# Patient Record
Sex: FEMALE | Race: WHITE | Employment: OTHER | URBAN - NONMETROPOLITAN AREA
[De-identification: names, ages, dates, MRNs, and addresses within clinical notes are randomized per-mention and may not be internally consistent; named-entity substitution may affect disease eponyms.]

---

## 2017-01-05 ENCOUNTER — HOSPITAL ENCOUNTER (OUTPATIENT)
Dept: GENERAL RADIOLOGY | Age: 82
Discharge: HOME | End: 2017-01-05
Payer: MEDICARE

## 2017-01-05 DIAGNOSIS — R06.02 SHORTNESS OF BREATH: ICD-10-CM

## 2017-01-05 PROCEDURE — 71020 XR CHEST STANDARD TWO VW: CPT

## 2017-01-18 RX ORDER — FERROUS SULFATE 325(65) MG
325 TABLET ORAL
COMMUNITY
End: 2017-01-25 | Stop reason: ALTCHOICE

## 2017-01-18 RX ORDER — LISINOPRIL 10 MG/1
10 TABLET ORAL DAILY
COMMUNITY
End: 2019-01-01 | Stop reason: DRUGHIGH

## 2017-01-18 RX ORDER — PAROXETINE 10 MG/1
10 TABLET, FILM COATED ORAL EVERY MORNING
Status: ON HOLD | COMMUNITY
End: 2019-01-01

## 2017-01-18 RX ORDER — ESOMEPRAZOLE MAGNESIUM 40 MG/1
40 CAPSULE, DELAYED RELEASE ORAL
COMMUNITY
End: 2019-01-01

## 2017-01-25 ENCOUNTER — OFFICE VISIT (OUTPATIENT)
Dept: CARDIOLOGY | Facility: CLINIC | Age: 82
End: 2017-01-25

## 2017-01-25 VITALS
HEART RATE: 96 BPM | BODY MASS INDEX: 24.74 KG/M2 | HEIGHT: 60 IN | WEIGHT: 126 LBS | SYSTOLIC BLOOD PRESSURE: 100 MMHG | DIASTOLIC BLOOD PRESSURE: 60 MMHG

## 2017-01-25 DIAGNOSIS — I10 ESSENTIAL HYPERTENSION: Primary | ICD-10-CM

## 2017-01-25 DIAGNOSIS — I48.20 CHRONIC ATRIAL FIBRILLATION (HCC): ICD-10-CM

## 2017-01-25 PROCEDURE — 99213 OFFICE O/P EST LOW 20 MIN: CPT | Performed by: INTERNAL MEDICINE

## 2017-01-25 NOTE — PROGRESS NOTES
Subjective    Heather Burgos is a 89 y.o. female.  - fu AFIB    History of Present Illness     CHRONIC AFIB:  No change in stamina, no cp , no light-headedness and no palpitations. Tolerating meds ok and no bleeding problems. Is taking Diclofenac daily which increases bleeding risk.    HTN:  BP is now low. She and her daughters think the Lisinopril dose has been reduced by her PCP but nor sure.    The following portions of the patient's history were reviewed and updated as appropriate: allergies, current medications, past family history, past medical history, past social history, past surgical history and problem list.    Patient Active Problem List   Diagnosis   • Essential hypertension   • Chronic atrial fibrillation       Allergies   Allergen Reactions   • Demerol [Meperidine]    • Erythromycin    • Morphine And Related        Family History   Problem Relation Age of Onset   • Stroke Mother      Age 90       Social History     Social History   • Marital status:      Spouse name: N/A   • Number of children: N/A   • Years of education: N/A     Occupational History   • Not on file.     Social History Main Topics   • Smoking status: Never Smoker   • Smokeless tobacco: Not on file   • Alcohol use No   • Drug use: No   • Sexual activity: Not on file     Other Topics Concern   • Not on file     Social History Narrative         Current Outpatient Prescriptions:   •  esomeprazole (nexIUM) 40 MG capsule, Take 40 mg by mouth Every Morning Before Breakfast., Disp: , Rfl:   •  lisinopril (PRINIVIL,ZESTRIL) 10 MG tablet, Take 10 mg by mouth Daily., Disp: , Rfl:   •  PARoxetine (PAXIL) 10 MG tablet, Take 10 mg by mouth Every Morning., Disp: , Rfl:   •  metoprolol tartrate (LOPRESSOR) 50 MG tablet, Take 1/2 tablet (25mg) PO BID, Disp: 30 tablet, Rfl: 2  •  rivaroxaban (XARELTO) 15 MG tablet, Take 1 tablet by mouth Daily., Disp: 30 tablet, Rfl: 11    Past Surgical History   Procedure Laterality Date   • Appendectomy     •  "Tonsillectomy     • Hysterectomy     • Tubal abdominal ligation         Review of Systems   Respiratory: Positive for shortness of breath. Negative for apnea and chest tightness.    Cardiovascular: Negative for chest pain, palpitations and leg swelling.   Gastrointestinal: Negative for abdominal pain.   Genitourinary: Negative for dysuria.   Musculoskeletal: Positive for arthralgias. Negative for myalgias.   Neurological: Negative for weakness and light-headedness.   Psychiatric/Behavioral: Negative for sleep disturbance.       Visit Vitals   • /60 (BP Location: Left arm, Patient Position: Sitting)   • Pulse 96   • Ht 60\" (152.4 cm)   • Wt 126 lb (57.2 kg)   • BMI 24.61 kg/m2     Procedures    Objective   Physical Exam   Constitutional: She is oriented to person, place, and time. She appears well-developed and well-nourished.   HENT:   Head: Normocephalic.   Cardiovascular: An irregularly irregular rhythm present.   Murmur heard.   Crescendo decrescendo systolic murmur is present with a grade of 2/6   At base.   Pulmonary/Chest: Effort normal and breath sounds normal. No respiratory distress. She has no wheezes. She has no rales.   Musculoskeletal: She exhibits no edema or tenderness.   Neurological: She is alert and oriented to person, place, and time.   Skin: Skin is warm and dry.   Psychiatric: She has a normal mood and affect.       Assessment/Plan   Heather was seen today for atrial fibrillation and hypertension.    Diagnoses and all orders for this visit:    Essential hypertension  Comments:  BP low now. May need to stop Lisinopril - she will discuss with PCP.    Chronic atrial fibrillation  Comments:  Well tolerated. Will reduce dose of Xarelto and try to reduce Diclofenac.    Other orders  -     rivaroxaban (XARELTO) 15 MG tablet; Take 1 tablet by mouth Daily.                 Return in about 1 year (around 1/25/2018) for Next scheduled follow up.  No orders of the defined types were placed in this " encounter.

## 2017-01-25 NOTE — MR AVS SNAPSHOT
Heather Burgos   1/25/2017 9:15 AM   Office Visit    Dept Phone:  [phone]   Encounter #:  [id number]    Provider:  Arpit Smith MD   Department:  [facility]                Your Full Care Plan              Today's Medication Changes          These changes are accurate as of: 1/25/17  9:46 AM.  If you have any questions, ask your nurse or doctor.               Medication(s)that have changed:     rivaroxaban 15 MG tablet   Commonly known as:  XARELTO   Take 1 tablet by mouth Daily.   What changed:    - medication strength  - how much to take  - when to take this   Changed by:  Arpit Smith MD         Stop taking medication(s)listed here:     DICLOFENAC SODIUM ER PO   Stopped by:  Arpit Smith MD           ferrous sulfate 325 (65 FE) MG tablet   Stopped by:  Arpit Smith MD                Where to Get Your Medications      These medications were sent to [facility]/pharmacy #[id number] - [place], [place] - [address] AT ACROSS FROM [address]  [phone] [facility] [phone]   [address] [facility] [address]    Hours:  24-hours Phone:  [phone]     rivaroxaban 15 MG tablet                  Your Updated Medication List          This list is accurate as of: 1/25/17  9:46 AM.  Always use your most recent med list.                esomeprazole 40 MG capsule   Commonly known as:  nexIUM       lisinopril 10 MG tablet   Commonly known as:  PRINIVIL,ZESTRIL       metoprolol tartrate 50 MG tablet   Commonly known as:  LOPRESSOR   Take 1/2 tablet (25mg) PO BID       PARoxetine 10 MG tablet   Commonly known as:  PAXIL       rivaroxaban 15 MG tablet   Commonly known as:  XARELTO   Take 1 tablet by mouth Daily.               You Were Diagnosed With        Codes Comments    Essential hypertension    -  Primary ICD-10-CM: I10  ICD-9-CM: 401.9     Chronic atrial fibrillation     ICD-10-CM: I48.2  ICD-9-CM: 427.31       Instructions     "None    Patient Instructions History      Upcoming Appointments     Visit Type Date Time Department    FOLLOW UP 2017  9:15 AM [facility]    FOLLOW UP 2018  9:15 AM [facility]      MyChart Signup     [facility] [employer] allows you to send messages to your doctor, view your test results, renew your prescriptions, schedule appointments, and more. To sign up, go to [employer] and click on the Sign Up Now link in the New User? box. Enter your [employer] Activation Code exactly as it appears below along with the last four digits of your Social Security Number and your Date of Birth () to complete the sign-up process. If you do not sign up before the expiration date, you must request a new code.    [employer] Activation Code: [email or web address]  Expires: 2017  9:45 AM    If you have questions, you can email [email or web address] or call [phone] to talk to our [employer] staff. Remember, [employer] is NOT to be used for urgent needs. For medical emergencies, dial 911.               Other Info from Your Visit           Your Appointments     2018  9:15 AM CST   Follow Up with Arpit Smith MD   [facility] (--)    [address] [place] [id number] [phone]           Arrive 15 minutes prior to appointment.              Allergies     Demerol [Meperidine]      Erythromycin      Morphine And Related        Reason for Visit     Atrial Fibrillation medication refill's    Hypertension           Vital Signs     Blood Pressure Pulse Height Weight Body Mass Index Smoking Status    100/60 (BP Location: Left arm, Patient Position: Sitting) 96 60\" (152.4 cm) 126 lb (57.2 kg) 24.61 kg/m2 Never Smoker      Problems and Diagnoses Noted     Atrial fibrillation (irregular heartbeat)    High blood pressure        "

## 2017-03-12 ENCOUNTER — HOSPITAL ENCOUNTER (INPATIENT)
Facility: HOSPITAL | Age: 82
LOS: 6 days | Discharge: HOME HEALTH | End: 2017-03-18
Attending: EMERGENCY MEDICINE | Admitting: FAMILY MEDICINE

## 2017-03-12 ENCOUNTER — APPOINTMENT (OUTPATIENT)
Dept: GENERAL RADIOLOGY | Facility: HOSPITAL | Age: 82
End: 2017-03-12

## 2017-03-12 DIAGNOSIS — A41.9 SEPSIS, DUE TO UNSPECIFIED ORGANISM: ICD-10-CM

## 2017-03-12 DIAGNOSIS — J18.9 PNEUMONIA OF RIGHT UPPER LOBE DUE TO INFECTIOUS ORGANISM: Primary | ICD-10-CM

## 2017-03-12 DIAGNOSIS — Z78.9 DECREASED ACTIVITIES OF DAILY LIVING (ADL): ICD-10-CM

## 2017-03-12 DIAGNOSIS — Z74.09 IMPAIRED MOBILITY: ICD-10-CM

## 2017-03-12 DIAGNOSIS — R06.00 DYSPNEA, UNSPECIFIED TYPE: ICD-10-CM

## 2017-03-12 DIAGNOSIS — I48.91 ATRIAL FIBRILLATION WITH RVR (HCC): ICD-10-CM

## 2017-03-12 DIAGNOSIS — J11.1 INFLUENZA: ICD-10-CM

## 2017-03-12 PROBLEM — J10.1 INFLUENZA A: Status: ACTIVE | Noted: 2017-03-12

## 2017-03-12 LAB
ALBUMIN SERPL-MCNC: 3.7 G/DL (ref 3.5–5)
ALBUMIN/GLOB SERPL: 1.2 G/DL (ref 1.1–2.5)
ALP SERPL-CCNC: 83 U/L (ref 24–120)
ALT SERPL W P-5'-P-CCNC: 28 U/L (ref 0–54)
AMYLASE SERPL-CCNC: 42 U/L (ref 30–110)
ANION GAP SERPL CALCULATED.3IONS-SCNC: 13 MMOL/L (ref 4–13)
APTT PPP: 35.4 SECONDS (ref 24.1–34.8)
AST SERPL-CCNC: 39 U/L (ref 7–45)
BACTERIA UR QL AUTO: ABNORMAL /HPF
BASOPHILS # BLD AUTO: 0.01 10*3/MM3 (ref 0–0.2)
BASOPHILS NFR BLD AUTO: 0.1 % (ref 0–2)
BILIRUB SERPL-MCNC: 0.7 MG/DL (ref 0.1–1)
BILIRUB UR QL STRIP: NEGATIVE
BUN BLD-MCNC: 13 MG/DL (ref 5–21)
BUN/CREAT SERPL: 30.2 (ref 7–25)
CALCIUM SPEC-SCNC: 9 MG/DL (ref 8.4–10.4)
CHLORIDE SERPL-SCNC: 92 MMOL/L (ref 98–110)
CLARITY UR: ABNORMAL
CO2 SERPL-SCNC: 28 MMOL/L (ref 24–31)
COLOR UR: ABNORMAL
CREAT BLD-MCNC: 0.43 MG/DL (ref 0.5–1.4)
D-LACTATE SERPL-SCNC: 1.5 MMOL/L (ref 0.5–2)
DEPRECATED RDW RBC AUTO: 45.4 FL (ref 40–54)
EOSINOPHIL # BLD AUTO: 0 10*3/MM3 (ref 0–0.7)
EOSINOPHIL NFR BLD AUTO: 0 % (ref 0–4)
ERYTHROCYTE [DISTWIDTH] IN BLOOD BY AUTOMATED COUNT: 13.8 % (ref 12–15)
FLUAV AG NPH QL: POSITIVE
FLUBV AG NPH QL IA: NEGATIVE
GFR SERPL CREATININE-BSD FRML MDRD: 138 ML/MIN/1.73
GLOBULIN UR ELPH-MCNC: 3.2 GM/DL
GLUCOSE BLD-MCNC: 148 MG/DL (ref 70–100)
GLUCOSE UR STRIP-MCNC: NEGATIVE MG/DL
HCT VFR BLD AUTO: 30 % (ref 37–47)
HGB BLD-MCNC: 9.6 G/DL (ref 12–16)
HGB UR QL STRIP.AUTO: ABNORMAL
HOLD SPECIMEN: NORMAL
HOLD SPECIMEN: NORMAL
HYALINE CASTS UR QL AUTO: ABNORMAL /LPF
IMM GRANULOCYTES # BLD: 0.04 10*3/MM3 (ref 0–0.03)
IMM GRANULOCYTES NFR BLD: 0.4 % (ref 0–5)
INR PPP: 1.26 (ref 0.91–1.09)
KETONES UR QL STRIP: NEGATIVE
LEUKOCYTE ESTERASE UR QL STRIP.AUTO: ABNORMAL
LIPASE SERPL-CCNC: 20 U/L (ref 23–203)
LYMPHOCYTES # BLD AUTO: 0.23 10*3/MM3 (ref 0.72–4.86)
LYMPHOCYTES NFR BLD AUTO: 2.2 % (ref 15–45)
MCH RBC QN AUTO: 28.7 PG (ref 28–32)
MCHC RBC AUTO-ENTMCNC: 32 G/DL (ref 33–36)
MCV RBC AUTO: 89.6 FL (ref 82–98)
MONOCYTES # BLD AUTO: 0.73 10*3/MM3 (ref 0.19–1.3)
MONOCYTES NFR BLD AUTO: 7 % (ref 4–12)
NEUTROPHILS # BLD AUTO: 9.41 10*3/MM3 (ref 1.87–8.4)
NEUTROPHILS NFR BLD AUTO: 90.3 % (ref 39–78)
NITRITE UR QL STRIP: POSITIVE
NT-PROBNP SERPL-MCNC: 2080 PG/ML (ref 0–1800)
PH UR STRIP.AUTO: 5.5 [PH] (ref 5–8)
PLATELET # BLD AUTO: 295 10*3/MM3 (ref 130–400)
PMV BLD AUTO: 9.2 FL (ref 6–12)
POTASSIUM BLD-SCNC: 4 MMOL/L (ref 3.5–5.3)
PROCALCITONIN SERPL-MCNC: 1.47 NG/ML
PROT SERPL-MCNC: 6.9 G/DL (ref 6.3–8.7)
PROT UR QL STRIP: ABNORMAL
PROTHROMBIN TIME: 16.2 SECONDS (ref 11.9–14.6)
RBC # BLD AUTO: 3.35 10*6/MM3 (ref 4.2–5.4)
RBC # UR: ABNORMAL /HPF
REF LAB TEST METHOD: ABNORMAL
SODIUM BLD-SCNC: 133 MMOL/L (ref 135–145)
SP GR UR STRIP: 1.03 (ref 1–1.03)
SQUAMOUS #/AREA URNS HPF: ABNORMAL /HPF
TROPONIN I SERPL-MCNC: 0 NG/ML (ref 0–0.07)
UROBILINOGEN UR QL STRIP: ABNORMAL
WBC NRBC COR # BLD: 10.42 10*3/MM3 (ref 4.8–10.8)
WBC UR QL AUTO: ABNORMAL /HPF
WHOLE BLOOD HOLD SPECIMEN: NORMAL
WHOLE BLOOD HOLD SPECIMEN: NORMAL

## 2017-03-12 PROCEDURE — 93010 ELECTROCARDIOGRAM REPORT: CPT | Performed by: INTERNAL MEDICINE

## 2017-03-12 PROCEDURE — 84484 ASSAY OF TROPONIN QUANT: CPT

## 2017-03-12 PROCEDURE — 87804 INFLUENZA ASSAY W/OPTIC: CPT | Performed by: EMERGENCY MEDICINE

## 2017-03-12 PROCEDURE — 71010 HC CHEST PA OR AP: CPT

## 2017-03-12 PROCEDURE — 81001 URINALYSIS AUTO W/SCOPE: CPT | Performed by: EMERGENCY MEDICINE

## 2017-03-12 PROCEDURE — 25010000002 PIPERACILLIN-TAZOBACTAM: Performed by: EMERGENCY MEDICINE

## 2017-03-12 PROCEDURE — 25010000002 VANCOMYCIN: Performed by: INTERNAL MEDICINE

## 2017-03-12 PROCEDURE — 85025 COMPLETE CBC W/AUTO DIFF WBC: CPT | Performed by: EMERGENCY MEDICINE

## 2017-03-12 PROCEDURE — 80053 COMPREHEN METABOLIC PANEL: CPT | Performed by: EMERGENCY MEDICINE

## 2017-03-12 PROCEDURE — 94640 AIRWAY INHALATION TREATMENT: CPT

## 2017-03-12 PROCEDURE — 87088 URINE BACTERIA CULTURE: CPT | Performed by: EMERGENCY MEDICINE

## 2017-03-12 PROCEDURE — 93005 ELECTROCARDIOGRAM TRACING: CPT

## 2017-03-12 PROCEDURE — 25010000002 CEFTRIAXONE: Performed by: NURSE PRACTITIONER

## 2017-03-12 PROCEDURE — 87040 BLOOD CULTURE FOR BACTERIA: CPT | Performed by: EMERGENCY MEDICINE

## 2017-03-12 PROCEDURE — 93005 ELECTROCARDIOGRAM TRACING: CPT | Performed by: EMERGENCY MEDICINE

## 2017-03-12 PROCEDURE — 87186 SC STD MICRODIL/AGAR DIL: CPT | Performed by: EMERGENCY MEDICINE

## 2017-03-12 PROCEDURE — 25010000002 LEVALBUTEROL PER 0.5 MG: Performed by: NURSE PRACTITIONER

## 2017-03-12 PROCEDURE — 83605 ASSAY OF LACTIC ACID: CPT | Performed by: EMERGENCY MEDICINE

## 2017-03-12 PROCEDURE — 87086 URINE CULTURE/COLONY COUNT: CPT | Performed by: EMERGENCY MEDICINE

## 2017-03-12 PROCEDURE — 82150 ASSAY OF AMYLASE: CPT | Performed by: EMERGENCY MEDICINE

## 2017-03-12 PROCEDURE — 99284 EMERGENCY DEPT VISIT MOD MDM: CPT

## 2017-03-12 PROCEDURE — 83690 ASSAY OF LIPASE: CPT | Performed by: EMERGENCY MEDICINE

## 2017-03-12 PROCEDURE — 94799 UNLISTED PULMONARY SVC/PX: CPT

## 2017-03-12 PROCEDURE — 85610 PROTHROMBIN TIME: CPT | Performed by: EMERGENCY MEDICINE

## 2017-03-12 PROCEDURE — 84145 PROCALCITONIN (PCT): CPT | Performed by: EMERGENCY MEDICINE

## 2017-03-12 PROCEDURE — 85730 THROMBOPLASTIN TIME PARTIAL: CPT | Performed by: EMERGENCY MEDICINE

## 2017-03-12 PROCEDURE — 83880 ASSAY OF NATRIURETIC PEPTIDE: CPT | Performed by: EMERGENCY MEDICINE

## 2017-03-12 RX ORDER — IPRATROPIUM BROMIDE AND ALBUTEROL SULFATE 2.5; .5 MG/3ML; MG/3ML
3 SOLUTION RESPIRATORY (INHALATION) EVERY 6 HOURS PRN
COMMUNITY
End: 2019-01-01 | Stop reason: ALTCHOICE

## 2017-03-12 RX ORDER — DOCUSATE SODIUM 100 MG/1
100 CAPSULE, LIQUID FILLED ORAL 2 TIMES DAILY
Status: DISCONTINUED | OUTPATIENT
Start: 2017-03-12 | End: 2017-03-18 | Stop reason: HOSPADM

## 2017-03-12 RX ORDER — ONDANSETRON 2 MG/ML
4 INJECTION INTRAMUSCULAR; INTRAVENOUS EVERY 6 HOURS PRN
Status: DISCONTINUED | OUTPATIENT
Start: 2017-03-12 | End: 2017-03-18 | Stop reason: HOSPADM

## 2017-03-12 RX ORDER — LEVALBUTEROL INHALATION SOLUTION 1.25 MG/3ML
1.25 SOLUTION RESPIRATORY (INHALATION)
Status: DISCONTINUED | OUTPATIENT
Start: 2017-03-12 | End: 2017-03-18 | Stop reason: HOSPADM

## 2017-03-12 RX ORDER — PAROXETINE 10 MG/1
10 TABLET, FILM COATED ORAL EVERY MORNING
Status: DISCONTINUED | OUTPATIENT
Start: 2017-03-13 | End: 2017-03-18 | Stop reason: HOSPADM

## 2017-03-12 RX ORDER — ACETAMINOPHEN 325 MG/1
650 TABLET ORAL EVERY 4 HOURS PRN
Status: DISCONTINUED | OUTPATIENT
Start: 2017-03-12 | End: 2017-03-18 | Stop reason: HOSPADM

## 2017-03-12 RX ORDER — OSELTAMIVIR PHOSPHATE 75 MG/1
75 CAPSULE ORAL EVERY 12 HOURS SCHEDULED
Status: COMPLETED | OUTPATIENT
Start: 2017-03-12 | End: 2017-03-12

## 2017-03-12 RX ORDER — ALBUTEROL SULFATE 2.5 MG/3ML
2.5 SOLUTION RESPIRATORY (INHALATION) ONCE
Status: COMPLETED | OUTPATIENT
Start: 2017-03-12 | End: 2017-03-12

## 2017-03-12 RX ORDER — SODIUM CHLORIDE 0.9 % (FLUSH) 0.9 %
1-10 SYRINGE (ML) INJECTION AS NEEDED
Status: DISCONTINUED | OUTPATIENT
Start: 2017-03-12 | End: 2017-03-18 | Stop reason: HOSPADM

## 2017-03-12 RX ORDER — ALBUTEROL SULFATE 90 UG/1
2 AEROSOL, METERED RESPIRATORY (INHALATION) EVERY 6 HOURS PRN
COMMUNITY
End: 2019-01-01 | Stop reason: SDUPTHER

## 2017-03-12 RX ORDER — OSELTAMIVIR PHOSPHATE 75 MG/1
75 CAPSULE ORAL EVERY 12 HOURS SCHEDULED
Status: DISCONTINUED | OUTPATIENT
Start: 2017-03-13 | End: 2017-03-17

## 2017-03-12 RX ORDER — MAGNESIUM HYDROXIDE/ALUMINUM HYDROXICE/SIMETHICONE 120; 1200; 1200 MG/30ML; MG/30ML; MG/30ML
30 SUSPENSION ORAL EVERY 6 HOURS PRN
Status: DISCONTINUED | OUTPATIENT
Start: 2017-03-12 | End: 2017-03-18 | Stop reason: HOSPADM

## 2017-03-12 RX ORDER — ACETAMINOPHEN 325 MG/1
650 TABLET ORAL ONCE
Status: COMPLETED | OUTPATIENT
Start: 2017-03-12 | End: 2017-03-12

## 2017-03-12 RX ORDER — FUROSEMIDE 20 MG/1
20 TABLET ORAL DAILY
COMMUNITY

## 2017-03-12 RX ORDER — ACETAMINOPHEN 325 MG/1
650 TABLET ORAL 2 TIMES DAILY PRN
COMMUNITY
End: 2017-12-24 | Stop reason: HOSPADM

## 2017-03-12 RX ORDER — SODIUM CHLORIDE 9 MG/ML
125 INJECTION, SOLUTION INTRAVENOUS CONTINUOUS
Status: DISCONTINUED | OUTPATIENT
Start: 2017-03-12 | End: 2017-03-13

## 2017-03-12 RX ADMIN — DOCUSATE SODIUM 100 MG: 100 CAPSULE ORAL at 21:15

## 2017-03-12 RX ADMIN — SODIUM CHLORIDE 125 ML/HR: 9 INJECTION, SOLUTION INTRAVENOUS at 15:33

## 2017-03-12 RX ADMIN — LEVALBUTEROL HYDROCHLORIDE 1.25 MG: 1.25 SOLUTION RESPIRATORY (INHALATION) at 20:26

## 2017-03-12 RX ADMIN — PIPERACILLIN SODIUM,TAZOBACTAM SODIUM 3.38 G: 3; .375 INJECTION, POWDER, FOR SOLUTION INTRAVENOUS at 15:39

## 2017-03-12 RX ADMIN — METOPROLOL TARTRATE 25 MG: 25 TABLET, FILM COATED ORAL at 21:15

## 2017-03-12 RX ADMIN — ACETAMINOPHEN 650 MG: 325 TABLET ORAL at 15:37

## 2017-03-12 RX ADMIN — OSELTAMIVIR PHOSPHATE 75 MG: 75 CAPSULE ORAL at 17:27

## 2017-03-12 RX ADMIN — RIVAROXABAN 15 MG: 15 TABLET, FILM COATED ORAL at 21:15

## 2017-03-12 RX ADMIN — VANCOMYCIN HYDROCHLORIDE 500 MG: 500 INJECTION, POWDER, LYOPHILIZED, FOR SOLUTION INTRAVENOUS at 22:03

## 2017-03-12 RX ADMIN — ALBUTEROL SULFATE 2.5 MG: 2.5 SOLUTION RESPIRATORY (INHALATION) at 15:28

## 2017-03-12 RX ADMIN — CEFTRIAXONE 1 G: 1 INJECTION, POWDER, FOR SOLUTION INTRAMUSCULAR; INTRAVENOUS at 21:16

## 2017-03-12 NOTE — ED NOTES
Patient c/o shortness of breath and cough, worsening since Thursday. Patient reports productive cough with green/gray colored sputum. Patient denies fever and chills. Patient denies chest pain. Patient's family states patient was treated for pneumonia twice in the last year.      Obdulia Kruger RN  03/12/17 1513

## 2017-03-12 NOTE — H&P
"    [facility] Medicine Services  HISTORY AND PHYSICAL    Date of Admission: 3/12/2017  Primary Care Physician: Lupe Ham MD    Subjective     Chief Complaint: Shortness of Breath    URI    Associated symptoms include coughing.   Shortness of Breath   Associated symptoms include a fever.     Patient is a 89-year-old  female with past medical history significant for atrial fibrillation (on outpatient anticoagulation with Xarelto), hypertension, and GERD who presented to our hospital today with a chief complaint of shortness of breath.  Patient reports that she started feeling poorly this past Thursday.  She started having some symptoms of fever and chills that may have started around that time as well, as daughter who is at bedside reports that when she checked on her she appeared to be warm, but then evidently she started feeling better and appeared to rally.  Over the course of the past 24 hours she is now struggled with worsening shortness of breath.  This is particularly worse at night when she attempts to rest.  She reports having a cough productive of dark brown sputum.  She has been having some malaise, but no significant myalgias.  She denies rigors.  She reports no chest pain.  She denies palpitations.  She has had no recent nausea or vomiting.  She states that she did not get the flu shot (\" I have never got one\").  She denies any known sick contacts, however she has been active out in the public going to different [employer] functions were she has been around many different people.      Review of Systems   Constitutional: Positive for fever.   HENT: Negative.    Eyes: Negative.    Respiratory: Positive for cough and shortness of breath.    Cardiovascular: Negative.    Gastrointestinal: Negative.    Endocrine: Negative.    Neurological: Negative.    Hematological: Negative.    Psychiatric/Behavioral: Negative.         Otherwise complete ROS reviewed and " negative except as mentioned in the HPI.      Past Medical History:   Past Medical History   Diagnosis Date   • Atrial fibrillation with rapid ventricular response    • Degenerative joint disease    • Diastolic murmur of aorta    • Dysphagia    • Essential hypertension    • Pneumonia        Past Surgical History:  Past Surgical History   Procedure Laterality Date   • Appendectomy     • Tonsillectomy     • Hysterectomy     • Tubal abdominal ligation         Social History:  reports that she has never smoked. She does not have any smokeless tobacco history on file. She reports that she does not drink alcohol or use illicit drugs.    Family History: family history includes Stroke in her mother.       Allergies:  Allergies   Allergen Reactions   • Erythromycin Hives and Swelling   • Demerol [Meperidine] Nausea And Vomiting and GI Intolerance   • Morphine And Related Nausea And Vomiting       Medications:  Prior to Admission medications    Medication Sig Start Date End Date Taking? Authorizing Provider   acetaminophen (TYLENOL) 325 MG tablet Take 650 mg by mouth 2 (Two) Times a Day As Needed for Mild Pain (1-3).   Yes Historical Provider, MD   albuterol (PROVENTIL HFA;VENTOLIN HFA) 108 (90 BASE) MCG/ACT inhaler Inhale 2 puffs Every 6 (Six) Hours As Needed for Wheezing or shortness of air.   Yes Historical Provider, MD   esomeprazole (nexIUM) 40 MG capsule Take 40 mg by mouth Every Morning Before Breakfast.   Yes Historical Provider, MD   furosemide (LASIX) 20 MG tablet Take 20 mg by mouth Daily.   Yes Historical Provider, MD   ipratropium-albuterol (DUO-NEB) 0.5-2.5 mg/mL nebulizer Take 3 mL by nebulization Every 6 (Six) Hours As Needed for Wheezing or shortness of air.   Yes Historical Provider, MD   lisinopril (PRINIVIL,ZESTRIL) 10 MG tablet Take 10 mg by mouth Daily As Needed (for blood pressure >120).   Yes Historical Provider, MD   metoprolol tartrate (LOPRESSOR) 25 MG tablet Take 25 mg by mouth 2 (Two) Times a Day.  "  Yes Historical Provider, MD   PARoxetine (PAXIL) 10 MG tablet Take 10 mg by mouth Every Morning.   Yes Historical Provider, MD   rivaroxaban (XARELTO) 15 MG tablet Take 15 mg by mouth Every Night.   Yes Historical Provider, MD   metoprolol tartrate (LOPRESSOR) 50 MG tablet Take 1/2 tablet (25mg) PO BID 11/22/16 3/12/17  Arpit Smith MD   rivaroxaban (XARELTO) 15 MG tablet Take 1 tablet by mouth Daily.  Patient taking differently: Take 15 mg by mouth Every Night. 1/25/17 3/12/17  Arpit Smith MD       Objective     Vital Signs:   Visit Vitals   • /73   • Pulse 108   • Temp (!) 102.7 °F (39.3 °C) (Rectal)   • Resp 22   • Ht 61\" (154.9 cm)   • Wt 125 lb (56.7 kg)   • SpO2 98%   • BMI 23.62 kg/m2     Physical Exam   Constitutional: She appears well-developed. No distress.   HENT:   Head: Normocephalic.   Mouth/Throat: No oropharyngeal exudate.   Eyes: Pupils are equal, round, and reactive to light. No scleral icterus.   Neck: No tracheal deviation present.   Cardiovascular:   Tachycardic; irregularly irregular with rate approx 110   Pulmonary/Chest: Effort normal. No respiratory distress. She has no wheezes. She has rales (rales and coarse BSs more prominent on the right).   Abdominal: Soft. There is no tenderness.   Musculoskeletal: She exhibits no edema.   Neurological: She is alert. No cranial nerve deficit.   Skin: Skin is warm and dry.   Psychiatric: She has a normal mood and affect. Her behavior is normal.   Vitals reviewed.    Results Reviewed:  Lab Results (last 24 hours)     Procedure Component Value Units Date/Time    Light Blue Top [[id number]] Collected:  03/12/17 1455    Specimen:  Blood from Arm, Right Updated:  03/12/17 1508    Lavender Top [[id number]] Collected:  03/12/17 1455    Specimen:  Blood from Arm, Right Updated:  03/12/17 1508    Red Top [[id number]] Collected:  03/12/17 1455    Specimen:  Blood from Arm, Right Updated:  03/12/17 1508    Green Top (Gel) [[id number]] " Collected:  03/12/17 1455    Specimen:  Blood from Arm, Right Updated:  03/12/17 1508    POC Troponin, Rapid [[id number]]  (Normal) Collected:  03/12/17 1508    Specimen:  Blood Updated:  03/12/17 1525     Troponin I 0.00 ng/mL       Serial Number: [id number]    : [id number]       Keyes Draw [[id number]] Collected:  03/12/17 1455    Specimen:  Blood Updated:  03/12/17 1542    Narrative:       The following orders were created for panel order [place] Draw.  Procedure                               Abnormality         Status                     ---------                               -----------         ------                     Light Blue Top[[id number]]                                    In process                 Green Top (Gel)[[id number]]                                   In process                 Lavender Top[[id number]]                                      In process                 Red Top[[id number]]                                           In process                 Green Top (No Gel)[[id number]]                                                             Please view results for these tests on the individual orders.    Comprehensive Metabolic Panel [[id number]]  (Abnormal) Collected:  03/12/17 1455    Specimen:  Blood from Arm, Right Updated:  03/12/17 1555     Glucose 148 (H) mg/dL      BUN 13 mg/dL      Creatinine 0.43 (L) mg/dL      Sodium 133 (L) mmol/L      Potassium 4.0 mmol/L      Chloride 92 (L) mmol/L      CO2 28.0 mmol/L      Calcium 9.0 mg/dL      Total Protein 6.9 g/dL      Albumin 3.70 g/dL      ALT (SGPT) 28 U/L      AST (SGOT) 39 U/L      Alkaline Phosphatase 83 U/L      Total Bilirubin 0.7 mg/dL      eGFR Non African Amer 138 mL/min/1.73      Globulin 3.2 gm/dL      A/G Ratio 1.2 g/dL      BUN/Creatinine Ratio 30.2 (H)      Anion Gap 13.0 mmol/L     Narrative:       The MDRD GFR formula is only valid for adults with stable renal function between ages 18 and 70.    Amylase [[id number]]  (Normal)  Collected:  03/12/17 1455    Specimen:  Blood from Arm, Right Updated:  03/12/17 1555     Amylase 42 U/L     Lipase [[id number]]  (Abnormal) Collected:  03/12/17 1455    Specimen:  Blood from Arm, Right Updated:  03/12/17 1555     Lipase 20 (L) U/L     Lactic Acid, Plasma [[id number]]  (Normal) Collected:  03/12/17 1505    Specimen:  Blood from Arm, Left Updated:  03/12/17 1559     Lactate 1.5 mmol/L     BNP [[id number]]  (Abnormal) Collected:  03/12/17 1455    Specimen:  Blood from Arm, Right Updated:  03/12/17 1603     proBNP 2080.0 (H) pg/mL     CBC & Differential [[id number]] Collected:  03/12/17 1455    Specimen:  Blood Updated:  03/12/17 1607    Narrative:       The following orders were created for panel order CBC & Differential.  Procedure                               Abnormality         Status                     ---------                               -----------         ------                     CBC Auto Differential[[id number]]         Abnormal            Final result                 Please view results for these tests on the individual orders.    CBC Auto Differential [[id number]]  (Abnormal) Collected:  03/12/17 1455    Specimen:  Blood from Arm, Right Updated:  03/12/17 1607     WBC 10.42 10*3/mm3      RBC 3.35 (L) 10*6/mm3      Hemoglobin 9.6 (L) g/dL      Hematocrit 30.0 (L) %      MCV 89.6 fL      MCH 28.7 pg      MCHC 32.0 (L) g/dL      RDW 13.8 %      RDW-SD 45.4 fl      MPV 9.2 fL      Platelets 295 10*3/mm3      Neutrophil % 90.3 (H) %      Lymphocyte % 2.2 (L) %      Monocyte % 7.0 %      Eosinophil % 0.0 %      Basophil % 0.1 %      Immature Grans % 0.4 %      Neutrophils, Absolute 9.41 (H) 10*3/mm3      Lymphocytes, Absolute 0.23 (L) 10*3/mm3      Monocytes, Absolute 0.73 10*3/mm3      Eosinophils, Absolute 0.00 10*3/mm3      Basophils, Absolute 0.01 10*3/mm3      Immature Grans, Absolute 0.04 (H) 10*3/mm3     Procalcitonin [[id number]]  (Abnormal) Collected:  03/12/17 1455    Specimen:  Blood from Arm,  Right Updated:  03/12/17 1611     Procalcitonin 1.47 (H) ng/mL     Narrative:       SIRS, sepsis, severe sepsis, and septic shock are categorized according to the criteria of the consensus conference of the American College of Chest Physicians/Society of Critical Care Medicine.    PCT < 0.5 ng/mL     Systemic infection (sepsis) is not likely.    PCT >0.5 and < 2.0 ng/mL Systemic infection (sepsis) is possible, but other conditions are known to elevate PCT as well.    PCT > 2.0 ng/mL     Systemic infection (sepsis) is likely, unless other causes are known.      PCT > 10.0 ng/mL    Important systemic inflammatory response, almost exclusively due to severe bacterial sepsis or septic shock.    PCT values of < 0.5 ng/mL do not exclude an infection, because localized infections (without systemic signs) may be associated with such low concentrations, or a systemic infection in its initial stages (<6 hours).  Increased PCT can occur without infection.  PCT concentrations between 0.5 and 2.0 ng/mL should be interpreted taking into account the patients history.  It is recommended to retest PCT within 6-24 hours if any concentrations < 2.0 ng/mL are obtained.    Protime-INR [[id number]]  (Abnormal) Collected:  03/12/17 1455    Specimen:  Blood from Arm, Right Updated:  03/12/17 1611     Protime 16.2 (H) Seconds      INR 1.26 (H)     aPTT [[id number]]  (Abnormal) Collected:  03/12/17 1455    Specimen:  Blood from Arm, Right Updated:  03/12/17 1611     PTT 35.4 (H) seconds     Influenza Antigen [[id number]]  (Abnormal) Collected:  03/12/17 1530    Specimen:  Swab from Nasopharynx Updated:  03/12/17 1623     Influenza A Ag, EIA Positive (A)      Influenza B Ag, EIA Negative     Narrative:         Recommend confirmation of negative results by viral culture or molecular assay.    Blood Culture [[id number]] Collected:  03/12/17 1455    Specimen:  Blood from Arm, Right Updated:  03/12/17 1636    Blood Culture [[id number]] Collected:  03/12/17  1505    Specimen:  Blood from Arm, Left Updated:  03/12/17 1636        Imaging Results (last 24 hours)     Procedure Component Value Units Date/Time    XR Chest 1 View [[id number]] Collected:  03/12/17 1556     Updated:  03/12/17 1559    Narrative:       EXAMINATION: XR CHEST 1 VW- 3/12/2017 3:56 PM CDT     HISTORY: Shortness of breath.     REPORT: Comparison is made with the study from 06/20/2016.     There is no area of focal infiltrate in the right upper lobe compatible  with acute pneumonia. The lungs are mildly hypoaerated. No infiltrate is  seen on the left. There is borderline cardiomegaly, moderate thoracic  aortic ectasia. There is a probable moderate-sized hiatal hernia. There  are stable severe degenerative changes of both shoulders.       Impression:       Acute right upper lobe pneumonia. Follow-up is recommended.  This report was finalized on 03/12/2017 15:57 by Dr. Nba Deras MD.          I have personally reviewed and interpreted the radiology studies and ECG obtained at time of admission.     Assessment / Plan     Assessment & Plan  Hospital Problem List     Pneumonia of right upper lobe due to infectious organism    Influenza A        Assessment:  1.  Influenza A  2.  Acute right upper lobe pneumonia (?viral pneumonia vs. Secondary bacterial infection)  3.  Chronic Atrial Fibrillation with Rapid Ventricular Response  4.  Advanced Age  5.  GERD  6.  HTN  7.  Hyponatremia    Plan:  1.  Tamiflu  2.  IV Vanco and Rocephin for now - cover for secondary bacterial infection in the setting of acute influenza (coverage against Staph aureus and Strep pneumoniae).  3.  Check respiratory culture  4.  Check Echo - recent symptoms of orthopnea; elevated pro-BNP; known h/o AFIB  5.  Continuous telemetry  6.  IVFs - monitor volume status closely  7.  Continue outpatient Xarelto regimen; PO Metoprolol  8.  Workup ongoing    Code Status: DNR/aggressive     I discussed the patients findings and my recommendations  with patient and two daughters at bedside    Navid Milan MD   03/12/17   5:31 PM

## 2017-03-12 NOTE — ED PROVIDER NOTES
Subjective URI and shortness of breath.  History of Present Illness  He is a 89-year-old white female with chief complaint of shortness of breath today.  Her states that she has been short of breath for the last 2 days.  The shortness of breath was very bad yesterday morning but it did improve.  This morning is gotten progressively worse and it has stayed bad the whole entire day.  She has had a very congested cough home and the shortness of breath they decided they would come to the emergency room to be evaluated.  She has had a fever.  Was very concerned because she had a very rapid heart rate.  She does have a long-standing history of A. fib but her rate has generally been very well-controlled.  They have noted over the last several weeks she has been hypotensive and have been holding her lisinopril as result of it.  She does states she just is not feeling well as she does admit that she is short of breath.  Temperature upon arrival in the emergency room 102.7.  Review of Systems   Constitutional: Positive for appetite change, fatigue and fever.   HENT: Positive for congestion.    Eyes: Negative.    Respiratory: Positive for cough and shortness of breath.    Cardiovascular: Positive for palpitations.   Gastrointestinal: Negative.  Negative for nausea and vomiting.   Endocrine: Negative.    Genitourinary: Negative.    Musculoskeletal: Negative.  Negative for back pain and myalgias.   Skin: Negative.    Allergic/Immunologic: Negative.    Neurological: Negative.  Negative for dizziness and syncope.   Hematological: Negative.    Psychiatric/Behavioral: Negative.    All other systems reviewed and are negative.      Past Medical History   Diagnosis Date   • Atrial fibrillation with rapid ventricular response    • Degenerative joint disease    • Diastolic murmur of aorta    • Dysphagia    • Essential hypertension    • Pneumonia        Allergies   Allergen Reactions   • Erythromycin Hives and Swelling   • Demerol  [Meperidine] Nausea And Vomiting and GI Intolerance   • Morphine And Related Nausea And Vomiting       Past Surgical History   Procedure Laterality Date   • Appendectomy     • Tonsillectomy     • Hysterectomy     • Tubal abdominal ligation         Family History   Problem Relation Age of Onset   • Stroke Mother      Age 90       Social History     Social History   • Marital status:      Spouse name: N/A   • Number of children: N/A   • Years of education: N/A     Social History Main Topics   • Smoking status: Never Smoker   • Smokeless tobacco: None   • Alcohol use No   • Drug use: No   • Sexual activity: Not Asked     Other Topics Concern   • None     Social History Narrative   • None       Prior to Admission medications    Medication Sig Start Date End Date Taking? Authorizing Provider   acetaminophen (TYLENOL) 325 MG tablet Take 650 mg by mouth 2 (Two) Times a Day As Needed for Mild Pain (1-3).   Yes Historical Provider, MD   albuterol (PROVENTIL HFA;VENTOLIN HFA) 108 (90 BASE) MCG/ACT inhaler Inhale 2 puffs Every 6 (Six) Hours As Needed for Wheezing or shortness of air.   Yes Historical Provider, MD   esomeprazole (nexIUM) 40 MG capsule Take 40 mg by mouth Every Morning Before Breakfast.   Yes Historical Provider, MD   furosemide (LASIX) 20 MG tablet Take 20 mg by mouth Daily.   Yes Historical Provider, MD   ipratropium-albuterol (DUO-NEB) 0.5-2.5 mg/mL nebulizer Take 3 mL by nebulization Every 6 (Six) Hours As Needed for Wheezing or shortness of air.   Yes Historical Provider, MD   lisinopril (PRINIVIL,ZESTRIL) 10 MG tablet Take 10 mg by mouth Daily As Needed (for blood pressure >120).   Yes Historical Provider, MD   metoprolol tartrate (LOPRESSOR) 25 MG tablet Take 25 mg by mouth 2 (Two) Times a Day.   Yes Historical Provider, MD   PARoxetine (PAXIL) 10 MG tablet Take 10 mg by mouth Every Morning.   Yes Historical Provider, MD   rivaroxaban (XARELTO) 15 MG tablet Take 15 mg by mouth Every Night.   Yes  Historical Provider, MD   metoprolol tartrate (LOPRESSOR) 50 MG tablet Take 1/2 tablet (25mg) PO BID 11/22/16 3/12/17  Arpit Smith MD   rivaroxaban (XARELTO) 15 MG tablet Take 1 tablet by mouth Daily.  Patient taking differently: Take 15 mg by mouth Every Night. 1/25/17 3/12/17  Arpit Smith MD       Medications   sodium chloride 0.9 % infusion (125 mL/hr Intravenous New Bag 3/12/17 1533)   oseltamivir (TAMIFLU) capsule 75 mg (not administered)   albuterol (PROVENTIL) nebulizer solution 0.083% 2.5 mg/3mL (2.5 mg Nebulization Given 3/12/17 1528)   piperacillin-tazobactam (ZOSYN) 3.375 g/100 mL 0.9% NS IVPB (mbp) (0 g Intravenous Stopped 3/12/17 1652)   acetaminophen (TYLENOL) tablet 650 mg (650 mg Oral Given 3/12/17 1537)       Objective   Physical Exam   Constitutional: She is oriented to person, place, and time. She appears well-developed and well-nourished.   HENT:   Head: Normocephalic and atraumatic.   Right Ear: External ear normal.   Left Ear: External ear normal.   Nose: Nose normal.   Mouth/Throat: Oropharynx is clear and moist.   Eyes: Conjunctivae and EOM are normal. Pupils are equal, round, and reactive to light. Right eye exhibits no discharge. Left eye exhibits no discharge.   Neck: Normal range of motion. Neck supple. No thyromegaly present.   Cardiovascular: Normal heart sounds and intact distal pulses.  Exam reveals no friction rub.    No murmur heard.  Pulmonary/Chest: Effort normal. No respiratory distress. She has rales.   Noted congestion and rhonchi noted on the exam.    Abdominal: Soft. Bowel sounds are normal. She exhibits no distension. There is no tenderness.   Musculoskeletal: Normal range of motion. She exhibits no edema or deformity.   Neurological: She is alert and oriented to person, place, and time. She has normal reflexes. No cranial nerve deficit.   Skin: Skin is warm and dry. No rash noted.   Psychiatric: Judgment normal.   Nursing note and vitals  "reviewed.      Procedures         Visit Vitals   • /77   • Pulse 105   • Temp (!) 102.7 °F (39.3 °C) (Rectal)   • Resp (!) 31   • Ht 61\" (154.9 cm)   • Wt 125 lb (56.7 kg)   • SpO2 96%   • BMI 23.62 kg/m2       Lab Results (last 24 hours)     Procedure Component Value Units Date/Time    CBC & Differential [[id number]] Collected:  03/12/17 1455    Specimen:  Blood Updated:  03/12/17 1607    Narrative:       The following orders were created for panel order CBC & Differential.  Procedure                               Abnormality         Status                     ---------                               -----------         ------                     CBC Auto Differential[[id number]]         Abnormal            Final result                 Please view results for these tests on the individual orders.    Comprehensive Metabolic Panel [[id number]]  (Abnormal) Collected:  03/12/17 1455    Specimen:  Blood from Arm, Right Updated:  03/12/17 1555     Glucose 148 (H) mg/dL      BUN 13 mg/dL      Creatinine 0.43 (L) mg/dL      Sodium 133 (L) mmol/L      Potassium 4.0 mmol/L      Chloride 92 (L) mmol/L      CO2 28.0 mmol/L      Calcium 9.0 mg/dL      Total Protein 6.9 g/dL      Albumin 3.70 g/dL      ALT (SGPT) 28 U/L      AST (SGOT) 39 U/L      Alkaline Phosphatase 83 U/L      Total Bilirubin 0.7 mg/dL      eGFR Non African Amer 138 mL/min/1.73      Globulin 3.2 gm/dL      A/G Ratio 1.2 g/dL      BUN/Creatinine Ratio 30.2 (H)      Anion Gap 13.0 mmol/L     Narrative:       The MDRD GFR formula is only valid for adults with stable renal function between ages 18 and 70.    Protime-INR [[id number]]  (Abnormal) Collected:  03/12/17 1455    Specimen:  Blood from Arm, Right Updated:  03/12/17 1611     Protime 16.2 (H) Seconds      INR 1.26 (H)     aPTT [[id number]]  (Abnormal) Collected:  03/12/17 1455    Specimen:  Blood from Arm, Right Updated:  03/12/17 1611     PTT 35.4 (H) seconds     Amylase [[id number]]  (Normal) Collected:  " 03/12/17 1455    Specimen:  Blood from Arm, Right Updated:  03/12/17 1555     Amylase 42 U/L     Lipase [[id number]]  (Abnormal) Collected:  03/12/17 1455    Specimen:  Blood from Arm, Right Updated:  03/12/17 1555     Lipase 20 (L) U/L     Blood Culture [[id number]] Collected:  03/12/17 1455    Specimen:  Blood from Arm, Right Updated:  03/12/17 1636    Procalcitonin [[id number]]  (Abnormal) Collected:  03/12/17 1455    Specimen:  Blood from Arm, Right Updated:  03/12/17 1611     Procalcitonin 1.47 (H) ng/mL     Narrative:       SIRS, sepsis, severe sepsis, and septic shock are categorized according to the criteria of the consensus conference of the American College of Chest Physicians/Society of Critical Care Medicine.    PCT < 0.5 ng/mL     Systemic infection (sepsis) is not likely.    PCT >0.5 and < 2.0 ng/mL Systemic infection (sepsis) is possible, but other conditions are known to elevate PCT as well.    PCT > 2.0 ng/mL     Systemic infection (sepsis) is likely, unless other causes are known.      PCT > 10.0 ng/mL    Important systemic inflammatory response, almost exclusively due to severe bacterial sepsis or septic shock.    PCT values of < 0.5 ng/mL do not exclude an infection, because localized infections (without systemic signs) may be associated with such low concentrations, or a systemic infection in its initial stages (<6 hours).  Increased PCT can occur without infection.  PCT concentrations between 0.5 and 2.0 ng/mL should be interpreted taking into account the patients history.  It is recommended to retest PCT within 6-24 hours if any concentrations < 2.0 ng/mL are obtained.    BNP [[id number]]  (Abnormal) Collected:  03/12/17 1455    Specimen:  Blood from Arm, Right Updated:  03/12/17 1603     proBNP 2080.0 (H) pg/mL     CBC Auto Differential [[id number]]  (Abnormal) Collected:  03/12/17 1455    Specimen:  Blood from Arm, Right Updated:  03/12/17 1607     WBC 10.42 10*3/mm3      RBC 3.35 (L) 10*6/mm3       Hemoglobin 9.6 (L) g/dL      Hematocrit 30.0 (L) %      MCV 89.6 fL      MCH 28.7 pg      MCHC 32.0 (L) g/dL      RDW 13.8 %      RDW-SD 45.4 fl      MPV 9.2 fL      Platelets 295 10*3/mm3      Neutrophil % 90.3 (H) %      Lymphocyte % 2.2 (L) %      Monocyte % 7.0 %      Eosinophil % 0.0 %      Basophil % 0.1 %      Immature Grans % 0.4 %      Neutrophils, Absolute 9.41 (H) 10*3/mm3      Lymphocytes, Absolute 0.23 (L) 10*3/mm3      Monocytes, Absolute 0.73 10*3/mm3      Eosinophils, Absolute 0.00 10*3/mm3      Basophils, Absolute 0.01 10*3/mm3      Immature Grans, Absolute 0.04 (H) 10*3/mm3     Blood Culture [[id number]] Collected:  03/12/17 1505    Specimen:  Blood from Arm, Left Updated:  03/12/17 1636    Lactic Acid, Plasma [[id number]]  (Normal) Collected:  03/12/17 1505    Specimen:  Blood from Arm, Left Updated:  03/12/17 1559     Lactate 1.5 mmol/L     POC Troponin, Rapid [[id number]]  (Normal) Collected:  03/12/17 1508    Specimen:  Blood Updated:  03/12/17 1525     Troponin I 0.00 ng/mL       Serial Number: [id number]    : [id number]       Influenza Antigen [[id number]]  (Abnormal) Collected:  03/12/17 1530    Specimen:  Swab from Nasopharynx Updated:  03/12/17 1623     Influenza A Ag, EIA Positive (A)      Influenza B Ag, EIA Negative     Narrative:         Recommend confirmation of negative results by viral culture or molecular assay.          XR Chest 1 View   Final Result   Acute right upper lobe pneumonia. Follow-up is recommended.   This report was finalized on 03/12/2017 15:57 by Dr. Nba Deras MD.              ED Course  ED Course   Comment By Time   I have discussed the findings of all the tests with family.  She is influenza A positive and she has an acute right upper lobe pneumonia.  Her BNP is quite elevated 2080 at this point although she has 5 the sepsis screening does have a positive for calcitonin I have been more judicious and administration of the fluids because I do not want the patient  to be fluid overloaded.  She has received Zosyn as well as Tamiflu at this point I am giving her Tylenol because she had quite an elevated temperature 102.7.  She will be admitted to the services of the hospitalist. Petra Gaitan MD 03/12 0732          MDM  Number of Diagnoses or Management Options  Atrial fibrillation with RVR: new and requires workup  Dyspnea, unspecified type: new and requires workup  Influenza: new and requires workup  Pneumonia of right upper lobe due to infectious organism: new and requires workup  Sepsis, due to unspecified organism: new and requires workup     Amount and/or Complexity of Data Reviewed  Clinical lab tests: ordered and reviewed  Tests in the radiology section of CPT®: ordered and reviewed  Discuss the patient with other providers: yes    Risk of Complications, Morbidity, and/or Mortality  Presenting problems: high  Diagnostic procedures: high  Management options: high    Patient Progress  Patient progress: stable      Differential diagnosis included but was not limited to Dyspnea: Asthma, URI, Rhinitis, Sinusitis, COPD, Pneumonia and Drug interactions>>influenza. All labs and imaging results were reviewed by Petra Gaitan MD    Final diagnoses:   Pneumonia of right upper lobe due to infectious organism   Influenza   Sepsis, due to unspecified organism   Dyspnea, unspecified type   Atrial fibrillation with RVR        Petra Gaitan MD  03/12/17 2616

## 2017-03-12 NOTE — PROGRESS NOTES
"Pharmacokinetic Initial Note - Vancomycin    Heather Burgos is a 89 y.o. year old female  61\" (154.9 cm) 125 lb 3 oz (56.8 kg)    Estimated Creatinine Clearance: 35.3 mL/min (by C-G formula based on Cr of 0.43).   Lab Results   Component Value Date    CREATININE 0.43 (L) 03/12/2017    CREATININE 0.50 06/29/2016    CREATININE 0.38 (L) 06/28/2016    CREATININE 0.42 (L) 06/27/2016      Lab Results   Component Value Date    WBC 10.42 03/12/2017    WBC 9.91 06/29/2016    WBC 10.63 06/28/2016      Temp Readings from Last 1 Encounters:   03/12/17 99.3 °F (37.4 °C) (Temporal Artery )       Indication for use: Pneumonia    Baseline culture results: Pending    PLAN  Start Vancomycin 500 mg IVPB Q24H. Will order Vancomycin trough level prior to the 4th dose.  Pharmacy will monitor renal function and adjust dose accordingly.    Lorena Ricardo, [facility]  03/12/17 6:51 PM     "

## 2017-03-13 ENCOUNTER — APPOINTMENT (OUTPATIENT)
Dept: GENERAL RADIOLOGY | Facility: HOSPITAL | Age: 82
End: 2017-03-13

## 2017-03-13 ENCOUNTER — APPOINTMENT (OUTPATIENT)
Dept: CARDIOLOGY | Facility: HOSPITAL | Age: 82
End: 2017-03-13

## 2017-03-13 LAB
ANION GAP SERPL CALCULATED.3IONS-SCNC: 5 MMOL/L (ref 4–13)
BH CV ECHO MEAS - AO MAX PG (FULL): 15.2 MMHG
BH CV ECHO MEAS - AO MAX PG: 18.8 MMHG
BH CV ECHO MEAS - AO MEAN PG (FULL): 9 MMHG
BH CV ECHO MEAS - AO MEAN PG: 11 MMHG
BH CV ECHO MEAS - AO ROOT AREA (BSA CORRECTED): 2
BH CV ECHO MEAS - AO ROOT AREA: 7.5 CM^2
BH CV ECHO MEAS - AO ROOT DIAM: 3.1 CM
BH CV ECHO MEAS - AO V2 MAX: 217 CM/SEC
BH CV ECHO MEAS - AO V2 MEAN: 153 CM/SEC
BH CV ECHO MEAS - AO V2 VTI: 37.6 CM
BH CV ECHO MEAS - AVA(I,A): 0.96 CM^2
BH CV ECHO MEAS - AVA(I,D): 0.96 CM^2
BH CV ECHO MEAS - AVA(V,A): 1 CM^2
BH CV ECHO MEAS - AVA(V,D): 1 CM^2
BH CV ECHO MEAS - BSA(HAYCOCK): 1.6 M^2
BH CV ECHO MEAS - BSA: 1.6 M^2
BH CV ECHO MEAS - BZI_BMI: 22.9 KILOGRAMS/M^2
BH CV ECHO MEAS - BZI_METRIC_HEIGHT: 157.5 CM
BH CV ECHO MEAS - BZI_METRIC_WEIGHT: 56.7 KG
BH CV ECHO MEAS - CONTRAST EF 4CH: 60.2 ML/M^2
BH CV ECHO MEAS - EDV(CUBED): 48.2 ML
BH CV ECHO MEAS - EDV(MOD-SP4): 21 ML
BH CV ECHO MEAS - EDV(TEICH): 55.9 ML
BH CV ECHO MEAS - EF(CUBED): 65.6 %
BH CV ECHO MEAS - EF(MOD-SP4): 60.2 %
BH CV ECHO MEAS - EF(TEICH): 58 %
BH CV ECHO MEAS - ESV(CUBED): 16.6 ML
BH CV ECHO MEAS - ESV(MOD-SP4): 8.4 ML
BH CV ECHO MEAS - ESV(TEICH): 23.4 ML
BH CV ECHO MEAS - FS: 29.9 %
BH CV ECHO MEAS - IVS/LVPW: 0.9
BH CV ECHO MEAS - IVSD: 1 CM
BH CV ECHO MEAS - LA DIMENSION: 3.9 CM
BH CV ECHO MEAS - LA/AO: 1.3
BH CV ECHO MEAS - LAT PEAK E' VEL: 8.5 CM/SEC
BH CV ECHO MEAS - LV DIASTOLIC VOL/BSA (35-75): 13.4 ML/M^2
BH CV ECHO MEAS - LV MASS(C)D: 120.3 GRAMS
BH CV ECHO MEAS - LV MASS(C)DI: 76.8 GRAMS/M^2
BH CV ECHO MEAS - LV MAX PG: 3.7 MMHG
BH CV ECHO MEAS - LV MEAN PG: 2 MMHG
BH CV ECHO MEAS - LV SYSTOLIC VOL/BSA (12-30): 5.3 ML/M^2
BH CV ECHO MEAS - LV V1 MAX: 95.8 CM/SEC
BH CV ECHO MEAS - LV V1 MEAN: 63.7 CM/SEC
BH CV ECHO MEAS - LV V1 VTI: 15.9 CM
BH CV ECHO MEAS - LVIDD: 3.6 CM
BH CV ECHO MEAS - LVIDS: 2.6 CM
BH CV ECHO MEAS - LVLD AP4: 5.4 CM
BH CV ECHO MEAS - LVLS AP4: 5 CM
BH CV ECHO MEAS - LVOT AREA (M): 2.3 CM^2
BH CV ECHO MEAS - LVOT AREA: 2.3 CM^2
BH CV ECHO MEAS - LVOT DIAM: 1.7 CM
BH CV ECHO MEAS - LVPWD: 1.1 CM
BH CV ECHO MEAS - MV DEC TIME: 0.14 SEC
BH CV ECHO MEAS - MV E MAX VEL: 150 CM/SEC
BH CV ECHO MEAS - RAP SYSTOLE: 10 MMHG
BH CV ECHO MEAS - RVSP: 60.1 MMHG
BH CV ECHO MEAS - SI(AO): 181.3 ML/M^2
BH CV ECHO MEAS - SI(CUBED): 20.2 ML/M^2
BH CV ECHO MEAS - SI(LVOT): 23.1 ML/M^2
BH CV ECHO MEAS - SI(MOD-SP4): 8.1 ML/M^2
BH CV ECHO MEAS - SI(TEICH): 20.7 ML/M^2
BH CV ECHO MEAS - SV(AO): 283.8 ML
BH CV ECHO MEAS - SV(CUBED): 31.6 ML
BH CV ECHO MEAS - SV(LVOT): 36.1 ML
BH CV ECHO MEAS - SV(MOD-SP4): 12.6 ML
BH CV ECHO MEAS - SV(TEICH): 32.4 ML
BH CV ECHO MEAS - TR MAX VEL: 354 CM/SEC
BUN BLD-MCNC: 13 MG/DL (ref 5–21)
BUN/CREAT SERPL: 28.9 (ref 7–25)
CALCIUM SPEC-SCNC: 8.6 MG/DL (ref 8.4–10.4)
CHLORIDE SERPL-SCNC: 97 MMOL/L (ref 98–110)
CO2 SERPL-SCNC: 30 MMOL/L (ref 24–31)
CREAT BLD-MCNC: 0.45 MG/DL (ref 0.5–1.4)
DEPRECATED RDW RBC AUTO: 45.5 FL (ref 40–54)
ERYTHROCYTE [DISTWIDTH] IN BLOOD BY AUTOMATED COUNT: 13.9 % (ref 12–15)
GFR SERPL CREATININE-BSD FRML MDRD: 131 ML/MIN/1.73
GLUCOSE BLD-MCNC: 98 MG/DL (ref 70–100)
HCT VFR BLD AUTO: 24.8 % (ref 37–47)
HGB BLD-MCNC: 8.1 G/DL (ref 12–16)
LEFT ATRIUM VOLUME INDEX: 52.4 ML/M2
MCH RBC QN AUTO: 29.3 PG (ref 28–32)
MCHC RBC AUTO-ENTMCNC: 32.7 G/DL (ref 33–36)
MCV RBC AUTO: 89.9 FL (ref 82–98)
PLATELET # BLD AUTO: 231 10*3/MM3 (ref 130–400)
PMV BLD AUTO: 9 FL (ref 6–12)
POTASSIUM BLD-SCNC: 3.7 MMOL/L (ref 3.5–5.3)
RBC # BLD AUTO: 2.76 10*6/MM3 (ref 4.2–5.4)
SODIUM BLD-SCNC: 132 MMOL/L (ref 135–145)
WBC NRBC COR # BLD: 8.85 10*3/MM3 (ref 4.8–10.8)

## 2017-03-13 PROCEDURE — 94760 N-INVAS EAR/PLS OXIMETRY 1: CPT

## 2017-03-13 PROCEDURE — 93306 TTE W/DOPPLER COMPLETE: CPT | Performed by: INTERNAL MEDICINE

## 2017-03-13 PROCEDURE — 85027 COMPLETE CBC AUTOMATED: CPT | Performed by: NURSE PRACTITIONER

## 2017-03-13 PROCEDURE — 93010 ELECTROCARDIOGRAM REPORT: CPT | Performed by: INTERNAL MEDICINE

## 2017-03-13 PROCEDURE — 71010 HC CHEST PA OR AP: CPT

## 2017-03-13 PROCEDURE — 87205 SMEAR GRAM STAIN: CPT | Performed by: NURSE PRACTITIONER

## 2017-03-13 PROCEDURE — 25010000002 VANCOMYCIN: Performed by: INTERNAL MEDICINE

## 2017-03-13 PROCEDURE — 94799 UNLISTED PULMONARY SVC/PX: CPT

## 2017-03-13 PROCEDURE — 87070 CULTURE OTHR SPECIMN AEROBIC: CPT | Performed by: NURSE PRACTITIONER

## 2017-03-13 PROCEDURE — 25010000002 LEVALBUTEROL PER 0.5 MG: Performed by: NURSE PRACTITIONER

## 2017-03-13 PROCEDURE — 25010000002 CEFTRIAXONE: Performed by: NURSE PRACTITIONER

## 2017-03-13 PROCEDURE — 80048 BASIC METABOLIC PNL TOTAL CA: CPT | Performed by: NURSE PRACTITIONER

## 2017-03-13 PROCEDURE — 93306 TTE W/DOPPLER COMPLETE: CPT

## 2017-03-13 RX ADMIN — LEVALBUTEROL HYDROCHLORIDE 1.25 MG: 1.25 SOLUTION RESPIRATORY (INHALATION) at 14:30

## 2017-03-13 RX ADMIN — LEVALBUTEROL HYDROCHLORIDE 1.25 MG: 1.25 SOLUTION RESPIRATORY (INHALATION) at 20:56

## 2017-03-13 RX ADMIN — METOPROLOL TARTRATE 25 MG: 25 TABLET, FILM COATED ORAL at 09:43

## 2017-03-13 RX ADMIN — RIVAROXABAN 15 MG: 15 TABLET, FILM COATED ORAL at 22:45

## 2017-03-13 RX ADMIN — VANCOMYCIN HYDROCHLORIDE 500 MG: 500 INJECTION, POWDER, LYOPHILIZED, FOR SOLUTION INTRAVENOUS at 19:57

## 2017-03-13 RX ADMIN — PAROXETINE 10 MG: 10 TABLET, FILM COATED ORAL at 06:20

## 2017-03-13 RX ADMIN — OSELTAMIVIR PHOSPHATE 75 MG: 75 CAPSULE ORAL at 17:52

## 2017-03-13 RX ADMIN — DOCUSATE SODIUM 100 MG: 100 CAPSULE ORAL at 09:44

## 2017-03-13 RX ADMIN — LEVALBUTEROL HYDROCHLORIDE 1.25 MG: 1.25 SOLUTION RESPIRATORY (INHALATION) at 06:59

## 2017-03-13 RX ADMIN — METOPROLOL TARTRATE 25 MG: 25 TABLET, FILM COATED ORAL at 22:45

## 2017-03-13 RX ADMIN — DOCUSATE SODIUM 100 MG: 100 CAPSULE ORAL at 18:28

## 2017-03-13 RX ADMIN — CEFTRIAXONE 1 G: 1 INJECTION, POWDER, FOR SOLUTION INTRAMUSCULAR; INTRAVENOUS at 18:28

## 2017-03-13 RX ADMIN — OSELTAMIVIR PHOSPHATE 75 MG: 75 CAPSULE ORAL at 05:28

## 2017-03-13 NOTE — PLAN OF CARE
Problem: Fall Risk (Adult)  Goal: Identify Related Risk Factors and Signs and Symptoms  Outcome: Ongoing (interventions implemented as appropriate)    03/13/17 0351   Fall Risk   Fall Risk: Related Risk Factors environment unfamiliar;age-related changes   Fall Risk: Signs and Symptoms presence of risk factors       Goal: Absence of Falls  Outcome: Ongoing (interventions implemented as appropriate)    03/13/17 0351   Fall Risk (Adult)   Absence of Falls making progress toward outcome         Problem: Pneumonia (Adult)  Goal: Signs and Symptoms of Listed Potential Problems Will be Absent or Manageable (Pneumonia)  Outcome: Ongoing (interventions implemented as appropriate)    03/13/17 0351   Pneumonia   Problems Assessed (Pneumonia) all   Problems Present (Pneumonia) respiratory compromise

## 2017-03-13 NOTE — PROGRESS NOTES
[facility] Medicine Services  INPATIENT PROGRESS NOTE    Length of Stay: 1  Date of Admission: 3/12/2017  Primary Care Physician: Lupe Ham MD    Subjective   Chief Complaint: follow up     HPI   The patient is currently sitting up in bed. She states that she had a good nights sleep. She denies any fever of chills overnight. She states that her breathing feels better. No nausea, vomiting or diarrhea noted. No family present.     Review of Systems   Constitutional: Positive for fatigue. Negative for activity change, appetite change, chills and fever.   HENT: Negative for hearing loss, nosebleeds, tinnitus and trouble swallowing.    Eyes: Negative for visual disturbance.   Respiratory: Positive for cough and shortness of breath. Negative for chest tightness and wheezing.    Cardiovascular: Negative for chest pain, palpitations and leg swelling.   Gastrointestinal: Negative for abdominal distention, abdominal pain, blood in stool, constipation, diarrhea, nausea and vomiting.   Endocrine: Negative for cold intolerance, heat intolerance, polydipsia, polyphagia and polyuria.   Genitourinary: Negative for decreased urine volume, difficulty urinating, dysuria, flank pain, frequency and hematuria.   Musculoskeletal: Positive for myalgias. Negative for arthralgias and joint swelling.   Skin: Negative for rash.   Allergic/Immunologic: Negative for immunocompromised state.   Neurological: Positive for weakness. Negative for dizziness, syncope, light-headedness and headaches.   Hematological: Negative for adenopathy. Does not bruise/bleed easily.   Psychiatric/Behavioral: Negative for confusion and sleep disturbance. The patient is not nervous/anxious.       All pertinent negatives and positives are as above. All other systems have been reviewed and are negative unless otherwise stated.     Objective    Temp:  [97.8 °F (36.6 °C)-102.7 °F (39.3 °C)] 98.2 °F (36.8 °C)  Heart Rate:   [] 101  Resp:  [18-31] 18  BP: (107-142)/(58-90) 136/82     Physical Exam   Constitutional: She is oriented to person, place, and time. She appears well-developed and well-nourished.   HENT:   Head: Normocephalic and atraumatic.   Eyes: Conjunctivae and EOM are normal. Pupils are equal, round, and reactive to light.   Neck: Neck supple. No JVD present. No thyromegaly present.   Cardiovascular: Normal rate, normal heart sounds and intact distal pulses.  An irregular rhythm present. Exam reveals no gallop and no friction rub.    No murmur heard.  Atrial fib 90's   Pulmonary/Chest: Effort normal. No respiratory distress. She has no decreased breath sounds. She has no wheezes. She has no rhonchi. She has rales. She exhibits no tenderness.   Rales and coarse breath sounds right > left    Abdominal: Soft. Bowel sounds are normal. She exhibits no distension. There is no tenderness. There is no rebound and no guarding.   Musculoskeletal: Normal range of motion. She exhibits no edema, tenderness or deformity.   Lymphadenopathy:     She has no cervical adenopathy.   Neurological: She is alert and oriented to person, place, and time. She displays normal reflexes. No cranial nerve deficit. She exhibits normal muscle tone.   Skin: Skin is warm and dry. No rash noted.   Psychiatric: She has a normal mood and affect. Her behavior is normal. Judgment and thought content normal.     Results Review:  I have reviewed the labs, radiology results, and diagnostic studies.    Laboratory Data:     Results from last 7 days  Lab Units 03/13/17  0445 03/12/17  1455   WBC 10*3/mm3 8.85 10.42   HEMOGLOBIN g/dL 8.1* 9.6*   HEMATOCRIT % 24.8* 30.0*   PLATELETS 10*3/mm3 231 295       Results from last 7 days  Lab Units 03/13/17  0445 03/12/17  1455   SODIUM mmol/L 132* 133*   POTASSIUM mmol/L 3.7 4.0   CHLORIDE mmol/L 97* 92*   TOTAL CO2 mmol/L 30.0 28.0   BUN mg/dL 13 13   CREATININE mg/dL 0.45* 0.43*   CALCIUM mg/dL 8.6 9.0   BILIRUBIN  mg/dL  --  0.7   ALK PHOS U/L  --  83   ALT (SGPT) U/L  --  28   AST (SGOT) U/L  --  39   GLUCOSE mg/dL 98 148*     Culture Data:   BLOOD CULTURE   Date Value Ref Range Status   03/12/2017 No growth at less than 24 hours  Preliminary   03/12/2017 No growth at less than 24 hours  Preliminary     URINE CULTURE   Date Value Ref Range Status   03/12/2017 >100,000 CFU/mL Escherichia coli (A)  Preliminary     Radiology Data:   Imaging Results (last 24 hours)     Procedure Component Value Units Date/Time    XR Chest 1 View [[id number]] Collected:  03/12/17 1556     Updated:  03/12/17 1559    Narrative:       EXAMINATION: XR CHEST 1 VW- 3/12/2017 3:56 PM CDT     HISTORY: Shortness of breath.     REPORT: Comparison is made with the study from 06/20/2016.     There is no area of focal infiltrate in the right upper lobe compatible  with acute pneumonia. The lungs are mildly hypoaerated. No infiltrate is  seen on the left. There is borderline cardiomegaly, moderate thoracic  aortic ectasia. There is a probable moderate-sized hiatal hernia. There  are stable severe degenerative changes of both shoulders.       Impression:       Acute right upper lobe pneumonia. Follow-up is recommended.  This report was finalized on 03/12/2017 15:57 by Dr. Nba Deras MD.        I have reviewed the patient current medications.     Assessment/Plan     Hospital Problem List     Pneumonia of right upper lobe due to infectious organism    Influenza A        Assessment:  1. Influenza A  2. Acute right upper lobe pneumonia (?viral vs secondary bacterial infection)  3. Chronic Atrial Fibrillation with rapid ventricular response  4. E. Coli UTI  5. Advanced Age  6. GERD  7. Hypertension  8. Hyponatremia  9. Anemia    Plan:  1. Tamiflu day #2  2. Vancomycin day #2  3. Rocephin day #2  4. 2D echo pending   5. Awaiting urine culture RACHEL  6. Sputum culture pending  7. Ambulate TID and up in chair for meals  8. Labs in AM    Discharge Planning: I expect  patient to be discharged to home in 2-4 days.    JIM Meade   03/13/17   6:38 AM      Chart reviewed and patient examined.  Agree with assessment and plan.  Lung sounds rhonchi with wheeze at this time.    Will need supplements as she is not eating diet well, gets full early.  Per family has had pneumonia several times since discharge from hospital in June 2016    Sudha Merchant DO  03/13/17  12:16 PM

## 2017-03-13 NOTE — PROGRESS NOTES
Discharge Planning Assessment  [facility]     Patient Name: Heather Burgos  MRN: [id number]  Today's Date: 3/13/2017    Admit Date: 3/12/2017          Discharge Needs Assessment       03/13/17 1516    Living Environment    Lives With alone    Living Arrangements house    Provides Primary Care For no one    Quality Of Family Relationships supportive    Able to Return to Prior Living Arrangements yes    Discharge Needs Assessment    Concerns To Be Addressed basic needs concerns    Anticipated Changes Related to Illness none    Equipment Currently Used at Home bath bench;grab bar;cane, quad;other (see comments)   Pt has RW available, has not been using.      Equipment Needed After Discharge none    Discharge Facility/Level Of Care Needs home with home health    Transportation Available car            Discharge Plan       03/13/17 1518    Case Management/Social Work Plan    Plan Pt/family interested in HH    Additional Comments Spoke with pt to assess for home needs.  Pt will return home alone as before.  Pt has needed DME.  They are very interesting in HH care at discharge if ordered,pt had before and they feel it was beneficial.          Discharge Placement     No information found                Demographic Summary     None            Functional Status     None            Psychosocial     None            Abuse/Neglect     None            Legal     None            Substance Abuse     None            Patient Forms     None          OLYA Alcala

## 2017-03-13 NOTE — PLAN OF CARE
Problem: Patient Care Overview (Adult)  Goal: Plan of Care Review  Outcome: Ongoing (interventions implemented as appropriate)    03/13/17 1529   Coping/Psychosocial Response Interventions   Plan Of Care Reviewed With patient   Patient Care Overview   Progress improving   Outcome Evaluation   Outcome Summary/Follow up Plan Pt states that she feels better.         Problem: Fall Risk (Adult)  Goal: Identify Related Risk Factors and Signs and Symptoms  Outcome: Ongoing (interventions implemented as appropriate)  Goal: Absence of Falls  Outcome: Ongoing (interventions implemented as appropriate)    Problem: Pneumonia (Adult)  Goal: Signs and Symptoms of Listed Potential Problems Will be Absent or Manageable (Pneumonia)  Outcome: Ongoing (interventions implemented as appropriate)

## 2017-03-14 LAB
ANION GAP SERPL CALCULATED.3IONS-SCNC: 5 MMOL/L (ref 4–13)
BACTERIA SPEC AEROBE CULT: ABNORMAL
BUN BLD-MCNC: 10 MG/DL (ref 5–21)
BUN/CREAT SERPL: 25 (ref 7–25)
CALCIUM SPEC-SCNC: 8.9 MG/DL (ref 8.4–10.4)
CHLORIDE SERPL-SCNC: 96 MMOL/L (ref 98–110)
CO2 SERPL-SCNC: 34 MMOL/L (ref 24–31)
CREAT BLD-MCNC: 0.4 MG/DL (ref 0.5–1.4)
DEPRECATED RDW RBC AUTO: 46.5 FL (ref 40–54)
ERYTHROCYTE [DISTWIDTH] IN BLOOD BY AUTOMATED COUNT: 14 % (ref 12–15)
GFR SERPL CREATININE-BSD FRML MDRD: 150 ML/MIN/1.73
GLUCOSE BLD-MCNC: 99 MG/DL (ref 70–100)
HCT VFR BLD AUTO: 25.8 % (ref 37–47)
HGB BLD-MCNC: 8.1 G/DL (ref 12–16)
MCH RBC QN AUTO: 28.5 PG (ref 28–32)
MCHC RBC AUTO-ENTMCNC: 31.4 G/DL (ref 33–36)
MCV RBC AUTO: 90.8 FL (ref 82–98)
PLATELET # BLD AUTO: 269 10*3/MM3 (ref 130–400)
PMV BLD AUTO: 9.1 FL (ref 6–12)
POTASSIUM BLD-SCNC: 3.9 MMOL/L (ref 3.5–5.3)
RBC # BLD AUTO: 2.84 10*6/MM3 (ref 4.2–5.4)
SODIUM BLD-SCNC: 135 MMOL/L (ref 135–145)
WBC NRBC COR # BLD: 6.36 10*3/MM3 (ref 4.8–10.8)

## 2017-03-14 PROCEDURE — 94799 UNLISTED PULMONARY SVC/PX: CPT

## 2017-03-14 PROCEDURE — 85027 COMPLETE CBC AUTOMATED: CPT | Performed by: NURSE PRACTITIONER

## 2017-03-14 PROCEDURE — 25010000002 LEVALBUTEROL PER 0.5 MG: Performed by: NURSE PRACTITIONER

## 2017-03-14 PROCEDURE — 25010000002 CEFTRIAXONE: Performed by: NURSE PRACTITIONER

## 2017-03-14 PROCEDURE — 80048 BASIC METABOLIC PNL TOTAL CA: CPT | Performed by: NURSE PRACTITIONER

## 2017-03-14 PROCEDURE — 94760 N-INVAS EAR/PLS OXIMETRY 1: CPT

## 2017-03-14 PROCEDURE — 25010000002 VANCOMYCIN: Performed by: INTERNAL MEDICINE

## 2017-03-14 RX ADMIN — METOPROLOL TARTRATE 25 MG: 25 TABLET, FILM COATED ORAL at 09:05

## 2017-03-14 RX ADMIN — LEVALBUTEROL HYDROCHLORIDE 1.25 MG: 1.25 SOLUTION RESPIRATORY (INHALATION) at 14:37

## 2017-03-14 RX ADMIN — DOCUSATE SODIUM 100 MG: 100 CAPSULE ORAL at 17:55

## 2017-03-14 RX ADMIN — METOPROLOL TARTRATE 25 MG: 25 TABLET, FILM COATED ORAL at 21:14

## 2017-03-14 RX ADMIN — OSELTAMIVIR PHOSPHATE 75 MG: 75 CAPSULE ORAL at 06:31

## 2017-03-14 RX ADMIN — OSELTAMIVIR PHOSPHATE 75 MG: 75 CAPSULE ORAL at 17:55

## 2017-03-14 RX ADMIN — LEVALBUTEROL HYDROCHLORIDE 1.25 MG: 1.25 SOLUTION RESPIRATORY (INHALATION) at 19:51

## 2017-03-14 RX ADMIN — CEFTRIAXONE 1 G: 1 INJECTION, POWDER, FOR SOLUTION INTRAMUSCULAR; INTRAVENOUS at 20:02

## 2017-03-14 RX ADMIN — RIVAROXABAN 15 MG: 15 TABLET, FILM COATED ORAL at 21:14

## 2017-03-14 RX ADMIN — VANCOMYCIN HYDROCHLORIDE 500 MG: 500 INJECTION, POWDER, LYOPHILIZED, FOR SOLUTION INTRAVENOUS at 21:15

## 2017-03-14 RX ADMIN — DOCUSATE SODIUM 100 MG: 100 CAPSULE ORAL at 09:05

## 2017-03-14 RX ADMIN — LEVALBUTEROL HYDROCHLORIDE 1.25 MG: 1.25 SOLUTION RESPIRATORY (INHALATION) at 06:41

## 2017-03-14 RX ADMIN — PAROXETINE 10 MG: 10 TABLET, FILM COATED ORAL at 09:05

## 2017-03-14 NOTE — PLAN OF CARE
Problem: Patient Care Overview (Adult)  Goal: Plan of Care Review  Outcome: Ongoing (interventions implemented as appropriate)    03/14/17 5177   Coping/Psychosocial Response Interventions   Plan Of Care Reviewed With patient   Patient Care Overview   Progress improving   Outcome Evaluation   Outcome Summary/Follow up Plan Pt ambulated 120 feet down [place] today and tolerated well with 2 liters oxygen via nasal cannula.         Problem: Fall Risk (Adult)  Goal: Identify Related Risk Factors and Signs and Symptoms  Outcome: Outcome(s) achieved Date Met:  03/14/17  Goal: Absence of Falls  Outcome: Ongoing (interventions implemented as appropriate)    Problem: Pneumonia (Adult)  Goal: Signs and Symptoms of Listed Potential Problems Will be Absent or Manageable (Pneumonia)  Outcome: Ongoing (interventions implemented as appropriate)

## 2017-03-14 NOTE — PROGRESS NOTES
[facility] Medicine Services  INPATIENT PROGRESS NOTE    Length of Stay: 2  Date of Admission: 3/12/2017  Primary Care Physician: Lupe Ham MD    Subjective   Chief Complaint: Follow up flu/pna    HPI   The patient is currently sitting up in the chair, eating breakfast. She states that her appetite is not any good. She states that she drinks a chocolate shake at home that she likes and her daughter brought some and placed them in the refrigerator here on the unit. She denies any current shortness of breath but feels weak. She has been afebrile since 3/12/17.     Review of Systems   Constitutional: Positive for activity change, appetite change and fatigue. Negative for chills and fever.   HENT: Positive for nosebleeds. Negative for hearing loss, tinnitus and trouble swallowing.    Eyes: Negative for visual disturbance.   Respiratory: Positive for shortness of breath. Negative for cough, chest tightness and wheezing.    Cardiovascular: Negative for chest pain, palpitations and leg swelling.   Gastrointestinal: Negative for abdominal distention, abdominal pain, blood in stool, constipation, diarrhea, nausea and vomiting.   Endocrine: Negative for cold intolerance, heat intolerance, polydipsia, polyphagia and polyuria.   Genitourinary: Negative for decreased urine volume, difficulty urinating, dysuria, flank pain, frequency and hematuria.   Musculoskeletal: Negative for arthralgias, joint swelling and myalgias.   Skin: Negative for rash.   Allergic/Immunologic: Negative for immunocompromised state.   Neurological: Positive for weakness. Negative for dizziness, syncope, light-headedness and headaches.   Hematological: Negative for adenopathy. Does not bruise/bleed easily.   Psychiatric/Behavioral: Negative for confusion and sleep disturbance. The patient is not nervous/anxious.       All pertinent negatives and positives are as above. All other systems have been reviewed  and are negative unless otherwise stated.     Objective    Temp:  [97.1 °F (36.2 °C)-98.9 °F (37.2 °C)] 97.3 °F (36.3 °C)  Heart Rate:  [] 88  Resp:  [16-20] 20  BP: (103-155)/(63-94) 125/63     Physical Exam   Constitutional: She is oriented to person, place, and time. She appears well-developed and well-nourished.   HENT:   Head: Normocephalic and atraumatic.   Eyes: Conjunctivae and EOM are normal. Pupils are equal, round, and reactive to light.   Neck: Neck supple. No JVD present. No thyromegaly present.   Cardiovascular: Normal rate, regular rhythm, normal heart sounds and intact distal pulses.  Exam reveals no gallop and no friction rub.    No murmur heard.  Pulmonary/Chest: Effort normal. No respiratory distress. She has decreased breath sounds. She has no wheezes. She has rhonchi. She has no rales. She exhibits no tenderness.   Abdominal: Soft. Bowel sounds are normal. She exhibits no distension. There is no tenderness. There is no rebound and no guarding.   Musculoskeletal: Normal range of motion. She exhibits no edema, tenderness or deformity.   Lymphadenopathy:     She has no cervical adenopathy.   Neurological: She is alert and oriented to person, place, and time. She displays normal reflexes. No cranial nerve deficit. She exhibits normal muscle tone.   Skin: Skin is warm and dry. No rash noted.   Psychiatric: She has a normal mood and affect. Her behavior is normal. Judgment and thought content normal.   Vitals reviewed.    Results Review:  I have reviewed the labs, radiology results, and diagnostic studies.    Laboratory Data:     Results from last 7 days  Lab Units 03/14/17  0445 03/13/17  0445 03/12/17  1455   WBC 10*3/mm3 6.36 8.85 10.42   HEMOGLOBIN g/dL 8.1* 8.1* 9.6*   HEMATOCRIT % 25.8* 24.8* 30.0*   PLATELETS 10*3/mm3 269 231 295       Results from last 7 days  Lab Units 03/14/17  0445 03/13/17  0445 03/12/17  1455   SODIUM mmol/L 135 132* 133*   POTASSIUM mmol/L 3.9 3.7 4.0   CHLORIDE  mmol/L 96* 97* 92*   TOTAL CO2 mmol/L 34.0* 30.0 28.0   BUN mg/dL 10 13 13   CREATININE mg/dL 0.40* 0.45* 0.43*   CALCIUM mg/dL 8.9 8.6 9.0   BILIRUBIN mg/dL  --   --  0.7   ALK PHOS U/L  --   --  83   ALT (SGPT) U/L  --   --  28   AST (SGOT) U/L  --   --  39   GLUCOSE mg/dL 99 98 148*     Culture Data:   BLOOD CULTURE   Date Value Ref Range Status   2017 No growth at 24 hours  Preliminary   2017 No growth at 24 hours  Preliminary     URINE CULTURE   Date Value Ref Range Status   2017 >100,000 CFU/mL Escherichia coli (A)  Final     RESPIRATORY CULTURE   Date Value Ref Range Status   2017 Moderate growth (3+) Normal Respiratory Katina  Preliminary     Radiology Data:   Imaging Results (last 24 hours)     Procedure Component Value Units Date/Time    XR Chest 1 View [[id number]] Collected:  17 0723     Updated:  17 1545    Narrative:       EXAMINATION: XR CHEST 1 VW- 3/13/2017 7:20 AM CDT     HISTORY: Shortness of breath.     REPORT: Comparison is made with the study from 2017 1524 hours.     There is patchy right upper lobe infiltrate which is unchanged. The  lungs are hyperinflated compatible with COPD. The heart is enlarged and  there is moderate thoracic aortic ectasia as before. No pneumothorax or  significant effusion is seen. There are severe degenerative changes of  both shoulders.       Impression:       Stable 1-day appearance of the chest.  This report was finalized on 2017 15:42 by Dr. Nba Deras MD.        Heather Burgos   Acquired echocardiogram 2D complete   Order# [id number]    Reading physician: Arpit Smith MD   Ordering physician: JIM Meade   Study date: 3/13/17         Patient Information      Patient Name MRN Sex  (Age)     Heather VINCENT Burgos [id number] Female 10/23/1926 (89 y.o.)       Admission Information      Admission Date/Time Discharge Date/Time Room/Bed     17  1441  481/1       Interpretation Summary         · Left  ventricular wall thickness is consistent with hypertrophy. Sigmoid-shaped ventricular septum is present.  · Left atrial cavity size is moderately dilated.  · Mild to moderate aortic valve stenosis is present.  · Mild tricuspid valve regurgitation is present.  · Left ventricular diastolic dysfunction.      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     Assessment/Plan     Hospital Problem List     Pneumonia of right upper lobe due to infectious organism    Influenza A        Assessment:  1. Influenza A  2. Acute right upper lobe pneumonia (?viral vs secondary bacterial infection)  3. Chronic Atrial Fibrillation with rapid ventricular response  4. E. Coli UTI - susceptible to ceftriaxone   5. Advanced Age  6. GERD  7. Hypertension  8. Hyponatremia  9. Anemia     Plan:  1. Tamiflu day #3  2. Vancomycin day #3  3. Rocephin day #3  4. Supplements   5. Ambulate TID with nursing  6. Wean oxygen as tolerated  7. Labs in AM    Discharge Planning: I expect patient to be discharged to home in 1-3 days.    JIM Meade   03/14/17   8:54 AM      Chart reviewed.  Patient examined.  Improving.  At time of examine is washing face, upper extremities.  Tolerating well.  Irvin with assessment and plan.  Will need home health on Discharge.  Patient already has [facility].     Sudha Merchant DO  03/14/17  10:40 AM

## 2017-03-15 LAB
ANION GAP SERPL CALCULATED.3IONS-SCNC: 4 MMOL/L (ref 4–13)
BACTERIA SPEC RESP CULT: NORMAL
BUN BLD-MCNC: 6 MG/DL (ref 5–21)
BUN/CREAT SERPL: 12.2 (ref 7–25)
CALCIUM SPEC-SCNC: 8.8 MG/DL (ref 8.4–10.4)
CHLORIDE SERPL-SCNC: 98 MMOL/L (ref 98–110)
CO2 SERPL-SCNC: 35 MMOL/L (ref 24–31)
CREAT BLD-MCNC: 0.49 MG/DL (ref 0.5–1.4)
DEPRECATED RDW RBC AUTO: 46.3 FL (ref 40–54)
ERYTHROCYTE [DISTWIDTH] IN BLOOD BY AUTOMATED COUNT: 13.8 % (ref 12–15)
GFR SERPL CREATININE-BSD FRML MDRD: 119 ML/MIN/1.73
GLUCOSE BLD-MCNC: 93 MG/DL (ref 70–100)
GRAM STN SPEC: NORMAL
HCT VFR BLD AUTO: 25.7 % (ref 37–47)
HGB BLD-MCNC: 8.1 G/DL (ref 12–16)
MCH RBC QN AUTO: 28.6 PG (ref 28–32)
MCHC RBC AUTO-ENTMCNC: 31.5 G/DL (ref 33–36)
MCV RBC AUTO: 90.8 FL (ref 82–98)
PLATELET # BLD AUTO: 265 10*3/MM3 (ref 130–400)
PMV BLD AUTO: 8.9 FL (ref 6–12)
POTASSIUM BLD-SCNC: 3.6 MMOL/L (ref 3.5–5.3)
RBC # BLD AUTO: 2.83 10*6/MM3 (ref 4.2–5.4)
SODIUM BLD-SCNC: 137 MMOL/L (ref 135–145)
VANCOMYCIN TROUGH SERPL-MCNC: <5 MCG/ML (ref 10–20)
WBC NRBC COR # BLD: 5.19 10*3/MM3 (ref 4.8–10.8)

## 2017-03-15 PROCEDURE — 85027 COMPLETE CBC AUTOMATED: CPT | Performed by: NURSE PRACTITIONER

## 2017-03-15 PROCEDURE — 80048 BASIC METABOLIC PNL TOTAL CA: CPT | Performed by: NURSE PRACTITIONER

## 2017-03-15 PROCEDURE — 25010000002 VANCOMYCIN: Performed by: FAMILY MEDICINE

## 2017-03-15 PROCEDURE — 80202 ASSAY OF VANCOMYCIN: CPT | Performed by: NURSE PRACTITIONER

## 2017-03-15 PROCEDURE — 25010000002 LEVALBUTEROL PER 0.5 MG: Performed by: NURSE PRACTITIONER

## 2017-03-15 PROCEDURE — 94799 UNLISTED PULMONARY SVC/PX: CPT

## 2017-03-15 PROCEDURE — 25010000002 CEFTRIAXONE: Performed by: NURSE PRACTITIONER

## 2017-03-15 RX ADMIN — LEVALBUTEROL HYDROCHLORIDE 1.25 MG: 1.25 SOLUTION RESPIRATORY (INHALATION) at 19:40

## 2017-03-15 RX ADMIN — VANCOMYCIN HYDROCHLORIDE 750 MG: 1 INJECTION, POWDER, LYOPHILIZED, FOR SOLUTION INTRAVENOUS at 21:16

## 2017-03-15 RX ADMIN — RIVAROXABAN 15 MG: 15 TABLET, FILM COATED ORAL at 20:57

## 2017-03-15 RX ADMIN — LEVALBUTEROL HYDROCHLORIDE 1.25 MG: 1.25 SOLUTION RESPIRATORY (INHALATION) at 08:12

## 2017-03-15 RX ADMIN — LEVALBUTEROL HYDROCHLORIDE 1.25 MG: 1.25 SOLUTION RESPIRATORY (INHALATION) at 15:14

## 2017-03-15 RX ADMIN — METOPROLOL TARTRATE 25 MG: 25 TABLET, FILM COATED ORAL at 20:56

## 2017-03-15 RX ADMIN — METOPROLOL TARTRATE 25 MG: 25 TABLET, FILM COATED ORAL at 09:19

## 2017-03-15 RX ADMIN — CEFTRIAXONE 1 G: 1 INJECTION, POWDER, FOR SOLUTION INTRAMUSCULAR; INTRAVENOUS at 20:08

## 2017-03-15 RX ADMIN — OSELTAMIVIR PHOSPHATE 75 MG: 75 CAPSULE ORAL at 17:26

## 2017-03-15 RX ADMIN — OSELTAMIVIR PHOSPHATE 75 MG: 75 CAPSULE ORAL at 06:30

## 2017-03-15 RX ADMIN — PAROXETINE 10 MG: 10 TABLET, FILM COATED ORAL at 06:30

## 2017-03-15 NOTE — PLAN OF CARE
Problem: Patient Care Overview (Adult)  Goal: Plan of Care Review  Outcome: Ongoing (interventions implemented as appropriate)    03/15/17 0347   Coping/Psychosocial Response Interventions   Plan Of Care Reviewed With patient;daughter   Patient Care Overview   Progress improving   Outcome Evaluation   Outcome Summary/Follow up Plan VSS, Telemetry a-fib 75-91, No c/o pain. Pt on continuous pulse ox, Sat 100% on 2L NC. Pt appeared to have rested well tonight.          Problem: Fall Risk (Adult)  Goal: Absence of Falls  Outcome: Ongoing (interventions implemented as appropriate)    03/15/17 0347   Fall Risk (Adult)   Absence of Falls achieves outcome         Problem: Pneumonia (Adult)  Goal: Signs and Symptoms of Listed Potential Problems Will be Absent or Manageable (Pneumonia)  Outcome: Ongoing (interventions implemented as appropriate)    03/15/17 0347   Pneumonia   Problems Assessed (Pneumonia) all   Problems Present (Pneumonia) respiratory compromise

## 2017-03-15 NOTE — PROGRESS NOTES
[facility] Medicine Services  INPATIENT PROGRESS NOTE    Length of Stay: 3  Date of Admission: 3/12/2017  Primary Care Physician: Lupe Ham MD    Subjective   Chief Complaint: Follow up     HPI   The patient is currently laying in bed.  She states she feels quite a bit better.  However she does not feel like she is quite ready to be discharged today.  Her daughter is not currently present.  She states she lives alone but her daughters are very close by.  She is still on 2 L nasal cannula but denies any shortness of breath.  She states she has gotten up and walked around her room yesterday but not yet this morning.  Tolerating a diet well.  Afebrile since admission.    Review of Systems   Constitutional: Positive for activity change, appetite change and fatigue. Negative for chills and fever.   HENT: Positive for nosebleeds. Negative for hearing loss, tinnitus and trouble swallowing.   Eyes: Negative for visual disturbance.   Respiratory: Negative for shortness of breath. Negative for cough, chest tightness and wheezing.   Cardiovascular: Negative for chest pain, palpitations and leg swelling.   Gastrointestinal: Negative for abdominal distention, abdominal pain, blood in stool, constipation, diarrhea, nausea and vomiting.   Endocrine: Negative for cold intolerance, heat intolerance, polydipsia, polyphagia and polyuria.   Genitourinary: Negative for decreased urine volume, difficulty urinating, dysuria, flank pain, frequency and hematuria.   Musculoskeletal: Negative for arthralgias, joint swelling and myalgias.   Skin: Negative for rash.   Allergic/Immunologic: Negative for immunocompromised state.   Neurological: Positive for weakness. Negative for dizziness, syncope, light-headedness and headaches.   Hematological: Negative for adenopathy. Does not bruise/bleed easily.   Psychiatric/Behavioral: Negative for confusion and sleep disturbance. The patient is not  nervous/anxious.     All pertinent negatives and positives are as above. All other systems have been reviewed and are negative unless otherwise stated.     Objective    Temp:  [97.3 °F (36.3 °C)-97.8 °F (36.6 °C)] 97.5 °F (36.4 °C)  Heart Rate:  [74-94] 78  Resp:  [18-20] 19  BP: (131-142)/(58-75) 135/58     Physical Exam  Constitutional: She is oriented to person, place, and time. She appears well-developed and well-nourished.   HENT:   Head: Normocephalic and atraumatic.   Eyes: Conjunctivae and EOM are normal. Pupils are equal, round, and reactive to light.   Neck: Neck supple. No JVD present. No thyromegaly present.   Cardiovascular: Normal rate, regular rhythm, normal heart sounds and intact distal pulses. Exam reveals no gallop and no friction rub.   No murmur heard.  Pulmonary/Chest: Effort normal. No respiratory distress. She has decreased breath sounds. She has no wheezes. She has rhonchi. She has no rales. She exhibits no tenderness. Improved breath sounds.   Abdominal: Soft. Bowel sounds are normal. She exhibits no distension. There is no tenderness. There is no rebound and no guarding.   Musculoskeletal: Normal range of motion. She exhibits no edema, tenderness or deformity.   Lymphadenopathy:   She has no cervical adenopathy.   Neurological: She is alert and oriented to person, place, and time. She displays normal reflexes. No cranial nerve deficit. She exhibits normal muscle tone.   Skin: Skin is warm and dry. No rash noted.   Psychiatric: She has a normal mood and affect. Her behavior is normal. Judgment and thought content normal.   Vitals reviewed.    Results Review:  I have reviewed the labs, radiology results, and diagnostic studies.    Laboratory Data:     Results from last 7 days  Lab Units 03/15/17  0609 03/14/17  0445 03/13/17  0445   WBC 10*3/mm3 5.19 6.36 8.85   HEMOGLOBIN g/dL 8.1* 8.1* 8.1*   HEMATOCRIT % 25.7* 25.8* 24.8*   PLATELETS 10*3/mm3 265 269 231       Results from last 7  days  Lab Units 03/15/17  0609 03/14/17  0445 03/13/17  0445 03/12/17  1455   SODIUM mmol/L 137 135 132* 133*   POTASSIUM mmol/L 3.6 3.9 3.7 4.0   CHLORIDE mmol/L 98 96* 97* 92*   TOTAL CO2 mmol/L 35.0* 34.0* 30.0 28.0   BUN mg/dL 6 10 13 13   CREATININE mg/dL 0.49* 0.40* 0.45* 0.43*   CALCIUM mg/dL 8.8 8.9 8.6 9.0   BILIRUBIN mg/dL  --   --   --  0.7   ALK PHOS U/L  --   --   --  83   ALT (SGPT) U/L  --   --   --  28   AST (SGOT) U/L  --   --   --  39   GLUCOSE mg/dL 93 99 98 148*     Culture Data:   BLOOD CULTURE   Date Value Ref Range Status   03/12/2017 No growth at 2 days  Preliminary   03/12/2017 No growth at 2 days  Preliminary     URINE CULTURE   Date Value Ref Range Status   03/12/2017 >100,000 CFU/mL Escherichia coli (A)  Final     RESPIRATORY CULTURE   Date Value Ref Range Status   03/13/2017 Moderate growth (3+) Normal Respiratory Katina  Final     I have reviewed the patient current medications.     Assessment/Plan     Hospital Problem List     Pneumonia of right upper lobe due to infectious organism    Influenza A        Assessment:  1. Influenza A  2. Acute right upper lobe pneumonia (?viral vs secondary bacterial infection)  3. Chronic Atrial Fibrillation with rapid ventricular response  4. E. Coli UTI - susceptible to ceftriaxone   5. Advanced Age  6. GERD  7. Hypertension  8. Hyponatremia  9. Anemia      Plan:  1. Tamiflu day #4  2. Vancomycin day #4... Discontinue?  3. Rocephin day #4  4. Ambulate TID   5. Check room air saturations  6. Labs in AM  7. Continue current treatment  8. Home tomorrow likely     Discharge Planning: I expect patient to be discharged to home in 1-2 days.    Charan Wharton, APRN   03/15/17   9:09 AM       Chart reviewed and patient examined.  She is doing better.  Agree with assessment and plan  Increase activity today.    Sudha Merchant, DO  03/15/17  9:46 AM

## 2017-03-16 ENCOUNTER — APPOINTMENT (OUTPATIENT)
Dept: GENERAL RADIOLOGY | Facility: HOSPITAL | Age: 82
End: 2017-03-16

## 2017-03-16 LAB
ANION GAP SERPL CALCULATED.3IONS-SCNC: 5 MMOL/L (ref 4–13)
ARTERIAL PATENCY WRIST A: ABNORMAL
ATMOSPHERIC PRESS: ABNORMAL MMHG
BASE EXCESS BLDA CALC-SCNC: 9.9 MMOL/L (ref -2–2)
BDY SITE: ABNORMAL
BUN BLD-MCNC: 10 MG/DL (ref 5–21)
BUN/CREAT SERPL: 26.3 (ref 7–25)
CALCIUM SPEC-SCNC: 8.8 MG/DL (ref 8.4–10.4)
CHLORIDE SERPL-SCNC: 94 MMOL/L (ref 98–110)
CO2 SERPL-SCNC: 34 MMOL/L (ref 24–31)
CREAT BLD-MCNC: 0.38 MG/DL (ref 0.5–1.4)
FERRITIN SERPL-MCNC: 58 NG/ML (ref 11.1–264)
FOLATE SERPL-MCNC: 10.6 NG/ML
GAS FLOW AIRWAY: 2 LPM
GFR SERPL CREATININE-BSD FRML MDRD: >150 ML/MIN/1.73
GLUCOSE BLD-MCNC: 120 MG/DL (ref 70–100)
HCO3 BLDA-SCNC: 33.3 MMOL/L (ref 22–26)
IRON 24H UR-MRATE: 24 MCG/DL (ref 42–180)
IRON SATN MFR SERPL: 7 % (ref 20–45)
MODALITY: ABNORMAL
PCO2 BLDA: 39.6 MM HG (ref 35–45)
PH BLDA: 7.54 PH UNITS (ref 7.35–7.45)
PO2 BLDA: 81 MM HG (ref 80–100)
POTASSIUM BLD-SCNC: 3.9 MMOL/L (ref 3.5–5.3)
SAO2 % BLDCOA: 97 % (ref 94–100)
SAO2 % BLDCOA: 97 % (ref 94–100)
SODIUM BLD-SCNC: 133 MMOL/L (ref 135–145)
TIBC SERPL-MCNC: 329 MCG/DL (ref 225–420)
VIT B12 BLD-MCNC: >1000 PG/ML (ref 239–931)

## 2017-03-16 PROCEDURE — 83540 ASSAY OF IRON: CPT | Performed by: NURSE PRACTITIONER

## 2017-03-16 PROCEDURE — 25010000002 IRON SUCROSE PER 1 MG: Performed by: NURSE PRACTITIONER

## 2017-03-16 PROCEDURE — 25010000002 CEFTRIAXONE: Performed by: NURSE PRACTITIONER

## 2017-03-16 PROCEDURE — 36600 WITHDRAWAL OF ARTERIAL BLOOD: CPT

## 2017-03-16 PROCEDURE — 83550 IRON BINDING TEST: CPT | Performed by: NURSE PRACTITIONER

## 2017-03-16 PROCEDURE — 82728 ASSAY OF FERRITIN: CPT | Performed by: NURSE PRACTITIONER

## 2017-03-16 PROCEDURE — 25010000002 METHYLPREDNISOLONE PER 40 MG: Performed by: NURSE PRACTITIONER

## 2017-03-16 PROCEDURE — 25010000002 LEVALBUTEROL PER 0.5 MG: Performed by: NURSE PRACTITIONER

## 2017-03-16 PROCEDURE — 82803 BLOOD GASES ANY COMBINATION: CPT

## 2017-03-16 PROCEDURE — 71010 HC CHEST PA OR AP: CPT

## 2017-03-16 PROCEDURE — 80048 BASIC METABOLIC PNL TOTAL CA: CPT | Performed by: NURSE PRACTITIONER

## 2017-03-16 PROCEDURE — 25010000002 METHYLPREDNISOLONE PER 125 MG: Performed by: NURSE PRACTITIONER

## 2017-03-16 PROCEDURE — 94799 UNLISTED PULMONARY SVC/PX: CPT

## 2017-03-16 PROCEDURE — 82607 VITAMIN B-12: CPT | Performed by: NURSE PRACTITIONER

## 2017-03-16 PROCEDURE — 82746 ASSAY OF FOLIC ACID SERUM: CPT | Performed by: NURSE PRACTITIONER

## 2017-03-16 PROCEDURE — 25010000002 VANCOMYCIN: Performed by: FAMILY MEDICINE

## 2017-03-16 RX ORDER — METHYLPREDNISOLONE SODIUM SUCCINATE 125 MG/2ML
60 INJECTION, POWDER, LYOPHILIZED, FOR SOLUTION INTRAMUSCULAR; INTRAVENOUS EVERY 8 HOURS
Status: DISCONTINUED | OUTPATIENT
Start: 2017-03-16 | End: 2017-03-17

## 2017-03-16 RX ADMIN — LEVALBUTEROL HYDROCHLORIDE 1.25 MG: 1.25 SOLUTION RESPIRATORY (INHALATION) at 07:27

## 2017-03-16 RX ADMIN — PAROXETINE 10 MG: 10 TABLET, FILM COATED ORAL at 08:58

## 2017-03-16 RX ADMIN — LEVALBUTEROL HYDROCHLORIDE 1.25 MG: 1.25 SOLUTION RESPIRATORY (INHALATION) at 21:25

## 2017-03-16 RX ADMIN — METOPROLOL TARTRATE 25 MG: 25 TABLET, FILM COATED ORAL at 08:58

## 2017-03-16 RX ADMIN — ACETAMINOPHEN 650 MG: 325 TABLET ORAL at 15:22

## 2017-03-16 RX ADMIN — IRON SUCROSE 300 MG: 20 INJECTION, SOLUTION INTRAVENOUS at 21:40

## 2017-03-16 RX ADMIN — METOPROLOL TARTRATE 25 MG: 25 TABLET, FILM COATED ORAL at 20:27

## 2017-03-16 RX ADMIN — RIVAROXABAN 15 MG: 15 TABLET, FILM COATED ORAL at 20:27

## 2017-03-16 RX ADMIN — METHYLPREDNISOLONE SODIUM SUCCINATE 60 MG: 125 INJECTION, POWDER, FOR SOLUTION INTRAMUSCULAR; INTRAVENOUS at 18:35

## 2017-03-16 RX ADMIN — VANCOMYCIN HYDROCHLORIDE 750 MG: 1 INJECTION, POWDER, LYOPHILIZED, FOR SOLUTION INTRAVENOUS at 21:41

## 2017-03-16 RX ADMIN — METHYLPREDNISOLONE SODIUM SUCCINATE 60 MG: 125 INJECTION, POWDER, FOR SOLUTION INTRAMUSCULAR; INTRAVENOUS at 12:20

## 2017-03-16 RX ADMIN — VANCOMYCIN HYDROCHLORIDE 750 MG: 1 INJECTION, POWDER, LYOPHILIZED, FOR SOLUTION INTRAVENOUS at 08:58

## 2017-03-16 RX ADMIN — CEFTRIAXONE 1 G: 1 INJECTION, POWDER, FOR SOLUTION INTRAMUSCULAR; INTRAVENOUS at 20:16

## 2017-03-16 RX ADMIN — LEVALBUTEROL HYDROCHLORIDE 1.25 MG: 1.25 SOLUTION RESPIRATORY (INHALATION) at 11:30

## 2017-03-16 RX ADMIN — OSELTAMIVIR PHOSPHATE 75 MG: 75 CAPSULE ORAL at 18:35

## 2017-03-16 RX ADMIN — OSELTAMIVIR PHOSPHATE 75 MG: 75 CAPSULE ORAL at 05:25

## 2017-03-16 NOTE — PROGRESS NOTES
[facility] Medicine Services  INPATIENT PROGRESS NOTE    Length of Stay: 4  Date of Admission: 3/12/2017  Primary Care Physician: Lupe Ham MD    Subjective   Chief Complaint: Shortness of breath, generalized malaise    HPI   Patient is sitting up eating breakfast.  She is short of breath with conversation.  She states she continues to be very weak and generally feels poorly.  She she denies chest pain or abdominal pain.  She reports trying to get up when the nurses ask her to.  She has no needs voice today.    Review of Systems   Respiratory: Positive for shortness of breath.         All pertinent negatives and positives are as above. All other systems have been reviewed and are negative unless otherwise stated.     Objective    Temp:  [97.5 °F (36.4 °C)-98.9 °F (37.2 °C)] 97.8 °F (36.6 °C)  Heart Rate:  [] 102  Resp:  [16-19] 16  BP: (122-153)/(60-88) 132/77    Physical Exam   Constitutional: She is oriented to person, place, and time. She appears well-developed and well-nourished. No distress.   HENT:   Head: Normocephalic and atraumatic.   Eyes: Conjunctivae and EOM are normal. Pupils are equal, round, and reactive to light. No scleral icterus.   Neck: Normal range of motion. Neck supple. No JVD present. No tracheal deviation present.   Cardiovascular: Normal heart sounds and intact distal pulses.  Exam reveals no gallop.    No murmur heard.  Irregular rate and rhythm secondary to A. fib   Pulmonary/Chest: Effort normal. No respiratory distress. She has wheezes (worse on left). She has no rales.   Coarseness throughout worse on left   Abdominal: Soft. Bowel sounds are normal. She exhibits no distension. There is no tenderness. There is no guarding.   Musculoskeletal: Normal range of motion. She exhibits no edema.   Generalized weakness   Neurological: She is alert and oriented to person, place, and time.   No obvious deficits noted.   Skin: Skin is warm and  dry. No rash noted. She is not diaphoretic. No erythema. No pallor.   Psychiatric: She has a normal mood and affect. Her behavior is normal.   Vitals reviewed.      Results Review:  Recent Results (from the past 12 hour(s))   Basic Metabolic Panel    Collection Time: 03/16/17  6:01 AM   Result Value Ref Range    Glucose 120 (H) 70 - 100 mg/dL    BUN 10 5 - 21 mg/dL    Creatinine 0.38 (L) 0.50 - 1.40 mg/dL    Sodium 133 (L) 135 - 145 mmol/L    Potassium 3.9 3.5 - 5.3 mmol/L    Chloride 94 (L) 98 - 110 mmol/L    CO2 34.0 (H) 24.0 - 31.0 mmol/L    Calcium 8.8 8.4 - 10.4 mg/dL    eGFR Non African Amer >150 >60 mL/min/1.73    BUN/Creatinine Ratio 26.3 (H) 7.0 - 25.0    Anion Gap 5.0 4.0 - 13.0 mmol/L       Cultures:  BLOOD CULTURE   Date Value Ref Range Status   03/12/2017 No growth at 3 days  Preliminary   03/12/2017 No growth at 3 days  Preliminary     URINE CULTURE   Date Value Ref Range Status   03/12/2017 >100,000 CFU/mL Escherichia coli (A)  Final     RESPIRATORY CULTURE   Date Value Ref Range Status   03/13/2017 Moderate growth (3+) Normal Respiratory Katina  Final       Radiology Data:    Imaging Results (last 24 hours)     ** No results found for the last 24 hours. **            Intake/Output Summary (Last 24 hours) at 03/16/17 0976  Last data filed at 03/16/17 0858   Gross per 24 hour   Intake    490 ml   Output    500 ml   Net    -10 ml       Allergies   Allergen Reactions   • Erythromycin Hives and Swelling   • Demerol [Meperidine] Nausea And Vomiting and GI Intolerance   • Morphine And Related Nausea And Vomiting       Scheduled meds:     ceftriaxone 1 g Intravenous Q24H   docusate sodium 100 mg Oral BID   levalbuterol 1.25 mg Nebulization TID - RT   metoprolol tartrate 25 mg Oral Q12H   oseltamivir 75 mg Oral Q12H   PARoxetine 10 mg Oral QAM   rivaroxaban 15 mg Oral Nightly   vancomycin 750 mg Intravenous Q12H       PRN meds:  •  acetaminophen  •  aluminum-magnesium hydroxide-simethicone  •  guaiFENesin  •   ondansetron  •  Pharmacy to dose vancomycin  •  sodium chloride    Assessment/Plan     Active Problems:    Pneumonia of right upper lobe due to infectious organism    Influenza A    Assessment:  1. Influenza A  2. Acute right upper lobe pneumonia (?viral vs secondary bacterial infection)  3. Respiratory failure secondary to #2  4. E. Coli UTI - susceptible to ceftriaxone   5. Advanced Age  6. GERD  7. Hypertension  8. Hyponatremia  9. Anemia  10. Chronic Atrial Fibrillation with rapid ventricular response        Plan:  1. ABG's now  2. Chest xray today  3. Rocephin day #5  4. Vancomycin day #5  5. Tamiflu day # 5, d/c after tomorrow's am dose  6. CBC and BMP in am  7. Anemia substrates today  8. Ambulate TID if tolerates, OOB for meals  9. Solumedrol 60mg TID        Discharge Planning: I expect patient to be discharged to home in 1-2 days.       JIM Abraham   03/16/17   9:29 AM    Chart reviewed and patient examined.  She is more short of breath today and having problems with slowing though process.    Agree with assessment and plan.   Discussed with the patient and GORAN Merchant DO  03/16/17  10:51 AM'

## 2017-03-16 NOTE — PLAN OF CARE
Problem: Patient Care Overview (Adult)  Goal: Plan of Care Review  Outcome: Ongoing (interventions implemented as appropriate)    03/16/17 0308   Coping/Psychosocial Response Interventions   Plan Of Care Reviewed With patient   Patient Care Overview   Progress no change   Outcome Evaluation   Outcome Summary/Follow up Plan remain in a-fib. no c/o pain voiced. cont pulse ox on. vss.          Problem: Fall Risk (Adult)  Goal: Absence of Falls  Outcome: Ongoing (interventions implemented as appropriate)    Problem: Pneumonia (Adult)  Goal: Signs and Symptoms of Listed Potential Problems Will be Absent or Manageable (Pneumonia)  Outcome: Ongoing (interventions implemented as appropriate)

## 2017-03-16 NOTE — PROGRESS NOTES
"Pharmacokinetic Follow-up Note - Vancomycin    Heather Burgos is a 89 y.o. year old female  61\" (154.9 cm) 135 lb 8 oz (61.5 kg)    Estimated Creatinine Clearance: 39.3 mL/min (by C-G formula based on Cr of 0.49).   Lab Results   Component Value Date    CREATININE 0.49 (L) 03/15/2017    CREATININE 0.40 (L) 03/14/2017    CREATININE 0.45 (L) 03/13/2017    CREATININE 0.50 06/29/2016    CREATININE 0.38 (L) 06/28/2016    CREATININE 0.42 (L) 06/27/2016      Lab Results   Component Value Date    WBC 5.19 03/15/2017    WBC 6.36 03/14/2017    WBC 8.85 03/13/2017      Temp Readings from Last 1 Encounters:   03/15/17 98.9 °F (37.2 °C) (Temporal Artery )      Lab Results   Component Value Date    VANCOTROUGH <5.00 (L) 03/15/2017       Current Vancomycin Dose:  500mg IVPB every 24 hours changed to Vancomycin 750 mg iv q12h, day 3 of Vancomycin therapy.    Indication for use: Pneumonia    PLAN  Changed Vancomycin dose 500 mg IVBP every 24 hours to Vancomycin 750 mg IVBP every 12 hours.  Pharmacy will continue to  monitor renal function and adjust dose accordingly.    Lorena Ricardo, [facility]   03/15/17 8:50 PM    "

## 2017-03-16 NOTE — PROGRESS NOTES
Continued Stay Note  ISABELLA Luna     Patient Name: Heather Burgos  MRN: [id number]  Today's Date: 3/16/2017    Admit Date: 3/12/2017          Discharge Plan       03/16/17 1612    Case Management/Social Work Plan    Plan Home with home health    Patient/Family In Agreement With Plan yes    Additional Comments Continuing to follow. Pt will go home at d/c and is asking for home health.               Discharge Codes     None            OLYA Guillen

## 2017-03-17 LAB
ANION GAP SERPL CALCULATED.3IONS-SCNC: 5 MMOL/L (ref 4–13)
BACTERIA SPEC AEROBE CULT: NORMAL
BACTERIA SPEC AEROBE CULT: NORMAL
BUN BLD-MCNC: 12 MG/DL (ref 5–21)
BUN/CREAT SERPL: 27.9 (ref 7–25)
CALCIUM SPEC-SCNC: 9.1 MG/DL (ref 8.4–10.4)
CHLORIDE SERPL-SCNC: 95 MMOL/L (ref 98–110)
CO2 SERPL-SCNC: 32 MMOL/L (ref 24–31)
CREAT BLD-MCNC: 0.43 MG/DL (ref 0.5–1.4)
DEPRECATED RDW RBC AUTO: 45.1 FL (ref 40–54)
ERYTHROCYTE [DISTWIDTH] IN BLOOD BY AUTOMATED COUNT: 14 % (ref 12–15)
GFR SERPL CREATININE-BSD FRML MDRD: 138 ML/MIN/1.73
GLUCOSE BLD-MCNC: 149 MG/DL (ref 70–100)
HCT VFR BLD AUTO: 25.6 % (ref 37–47)
HGB BLD-MCNC: 8.3 G/DL (ref 12–16)
MCH RBC QN AUTO: 28.5 PG (ref 28–32)
MCHC RBC AUTO-ENTMCNC: 32.4 G/DL (ref 33–36)
MCV RBC AUTO: 88 FL (ref 82–98)
PLATELET # BLD AUTO: 307 10*3/MM3 (ref 130–400)
PMV BLD AUTO: 8.9 FL (ref 6–12)
POTASSIUM BLD-SCNC: 3.4 MMOL/L (ref 3.5–5.3)
RBC # BLD AUTO: 2.91 10*6/MM3 (ref 4.2–5.4)
SODIUM BLD-SCNC: 132 MMOL/L (ref 135–145)
VANCOMYCIN TROUGH SERPL-MCNC: 13.37 MCG/ML (ref 10–20)
WBC NRBC COR # BLD: 16.66 10*3/MM3 (ref 4.8–10.8)

## 2017-03-17 PROCEDURE — 94799 UNLISTED PULMONARY SVC/PX: CPT

## 2017-03-17 PROCEDURE — 94760 N-INVAS EAR/PLS OXIMETRY 1: CPT

## 2017-03-17 PROCEDURE — 80202 ASSAY OF VANCOMYCIN: CPT | Performed by: FAMILY MEDICINE

## 2017-03-17 PROCEDURE — G8979 MOBILITY GOAL STATUS: HCPCS

## 2017-03-17 PROCEDURE — G8978 MOBILITY CURRENT STATUS: HCPCS

## 2017-03-17 PROCEDURE — 80048 BASIC METABOLIC PNL TOTAL CA: CPT | Performed by: NURSE PRACTITIONER

## 2017-03-17 PROCEDURE — 25010000002 VANCOMYCIN: Performed by: FAMILY MEDICINE

## 2017-03-17 PROCEDURE — 85027 COMPLETE CBC AUTOMATED: CPT | Performed by: NURSE PRACTITIONER

## 2017-03-17 PROCEDURE — 97162 PT EVAL MOD COMPLEX 30 MIN: CPT

## 2017-03-17 PROCEDURE — 25010000002 IRON SUCROSE PER 1 MG: Performed by: NURSE PRACTITIONER

## 2017-03-17 PROCEDURE — 25010000002 CEFTRIAXONE: Performed by: NURSE PRACTITIONER

## 2017-03-17 PROCEDURE — G8988 SELF CARE GOAL STATUS: HCPCS

## 2017-03-17 PROCEDURE — 97166 OT EVAL MOD COMPLEX 45 MIN: CPT

## 2017-03-17 PROCEDURE — 25010000002 LEVALBUTEROL PER 0.5 MG: Performed by: NURSE PRACTITIONER

## 2017-03-17 PROCEDURE — 94620 HC PULMONARY STRESS TEST SIMPLE: CPT

## 2017-03-17 PROCEDURE — 94762 N-INVAS EAR/PLS OXIMTRY CONT: CPT

## 2017-03-17 PROCEDURE — 25010000002 VANCOMYCIN PER 500 MG

## 2017-03-17 PROCEDURE — 25010000002 METHYLPREDNISOLONE PER 125 MG: Performed by: NURSE PRACTITIONER

## 2017-03-17 PROCEDURE — G8987 SELF CARE CURRENT STATUS: HCPCS

## 2017-03-17 RX ORDER — POTASSIUM CHLORIDE 750 MG/1
40 CAPSULE, EXTENDED RELEASE ORAL
Status: COMPLETED | OUTPATIENT
Start: 2017-03-17 | End: 2017-03-17

## 2017-03-17 RX ORDER — METHYLPREDNISOLONE SODIUM SUCCINATE 40 MG/ML
40 INJECTION, POWDER, LYOPHILIZED, FOR SOLUTION INTRAMUSCULAR; INTRAVENOUS EVERY 8 HOURS
Status: DISCONTINUED | OUTPATIENT
Start: 2017-03-17 | End: 2017-03-18 | Stop reason: HOSPADM

## 2017-03-17 RX ORDER — VANCOMYCIN HYDROCHLORIDE 1 G/200ML
1000 INJECTION, SOLUTION INTRAVENOUS EVERY 12 HOURS
Status: DISCONTINUED | OUTPATIENT
Start: 2017-03-17 | End: 2017-03-18 | Stop reason: HOSPADM

## 2017-03-17 RX ADMIN — POTASSIUM CHLORIDE 40 MEQ: 750 CAPSULE, EXTENDED RELEASE ORAL at 11:06

## 2017-03-17 RX ADMIN — VANCOMYCIN HYDROCHLORIDE 750 MG: 1 INJECTION, POWDER, LYOPHILIZED, FOR SOLUTION INTRAVENOUS at 10:29

## 2017-03-17 RX ADMIN — ALUMINUM HYDROXIDE, MAGNESIUM HYDROXIDE, AND SIMETHICONE 30 ML: 200; 200; 20 SUSPENSION ORAL at 14:39

## 2017-03-17 RX ADMIN — METHYLPREDNISOLONE SODIUM SUCCINATE 60 MG: 125 INJECTION, POWDER, FOR SOLUTION INTRAMUSCULAR; INTRAVENOUS at 02:15

## 2017-03-17 RX ADMIN — METHYLPREDNISOLONE SODIUM SUCCINATE 40 MG: 125 INJECTION, POWDER, FOR SOLUTION INTRAMUSCULAR; INTRAVENOUS at 18:27

## 2017-03-17 RX ADMIN — CEFTRIAXONE 1 G: 1 INJECTION, POWDER, FOR SOLUTION INTRAMUSCULAR; INTRAVENOUS at 21:11

## 2017-03-17 RX ADMIN — LEVALBUTEROL HYDROCHLORIDE 1.25 MG: 1.25 SOLUTION RESPIRATORY (INHALATION) at 07:17

## 2017-03-17 RX ADMIN — LEVALBUTEROL HYDROCHLORIDE 1.25 MG: 1.25 SOLUTION RESPIRATORY (INHALATION) at 21:33

## 2017-03-17 RX ADMIN — PAROXETINE 10 MG: 10 TABLET, FILM COATED ORAL at 09:17

## 2017-03-17 RX ADMIN — RIVAROXABAN 15 MG: 15 TABLET, FILM COATED ORAL at 21:25

## 2017-03-17 RX ADMIN — DOCUSATE SODIUM 100 MG: 100 CAPSULE ORAL at 18:27

## 2017-03-17 RX ADMIN — METOPROLOL TARTRATE 25 MG: 25 TABLET, FILM COATED ORAL at 09:17

## 2017-03-17 RX ADMIN — LEVALBUTEROL HYDROCHLORIDE 1.25 MG: 1.25 SOLUTION RESPIRATORY (INHALATION) at 15:22

## 2017-03-17 RX ADMIN — OSELTAMIVIR PHOSPHATE 75 MG: 75 CAPSULE ORAL at 06:11

## 2017-03-17 RX ADMIN — METHYLPREDNISOLONE SODIUM SUCCINATE 40 MG: 125 INJECTION, POWDER, FOR SOLUTION INTRAMUSCULAR; INTRAVENOUS at 09:17

## 2017-03-17 RX ADMIN — IRON SUCROSE 300 MG: 20 INJECTION, SOLUTION INTRAVENOUS at 18:33

## 2017-03-17 RX ADMIN — METOPROLOL TARTRATE 25 MG: 25 TABLET, FILM COATED ORAL at 21:25

## 2017-03-17 RX ADMIN — DOCUSATE SODIUM 100 MG: 100 CAPSULE ORAL at 09:17

## 2017-03-17 RX ADMIN — POTASSIUM CHLORIDE 40 MEQ: 750 CAPSULE, EXTENDED RELEASE ORAL at 13:24

## 2017-03-17 RX ADMIN — VANCOMYCIN HYDROCHLORIDE 1000 MG: 1 INJECTION, SOLUTION INTRAVENOUS at 21:11

## 2017-03-17 NOTE — PLAN OF CARE
Problem: Patient Care Overview (Adult)  Goal: Plan of Care Review    03/17/17 1639   Coping/Psychosocial Response Interventions   Plan Of Care Reviewed With patient   Patient Care Overview   Progress improving   Outcome Evaluation   Outcome Summary/Follow up Plan ambulate TID, OOB for meals, walk ox on room air, overnight sleep ox study room air         Problem: Fall Risk (Adult)  Goal: Absence of Falls  Outcome: Ongoing (interventions implemented as appropriate)    Problem: Pneumonia (Adult)  Goal: Signs and Symptoms of Listed Potential Problems Will be Absent or Manageable (Pneumonia)  Outcome: Ongoing (interventions implemented as appropriate)

## 2017-03-17 NOTE — PROGRESS NOTES
"Pharmacokinetic Follow-up Note - Vancomycin    Heather Burgos is a 89 y.o. female  [Ht: 61\" (154.9 cm); Wt: 136 lb (61.7 kg)]    Est Crcl= 39   Lab Results   Component Value Date    CREATININE 0.43 (L) 03/17/2017    CREATININE 0.38 (L) 03/16/2017    CREATININE 0.49 (L) 03/15/2017    CREATININE 0.50 06/29/2016    CREATININE 0.38 (L) 06/28/2016    CREATININE 0.42 (L) 06/27/2016      Lab Results   Component Value Date    WBC 16.66 (H) 03/17/2017    WBC 5.19 03/15/2017    WBC 6.36 03/14/2017      Lab Results   Component Value Date    VANCOTROUGH 13.37 03/17/2017   Drawn ~10 hours post 750mg IV q12 dose  Calc trough ~11       Current Vancomycin Dose:  750 mg IVPB every 12 hours, day 6 of therapy.    Indication for use: Pharmacy to dose for pneumonia    Assessment/Plan:  Trough sub-optimal for indication. Renal function stable. Vancomycin dose adjusted to 1g IV q12.  Pharmacy will continue to  monitor renal function and adjust dose accordingly.      Thanks for the consult,   JOLANTA Mike, Pharm.D.    03/17/17 11:02 AM    "

## 2017-03-17 NOTE — PROGRESS NOTES
[facility] Medicine Services  INPATIENT PROGRESS NOTE    Length of Stay: 5  Date of Admission: 3/12/2017  Primary Care Physician: Lupe Ham MD    Subjective   Chief Complaint: Feeling better    HPI   Patient setting up in bed eating breakfast.  She states she is feeling much better today.  Continuous oxygen saturation today 96% on 2 L.  She is agreeable to room air studies today and tonight.  She has absolutely no complaints, shortness of breathing, chest pain, abdominal pain.  She is hoping to be discharged tomorrow.    Review of Systems   All pertinent negatives and positives are as above. All other systems have been reviewed and are negative unless otherwise stated.     Objective    Temp:  [97 °F (36.1 °C)-98 °F (36.7 °C)] 97.8 °F (36.6 °C)  Heart Rate:  [] 86  Resp:  [14-18] 14  BP: (118-146)/(54-95) 121/66    Physical Exam   Constitutional: She is oriented to person, place, and time. She appears well-developed and well-nourished. No distress.   HENT:   Head: Normocephalic and atraumatic.   Eyes: Conjunctivae and EOM are normal. Pupils are equal, round, and reactive to light. No scleral icterus.   Neck: Normal range of motion. Neck supple. No JVD present. No tracheal deviation present.   Cardiovascular: Normal rate, regular rhythm, normal heart sounds and intact distal pulses.  Exam reveals no gallop.    No murmur heard.  Pulmonary/Chest: Effort normal. No respiratory distress. She has wheezes (expiratory bilaterally). She has no rales.   Coarseness throughout however has improved   Abdominal: Soft. Bowel sounds are normal. She exhibits no distension. There is no tenderness. There is no guarding.   Musculoskeletal: Normal range of motion. She exhibits no edema.   Generalized weakness   Neurological: She is alert and oriented to person, place, and time.   No obvious deficits noted.   Skin: Skin is warm and dry. No rash noted. She is not diaphoretic. No  erythema. No pallor.   Psychiatric: She has a normal mood and affect. Her behavior is normal.   Vitals reviewed.      Results Review:  No results found for this or any previous visit (from the past 12 hour(s)).    Cultures:  BLOOD CULTURE   Date Value Ref Range Status   03/12/2017 No growth at 4 days  Preliminary   03/12/2017 No growth at 4 days  Preliminary     URINE CULTURE   Date Value Ref Range Status   03/12/2017 >100,000 CFU/mL Escherichia coli (A)  Final     RESPIRATORY CULTURE   Date Value Ref Range Status   03/13/2017 Moderate growth (3+) Normal Respiratory Katina  Final       Radiology Data:    Imaging Results (last 24 hours)     Procedure Component Value Units Date/Time    XR Chest 1 View [[id number]] Collected:  03/16/17 1031     Updated:  03/16/17 1042    Narrative:       HISTORY: Shortness of breath.     FINDINGS: Today's exam is compared to a previous study of 3 days  earlier. There is moderate cardiomegaly. There is tortuosity of the  thoracic aorta. There is a patchy right lower lobe and upper lobe  infiltrate. The left lung remains clear.       Impression:       1. Persistent patchy right upper lobe and right lower lobe infiltrate.  The lungs are hypoventilated.  2. Mild cardiomegaly.  This report was finalized on 03/16/2017 10:39 by Dr. Huy Bedoya MD.            Intake/Output Summary (Last 24 hours) at 03/17/17 0821  Last data filed at 03/16/17 2022   Gross per 24 hour   Intake    740 ml   Output    350 ml   Net    390 ml       Allergies   Allergen Reactions   • Erythromycin Hives and Swelling   • Demerol [Meperidine] Nausea And Vomiting and GI Intolerance   • Morphine And Related Nausea And Vomiting       Scheduled meds:     ceftriaxone 1 g Intravenous Q24H   docusate sodium 100 mg Oral BID   iron sucrose (VENOFER) IVPB 300 mg Intravenous Q24H   levalbuterol 1.25 mg Nebulization TID - RT   methylPREDNISolone sodium succinate 60 mg Intravenous Q8H   metoprolol tartrate 25 mg Oral Q12H    oseltamivir 75 mg Oral Q12H   PARoxetine 10 mg Oral QAM   rivaroxaban 15 mg Oral Nightly   vancomycin 750 mg Intravenous Q12H       PRN meds:  •  acetaminophen  •  aluminum-magnesium hydroxide-simethicone  •  guaiFENesin  •  ondansetron  •  Pharmacy to dose vancomycin  •  sodium chloride    Assessment/Plan     Active Problems:    Pneumonia of right upper lobe due to infectious organism    Influenza A  Assessment:  1. Influenza A  2. Acute right upper lobe pneumonia (?viral vs secondary bacterial infection)  3. Respiratory failure secondary to #2  4. E. Coli UTI - susceptible to ceftriaxone   5. Advanced Age  6. GERD  7. Hypertension  8. Hyponatremia  9. Anemia  10. Chronic Atrial Fibrillation with rapid ventricular response         Plan:  1. Today's CBC and BMP results pending  2. CBC and BMP in am  3. Rocephin day #6  4. Vancomycin day #6  5. Tamiflu day # 5, d/c today  6. Ambulate TID if tolerates, OOB for meals  7. Decrease Solumedrol to 40mg TID   8. Day 2/3 Venofer 300mg  9.  Room air rest and exercise oxygenation saturation study today  10.  Overnight pulse oximetry on room air  11.  Micro-K 40 mEq ×2 today         Discharge Planning: I expect patient to be discharged to home tomorrow.      Samra Milan, APRN   03/17/17   8:21 AM          Chart reviewed patient examined.  Much improved today.  Tolerating diet.   Family at bedside.     Sudha Merchant,   03/17/17  2:57 PM

## 2017-03-17 NOTE — PLAN OF CARE
Problem: Patient Care Overview (Adult)  Goal: Plan of Care Review  Outcome: Ongoing (interventions implemented as appropriate)    03/17/17 1100   Coping/Psychosocial Response Interventions   Plan Of Care Reviewed With patient;family   Outcome Evaluation   Outcome Summary/Follow up Plan PT matias completed. Pt. was CGA to get OOB, and Min assist to stand and ambulate ~ 40 ft. Pt. with generalized weakness and decreased endurance. PT will work to progress pt's functional mobility, strength, and balance. Anticipate TCU vs SNF for short term rehab before pt. d/c home.         Problem: Inpatient Physical Therapy  Goal: Transfer Training Goal 1 LTG- PT  Outcome: Ongoing (interventions implemented as appropriate)    03/17/17 1100   Transfer Training PT LTG   Transfer Training PT LTG, Date Established 03/17/17   Transfer Training PT LTG, Time to Achieve by discharge   Transfer Training PT LTG, Activity Type bed to chair /chair to bed;sit to stand/stand to sit   Transfer Training PT LTG, [place] Level independent   Transfer Training PT LTG, Assist Device (with AD)       Goal: Gait Training Goal LTG- PT  Outcome: Ongoing (interventions implemented as appropriate)    03/17/17 1100   Gait Training PT LTG   Gait Training Goal PT LTG, Date Established 03/17/17   Gait Training Goal PT LTG, Time to Achieve by discharge   Gait Training Goal PT LTG, [place] Level conditional independence;supervision required   Gait Training Goal PT LTG, Assist Device (with AD)   Gait Training Goal PT LTG, Distance to Achieve 200       Goal: Stair Training Goal LTG- PT  Outcome: Ongoing (interventions implemented as appropriate)    03/17/17 1100   Stair Training PT LTG   Stair Training Goal PT LTG, Date Established 03/17/17   Stair Training Goal PT LTG, Time to Achieve by discharge   Stair Training Goal PT LTG, Number of Steps 1   Stair Training Goal PT LTG, [place]ine Level supervision required   Stair Training Goal PT LTG, Additional Goal  Attempt if pt. to d/c home       Goal: Dynamic Standing Balance Goal LTG- PT  Outcome: Ongoing (interventions implemented as appropriate)    03/17/17 1100   Dynamic Standing Balance PT LTG   Dynamic Standing Balance PT LTG, Date Established 03/17/17   Dynamic Standing Balance PT LTG, Time to Achieve by discharge   Dynamic Standing Balance PT LTG, [place] Level conditional independence;supervision required   Dynamic Standing Balance PT LTG, Assist Device assistive Device   Dynamic Standing Balance PT LTG, Additional Goal With all functional mobility, no LOB

## 2017-03-17 NOTE — PROGRESS NOTES
Acute Care - Occupational Therapy Initial Evaluation  [facility]     Patient Name: Heather Burgos  : 10/23/1926  MRN: [id number]  Today's Date: 3/17/2017  Onset of Illness/Injury or Date of Surgery Date: 17  Date of Referral to OT: 17  Referring Physician: JIM Hernandez    Admit Date: 3/12/2017       ICD-10-CM ICD-9-CM   1. Pneumonia of right upper lobe due to infectious organism J18.9 486   2. Influenza J11.1 487.1   3. Sepsis, due to unspecified organism A41.9 038.9     995.91   4. Dyspnea, unspecified type R06.00 786.09   5. Atrial fibrillation with RVR I48.91 427.31   6. Impaired mobility Z74.09 799.89   7. Decreased activities of daily living (ADL) Z78.9 V49.89     Patient Active Problem List   Diagnosis   • Essential hypertension   • Chronic atrial fibrillation   • Pneumonia of right upper lobe due to infectious organism   • Influenza A     Past Medical History   Diagnosis Date   • Atrial fibrillation with rapid ventricular response    • Degenerative joint disease    • Diastolic murmur of aorta    • Dysphagia    • Essential hypertension    • Pneumonia      Past Surgical History   Procedure Laterality Date   • Appendectomy     • Tonsillectomy     • Hysterectomy     • Tubal abdominal ligation            OT ASSESSMENT FLOWSHEET (last 72 hours)      OT Evaluation       17 1138 17 1100 17 0837          Rehab Evaluation    Document Type evaluation   See MAR  -ND evaluation   See MAR  -MICAH --  -AC      Subjective Information agree to therapy;complains of;weakness;dyspnea  -ND agree to therapy;complains of;fatigue;dyspnea  -MICAH       General Information    Patient Profile Review yes  -ND yes  -MICAH --  -AC      Onset of Illness/Injury or Date of Surgery Date 17  -ND 17  -MICAH --  -AC      Referring Physician JIM Hernandez  -ROLA Milan   Weakness  -MICAH --  -AC      General Observations Pt. sitting up in chair, alert, telemetry  -ND Fowlers in bed, alert,  oriented. Daughter in room.  -MICAH       Pertinent History Of Current Problem SOB. Dx: Influenza A, acute R upper lobe PNA, UTI, chronic A-fib. 3/16 cxr, 3/17 H&H: 8.3/25.6.   -ND SOB. Dx: Influenza A, acute R upper lobe PNA, UTI, chronic A-fib. 3/16 cxr, 3/17 H&H: 8.3/25.6.   -MICAH       Precautions/Limitations fall precautions;oxygen therapy device and L/min  -ND fall precautions;oxygen therapy device and L/min  -MICAH       Prior Level of Function independent:;all household mobility;ADL's;dressing;home management;cleaning;cooking;dependent:;driving   USED CANE PRN  -ND independent:;all household mobility;ADL's;dressing;bathing;home management;cooking;cleaning;dependent:;driving   Used cane as needed and community distances  -MICAH       Equipment Currently Used at Home shower chair;grab bar;cane, quad;walker, rolling;raised toilet  -ND shower chair;grab bar;cane, quad;walker, rolling;raised toilet  -MICAH       Plans/Goals Discussed With patient;agreed upon  -ND patient and family;agreed upon  -MICAH       Risks Reviewed patient:;LOB;nausea/vomiting;dizziness;increased discomfort;change in vital signs  -ND patient and family:;LOB;nausea/vomiting;dizziness;change in vital signs;increased discomfort;lines disloged  -MICAH       Benefits Reviewed patient:;improve function;increase independence;increase strength;decrease pain;increase balance;increase knowledge;improve skin integrity  -ND patient and family:;improve function;increase independence;increase strength;increase balance;decrease pain;increase knowledge;improve skin integrity  -MICAH       Barriers to Rehab medically complex;previous functional deficit;physical barrier  -ND medically complex  -MICAH       Living Environment    Lives With alone  -ND alone  -MICAH       Living Arrangements house  -ND house  -MICAH       Home Accessibility bed and bath on same level;stairs to enter home   WALK IN SHOWER WITH SEAT  -ND bed and bath on same level;stairs to enter home   walk in shower with  seat  -MICAH       Number of Stairs to Enter Home 1  -ND 1  -MICAH       Clinical Impression    Date of Referral to OT 03/16/17  -ND        OT Diagnosis decreased adl  -ND        Prognosis good  -ND        Impairments Found (describe specific impairments) aerobic capacity/endurance;ergonomics and body mechanics;gait, locomotion, and balance;integumentary integrity;joint integrity and mobility;motor function;ROM;posture  -ND        Patient/Family Goals Statement to go home  -ND        Criteria for Skilled Therapeutic Interventions Met yes;treatment indicated  -ND        Rehab Potential good, to achieve stated therapy goals  -ND        Therapy Frequency 3-5 times/wk  -ND        Predicted Duration of Therapy Intervention (days/wks) 10 days  -ND        Anticipated Equipment Needs At Discharge bathing equipment;dressing equipment  -ND        Anticipated Discharge Disposition home with 24/7 care;skilled nursing facility   TCU  -ND        Vital Signs    Posttreatment Heart Rate (beats/min)  98  -MICAH       Post SpO2 (%)  100  -MICAH       O2 Delivery Post Treatment  supplemental O2  -MICAH       Pre Patient Position  Supine  -MICAH       Intra Patient Position  --   ambulating  -MICAH       Post Patient Position  Sitting  -MICAH       Pain Assessment    Pain Assessment No/denies pain  -ND No/denies pain  -MICAH       Vision Assessment/Intervention    Visual Impairment WFL with corrective lenses  -ND WFL with corrective lenses   per pt  -MICAH       Cognitive Assessment/Intervention    Current Cognitive/Communication Assessment functional  -ND functional  -MICAH       Orientation Status oriented x 4  -ND oriented x 4  -MICAH       Follows Commands/Answers Questions 100% of the time;able to follow multi-step instructions  -ND able to follow multi-step instructions;100% of the time  -MICAH       Personal Safety WNL/WFL  -ND        Personal Safety Interventions fall prevention program maintained;gait belt;nonskid shoes/slippers when out of bed;supervised activity   -ND fall prevention program maintained;gait belt;muscle strengthening facilitated;nonskid shoes/slippers when out of bed  -MICAH       ROM (Range of Motion)    General ROM upper extremity range of motion deficits identified  -ND        General ROM Detail Aravind shoulders impaired approx 75%, Distal BUE WFL  -ND B shoulder AROM impaired ~ 75%, B elbow/hand AROM WFL. B LE AROM WFL  -MICAH       MMT (Manual Muscle Testing)    General MMT Assessment upper extremity strength deficits identified  -ND        General MMT Assessment Detail Functionally 4-/5  -ND B LE/UE functionally 4-/5.  -MICAH       Bed Mobility, Assessment/Treatment    Bed Mobility, Assistive Device  head of bed elevated;bed rails  -MICAH       Bed Mobility, Scoot/Bridge, [place]  verbal cues required;contact guard assist  -MICAH       Bed Mob, Supine to Sit, [place]  verbal cues required;contact guard assist  -MICAH       Bed Mobility, Safety Issues  decreased use of arms for pushing/pulling;decreased use of legs for bridging/pushing  -MICAH       Bed Mobility, Impairments  strength decreased  -MICAH       Bed Mobility, Comment up in chair  -ND        Transfer Assessment/Treatment    Transfers, Sit-Stand [place] verbal cues required;nonverbal cues required (demo/gesture);minimum assist (75% patient effort)  -ND verbal cues required;minimum assist (75% patient effort);upper extremity support  -MICAH       Transfers, Stand-Sit [place] verbal cues required;nonverbal cues required (demo/gesture);minimum assist (75% patient effort)  -ND verbal cues required;minimum assist (75% patient effort);upper extremity support  -MICAH       Transfers, Sit-Stand-Sit, Assist Device --   L. HHA  -ND        Toilet Transfer, [place] verbal cues required;nonverbal cues required (demo/gesture);minimum assist (75% patient effort)  -ND verbal cues required;minimum assist (75% patient effort)  -MICAH       Toilet Transfer, Assistive Device --   L. HHA  -ND --   assisted on/off the bathroom  toilet, pt. completed hygiene.  -MICAH       Transfer, Safety Issues step length decreased  -ND step length decreased  -MICAH       Transfer, Impairments strength decreased;impaired balance;postural control impaired  -ND strength decreased;impaired balance  -MICAH       Functional Mobility    Functional Mobility- Ind. Level verbal cues required;nonverbal cues required (demo/gesture);minimum assist (75% patient effort)  -ND        Functional Mobility- Device --   L. HHA  -ND        Functional Mobility- Safety Issues step length decreased  -ND        Lower Body Dressing Assessment/Training    LB Dressing Assess/Train, Clothing Type doffing:;donning:;socks  -ND        LB Dressing Assess/Train, Position sitting  -ND        LB Dressing Assess/Train, [place] verbal cues required;nonverbal cues required (demo/gesture);supervision required;maximum assist (25% patient effort)  -ND        LB Dressing Assess/Train, Impairments decreased flexibility  -ND        LB Dressing Assess/Train, Comment Pt. Max for R. sock and supervision for L. sock  -ND        Toileting Assessment/Training    Toileting Assess/Train, Assistive Device bedside commode  -ND        Toileting Assess/Train, Position sitting;standing  -ND        Toileting Assess/Train, Indepen Level moderate assist (50% patient effort)  -ND        Toileting Assess/Train, Impairments strength decreased;impaired balance;postural control impaired  -ND        Toileting Assess/Train, Comment Pt. Mod A for clothing management, Min for t/f, and Min for hygiene  -ND        Motor Skills/Interventions    Additional Documentation Balance Skills Training (Group)  -ND Balance Skills Training (Group)  -MICAH       Balance Skills Training    Sitting-Level of Assistance Distant supervision  -ND Distant supervision  -MICAH       Sitting-Balance Support Right upper extremity supported;Left upper extremity supported;Feet supported  -ND Right upper extremity supported;Left upper extremity supported;Feet  supported  -MICAH       Standing-Level of Assistance Minimum assistance  -ND Minimum assistance  -MICAH       Static Standing Balance Support Left upper extremity supported  -ND Right upper extremity supported  -MICAH       Gait Balance-Level of Assistance Minimum assistance  -ND Minimum assistance  -MICAH       Gait Balance Support Left upper extremity supported  -ND Right upper extremity supported  -MICAH       Sensory Assessment/Intervention    Sensory Impairment --   WNL per pt.   -ND --   WNL per pt  -MICAH       General Therapy Interventions    Planned Therapy Interventions activity intolerance;adaptive equipment training;ADL retraining;balance training;bed mobility training;energy conservation;home exercise program;strengthening;transfer training  -ND        Positioning and Restraints    Pre-Treatment Position sitting in chair/recliner  -ND in bed  -MICAH       Post Treatment Position chair  -ND chair  -MICAH       In Chair sitting;call light within reach;encouraged to call for assist;notified nsg  -ND sitting;call light within reach;encouraged to call for assist;with family/caregiver;with nsg  -MICAH         User Key  (r) = Recorded By, (t) = Taken By, (c) = Cosigned By    Initials Name Effective Dates    AC Vernon Baker, OTR/L 06/22/15 -     MICAH Jose Angel Canada, PT DPT 08/02/16 -     ND Tanvi Black, OTR/L 10/21/16 -            Occupational Therapy Education     Title: PT OT SLP Therapies (Active)     Topic: Occupational Therapy (Done)     Point: ADL training (Done)    Description: Instruct learner(s) on proper safety adaptation and remediation techniques during self care or transfers.   Instruct in proper use of assistive devices.    Learning Progress Summary    Learner Readiness Method Response Comment Documented by Status   Patient Acceptance E VU,NR Pt. educated on role of OT, safe t/f, safe functional mob, progression with poc ND 03/17/17 2653 Done               Point: Home exercise program (Done)    Description: Instruct  learner(s) on appropriate technique for monitoring, assisting and/or progressing therapeutic exercises/activities.    Learning Progress Summary    Learner Readiness Method Response Comment Documented by Status   Patient Acceptance E VU,NR Pt. educated on role of OT, safe t/f, safe functional mob, progression with poc ND 03/17/17 1457 Done               Point: Body mechanics (Done)    Description: Instruct learner(s) on proper positioning and spine alignment during self-care, functional mobility activities and/or exercises.    Learning Progress Summary    Learner Readiness Method Response Comment Documented by Status   Patient Acceptance E VU,NR Pt. educated on role of OT, safe t/f, safe functional mob, progression with poc ND 03/17/17 1457 Done                      User Key     Initials Effective Dates Name Provider Type Discipline    ND 10/21/16 -  Tanvi Black, OTR/L Occupational Therapist OT                  OT Recommendation and Plan  Anticipated Equipment Needs At Discharge: bathing equipment, dressing equipment  Anticipated Discharge Disposition: home with 24/7 care, skilled nursing facility (TCU)  Planned Therapy Interventions: activity intolerance, adaptive equipment training, ADL retraining, balance training, bed mobility training, energy conservation, home exercise program, strengthening, transfer training  Therapy Frequency: 3-5 times/wk  Plan of Care Review  Plan Of Care Reviewed With: patient  Progress: progress toward functional goals as expected  Outcome Summary/Follow up Plan: OT matias completed this date. Pt. required Min A for sit to stand t/f from chair and BSC. Pt. required Mod A for toileting ADL. Pt. Max to simulate don/doff R sock and supervision for L. sock. Pt. cont to benefit from skilled OT due to decreased balance, decreased strength, decreased activity tolerance and decreased indepedence in ADLs. Anticipated dc is TCU vs SNF. 3/17/17 1500          OT Goals       03/17/17 1457           Transfer Training OT LTG    Transfer Training OT LTG, Date Established 03/17/17  -ND      Transfer Training OT LTG, Time to Achieve by discharge  -ND      Transfer Training OT LTG, Activity Type all transfers  -ND      Transfer Training OT LTG, [place] Level supervision required  -ND      Transfer Training OT LTG, Assist Device cane, straight  -ND      Strength OT LTG    Strength Goal OT LTG, Date Established 03/17/17  -ND      Strength Goal OT LTG, Time to Achieve by discharge  -ND      Strength Goal OT LTG, Measure to Achieve Pt. will complete BUE strengthening exercises to increase BUE strength to 4/5 for ADLs.   -ND      ADL OT LTG    ADL OT LTG, Date Established 03/17/17  -ND      ADL OT LTG, Time to Achieve by discharge  -ND      ADL OT LTG, Activity Type ADL skills  -ND      ADL OT LTG, [place] Level contact guard  -ND      ADL OT LTG, Additional Goal AE PRN  -ND        User Key  (r) = Recorded By, (t) = Taken By, (c) = Cosigned By    Initials Name Provider Type    ROLA Black, OTR/L Occupational Therapist                Outcome Measures       03/17/17 1500 03/17/17 1100       How much help from another person do you currently need...    Turning from your back to your side while in flat bed without using bedrails?  3  -MICAH     Moving from lying on back to sitting on the side of a flat bed without bedrails?  3  -MICAH     Moving to and from a bed to a chair (including a wheelchair)?  3  -MICAH     Standing up from a chair using your arms (e.g., wheelchair, bedside chair)?  3  -MICAH     Climbing 3-5 steps with a railing?  2  -MICAH     To walk in hospital room?  3  -MICAH     AM-PAC 6 Clicks Score  17  -MICAH     How much help from another is currently needed...    Putting on and taking off regular lower body clothing? 2  -ND      Bathing (including washing, rinsing, and drying) 2  -ND      Toileting (which includes using toilet bed pan or urinal) 3  -ND      Putting on and taking off regular upper body  clothing 3  -ND      Taking care of personal grooming (such as brushing teeth) 3  -ND      Eating meals 4  -ND      Score 17  -ND      Functional Assessment    Outcome Measure Options AM-PAC 6 Clicks Daily Activity (OT)  -ND AM-PAC 6 Clicks Basic Mobility (PT)  -MICAH       User Key  (r) = Recorded By, (t) = Taken By, (c) = Cosigned By    Initials Name Provider Type    MICAH Canada, PT DPT Physical Therapist    ND Tanvi Black OTR/L Occupational Therapist          Time Calculation:   OT Start Time: 1430 (10 MIN CHART REVIEW NOT INCLUDED)  OT Stop Time: 1500  OT Time Calculation (min): 30 min    Therapy Charges for Today     Code Description Service Date Service Provider Modifiers Qty    21714607460 HC OT SELFCARE CURRENT 3/17/2017 Tanvi Black OTR/L GO, CK 1    53887433470 HC OT SELFCARE PROJECTED 3/17/2017 Tanvi Black OTR/L GO, CJ 1    28429797903 HC OT EVAL MOD COMPLEXITY 3 3/17/2017 Tanvi Black OTR/L GO, KX 1          OT G-codes  OT Functional Scales Options: AM-PAC 6 Clicks Daily Activity (OT)  Functional Limitation: Self care  Self Care Current Status (): At least 40 percent but less than 60 percent impaired, limited or restricted  Self Care Goal Status (): At least 20 percent but less than 40 percent impaired, limited or restricted    SARAH BETH Castillo/DIETER  3/17/2017

## 2017-03-17 NOTE — PLAN OF CARE
Problem: Patient Care Overview (Adult)  Goal: Plan of Care Review  Outcome: Ongoing (interventions implemented as appropriate)    03/17/17 0614   Coping/Psychosocial Response Interventions   Plan Of Care Reviewed With patient   Patient Care Overview   Progress no change         Problem: Fall Risk (Adult)  Goal: Absence of Falls  Outcome: Ongoing (interventions implemented as appropriate)    03/17/17 0614   Fall Risk (Adult)   Absence of Falls achieves outcome         Problem: Pneumonia (Adult)  Goal: Signs and Symptoms of Listed Potential Problems Will be Absent or Manageable (Pneumonia)  Outcome: Ongoing (interventions implemented as appropriate)    03/17/17 0614   Pneumonia   Problems Assessed (Pneumonia) all   Problems Present (Pneumonia) respiratory compromise

## 2017-03-17 NOTE — PLAN OF CARE
Problem: Patient Care Overview (Adult)  Goal: Plan of Care Review  Outcome: Ongoing (interventions implemented as appropriate)    03/17/17 1457   Coping/Psychosocial Response Interventions   Plan Of Care Reviewed With patient   Patient Care Overview   Progress progress toward functional goals as expected   Outcome Evaluation   Outcome Summary/Follow up Plan OT matias completed this date. Pt. required Min A for sit to stand t/f from chair and BSC. Pt. required Mod A for toileting ADL. Pt. Max to simulate don/doff R sock and supervision for L. sock. Pt. cont to benefit from skilled OT due to decreased balance, decreased strength, decreased activity tolerance and decreased indepedence in ADLs. Anticipated dc is TCU vs SNF. 3/17/17 1500         Problem: Inpatient Occupational Therapy  Goal: Transfer Training Goal 1 LTG- OT  Outcome: Ongoing (interventions implemented as appropriate)    03/17/17 1457   Transfer Training OT LTG   Transfer Training OT LTG, Date Established 03/17/17   Transfer Training OT LTG, Time to Achieve by discharge   Transfer Training OT LTG, Activity Type all transfers   Transfer Training OT LTG, [place] Level supervision required   Transfer Training OT LTG, Assist Device cane, straight       Goal: Strength Goal LTG- OT  Outcome: Ongoing (interventions implemented as appropriate)    03/17/17 1457   Strength OT LTG   Strength Goal OT LTG, Date Established 03/17/17   Strength Goal OT LTG, Time to Achieve by discharge   Strength Goal OT LTG, Measure to Achieve Pt. will complete BUE strengthening exercises to increase BUE strength to 4/5 for ADLs.        Goal: ADL Goal LTG- OT  Outcome: Ongoing (interventions implemented as appropriate)    03/17/17 1457   ADL OT LTG   ADL OT LTG, Date Established 03/17/17   ADL OT LTG, Time to Achieve by discharge   ADL OT LTG, Activity Type ADL skills   ADL OT LTG, [place] Level contact guard   ADL OT LTG, Additional Goal AE PRN

## 2017-03-17 NOTE — PROGRESS NOTES
Exercise Oximetry    Patient Name:Heather Burgos   MRN: [id number]   Date: 03/17/17             ROOM AIR BASELINE   SpO2% 96   Heart Rate 100   Blood Pressure      EXERCISE ON ROOM AIR SpO2% EXERCISE ON O2 @  LPM SpO2%   1 MINUTE 95 1 MINUTE    2 MINUTES 96 2 MINUTES    3 MINUTES 95 3 MINUTES    4 MINUTES 96 4 MINUTES    5 MINUTES 94 5 MINUTES    6 MINUTES 95 6 MINUTES               Distance Walked  50' Distance Walked   Dyspnea ([place] Scale)   Dyspnea ([place] Scale)   Fatigue ([place] Scale)   Fatigue ([place] Scale)   SpO2% Post Exercise  95 SpO2% Post Exercise   HR Post Exercise  123 HR Post Exercise   Time to Recovery  2 minutes Time to Recovery     Comments: Patient was in shower for around 20 minutes prior to walk ox.  She is not steady so we only walked from bathroom to bed but she was showering so she was exerting herself. Her SPO2 stayed above 93%. CP,CRT

## 2017-03-17 NOTE — PROGRESS NOTES
Acute Care - Physical Therapy Initial Evaluation  [facility]     Patient Name: Heather Burgos  : 10/23/1926  MRN: [id number]  Today's Date: 3/17/2017   Onset of Illness/Injury or Date of Surgery Date: 17  Date of Referral to PT: 17  Referring Physician: JIM Hernandez      Admit Date: 3/12/2017     Visit Dx:    ICD-10-CM ICD-9-CM   1. Pneumonia of right upper lobe due to infectious organism J18.9 486   2. Influenza J11.1 487.1   3. Sepsis, due to unspecified organism A41.9 038.9     995.91   4. Dyspnea, unspecified type R06.00 786.09   5. Atrial fibrillation with RVR I48.91 427.31   6. Impaired mobility Z74.09 799.89     Patient Active Problem List   Diagnosis   • Essential hypertension   • Chronic atrial fibrillation   • Pneumonia of right upper lobe due to infectious organism   • Influenza A     Past Medical History   Diagnosis Date   • Atrial fibrillation with rapid ventricular response    • Degenerative joint disease    • Diastolic murmur of aorta    • Dysphagia    • Essential hypertension    • Pneumonia      Past Surgical History   Procedure Laterality Date   • Appendectomy     • Tonsillectomy     • Hysterectomy     • Tubal abdominal ligation            PT ASSESSMENT (last 72 hours)      PT Evaluation       17 1138 17 1100    Rehab Evaluation    Document Type evaluation   See MAR  -ND evaluation   See MAR  -MICAH    Subjective Information  agree to therapy;complains of;fatigue;dyspnea  -MICAH    General Information    Patient Profile Review yes  -ND yes  -MICAH    Onset of Illness/Injury or Date of Surgery Date 17  -ND 17  -MICAH    Referring Physician JIM Hernandez  -ND Samra Milan   Weakness  -MICAH    General Observations  Fowlers in bed, alert, oriented. Daughter in room.  -MICAH    Pertinent History Of Current Problem SOB. Dx: Influenza A, acute R upper lobe PNA, UTI, chronic A-fib. 3/16 cxr, 3/17 H&H: 8.3/25.6.   -ND SOB. Dx: Influenza A, acute R upper lobe PNA, UTI,  chronic A-fib. 3/16 cxr, 3/17 H&H: 8.3/25.6.   -MICAH    Precautions/Limitations fall precautions;oxygen therapy device and L/min  -ND fall precautions;oxygen therapy device and L/min  -MICAH    Prior Level of Function independent:;all household mobility;ADL's;dressing;home management;cleaning;cooking;dependent:;driving   USED CANE PRN  -ND independent:;all household mobility;ADL's;dressing;bathing;home management;cooking;cleaning;dependent:;driving   Used cane as needed and community distances  -MICAH    Equipment Currently Used at Home shower chair;grab bar;cane, quad;walker, rolling;raised toilet  -ND shower chair;grab bar;cane, quad;walker, rolling;raised toilet  -MICAH    Plans/Goals Discussed With  patient and family;agreed upon  -MICAH    Risks Reviewed  patient and family:;LOB;nausea/vomiting;dizziness;change in vital signs;increased discomfort;lines disloged  -MICAH    Benefits Reviewed  patient and family:;improve function;increase independence;increase strength;increase balance;decrease pain;increase knowledge;improve skin integrity  -MICAH    Barriers to Rehab  medically complex  -MICAH    Living Environment    Lives With alone  -ND alone  -MICAH    Living Arrangements house  -ND house  -MICAH    Home Accessibility bed and bath on same level;stairs to enter home   WALK IN SHOWER WITH SEAT  -ND bed and bath on same level;stairs to enter home   walk in shower with seat  -MICAH    Number of Stairs to Enter Home 1  -ND 1  -MICAH    Clinical Impression    Date of Referral to PT  03/16/17  -MICAH    PT Diagnosis  Impaired mobility, weakness  -MICAH    Functional Level At Time Of Evaluation  CGA OOB, Min to stand and ambulate ~ 40 ft. total.  -MICAH    Patient/Family Goals Statement  Increase strength/mobility  -MICAH    Criteria for Skilled Therapeutic Interventions Met  yes;treatment indicated  -MICAH    Impairments Found (describe specific impairments)  gait, locomotion, and balance  -MICAH    Rehab Potential  good, to achieve stated therapy goals  -MICAH     Predicted Duration of Therapy Intervention (days/wks)  until d/c  -MICAH    Vital Signs    Posttreatment Heart Rate (beats/min)  98  -MICAH    Post SpO2 (%)  100  -MICAH    O2 Delivery Post Treatment  supplemental O2  -MICAH    Pre Patient Position  Supine  -MICAH    Intra Patient Position  --   ambulating  -MICAH    Post Patient Position  Sitting  -MICAH    Pain Assessment    Pain Assessment  No/denies pain  -MICAH    Vision Assessment/Intervention    Visual Impairment  WFL with corrective lenses   per pt  -MICAH    Cognitive Assessment/Intervention    Current Cognitive/Communication Assessment  functional  -MICAH    Orientation Status  oriented x 4  -MICAH    Follows Commands/Answers Questions  able to follow multi-step instructions;100% of the time  -MICAH    Personal Safety Interventions  fall prevention program maintained;gait belt;muscle strengthening facilitated;nonskid shoes/slippers when out of bed  -MICAH    ROM (Range of Motion)    General ROM Detail  B shoulder AROM impaired ~ 75%, B elbow/hand AROM WFL. B LE AROM WFL  -MICAH    MMT (Manual Muscle Testing)    General MMT Assessment Detail  B LE/UE functionally 4-/5.  -MICAH    Bed Mobility, Assessment/Treatment    Bed Mobility, Assistive Device  head of bed elevated;bed rails  -MICAH    Bed Mobility, Scoot/Bridge, [place]  verbal cues required;contact guard assist  -MICAH    Bed Mob, Supine to Sit, [place]  verbal cues required;contact guard assist  -MICAH    Bed Mobility, Safety Issues  decreased use of arms for pushing/pulling;decreased use of legs for bridging/pushing  -MICAH    Bed Mobility, Impairments  strength decreased  -MICAH    Transfer Assessment/Treatment    Transfers, Sit-Stand [place]  verbal cues required;minimum assist (75% patient effort);upper extremity support  -MICAH    Transfers, Stand-Sit [place]  verbal cues required;minimum assist (75% patient effort);upper extremity support  -MICAH    Toilet Transfer, [place]  verbal cues required;minimum assist (75% patient effort)   -MICAH    Toilet Transfer, Assistive Device  --   assisted on/off the bathroom toilet, pt. completed hygiene.  -MICAH    Transfer, Safety Issues  step length decreased  -MICAH    Transfer, Impairments  strength decreased;impaired balance  -MICAH    Gait Assessment/Treatment    Gait, [place] Level  verbal cues required;minimum assist (75% patient effort);upper extremity support  -MICAH    Gait, Distance (Feet)  40  -MICAH    Gait, Gait Pattern Analysis  swing-through gait  -MICAH    Gait, Gait Deviations  bilateral:;jes[place] decreased;step length decreased;forward flexed posture  -MICAH    Gait, Safety Issues  step length decreased;supplemental O2  -MICAH    Gait, Impairments  strength decreased;impaired balance  -MICAH    Gait, Comment  Try cane/RW for gait next visit.  -MICAH    Motor Skills/Interventions    Additional Documentation  Balance Skills Training (Group)  -MICAH    Balance Skills Training    Sitting-Level of Assistance  Distant supervision  -MICAH    Sitting-Balance Support  Right upper extremity supported;Left upper extremity supported;Feet supported  -MICAH    Standing-Level of Assistance  Minimum assistance  -MICAH    Static Standing Balance Support  Right upper extremity supported  -MICAH    Gait Balance-Level of Assistance  Minimum assistance  -MICAH    Gait Balance Support  Right upper extremity supported  -MICAH    Sensory Assessment/Intervention    Sensory Impairment  --   WNL per pt  -MICAH    Positioning and Restraints    Pre-Treatment Position  in bed  -MICAH    Post Treatment Position  chair  -MICAH    In Chair  sitting;call light within reach;encouraged to call for assist;with family/caregiver;with nsg  -MICAH      03/17/17 0871       Rehab Evaluation    Document Type --  -     General Information    Patient Profile Review --  -AC     Onset of Illness/Injury or Date of Surgery Date --  -     Referring Physician --  -AC       User Key  (r) = Recorded By, (t) = Taken By, (c) = Cosigned By    Initials Name Provider Type    AC Vernon Baker, OTR/L  Occupational Therapist    MICAH Canada, PT DPT Physical Therapist    ROLA Black, OTR/L Occupational Therapist          Physical Therapy Education     Title: PT OT SLP Therapies (Active)     Topic: Physical Therapy (Active)     Point: Mobility training (Done)    Learning Progress Summary    Learner Readiness Method Response Comment Documented by Status   Patient Acceptance E VU,NR,DU Educated pt. on progression of PT POC and benefits of activity MICAH 03/17/17 1100 Done   Family Acceptance E VU,NR,DU Educated pt. on progression of PT POC and benefits of activity MICAH 03/17/17 1100 Done                      User Key     Initials Effective Dates Name Provider Type Discipline     08/02/16 -  Jose Angel Canada, PT DPT Physical Therapist PT                PT Recommendation and Plan  Anticipated Discharge Disposition: skilled nursing facility, transitional care  Planned Therapy Interventions: bed mobility training, transfer training, gait training, balance training, home exercise program, patient/family education, postural re-education, stair training, strengthening  PT Frequency: daily, 2 times/day  Plan of Care Review  Plan Of Care Reviewed With: patient, family  Outcome Summary/Follow up Plan: PT eval completed. Pt. was CGA to get OOB, and Min assist to stand and ambulate ~ 40 ft. Pt. with generalized weakness and decreased endurance. PT will work to progress pt's functional mobility, strength, and balance. Anticipate TCU vs SNF for short term rehab before pt. d/c home.          IP PT Goals       03/17/17 1100          Transfer Training PT LTG    Transfer Training PT LTG, Date Established 03/17/17  -MICAH      Transfer Training PT LTG, Time to Achieve by discharge  -MICAH      Transfer Training PT LTG, Activity Type bed to chair /chair to bed;sit to stand/stand to sit  -MICAH      Transfer Training PT LTG, [place] Level independent  -MICAH      Transfer Training PT LTG, Assist Device --   with AD  -MICAH      Gait  Training PT LTG    Gait Training Goal PT LTG, Date Established 03/17/17  -MICAH      Gait Training Goal PT LTG, Time to Achieve by discharge  -MICAH      Gait Training Goal PT LTG, [place] Level conditional independence;supervision required  -MICAH      Gait Training Goal PT LTG, Assist Device --   with AD  -MICAH      Gait Training Goal PT LTG, Distance to Achieve 200  -MICAH      Stair Training PT LTG    Stair Training Goal PT LTG, Date Established 03/17/17  -MICAH      Stair Training Goal PT LTG, Time to Achieve by discharge  -MICAH      Stair Training Goal PT LTG, Number of Steps 1  -MICAH      Stair Training Goal PT LTG, [place] Level supervision required  -MICAH      Stair Training Goal PT LTG, Distance to Achieve --  -MICAH      Stair Training Goal PT LTG, Additional Goal Attempt if pt. to d/c home  -MICAH      Dynamic Standing Balance PT LTG    Dynamic Standing Balance PT LTG, Date Established 03/17/17  -MICAH      Dynamic Standing Balance PT LTG, Time to Achieve by discharge  -MICAH      Dynamic Standing Balance PT LTG, [place] Level conditional independence;supervision required  -MICAH      Dynamic Standing Balance PT LTG, Assist Device assistive Device  -MICAH      Dynamic Standing Balance PT LTG, Additional Goal With all functional mobility, no LOB  -MICAH        User Key  (r) = Recorded By, (t) = Taken By, (c) = Cosigned By    Initials Name Provider Type    MICAH Canada, PT DPT Physical Therapist                Outcome Measures       03/17/17 1100          How much help from another person do you currently need...    Turning from your back to your side while in flat bed without using bedrails? 3  -MICAH      Moving from lying on back to sitting on the side of a flat bed without bedrails? 3  -MICAH      Moving to and from a bed to a chair (including a wheelchair)? 3  -MICAH      Standing up from a chair using your arms (e.g., wheelchair, bedside chair)? 3  -MICAH      Climbing 3-5 steps with a railing? 2  -MICAH      To walk in hospital room? 3   -MICAH      AM-PAC 6 Clicks Score 17  -MICAH      Functional Assessment    Outcome Measure Options AM-PAC 6 Clicks Basic Mobility (PT)  -MICAH        User Key  (r) = Recorded By, (t) = Taken By, (c) = Cosigned By    Initials Name Provider Type    MICAH Canada, PT DPT Physical Therapist           Time Calculation:         PT Charges       03/17/17 1255          Time Calculation    Start Time 1100  -MICAH      Stop Time 1145  -MICAH      Time Calculation (min) 45 min  -MICAH      PT Received On 03/17/17  -MICAH      PT Goal Re-Cert Due Date 03/27/17  -MICAH        User Key  (r) = Recorded By, (t) = Taken By, (c) = Cosigned By    Initials Name Provider Type    MICAH Canada, PT DPT Physical Therapist          Therapy Charges for Today     Code Description Service Date Service Provider Modifiers Qty    82375419485 HC PT MOBILITY CURRENT 3/17/2017 Jose Angel Canada, PT DPT GP, CK 1    86114745623 HC PT MOBILITY PROJECTED 3/17/2017 Jose Angel Canada, PT DPT GP, CI 1    16718885259 HC PT EVAL MOD COMPLEXITY 3 3/17/2017 Jose Angel Canada, PT DPT GP 1          PT G-Codes  Outcome Measure Options: AM-PAC 6 Clicks Basic Mobility (PT)  Score: 17  Functional Limitation: Mobility: Walking and moving around  Mobility: Walking and Moving Around Current Status (): At least 40 percent but less than 60 percent impaired, limited or restricted  Mobility: Walking and Moving Around Goal Status (): At least 1 percent but less than 20 percent impaired, limited or restricted      Jose Angel Canada, PT DPT  3/17/2017

## 2017-03-18 VITALS
TEMPERATURE: 97.5 F | WEIGHT: 136 LBS | BODY MASS INDEX: 25.68 KG/M2 | HEART RATE: 106 BPM | SYSTOLIC BLOOD PRESSURE: 151 MMHG | DIASTOLIC BLOOD PRESSURE: 81 MMHG | RESPIRATION RATE: 20 BRPM | HEIGHT: 61 IN | OXYGEN SATURATION: 93 %

## 2017-03-18 LAB
ANION GAP SERPL CALCULATED.3IONS-SCNC: 5 MMOL/L (ref 4–13)
BUN BLD-MCNC: 20 MG/DL (ref 5–21)
BUN/CREAT SERPL: 38.5 (ref 7–25)
CALCIUM SPEC-SCNC: 9.7 MG/DL (ref 8.4–10.4)
CHLORIDE SERPL-SCNC: 96 MMOL/L (ref 98–110)
CO2 SERPL-SCNC: 31 MMOL/L (ref 24–31)
CREAT BLD-MCNC: 0.52 MG/DL (ref 0.5–1.4)
DEPRECATED RDW RBC AUTO: 45.5 FL (ref 40–54)
ERYTHROCYTE [DISTWIDTH] IN BLOOD BY AUTOMATED COUNT: 14.1 % (ref 12–15)
GFR SERPL CREATININE-BSD FRML MDRD: 111 ML/MIN/1.73
GLUCOSE BLD-MCNC: 150 MG/DL (ref 70–100)
HCT VFR BLD AUTO: 24.4 % (ref 37–47)
HGB BLD-MCNC: 8 G/DL (ref 12–16)
MCH RBC QN AUTO: 28.9 PG (ref 28–32)
MCHC RBC AUTO-ENTMCNC: 32.8 G/DL (ref 33–36)
MCV RBC AUTO: 88.1 FL (ref 82–98)
PLATELET # BLD AUTO: 423 10*3/MM3 (ref 130–400)
PMV BLD AUTO: 9.9 FL (ref 6–12)
POTASSIUM BLD-SCNC: 4.7 MMOL/L (ref 3.5–5.3)
RBC # BLD AUTO: 2.77 10*6/MM3 (ref 4.2–5.4)
SODIUM BLD-SCNC: 132 MMOL/L (ref 135–145)
WBC NRBC COR # BLD: 18.8 10*3/MM3 (ref 4.8–10.8)

## 2017-03-18 PROCEDURE — 80048 BASIC METABOLIC PNL TOTAL CA: CPT | Performed by: NURSE PRACTITIONER

## 2017-03-18 PROCEDURE — 94799 UNLISTED PULMONARY SVC/PX: CPT

## 2017-03-18 PROCEDURE — 25010000002 METHYLPREDNISOLONE PER 40 MG: Performed by: NURSE PRACTITIONER

## 2017-03-18 PROCEDURE — 25010000002 VANCOMYCIN PER 500 MG

## 2017-03-18 PROCEDURE — 94760 N-INVAS EAR/PLS OXIMETRY 1: CPT

## 2017-03-18 PROCEDURE — 85027 COMPLETE CBC AUTOMATED: CPT | Performed by: NURSE PRACTITIONER

## 2017-03-18 PROCEDURE — 25010000002 LEVALBUTEROL PER 0.5 MG: Performed by: NURSE PRACTITIONER

## 2017-03-18 RX ORDER — PREDNISONE 10 MG/1
TABLET ORAL
Qty: 30 TABLET | Refills: 0 | Status: SHIPPED | OUTPATIENT
Start: 2017-03-18 | End: 2017-12-24 | Stop reason: HOSPADM

## 2017-03-18 RX ORDER — CEFDINIR 300 MG/1
300 CAPSULE ORAL 2 TIMES DAILY
Qty: 8 CAPSULE | Refills: 0 | Status: SHIPPED | OUTPATIENT
Start: 2017-03-18 | End: 2017-03-22

## 2017-03-18 RX ADMIN — METHYLPREDNISOLONE SODIUM SUCCINATE 40 MG: 125 INJECTION, POWDER, FOR SOLUTION INTRAMUSCULAR; INTRAVENOUS at 10:01

## 2017-03-18 RX ADMIN — METHYLPREDNISOLONE SODIUM SUCCINATE 40 MG: 125 INJECTION, POWDER, FOR SOLUTION INTRAMUSCULAR; INTRAVENOUS at 01:36

## 2017-03-18 RX ADMIN — LEVALBUTEROL HYDROCHLORIDE 1.25 MG: 1.25 SOLUTION RESPIRATORY (INHALATION) at 07:36

## 2017-03-18 RX ADMIN — ALUMINUM HYDROXIDE, MAGNESIUM HYDROXIDE, AND SIMETHICONE 30 ML: 200; 200; 20 SUSPENSION ORAL at 07:43

## 2017-03-18 RX ADMIN — METOPROLOL TARTRATE 25 MG: 25 TABLET, FILM COATED ORAL at 10:01

## 2017-03-18 RX ADMIN — DOCUSATE SODIUM 100 MG: 100 CAPSULE ORAL at 10:02

## 2017-03-18 RX ADMIN — VANCOMYCIN HYDROCHLORIDE 1000 MG: 1 INJECTION, SOLUTION INTRAVENOUS at 10:02

## 2017-03-18 RX ADMIN — PAROXETINE 10 MG: 10 TABLET, FILM COATED ORAL at 10:02

## 2017-03-18 NOTE — PROGRESS NOTES
Continued Stay Note  [facility]     Patient Name: Heather Burgos  MRN: [id number]  Today's Date: 3/18/2017    Admit Date: 3/12/2017          Discharge Plan       03/18/17 1017    Case Management/Social Work Plan    Plan Referral for hh.  Pt chose [facility].  Spoke to Michelle on call and faxed referral.  JOCELINE Morel.     Patient/Family In Agreement With Plan yes              Discharge Codes     None        Expected Discharge Date and Time     Expected Discharge Date Expected Discharge Time    Mar 18, 2017             JOCELINE Henderson

## 2017-03-18 NOTE — DISCHARGE PLACEMENT REQUEST
"To:  [facility]  From:  JOCELINE Morel.    Heather Burgos (89 y.o. Female)     Date of Birth Social Security Number Address Home Phone MRN    10/23/1926  [id number] [facility] [id number] [phone] [id number]    Sikhism Marital Status          Confucianist        Admission Date Admission Type Admitting Provider Attending Provider Department, Room/Bed    3/12/17 Emergency Sudha Merchant DO Horn, Frances Marie, DO [facility] 4C, 481/1    Discharge Date Discharge Disposition Discharge Destination         Home or Self Care             Attending Provider: Sudha Merchant DO     Allergies:  Erythromycin, Demerol [Meperidine], Morphine And Related    Isolation:  Contact Drop   Infection:  Influenza (03/12/17)   Code Status:  Conditional    Ht:  61\" (154.9 cm)   Wt:  136 lb (61.7 kg)    Admission Cmt:  None   Principal Problem:  None                Active Insurance as of 3/12/2017     Primary Coverage     Payor Plan Insurance Group Employer/Plan Group    MEDICARE MEDICARE A & B      Payor Plan Address Payor Plan Phone Number Effective From Effective To    [address] [phone] 10/1/1991     [place] [address]       Subscriber Name Subscriber Birth Date Member ID       HEATHER BURGOS 10/23/1926 [id number]                 Emergency Contacts      (Rel.) Home Phone Work Phone Mobile Phone    Abeba Kelley (Daughter) [phone] -- --        Referral   Referral # [id number]   Referral Information   Referral # Creation Date Referral Status Status Update   [id number] 03/18/2017 Authorized 03/18/2017: Status History      Status Reason Referral Type Referral Reasons Referral Class   No Approval Necessary - Patient Tracking Home Health Patient Preference Incoming      To Specialty To Provider To Location/POS To Department   Home Health Services none none [facility]      To Vendor Referred By By Location/POS By Department   none [facility] [address]    "   Priority Start Date Expiration Date Referral Entered By   Routine 03/18/2017 03/18/2018 [facility]      Visits Requested Visits Authorized Visits Completed Visits Scheduled   1 1 0 0      Procedure Information   Procedure Modifiers Provider Requested Approved   [id number] - Referral to Home Health   1 1      Diagnosis Information   Diagnosis   J18.9 (ICD-10-CM) - Pneumonia of right upper lobe due to infectious organism   Z74.09 (ICD-10-CM) - Impaired mobility   Z78.9 (ICD-10-CM) - Decreased activities of daily living (ADL)      Referral Order   Order   Referral to Home Health (Order # [id number]) on 03/18/2017   View Encounter               History & Physical      Navid Milan MD at 3/12/2017  5:31 PM              [facility] Medicine Services  HISTORY AND PHYSICAL    Date of Admission: 3/12/2017  Primary Care Physician: Lupe Ham MD    Subjective     Chief Complaint: Shortness of Breath    URI    Associated symptoms include coughing.   Shortness of Breath   Associated symptoms include a fever.     Patient is a 89-year-old  female with past medical history significant for atrial fibrillation (on outpatient anticoagulation with Xarelto), hypertension, and GERD who presented to our hospital today with a chief complaint of shortness of breath.  Patient reports that she started feeling poorly this past Thursday.  She started having some symptoms of fever and chills that may have started around that time as well, as daughter who is at bedside reports that when she checked on her she appeared to be warm, but then evidently she started feeling better and appeared to rally.  Over the course of the past 24 hours she is now struggled with worsening shortness of breath.  This is particularly worse at night when she attempts to rest.  She reports having a cough productive of dark brown sputum.  She has been having some malaise, but no significant myalgias.   "She denies rigors.  She reports no chest pain.  She denies palpitations.  She has had no recent nausea or vomiting.  She states that she did not get the flu shot (\" I have never got one\").  She denies any known sick contacts, however she has been active out in the public going to different [employer] functions were she has been around many different people.      Review of Systems   Constitutional: Positive for fever.   HENT: Negative.    Eyes: Negative.    Respiratory: Positive for cough and shortness of breath.    Cardiovascular: Negative.    Gastrointestinal: Negative.    Endocrine: Negative.    Neurological: Negative.    Hematological: Negative.    Psychiatric/Behavioral: Negative.         Otherwise complete ROS reviewed and negative except as mentioned in the HPI.      Past Medical History:   Past Medical History   Diagnosis Date   • Atrial fibrillation with rapid ventricular response    • Degenerative joint disease    • Diastolic murmur of aorta    • Dysphagia    • Essential hypertension    • Pneumonia        Past Surgical History:  Past Surgical History   Procedure Laterality Date   • Appendectomy     • Tonsillectomy     • Hysterectomy     • Tubal abdominal ligation         Social History:  reports that she has never smoked. She does not have any smokeless tobacco history on file. She reports that she does not drink alcohol or use illicit drugs.    Family History: family history includes Stroke in her mother.       Allergies:  Allergies   Allergen Reactions   • Erythromycin Hives and Swelling   • Demerol [Meperidine] Nausea And Vomiting and GI Intolerance   • Morphine And Related Nausea And Vomiting       Medications:  Prior to Admission medications    Medication Sig Start Date End Date Taking? Authorizing Provider   acetaminophen (TYLENOL) 325 MG tablet Take 650 mg by mouth 2 (Two) Times a Day As Needed for Mild Pain (1-3).   Yes Historical Provider, MD   albuterol (PROVENTIL HFA;VENTOLIN HFA) 108 (90 BASE) " "MCG/ACT inhaler Inhale 2 puffs Every 6 (Six) Hours As Needed for Wheezing or shortness of air.   Yes Historical Provider, MD   esomeprazole (nexIUM) 40 MG capsule Take 40 mg by mouth Every Morning Before Breakfast.   Yes Historical Provider, MD   furosemide (LASIX) 20 MG tablet Take 20 mg by mouth Daily.   Yes Historical Provider, MD   ipratropium-albuterol (DUO-NEB) 0.5-2.5 mg/mL nebulizer Take 3 mL by nebulization Every 6 (Six) Hours As Needed for Wheezing or shortness of air.   Yes Historical Provider, MD   lisinopril (PRINIVIL,ZESTRIL) 10 MG tablet Take 10 mg by mouth Daily As Needed (for blood pressure >120).   Yes Historical Provider, MD   metoprolol tartrate (LOPRESSOR) 25 MG tablet Take 25 mg by mouth 2 (Two) Times a Day.   Yes Historical Provider, MD   PARoxetine (PAXIL) 10 MG tablet Take 10 mg by mouth Every Morning.   Yes Historical Provider, MD   rivaroxaban (XARELTO) 15 MG tablet Take 15 mg by mouth Every Night.   Yes Historical Provider, MD   metoprolol tartrate (LOPRESSOR) 50 MG tablet Take 1/2 tablet (25mg) PO BID 11/22/16 3/12/17  Arpit Smith MD   rivaroxaban (XARELTO) 15 MG tablet Take 1 tablet by mouth Daily.  Patient taking differently: Take 15 mg by mouth Every Night. 1/25/17 3/12/17  Arpit Smith MD       Objective     Vital Signs:   Visit Vitals   • /73   • Pulse 108   • Temp (!) 102.7 °F (39.3 °C) (Rectal)   • Resp 22   • Ht 61\" (154.9 cm)   • Wt 125 lb (56.7 kg)   • SpO2 98%   • BMI 23.62 kg/m2     Physical Exam   Constitutional: She appears well-developed. No distress.   HENT:   Head: Normocephalic.   Mouth/Throat: No oropharyngeal exudate.   Eyes: Pupils are equal, round, and reactive to light. No scleral icterus.   Neck: No tracheal deviation present.   Cardiovascular:   Tachycardic; irregularly irregular with rate approx 110   Pulmonary/Chest: Effort normal. No respiratory distress. She has no wheezes. She has rales (rales and coarse BSs more prominent on the " right).   Abdominal: Soft. There is no tenderness.   Musculoskeletal: She exhibits no edema.   Neurological: She is alert. No cranial nerve deficit.   Skin: Skin is warm and dry.   Psychiatric: She has a normal mood and affect. Her behavior is normal.   Vitals reviewed.    Results Reviewed:  Lab Results (last 24 hours)     Procedure Component Value Units Date/Time    Light Blue Top [[id number]] Collected:  03/12/17 1455    Specimen:  Blood from Arm, Right Updated:  03/12/17 1508    Lavender Top [[id number]] Collected:  03/12/17 1455    Specimen:  Blood from Arm, Right Updated:  03/12/17 1508    Red Top [[id number]] Collected:  03/12/17 1455    Specimen:  Blood from Arm, Right Updated:  03/12/17 1508    Green Top (Gel) [[id number]] Collected:  03/12/17 1455    Specimen:  Blood from Arm, Right Updated:  03/12/17 1508    POC Troponin, Rapid [[id number]]  (Normal) Collected:  03/12/17 1508    Specimen:  Blood Updated:  03/12/17 1525     Troponin I 0.00 ng/mL       Serial Number: [id number]    : [id number]       [place] Draw [[id number]] Collected:  03/12/17 1455    Specimen:  Blood Updated:  03/12/17 1542    Narrative:       The following orders were created for panel order [place] Draw.  Procedure                               Abnormality         Status                     ---------                               -----------         ------                     Light Blue Top[[id number]]                                    In process                 Green Top (Gel)[[id number]]                                   In process                 Lavender Top[[id number]]                                      In process                 Red Top[[id number]]                                           In process                 Green Top (No Gel)[[id number]]                                                             Please view results for these tests on the individual orders.    Comprehensive Metabolic Panel [[id number]]  (Abnormal) Collected:  03/12/17 1455     Specimen:  Blood from Arm, Right Updated:  03/12/17 1555     Glucose 148 (H) mg/dL      BUN 13 mg/dL      Creatinine 0.43 (L) mg/dL      Sodium 133 (L) mmol/L      Potassium 4.0 mmol/L      Chloride 92 (L) mmol/L      CO2 28.0 mmol/L      Calcium 9.0 mg/dL      Total Protein 6.9 g/dL      Albumin 3.70 g/dL      ALT (SGPT) 28 U/L      AST (SGOT) 39 U/L      Alkaline Phosphatase 83 U/L      Total Bilirubin 0.7 mg/dL      eGFR Non African Amer 138 mL/min/1.73      Globulin 3.2 gm/dL      A/G Ratio 1.2 g/dL      BUN/Creatinine Ratio 30.2 (H)      Anion Gap 13.0 mmol/L     Narrative:       The MDRD GFR formula is only valid for adults with stable renal function between ages 18 and 70.    Amylase [[id number]]  (Normal) Collected:  03/12/17 1455    Specimen:  Blood from Arm, Right Updated:  03/12/17 1555     Amylase 42 U/L     Lipase [[id number]]  (Abnormal) Collected:  03/12/17 1455    Specimen:  Blood from Arm, Right Updated:  03/12/17 1555     Lipase 20 (L) U/L     Lactic Acid, Plasma [[id number]]  (Normal) Collected:  03/12/17 1505    Specimen:  Blood from Arm, Left Updated:  03/12/17 1559     Lactate 1.5 mmol/L     BNP [[id number]]  (Abnormal) Collected:  03/12/17 1455    Specimen:  Blood from Arm, Right Updated:  03/12/17 1603     proBNP 2080.0 (H) pg/mL     CBC & Differential [[id number]] Collected:  03/12/17 1455    Specimen:  Blood Updated:  03/12/17 1607    Narrative:       The following orders were created for panel order CBC & Differential.  Procedure                               Abnormality         Status                     ---------                               -----------         ------                     CBC Auto Differential[[id number]]         Abnormal            Final result                 Please view results for these tests on the individual orders.    CBC Auto Differential [[id number]]  (Abnormal) Collected:  03/12/17 1455    Specimen:  Blood from Arm, Right Updated:  03/12/17 1607     WBC 10.42 10*3/mm3       RBC 3.35 (L) 10*6/mm3      Hemoglobin 9.6 (L) g/dL      Hematocrit 30.0 (L) %      MCV 89.6 fL      MCH 28.7 pg      MCHC 32.0 (L) g/dL      RDW 13.8 %      RDW-SD 45.4 fl      MPV 9.2 fL      Platelets 295 10*3/mm3      Neutrophil % 90.3 (H) %      Lymphocyte % 2.2 (L) %      Monocyte % 7.0 %      Eosinophil % 0.0 %      Basophil % 0.1 %      Immature Grans % 0.4 %      Neutrophils, Absolute 9.41 (H) 10*3/mm3      Lymphocytes, Absolute 0.23 (L) 10*3/mm3      Monocytes, Absolute 0.73 10*3/mm3      Eosinophils, Absolute 0.00 10*3/mm3      Basophils, Absolute 0.01 10*3/mm3      Immature Grans, Absolute 0.04 (H) 10*3/mm3     Procalcitonin [[id number]]  (Abnormal) Collected:  03/12/17 1455    Specimen:  Blood from Arm, Right Updated:  03/12/17 1611     Procalcitonin 1.47 (H) ng/mL     Narrative:       SIRS, sepsis, severe sepsis, and septic shock are categorized according to the criteria of the consensus conference of the American College of Chest Physicians/Society of Critical Care Medicine.    PCT < 0.5 ng/mL     Systemic infection (sepsis) is not likely.    PCT >0.5 and < 2.0 ng/mL Systemic infection (sepsis) is possible, but other conditions are known to elevate PCT as well.    PCT > 2.0 ng/mL     Systemic infection (sepsis) is likely, unless other causes are known.      PCT > 10.0 ng/mL    Important systemic inflammatory response, almost exclusively due to severe bacterial sepsis or septic shock.    PCT values of < 0.5 ng/mL do not exclude an infection, because localized infections (without systemic signs) may be associated with such low concentrations, or a systemic infection in its initial stages (<6 hours).  Increased PCT can occur without infection.  PCT concentrations between 0.5 and 2.0 ng/mL should be interpreted taking into account the patients history.  It is recommended to retest PCT within 6-24 hours if any concentrations < 2.0 ng/mL are obtained.    Protime-INR [[id number]]  (Abnormal) Collected:  03/12/17  1455    Specimen:  Blood from Arm, Right Updated:  03/12/17 1611     Protime 16.2 (H) Seconds      INR 1.26 (H)     aPTT [[id number]]  (Abnormal) Collected:  03/12/17 1455    Specimen:  Blood from Arm, Right Updated:  03/12/17 1611     PTT 35.4 (H) seconds     Influenza Antigen [[id number]]  (Abnormal) Collected:  03/12/17 1530    Specimen:  Swab from Nasopharynx Updated:  03/12/17 1623     Influenza A Ag, EIA Positive (A)      Influenza B Ag, EIA Negative     Narrative:         Recommend confirmation of negative results by viral culture or molecular assay.    Blood Culture [[id number]] Collected:  03/12/17 1455    Specimen:  Blood from Arm, Right Updated:  03/12/17 1636    Blood Culture [[id number]] Collected:  03/12/17 1505    Specimen:  Blood from Arm, Left Updated:  03/12/17 1636        Imaging Results (last 24 hours)     Procedure Component Value Units Date/Time    XR Chest 1 View [[id number]] Collected:  03/12/17 1556     Updated:  03/12/17 1559    Narrative:       EXAMINATION: XR CHEST 1 VW- 3/12/2017 3:56 PM CDT     HISTORY: Shortness of breath.     REPORT: Comparison is made with the study from 06/20/2016.     There is no area of focal infiltrate in the right upper lobe compatible  with acute pneumonia. The lungs are mildly hypoaerated. No infiltrate is  seen on the left. There is borderline cardiomegaly, moderate thoracic  aortic ectasia. There is a probable moderate-sized hiatal hernia. There  are stable severe degenerative changes of both shoulders.       Impression:       Acute right upper lobe pneumonia. Follow-up is recommended.  This report was finalized on 03/12/2017 15:57 by Dr. Nba Deras MD.          I have personally reviewed and interpreted the radiology studies and ECG obtained at time of admission.     Assessment / Plan     Assessment & Plan  Hospital Problem List     Pneumonia of right upper lobe due to infectious organism    Influenza A        Assessment:  1.  Influenza A  2.  Acute right upper  lobe pneumonia (?viral pneumonia vs. Secondary bacterial infection)  3.  Chronic Atrial Fibrillation with Rapid Ventricular Response  4.  Advanced Age  5.  GERD  6.  HTN  7.  Hyponatremia    Plan:  1.  Tamiflu  2.  IV Vanco and Rocephin for now - cover for secondary bacterial infection in the setting of acute influenza (coverage against Staph aureus and Strep pneumoniae).  3.  Check respiratory culture  4.  Check Echo - recent symptoms of orthopnea; elevated pro-BNP; known h/o AFIB  5.  Continuous telemetry  6.  IVFs - monitor volume status closely  7.  Continue outpatient Xarelto regimen; PO Metoprolol  8.  Workup ongoing    Code Status: DNR/aggressive     I discussed the patients findings and my recommendations with patient and two daughters at bedside    Navid Milan MD   03/12/17   5:31 PM             Electronically signed by Navid Milan MD at 3/12/2017  5:47 PM        Prior to Admission Medications     Prescriptions Last Dose Informant Patient Reported? Taking?    acetaminophen (TYLENOL) 325 MG tablet Past Week Child Yes Yes    Take 650 mg by mouth 2 (Two) Times a Day As Needed for Mild Pain (1-3).    albuterol (PROVENTIL HFA;VENTOLIN HFA) 108 (90 BASE) MCG/ACT inhaler Past Week Pharmacy Yes Yes    Inhale 2 puffs Every 6 (Six) Hours As Needed for Wheezing or shortness of air.    esomeprazole (nexIUM) 40 MG capsule 3/12/2017  Yes Yes    Take 40 mg by mouth Every Morning Before Breakfast.    furosemide (LASIX) 20 MG tablet 3/11/2017 Child Yes Yes    Take 20 mg by mouth Daily.    ipratropium-albuterol (DUO-NEB) 0.5-2.5 mg/mL nebulizer 3/12/2017 Pharmacy Yes Yes    Take 3 mL by nebulization Every 6 (Six) Hours As Needed for Wheezing or shortness of air.    lisinopril (PRINIVIL,ZESTRIL) 10 MG tablet Past Month Pharmacy Yes Yes    Take 10 mg by mouth Daily As Needed (for blood pressure >120).    metoprolol tartrate (LOPRESSOR) 25 MG tablet 3/12/2017 Child Yes Yes    Take 25 mg by mouth 2 (Two) Times  a Day.    PARoxetine (PAXIL) 10 MG tablet 3/12/2017  Yes Yes    Take 10 mg by mouth Every Morning.    rivaroxaban (XARELTO) 15 MG tablet 3/11/2017 Child Yes Yes    Take 15 mg by mouth Every Night.          Hospital Medications (active)       Dose Frequency Start End    acetaminophen (TYLENOL) tablet 650 mg 650 mg Every 4 Hours PRN 3/12/2017     Sig - Route: Take 2 tablets by mouth Every 4 (Four) Hours As Needed for Mild Pain (1-3). - Oral    aluminum-magnesium hydroxide-simethicone (MAALOX/MYLANTA) suspension 30 mL 30 mL Every 6 Hours PRN 3/12/2017     Sig - Route: Take 30 mL by mouth Every 6 (Six) Hours As Needed for heartburn. - Oral    cefTRIAXone (ROCEPHIN) 1 g/100 mL 0.9% NS (MBP) 1 g Every 24 Hours 3/12/2017     Sig - Route: Infuse 100 mL into a venous catheter Daily. - Intravenous    docusate sodium (COLACE) capsule 100 mg 100 mg 2 Times Daily 3/12/2017     Sig - Route: Take 1 capsule by mouth 2 (Two) Times a Day. - Oral    guaiFENesin (ROBITUSSIN) 100 MG/5ML oral solution 200 mg 200 mg Every 4 Hours PRN 3/12/2017     Sig - Route: Take 10 mL by mouth Every 4 (Four) Hours As Needed for Cough. - Oral    iron sucrose (VENOFER) 300 mg in sodium chloride 0.9 % 100 mL IVPB 300 mg Every 24 Hours 3/16/2017 3/19/2017    Sig - Route: Infuse 300 mg into a venous catheter Daily. - Intravenous    levalbuterol (XOPENEX) nebulizer solution 1.25 mg 1.25 mg 3 Times Daily - RT 3/12/2017     Sig - Route: Take 3 mL by nebulization 3 (Three) Times a Day. - Nebulization    methylPREDNISolone sodium succinate (SOLU-Medrol) injection 40 mg 40 mg Every 8 Hours 3/17/2017     Sig - Route: Infuse 1 mL into a venous catheter Every 8 (Eight) Hours. - Intravenous    metoprolol tartrate (LOPRESSOR) tablet 25 mg 25 mg Every 12 Hours Scheduled 3/12/2017     Sig - Route: Take 1 tablet by mouth Every 12 (Twelve) Hours. - Oral    ondansetron (ZOFRAN) injection 4 mg 4 mg Every 6 Hours PRN 3/12/2017     Sig - Route: Infuse 2 mL into a venous  catheter Every 6 (Six) Hours As Needed for Nausea or Vomiting. - Intravenous    PARoxetine (PAXIL) tablet 10 mg 10 mg Every Morning 3/13/2017     Sig - Route: Take 1 tablet by mouth Every Morning. - Oral    Pharmacy to dose vancomycin  Continuous PRN 3/12/2017     Sig - Route: Continuous As Needed for Consult. - Does not apply    potassium chloride (MICRO-K) CR capsule 40 mEq 40 mEq Every 3 Hours 3/17/2017 3/17/2017    Sig - Route: Take 4 capsules by mouth Every 3 (Three) Hours. - Oral    rivaroxaban (XARELTO) tablet 15 mg 15 mg Nightly 3/12/2017     Sig - Route: Take 1 tablet by mouth Every Night. - Oral    sodium chloride 0.9 % flush 1-10 mL 1-10 mL As Needed 3/12/2017     Sig - Route: Infuse 1-10 mL into a venous catheter As Needed for Line Care. - Intravenous    vancomycin (VANCOCIN) IVPB 1 g (premix) in Dextrose 5% 200 mL 1,000 mg Every 12 Hours 3/17/2017     Sig - Route: Infuse 200 mL into a venous catheter Every 12 (Twelve) Hours. - Intravenous    vancomycin 750 mg/250 mL 0.9% NS IVPB ([facility]) (Discontinued) 750 mg Every 12 Hours 3/15/2017 3/17/2017    Sig - Route: Infuse 250 mL into a venous catheter Every 12 (Twelve) Hours. - Intravenous          Operative/Procedure Notes (last 24 hours) (Notes from 3/17/2017 10:19 AM through 3/18/2017 10:19 AM)     No notes of this type exist for this encounter.           Physician Progress Notes (most recent note)      Sudha Merchant DO at 3/17/2017  8:21 AM  Version 1 of 1             [facility] Medicine Services  INPATIENT PROGRESS NOTE    Length of Stay: 5  Date of Admission: 3/12/2017  Primary Care Physician: Lupe Ham MD    Subjective   Chief Complaint: Feeling better    HPI   Patient setting up in bed eating breakfast.  She states she is feeling much better today.  Continuous oxygen saturation today 96% on 2 L.  She is agreeable to room air studies today and tonight.  She has absolutely no complaints, shortness of  breathing, chest pain, abdominal pain.  She is hoping to be discharged tomorrow.    Review of Systems   All pertinent negatives and positives are as above. All other systems have been reviewed and are negative unless otherwise stated.     Objective    Temp:  [97 °F (36.1 °C)-98 °F (36.7 °C)] 97.8 °F (36.6 °C)  Heart Rate:  [] 86  Resp:  [14-18] 14  BP: (118-146)/(54-95) 121/66    Physical Exam   Constitutional: She is oriented to person, place, and time. She appears well-developed and well-nourished. No distress.   HENT:   Head: Normocephalic and atraumatic.   Eyes: Conjunctivae and EOM are normal. Pupils are equal, round, and reactive to light. No scleral icterus.   Neck: Normal range of motion. Neck supple. No JVD present. No tracheal deviation present.   Cardiovascular: Normal rate, regular rhythm, normal heart sounds and intact distal pulses.  Exam reveals no gallop.    No murmur heard.  Pulmonary/Chest: Effort normal. No respiratory distress. She has wheezes (expiratory bilaterally). She has no rales.   Coarseness throughout however has improved   Abdominal: Soft. Bowel sounds are normal. She exhibits no distension. There is no tenderness. There is no guarding.   Musculoskeletal: Normal range of motion. She exhibits no edema.   Generalized weakness   Neurological: She is alert and oriented to person, place, and time.   No obvious deficits noted.   Skin: Skin is warm and dry. No rash noted. She is not diaphoretic. No erythema. No pallor.   Psychiatric: She has a normal mood and affect. Her behavior is normal.   Vitals reviewed.      Results Review:  No results found for this or any previous visit (from the past 12 hour(s)).    Cultures:  BLOOD CULTURE   Date Value Ref Range Status   03/12/2017 No growth at 4 days  Preliminary   03/12/2017 No growth at 4 days  Preliminary     URINE CULTURE   Date Value Ref Range Status   03/12/2017 >100,000 CFU/mL Escherichia coli (A)  Final     RESPIRATORY CULTURE   Date  Value Ref Range Status   03/13/2017 Moderate growth (3+) Normal Respiratory Katina  Final       Radiology Data:    Imaging Results (last 24 hours)     Procedure Component Value Units Date/Time    XR Chest 1 View [[id number]] Collected:  03/16/17 1031     Updated:  03/16/17 1042    Narrative:       HISTORY: Shortness of breath.     FINDINGS: Today's exam is compared to a previous study of 3 days  earlier. There is moderate cardiomegaly. There is tortuosity of the  thoracic aorta. There is a patchy right lower lobe and upper lobe  infiltrate. The left lung remains clear.       Impression:       1. Persistent patchy right upper lobe and right lower lobe infiltrate.  The lungs are hypoventilated.  2. Mild cardiomegaly.  This report was finalized on 03/16/2017 10:39 by Dr. Huy Bedoya MD.            Intake/Output Summary (Last 24 hours) at 03/17/17 0821  Last data filed at 03/16/17 2022   Gross per 24 hour   Intake    740 ml   Output    350 ml   Net    390 ml       Allergies   Allergen Reactions   • Erythromycin Hives and Swelling   • Demerol [Meperidine] Nausea And Vomiting and GI Intolerance   • Morphine And Related Nausea And Vomiting       Scheduled meds:     ceftriaxone 1 g Intravenous Q24H   docusate sodium 100 mg Oral BID   iron sucrose (VENOFER) IVPB 300 mg Intravenous Q24H   levalbuterol 1.25 mg Nebulization TID - RT   methylPREDNISolone sodium succinate 60 mg Intravenous Q8H   metoprolol tartrate 25 mg Oral Q12H   oseltamivir 75 mg Oral Q12H   PARoxetine 10 mg Oral QAM   rivaroxaban 15 mg Oral Nightly   vancomycin 750 mg Intravenous Q12H       PRN meds:  •  acetaminophen  •  aluminum-magnesium hydroxide-simethicone  •  guaiFENesin  •  ondansetron  •  Pharmacy to dose vancomycin  •  sodium chloride    Assessment/Plan     Active Problems:    Pneumonia of right upper lobe due to infectious organism    Influenza A  Assessment:  1. Influenza A  2. Acute right upper lobe pneumonia (?viral vs secondary bacterial  infection)  3. Respiratory failure secondary to #2  4. E. Coli UTI - susceptible to ceftriaxone   5. Advanced Age  6. GERD  7. Hypertension  8. Hyponatremia  9. Anemia  10. Chronic Atrial Fibrillation with rapid ventricular response         Plan:  1. Today's CBC and BMP results pending  2. CBC and BMP in am  3. Rocephin day #6  4. Vancomycin day #6  5. Tamiflu day # 5, d/c today  6. Ambulate TID if tolerates, OOB for meals  7. Decrease Solumedrol to 40mg TID   8. Day 2/3 Venofer 300mg  9.  Room air rest and exercise oxygenation saturation study today  10.  Overnight pulse oximetry on room air  11.  Micro-K 40 mEq ×2 today         Discharge Planning: I expect patient to be discharged to home tomorrow.      JIM Abraham   03/17/17   8:21 AM          Chart reviewed patient examined.  Much improved today.  Tolerating diet.   Family at bedside.     Sudha Merchant DO  03/17/17  2:57 PM         Electronically signed by Sudha Merchant DO at 3/17/2017  2:57 PM        Consult Notes (most recent note)     No notes of this type exist for this encounter.           Discharge Summary      JIM Estes at 3/18/2017  9:40 AM              [facility] Medicine Services  DISCHARGE SUMMARY       Date of Admission: 3/12/2017  Date of Discharge:  3/18/2017  Primary Care Physician: Lupe Ham MD    Discharge Diagnoses:  Hospital Problem List     Pneumonia of right upper lobe due to infectious organism    Influenza A      Assessment:  1. Influenza A  2. Acute right upper lobe pneumonia (?viral vs secondary bacterial infection)  3. Respiratory failure secondary to #2  4. E. Coli UTI - susceptible to ceftriaxone   5. Advanced Age  6. GERD  7. Hypertension  8. Hyponatremia  9. Anemia  10. Chronic Atrial Fibrillation with rapid ventricular response        Procedures Performed: None    Pertinent Test Results:   Lab Results (all)     Procedure Component Value Units  Date/Time    POC Troponin, Rapid [[id number]]  (Normal) Collected:  03/12/17 1508    Specimen:  Blood Updated:  03/12/17 1525     Troponin I 0.00 ng/mL       Serial Number: [id number]    : [id number]       Comprehensive Metabolic Panel [[id number]]  (Abnormal) Collected:  03/12/17 1455    Specimen:  Blood from Arm, Right Updated:  03/12/17 1555     Glucose 148 (H) mg/dL      BUN 13 mg/dL      Creatinine 0.43 (L) mg/dL      Sodium 133 (L) mmol/L      Potassium 4.0 mmol/L      Chloride 92 (L) mmol/L      CO2 28.0 mmol/L      Calcium 9.0 mg/dL      Total Protein 6.9 g/dL      Albumin 3.70 g/dL      ALT (SGPT) 28 U/L      AST (SGOT) 39 U/L      Alkaline Phosphatase 83 U/L      Total Bilirubin 0.7 mg/dL      eGFR Non African Amer 138 mL/min/1.73      Globulin 3.2 gm/dL      A/G Ratio 1.2 g/dL      BUN/Creatinine Ratio 30.2 (H)      Anion Gap 13.0 mmol/L     Narrative:       The MDRD GFR formula is only valid for adults with stable renal function between ages 18 and 70.    Amylase [[id number]]  (Normal) Collected:  03/12/17 1455    Specimen:  Blood from Arm, Right Updated:  03/12/17 1555     Amylase 42 U/L     Lipase [[id number]]  (Abnormal) Collected:  03/12/17 1455    Specimen:  Blood from Arm, Right Updated:  03/12/17 1555     Lipase 20 (L) U/L     Lactic Acid, Plasma [[id number]]  (Normal) Collected:  03/12/17 1505    Specimen:  Blood from Arm, Left Updated:  03/12/17 1559     Lactate 1.5 mmol/L     BNP [[id number]]  (Abnormal) Collected:  03/12/17 1455    Specimen:  Blood from Arm, Right Updated:  03/12/17 1603     proBNP 2080.0 (H) pg/mL     CBC Auto Differential [[id number]]  (Abnormal) Collected:  03/12/17 1455    Specimen:  Blood from Arm, Right Updated:  03/12/17 1607     WBC 10.42 10*3/mm3      RBC 3.35 (L) 10*6/mm3      Hemoglobin 9.6 (L) g/dL      Hematocrit 30.0 (L) %      MCV 89.6 fL      MCH 28.7 pg      MCHC 32.0 (L) g/dL      RDW 13.8 %      RDW-SD 45.4 fl      MPV 9.2 fL      Platelets 295 10*3/mm3      Neutrophil  % 90.3 (H) %      Lymphocyte % 2.2 (L) %      Monocyte % 7.0 %      Eosinophil % 0.0 %      Basophil % 0.1 %      Immature Grans % 0.4 %      Neutrophils, Absolute 9.41 (H) 10*3/mm3      Lymphocytes, Absolute 0.23 (L) 10*3/mm3      Monocytes, Absolute 0.73 10*3/mm3      Eosinophils, Absolute 0.00 10*3/mm3      Basophils, Absolute 0.01 10*3/mm3      Immature Grans, Absolute 0.04 (H) 10*3/mm3     Procalcitonin [[id number]]  (Abnormal) Collected:  03/12/17 1455    Specimen:  Blood from Arm, Right Updated:  03/12/17 1611     Procalcitonin 1.47 (H) ng/mL     Narrative:       SIRS, sepsis, severe sepsis, and septic shock are categorized according to the criteria of the consensus conference of the American College of Chest Physicians/Society of Critical Care Medicine.    PCT < 0.5 ng/mL     Systemic infection (sepsis) is not likely.    PCT >0.5 and < 2.0 ng/mL Systemic infection (sepsis) is possible, but other conditions are known to elevate PCT as well.    PCT > 2.0 ng/mL     Systemic infection (sepsis) is likely, unless other causes are known.      PCT > 10.0 ng/mL    Important systemic inflammatory response, almost exclusively due to severe bacterial sepsis or septic shock.    PCT values of < 0.5 ng/mL do not exclude an infection, because localized infections (without systemic signs) may be associated with such low concentrations, or a systemic infection in its initial stages (<6 hours).  Increased PCT can occur without infection.  PCT concentrations between 0.5 and 2.0 ng/mL should be interpreted taking into account the patients history.  It is recommended to retest PCT within 6-24 hours if any concentrations < 2.0 ng/mL are obtained.    Protime-INR [[id number]]  (Abnormal) Collected:  03/12/17 1455    Specimen:  Blood from Arm, Right Updated:  03/12/17 1611     Protime 16.2 (H) Seconds      INR 1.26 (H)     aPTT [[id number]]  (Abnormal) Collected:  03/12/17 1455    Specimen:  Blood from Arm, Right Updated:  03/12/17 1611      PTT 35.4 (H) seconds     Influenza Antigen [[id number]]  (Abnormal) Collected:  03/12/17 1530    Specimen:  Swab from Nasopharynx Updated:  03/12/17 1623     Influenza A Ag, EIA Positive (A)      Influenza B Ag, EIA Negative     Narrative:         Recommend confirmation of negative results by viral culture or molecular assay.    Urinalysis With / Culture If Indicated [[id number]]  (Abnormal) Collected:  03/12/17 1735    Specimen:  Urine from Urine, Clean Catch Updated:  03/12/17 1818     Color, UA Dark Yellow (A)      Appearance, UA Cloudy (A)      pH, UA 5.5      Specific Gravity, UA 1.029      Glucose, UA Negative      Ketones, UA Negative      Bilirubin, UA Negative      Blood, UA Large (3+) (A)      Protein,  mg/dL (2+) (A)      Leuk Esterase, UA Small (1+) (A)      Nitrite, UA Positive (A)      Urobilinogen, UA 1.0 E.U./dL     Urinalysis, Microscopic Only [[id number]]  (Abnormal) Collected:  03/12/17 1735    Specimen:  Urine from Urine, Clean Catch Updated:  03/12/17 1818     RBC, UA 3-5 (A) /HPF      WBC, UA 0-2 (A) /HPF      Bacteria, UA Trace (A) /HPF      Squamous Epithelial Cells, UA 3-6 (A) /HPF      Hyaline Casts, UA None Seen /LPF      Methodology Manual Light Microscopy     Urine Culture [[id number]]  (Abnormal)  (Susceptibility) Collected:  03/12/17 1735    Specimen:  Urine from Urine, Clean Catch Updated:  03/14/17 0743     Urine Culture >100,000 CFU/mL Escherichia coli (A)     Susceptibility      Escherichia coli     RACHEL     Ampicillin >=32 ug/ml Resistant     Ampicillin + Sulbactam >=32 ug/ml Resistant     Cefazolin 8 ug/ml Susceptible  [1]      Cefepime <=1 ug/ml Susceptible     Ceftriaxone <=1 ug/ml Susceptible     Ertapenem <=0.5 ug/ml Susceptible     ESBL Confirmation Test NEG  Negative     Gentamicin <=1 ug/ml Susceptible     Levofloxacin <=0.12 ug/ml Susceptible     Meropenem <=0.25 ug/ml Susceptible     Nitrofurantoin <=16 ug/ml Susceptible     Piperacillin + Tazobactam 8 ug/ml Susceptible      Trimethoprim + Sulfamethoxazole <=20 ug/ml Susceptible            [1]   Cefazolin results may be used to predict the potential effectiveness of oral cephalosporins for treating uncomplicated urinary tract infections.          Respiratory Culture [[id number]] Collected:  03/13/17 1138    Specimen:  Sputum from Cough Updated:  03/15/17 0658     Respiratory Culture        Moderate growth (3+) Normal Respiratory Migel     Gram Stain Result Greater than 25 WBCs per low power field              Moderate (3+) Epithelial cells per low power field      Moderate (3+) Mixed gram positive migel       Few (2+) Mixed gram negative migel     Blood Gas, Arterial [[id number]]  (Abnormal) Collected:  03/16/17 0945    Specimen:  Arterial Blood Updated:  03/16/17 0948     Site Arterial: right radial      Marcelo's Test --       Documented in Rapid Comm        pH, Arterial 7.542 (H) pH units      pCO2, Arterial 39.6 mm Hg      pO2, Arterial 81.0 mm Hg      HCO3, Arterial 33.3 (H) mmol/L      Base Excess, Arterial 9.9 (H) mmol/L      O2 Saturation, Arterial 97.0 %      O2 Saturation Calculated 97.0 %      Barometric Pressure for Blood Gas -- mmHg       Component not reported at this site.        Modality Cannula      Flow Rate 2.00 lpm     Iron Profile [[id number]]  (Abnormal) Collected:  03/16/17 0601    Specimen:  Blood Updated:  03/16/17 1138     Iron 24 (L) mcg/dL      TIBC 329 mcg/dL      Iron Saturation 7 (L) %     Ferritin [[id number]]  (Normal) Collected:  03/16/17 0601    Specimen:  Blood Updated:  03/16/17 1205     Ferritin 58.00 ng/mL     Folate [[id number]] Collected:  03/16/17 0601    Specimen:  Blood Updated:  03/16/17 1236     Folate 10.60 ng/mL     Vitamin B12 [[id number]]  (Abnormal) Collected:  03/16/17 0601    Specimen:  Blood Updated:  03/16/17 1236     Vitamin B-12 >1000 (H) pg/mL     Blood Culture [[id number]]  (Normal) Collected:  03/12/17 1455    Specimen:  Blood from Arm, Right Updated:  03/17/17 1701     Blood Culture No  growth at 5 days     Blood Culture [[id number]]  (Normal) Collected:  03/12/17 1505    Specimen:  Blood from Arm, Left Updated:  03/17/17 1701     Blood Culture No growth at 5 days     Basic Metabolic Panel [[id number]]  (Abnormal) Collected:  03/18/17 0310    Specimen:  Blood Updated:  03/18/17 0437     Glucose 150 (H) mg/dL      BUN 20 mg/dL      Creatinine 0.52 mg/dL      Sodium 132 (L) mmol/L      Potassium 4.7 mmol/L      Chloride 96 (L) mmol/L      CO2 31.0 mmol/L      Calcium 9.7 mg/dL      eGFR Non African Amer 111 mL/min/1.73      BUN/Creatinine Ratio 38.5 (H)      Anion Gap 5.0 mmol/L     Narrative:       The MDRD GFR formula is only valid for adults with stable renal function between ages 18 and 70.    CBC (No Diff) [[id number]]  (Abnormal) Collected:  03/18/17 0310    Specimen:  Blood Updated:  03/18/17 0444     WBC 18.80 (H) 10*3/mm3      RBC 2.77 (L) 10*6/mm3      Hemoglobin 8.0 (L) g/dL      Hematocrit 24.4 (L) %      MCV 88.1 fL      MCH 28.9 pg      MCHC 32.8 (L) g/dL      RDW 14.1 %      RDW-SD 45.5 fl      MPV 9.9 fL      Platelets 423 (H) 10*3/mm3         Imaging Results (all)     Procedure Component Value Units Date/Time    XR Chest 1 View [[id number]] Collected:  03/12/17 1556     Updated:  03/12/17 1559    Narrative:       EXAMINATION: XR CHEST 1 VW- 3/12/2017 3:56 PM CDT     HISTORY: Shortness of breath.     REPORT: Comparison is made with the study from 06/20/2016.     There is no area of focal infiltrate in the right upper lobe compatible  with acute pneumonia. The lungs are mildly hypoaerated. No infiltrate is  seen on the left. There is borderline cardiomegaly, moderate thoracic  aortic ectasia. There is a probable moderate-sized hiatal hernia. There  are stable severe degenerative changes of both shoulders.       Impression:       Acute right upper lobe pneumonia. Follow-up is recommended.  This report was finalized on 03/12/2017 15:57 by Dr. Nba Deras MD.    XR Chest 1 View [[id number]]  Collected:  03/13/17 0723     Updated:  03/13/17 1545    Narrative:       EXAMINATION: XR CHEST 1 VW- 3/13/2017 7:20 AM CDT     HISTORY: Shortness of breath.     REPORT: Comparison is made with the study from 03/12/2017 1524 hours.     There is patchy right upper lobe infiltrate which is unchanged. The  lungs are hyperinflated compatible with COPD. The heart is enlarged and  there is moderate thoracic aortic ectasia as before. No pneumothorax or  significant effusion is seen. There are severe degenerative changes of  both shoulders.       Impression:       Stable 1-day appearance of the chest.    This report was finalized on 03/13/2017 15:42 by Dr. Nba Deras MD.    XR Chest 1 View [[id number]] Collected:  03/16/17 1031     Updated:  03/16/17 1042    Narrative:       HISTORY: Shortness of breath.     FINDINGS: Today's exam is compared to a previous study of 3 days  earlier. There is moderate cardiomegaly. There is tortuosity of the  thoracic aorta. There is a patchy right lower lobe and upper lobe  infiltrate. The left lung remains clear.       Impression:       1. Persistent patchy right upper lobe and right lower lobe infiltrate.  The lungs are hypoventilated.  2. Mild cardiomegaly.     This report was finalized on 03/16/2017 10:39 by Dr. Huy Bedoya MD.        Exercise Oximetry    ROOM AIR BASELINE   SpO2% 96   Heart Rate 100   Blood Pressure       EXERCISE ON ROOM AIR SpO2% EXERCISE ON O2 @ LPM SpO2%   1 MINUTE 95 1 MINUTE     2 MINUTES 96 2 MINUTES     3 MINUTES 95 3 MINUTES     4 MINUTES 96 4 MINUTES     5 MINUTES 94 5 MINUTES     6 MINUTES 95 6 MINUTES         Distance Walked 50' Distance Walked   Dyspnea (Cintia Scale)  Dyspnea (Cintia Scale)   Fatigue (Cintia Scale)  Fatigue (Cintia Scale)   SpO2% Post Exercise 95 SpO2% Post Exercise   HR Post Exercise 123 HR Post Exercise   Time to Recovery 2 minutes Time to Recovery     Overnight pulse oximetry revealed the longest continuous time on room air with  saturation less than 88% 1:08 minutes/seconds    ECHO  Interpretation Summary         · Left ventricular wall thickness is consistent with hypertrophy. Sigmoid-shaped ventricular septum is present.  · Left atrial cavity size is moderately dilated.  · Mild to moderate aortic valve stenosis is present.  · Mild tricuspid valve regurgitation is present.  · Left ventricular diastolic dysfunction.      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 Estimated EF was in agreement with the calculated EF. Normal left ventricular cavity size noted. All left ventricular wall segments contract normally. Left ventricular wall thickness is consistent with hypertrophy. Sigmoid-shaped ventricular septum is present. Left ventricular diastolic dysfunction is noted. Elevated left atrial pressure       Consults: None    Chief Complaint on Day of Discharge: None    Hospital Course  Patient is a 89 y.o. female presented with soreness of breathing.  She has a history positive for atrial fibrillation on Xarelto, hypertension, and GERD.  Patient's symptoms started a few days prior to admission and progressively got worse therefore they did seek evaluation at [facility].  X-ray revealed right upper lobe pneumonia and positive influenza A.  Patient has been treated with IV Rocephin and vancomycin.  She will continue with Omnicef for total of 10 days therapy and taper steroids.  She did receive days therapy with Tamiflu.  Patient is significantly deconditioned, has been followed with occupational and physical therapy.  Currently patient lives alone but has frequent visits by family therefore we will continue physical therapy and skilled nursing via [facility] after discharge.  During her stay she did have issues with hypoxia however treatment with DuoNeb, oxygen and steroids she  "is now back to baseline.  She is followed by Dr. Kyler Thompson on outpatient basis and she will follow up with him as soon as possible after discharge.  Patient was also found to have chronic anemia, anemia substrates did reveal iron deficiency for which she has received 900 mg IV.  Today she stated she had dark sticky stools, and I have ordered outpatient stool study for occult blood and repeat CBC to be followed by her primary care physician Dr. Ham.  I do recommend referral to hematology to monitor her iron needs and possible chronic transfusion if in fact she does have a GI bleed as at this point there will be no interventions and she will be unable to stop Xarelto.  The patient's daughter is in the room, all of this has been discussed with her and the patient.  Both are in agreement to discharge today in stable condition via private vehicle to be followed by [facility].    Condition on Discharge:  Stable    Physical Exam on Discharge:  Visit Vitals   • /81 (BP Location: Right arm, Patient Position: Sitting)   • Pulse 106   • Temp 97.5 °F (36.4 °C) (Temporal Artery )   • Resp 20   • Ht 61\" (154.9 cm)   • Wt 136 lb (61.7 kg)   • LMP Comment: pt postmenapausal   • SpO2 93%   • BMI 25.7 kg/m2     Physical Exam   Constitutional: She is oriented to person, place, and time. She appears well-developed and well-nourished. No distress.   HENT:   Head: Normocephalic and atraumatic.   Eyes: Conjunctivae and EOM are normal. Pupils are equal, round, and reactive to light. No scleral icterus.   Neck: Normal range of motion. Neck supple. No JVD present. No tracheal deviation present.   Cardiovascular: Normal rate, regular rhythm, normal heart sounds and intact distal pulses.  Exam reveals no gallop.    No murmur heard.  Pulmonary/Chest: Effort normal. No respiratory distress. She has wheezes (aint, scattered lateral bases). She has no rales.   Abdominal: Soft. Bowel sounds are normal. She exhibits no " distension. There is no tenderness. There is no guarding.   Musculoskeletal: Normal range of motion. She exhibits no edema.   Neurological: She is alert and oriented to person, place, and time.   No obvious deficits noted.   Skin: Skin is warm and dry. No rash noted. She is not diaphoretic. No erythema. No pallor.   Psychiatric: She has a normal mood and affect. Her behavior is normal.   Vitals reviewed.      Discharge Disposition:  Home or Self Care    Discharge Medications:   Heather Burgos   Home Medication Instructions DENNIS:[id number]    Printed on:03/18/17 1318   Medication Information                      acetaminophen (TYLENOL) 325 MG tablet  Take 650 mg by mouth 2 (Two) Times a Day As Needed for Mild Pain (1-3).             albuterol (PROVENTIL HFA;VENTOLIN HFA) 108 (90 BASE) MCG/ACT inhaler  Inhale 2 puffs Every 6 (Six) Hours As Needed for Wheezing or shortness of air.             cefdinir (OMNICEF) 300 MG capsule  Take 1 capsule by mouth 2 (Two) Times a Day for 4 days.             esomeprazole (nexIUM) 40 MG capsule  Take 40 mg by mouth Every Morning Before Breakfast.             furosemide (LASIX) 20 MG tablet  Take 20 mg by mouth Daily.             guaiFENesin (ROBITUSSIN) 100 MG/5ML solution oral solution  Take 10 mL by mouth Every 4 (Four) Hours As Needed (cough).             ipratropium-albuterol (DUO-NEB) 0.5-2.5 mg/mL nebulizer  Take 3 mL by nebulization Every 6 (Six) Hours As Needed for Wheezing or shortness of air.             lisinopril (PRINIVIL,ZESTRIL) 10 MG tablet  Take 10 mg by mouth Daily As Needed (for blood pressure >120).             metoprolol tartrate (LOPRESSOR) 25 MG tablet  Take 25 mg by mouth 2 (Two) Times a Day.             PARoxetine (PAXIL) 10 MG tablet  Take 10 mg by mouth Every Morning.             predniSONE (DELTASONE) 10 MG tablet  4 tabs daily x 3 days; 3 tabs daily x 3 days; 2 tabs daily x 3 days; 1 tab daily x 3 days.             rivaroxaban (XARELTO) 15 MG tablet  Take  15 mg by mouth Every Night.                 Discharge Diet:   Diet Instructions     Diet: Cardiac; Thin Liquids, No Restrictions       Discharge Diet:  Cardiac   Fluid Consistency:  Thin Liquids, No Restrictions                 Discharge Care Plan / Instructions: At this home health for continued physical therapy, skilled nursing to evaluate for home needs.  CBC and stool for occult blood to obtained with results forwarded to Dr. Lupe Ham prior to appointment next week    Activity at Discharge:   Activity Instructions     Activity as Tolerated                     Follow-up Appointments: Lupe Ham M.D. 1 week, primary care provider to monitor hemoglobin/hematocrit, 3 office CBC and stool for occult blood ordered    Kyler Thompson M.D. 1 week as patient missed routine appointment while hospitalized    Test Results Pending at Discharge: None     Plan discussed with Dr. Yao Prieto.     Time spent in face-to-face evaluation, chart review, planning and education 40 minutes.    JIM Abraham  03/18/17  9:41 AM                   Electronically signed by JIM Estes at 3/18/2017  9:58 AM        Discharge Order     Start     Ordered    03/18/17 0932  Discharge patient  Once     Expected Discharge Date:  03/18/17    Discharge Disposition:  Home or Self Care    Please choose which facility the patient is currently admitted if they are being discharged to another facility or unit.:   [facility]    Mode:  Notify patient to arrange transport       Question Answer Comment   Please choose which facility the patient is currently admitted if they are being discharged to another facility or unit.  [facility]    Mode: Notify patient to arrange transport        03/18/17 0929

## 2017-03-18 NOTE — PLAN OF CARE
Problem: Inpatient Physical Therapy  Goal: Gait Training Goal LTG- PT  Outcome: Unable to achieve outcome(s) by discharge Date Met:  03/18/17 03/18/17 1405   Gait Training PT LTG   Gait Training Goal PT LTG, Date Goal Reviewed 03/18/17   Gait Training Goal PT LTG, Outcome goal not met   Gait Training Goal PT LTG, Reason Goal Not Met unable to make needed progress;discharged from facility

## 2017-03-18 NOTE — THERAPY DISCHARGE NOTE
Acute Care - Physical Therapy Discharge Summary  [facility]       Patient Name: Heather Burgos  : 10/23/1926  MRN: [id number]    Today's Date: 3/18/2017  Onset of Illness/Injury or Date of Surgery Date: 17    Date of Referral to PT: 17  Referring Physician: JIM Hernandez      Admit Date: 3/12/2017      PT Recommendation and Plan    Visit Dx:    ICD-10-CM ICD-9-CM   1. Pneumonia of right upper lobe due to infectious organism J18.9 486   2. Influenza J11.1 487.1   3. Sepsis, due to unspecified organism A41.9 038.9     995.91   4. Dyspnea, unspecified type R06.00 786.09   5. Atrial fibrillation with RVR I48.91 427.31   6. Impaired mobility Z74.09 799.89   7. Decreased activities of daily living (ADL) Z78.9 V49.89             Outcome Measures       17 1500 17 1100       How much help from another person do you currently need...    Turning from your back to your side while in flat bed without using bedrails?  3  -MICAH     Moving from lying on back to sitting on the side of a flat bed without bedrails?  3  -MICAH     Moving to and from a bed to a chair (including a wheelchair)?  3  -MICAH     Standing up from a chair using your arms (e.g., wheelchair, bedside chair)?  3  -MICAH     Climbing 3-5 steps with a railing?  2  -MICAH     To walk in hospital room?  3  -MICAH     AM-PAC 6 Clicks Score  17  -MICAH     How much help from another is currently needed...    Putting on and taking off regular lower body clothing? 2  -ND      Bathing (including washing, rinsing, and drying) 2  -ND      Toileting (which includes using toilet bed pan or urinal) 3  -ND      Putting on and taking off regular upper body clothing 3  -ND      Taking care of personal grooming (such as brushing teeth) 3  -ND      Eating meals 4  -ND      Score 17  -ND      Functional Assessment    Outcome Measure Options AM-PAC 6 Clicks Daily Activity (OT)  -ND AM-PAC 6 Clicks Basic Mobility (PT)  -MICAH       User Key  (r) = Recorded By, (t) = Taken By,  (c) = Cosigned By    Initials Name Provider Type    MICAH Canada, PT DPT Physical Therapist    ROLA Black, OTR/L Occupational Therapist                      IP PT Goals       03/18/17 1405 03/18/17 1404 03/17/17 1100    Transfer Training PT LTG    Transfer Training PT LTG, Date Established   03/17/17  -MICAH    Transfer Training PT LTG, Time to Achieve   by discharge  -MICAH    Transfer Training PT LTG, Activity Type   bed to chair /chair to bed;sit to stand/stand to sit  -MICAH    Transfer Training PT LTG, [place] Level   independent  -MICAH    Transfer Training PT LTG, Assist Device   --   with AD  -MICAH    Transfer Training PT  LTG, Date Goal Reviewed  03/18/17  -ALEJANDRO     Transfer Training PT LTG, Outcome  goal not met  -ALEJANDRO     Transfer Training PT LTG, Reason Goal Not Met  unable to make needed progress;discharged from facility  -     Gait Training PT LTG    Gait Training Goal PT LTG, Date Established   03/17/17  -MICAH    Gait Training Goal PT LTG, Time to Achieve   by discharge  -MICAH    Gait Training Goal PT LTG, [place] Level   conditional independence;supervision required  -MICAH    Gait Training Goal PT LTG, Assist Device   --   with AD  -MICAH    Gait Training Goal PT LTG, Distance to Achieve   200  -MICAH    Gait Training Goal PT LTG, Date Goal Reviewed 03/18/17  -ALEJANDRO 03/18/17  -ALEJANDRO     Gait Training Goal PT LTG, Outcome goal not met  -ALEJANDRO goal not met  -ALEJANDRO     Gait Training Goal PT LTG, Reason Goal Not Met unable to make needed progress;discharged from [facility]  - unable to make needed progress;discharged from [facility]  -     Stair Training PT LTG    Stair Training Goal PT LTG, Date Established   03/17/17  -MICAH    Stair Training Goal PT LTG, Time to Achieve   by discharge  -MICAH    Stair Training Goal PT LTG, Number of Steps   1  -MICAH    Stair Training Goal PT LTG, [place] Level   supervision required  -MICAH    Stair Training Goal PT LTG, Distance to Achieve   --  -MICAH    Stair Training Goal PT LTG,  Additional Goal   Attempt if pt. to d/c home  -MICAH    Stair Training Goal PT LTG, Date Goal Reviewed  03/18/17  -     Stair Training Goal PT LTG, Outcome  goal not met  -ALEJANDRO     Stair Training Goal PT LTG, Reason Goal Not Met  unable to make needed progress;discharged from facility  -     Dynamic Standing Balance PT LTG    Dynamic Standing Balance PT LTG, Date Established   03/17/17  -MICAH    Dynamic Standing Balance PT LTG, Time to Achieve   by discharge  -MICAH    Dynamic Standing Balance PT LTG, [place] Level   conditional independence;supervision required  -MICAH    Dynamic Standing Balance PT LTG, Assist Device   assistive Device  -MICAH    Dynamic Standing Balance PT LTG, Additional Goal   With all functional mobility, no LOB  -MICAH    Dynamic Standing Balance PT LTG, Date Goal Reviewed  03/18/17  -     Dynamic Standing Balance PT LTG, Outcome  goal not met  -     Dynamic Standing Balance PT LTG, Reason Goal Not Met  unable to make needed progress;discharged from facility  -       User Key  (r) = Recorded By, (t) = Taken By, (c) = Cosigned By    Initials Name Provider Type    MICAH Canada, PT DPT Physical Therapist    ALEJANDRO Weir, PTA Physical Therapy Assistant              PT Discharge Summary  Anticipated Discharge Disposition: home with home health  Reason for Discharge: Discharge from facility  Outcomes Achieved: Unable to make functional progress toward goals at this time  Discharge Destination: Home with home health      Kasandra Weir, JOSE   3/18/2017

## 2017-03-18 NOTE — PLAN OF CARE
Problem: Patient Care Overview (Adult)  Goal: Plan of Care Review  Outcome: Ongoing (interventions implemented as appropriate)    03/18/17 0649   Coping/Psychosocial Response Interventions   Plan Of Care Reviewed With patient   Patient Care Overview   Progress no change   Outcome Evaluation   Outcome Summary/Follow up Plan Patient had overnight pulse ox study on RA this shift. NO C/O pain this shift . Safety maintained , bed check in use. IV antibiotics and steriods given this shift.         Problem: Fall Risk (Adult)  Goal: Absence of Falls  Outcome: Ongoing (interventions implemented as appropriate)    Problem: Pneumonia (Adult)  Goal: Signs and Symptoms of Listed Potential Problems Will be Absent or Manageable (Pneumonia)  Outcome: Ongoing (interventions implemented as appropriate)

## 2017-03-18 NOTE — DISCHARGE SUMMARY
[facility] Medicine Services  DISCHARGE SUMMARY       Date of Admission: 3/12/2017  Date of Discharge:  3/18/2017  Primary Care Physician: Lupe Ham MD    Discharge Diagnoses:  Hospital Problem List     Pneumonia of right upper lobe due to infectious organism    Influenza A      Assessment:  1. Influenza A  2. Acute right upper lobe pneumonia (?viral vs secondary bacterial infection)  3. Respiratory failure secondary to #2  4. E. Coli UTI - susceptible to ceftriaxone   5. Advanced Age  6. GERD  7. Hypertension  8. Hyponatremia  9. Anemia  10. Chronic Atrial Fibrillation with rapid ventricular response        Procedures Performed: None    Pertinent Test Results:   Lab Results (all)     Procedure Component Value Units Date/Time    POC Troponin, Rapid [[id number]]  (Normal) Collected:  03/12/17 1508    Specimen:  Blood Updated:  03/12/17 1525     Troponin I 0.00 ng/mL       Serial Number: [id number]    : [id number]       Comprehensive Metabolic Panel [[id number]]  (Abnormal) Collected:  03/12/17 1455    Specimen:  Blood from Arm, Right Updated:  03/12/17 1555     Glucose 148 (H) mg/dL      BUN 13 mg/dL      Creatinine 0.43 (L) mg/dL      Sodium 133 (L) mmol/L      Potassium 4.0 mmol/L      Chloride 92 (L) mmol/L      CO2 28.0 mmol/L      Calcium 9.0 mg/dL      Total Protein 6.9 g/dL      Albumin 3.70 g/dL      ALT (SGPT) 28 U/L      AST (SGOT) 39 U/L      Alkaline Phosphatase 83 U/L      Total Bilirubin 0.7 mg/dL      eGFR Non African Amer 138 mL/min/1.73      Globulin 3.2 gm/dL      A/G Ratio 1.2 g/dL      BUN/Creatinine Ratio 30.2 (H)      Anion Gap 13.0 mmol/L     Narrative:       The MDRD GFR formula is only valid for adults with stable renal function between ages 18 and 70.    Amylase [[id number]]  (Normal) Collected:  03/12/17 1455    Specimen:  Blood from Arm, Right Updated:  03/12/17 1555     Amylase 42 U/L     Lipase [[id number]]  (Abnormal) Collected:  03/12/17 1455     Specimen:  Blood from Arm, Right Updated:  03/12/17 1555     Lipase 20 (L) U/L     Lactic Acid, Plasma [[id number]]  (Normal) Collected:  03/12/17 1505    Specimen:  Blood from Arm, Left Updated:  03/12/17 1559     Lactate 1.5 mmol/L     BNP [[id number]]  (Abnormal) Collected:  03/12/17 1455    Specimen:  Blood from Arm, Right Updated:  03/12/17 1603     proBNP 2080.0 (H) pg/mL     CBC Auto Differential [[id number]]  (Abnormal) Collected:  03/12/17 1455    Specimen:  Blood from Arm, Right Updated:  03/12/17 1607     WBC 10.42 10*3/mm3      RBC 3.35 (L) 10*6/mm3      Hemoglobin 9.6 (L) g/dL      Hematocrit 30.0 (L) %      MCV 89.6 fL      MCH 28.7 pg      MCHC 32.0 (L) g/dL      RDW 13.8 %      RDW-SD 45.4 fl      MPV 9.2 fL      Platelets 295 10*3/mm3      Neutrophil % 90.3 (H) %      Lymphocyte % 2.2 (L) %      Monocyte % 7.0 %      Eosinophil % 0.0 %      Basophil % 0.1 %      Immature Grans % 0.4 %      Neutrophils, Absolute 9.41 (H) 10*3/mm3      Lymphocytes, Absolute 0.23 (L) 10*3/mm3      Monocytes, Absolute 0.73 10*3/mm3      Eosinophils, Absolute 0.00 10*3/mm3      Basophils, Absolute 0.01 10*3/mm3      Immature Grans, Absolute 0.04 (H) 10*3/mm3     Procalcitonin [[id number]]  (Abnormal) Collected:  03/12/17 1455    Specimen:  Blood from Arm, Right Updated:  03/12/17 1611     Procalcitonin 1.47 (H) ng/mL     Narrative:       SIRS, sepsis, severe sepsis, and septic shock are categorized according to the criteria of the consensus conference of the American College of Chest Physicians/Society of Critical Care Medicine.    PCT < 0.5 ng/mL     Systemic infection (sepsis) is not likely.    PCT >0.5 and < 2.0 ng/mL Systemic infection (sepsis) is possible, but other conditions are known to elevate PCT as well.    PCT > 2.0 ng/mL     Systemic infection (sepsis) is likely, unless other causes are known.      PCT > 10.0 ng/mL    Important systemic inflammatory response, almost exclusively due to severe bacterial sepsis or  septic shock.    PCT values of < 0.5 ng/mL do not exclude an infection, because localized infections (without systemic signs) may be associated with such low concentrations, or a systemic infection in its initial stages (<6 hours).  Increased PCT can occur without infection.  PCT concentrations between 0.5 and 2.0 ng/mL should be interpreted taking into account the patients history.  It is recommended to retest PCT within 6-24 hours if any concentrations < 2.0 ng/mL are obtained.    Protime-INR [[id number]]  (Abnormal) Collected:  03/12/17 1455    Specimen:  Blood from Arm, Right Updated:  03/12/17 1611     Protime 16.2 (H) Seconds      INR 1.26 (H)     aPTT [[id number]]  (Abnormal) Collected:  03/12/17 1455    Specimen:  Blood from Arm, Right Updated:  03/12/17 1611     PTT 35.4 (H) seconds     Influenza Antigen [[id number]]  (Abnormal) Collected:  03/12/17 1530    Specimen:  Swab from Nasopharynx Updated:  03/12/17 1623     Influenza A Ag, EIA Positive (A)      Influenza B Ag, EIA Negative     Narrative:         Recommend confirmation of negative results by viral culture or molecular assay.    Urinalysis With / Culture If Indicated [[id number]]  (Abnormal) Collected:  03/12/17 1735    Specimen:  Urine from Urine, Clean Catch Updated:  03/12/17 1818     Color, UA Dark Yellow (A)      Appearance, UA Cloudy (A)      pH, UA 5.5      Specific Gravity, UA 1.029      Glucose, UA Negative      Ketones, UA Negative      Bilirubin, UA Negative      Blood, UA Large (3+) (A)      Protein,  mg/dL (2+) (A)      Leuk Esterase, UA Small (1+) (A)      Nitrite, UA Positive (A)      Urobilinogen, UA 1.0 E.U./dL     Urinalysis, Microscopic Only [[id number]]  (Abnormal) Collected:  03/12/17 1735    Specimen:  Urine from Urine, Clean Catch Updated:  03/12/17 1818     RBC, UA 3-5 (A) /HPF      WBC, UA 0-2 (A) /HPF      Bacteria, UA Trace (A) /HPF      Squamous Epithelial Cells, UA 3-6 (A) /HPF      Hyaline Casts, UA None Seen /LPF       Methodology Manual Light Microscopy     Urine Culture [[id number]]  (Abnormal)  (Susceptibility) Collected:  03/12/17 1735    Specimen:  Urine from Urine, Clean Catch Updated:  03/14/17 0743     Urine Culture >100,000 CFU/mL Escherichia coli (A)     Susceptibility      Escherichia coli     RACHEL     Ampicillin >=32 ug/ml Resistant     Ampicillin + Sulbactam >=32 ug/ml Resistant     Cefazolin 8 ug/ml Susceptible  [1]      Cefepime <=1 ug/ml Susceptible     Ceftriaxone <=1 ug/ml Susceptible     Ertapenem <=0.5 ug/ml Susceptible     ESBL Confirmation Test NEG  Negative     Gentamicin <=1 ug/ml Susceptible     Levofloxacin <=0.12 ug/ml Susceptible     Meropenem <=0.25 ug/ml Susceptible     Nitrofurantoin <=16 ug/ml Susceptible     Piperacillin + Tazobactam 8 ug/ml Susceptible     Trimethoprim + Sulfamethoxazole <=20 ug/ml Susceptible            [1]   Cefazolin results may be used to predict the potential effectiveness of oral cephalosporins for treating uncomplicated urinary tract infections.          Respiratory Culture [[id number]] Collected:  03/13/17 1138    Specimen:  Sputum from Cough Updated:  03/15/17 0658     Respiratory Culture        Moderate growth (3+) Normal Respiratory Migel     Gram Stain Result Greater than 25 WBCs per low power field              Moderate (3+) Epithelial cells per low power field      Moderate (3+) Mixed gram positive migel       Few (2+) Mixed gram negative migel     Blood Gas, Arterial [[id number]]  (Abnormal) Collected:  03/16/17 0945    Specimen:  Arterial Blood Updated:  03/16/17 0948     Site Arterial: right radial      Marcelo's Test --       Documented in Rapid Comm        pH, Arterial 7.542 (H) pH units      pCO2, Arterial 39.6 mm Hg      pO2, Arterial 81.0 mm Hg      HCO3, Arterial 33.3 (H) mmol/L      Base Excess, Arterial 9.9 (H) mmol/L      O2 Saturation, Arterial 97.0 %      O2 Saturation Calculated 97.0 %      Barometric Pressure for Blood Gas -- mmHg       Component not reported  at this site.        Modality Cannula      Flow Rate 2.00 lpm     Iron Profile [[id number]]  (Abnormal) Collected:  03/16/17 0601    Specimen:  Blood Updated:  03/16/17 1138     Iron 24 (L) mcg/dL      TIBC 329 mcg/dL      Iron Saturation 7 (L) %     Ferritin [[id number]]  (Normal) Collected:  03/16/17 0601    Specimen:  Blood Updated:  03/16/17 1205     Ferritin 58.00 ng/mL     Folate [[id number]] Collected:  03/16/17 0601    Specimen:  Blood Updated:  03/16/17 1236     Folate 10.60 ng/mL     Vitamin B12 [[id number]]  (Abnormal) Collected:  03/16/17 0601    Specimen:  Blood Updated:  03/16/17 1236     Vitamin B-12 >1000 (H) pg/mL     Blood Culture [[id number]]  (Normal) Collected:  03/12/17 1455    Specimen:  Blood from Arm, Right Updated:  03/17/17 1701     Blood Culture No growth at 5 days     Blood Culture [[id number]]  (Normal) Collected:  03/12/17 1505    Specimen:  Blood from Arm, Left Updated:  03/17/17 1701     Blood Culture No growth at 5 days     Basic Metabolic Panel [[id number]]  (Abnormal) Collected:  03/18/17 0310    Specimen:  Blood Updated:  03/18/17 0437     Glucose 150 (H) mg/dL      BUN 20 mg/dL      Creatinine 0.52 mg/dL      Sodium 132 (L) mmol/L      Potassium 4.7 mmol/L      Chloride 96 (L) mmol/L      CO2 31.0 mmol/L      Calcium 9.7 mg/dL      eGFR Non African Amer 111 mL/min/1.73      BUN/Creatinine Ratio 38.5 (H)      Anion Gap 5.0 mmol/L     Narrative:       The MDRD GFR formula is only valid for adults with stable renal function between ages 18 and 70.    CBC (No Diff) [[id number]]  (Abnormal) Collected:  03/18/17 0310    Specimen:  Blood Updated:  03/18/17 0444     WBC 18.80 (H) 10*3/mm3      RBC 2.77 (L) 10*6/mm3      Hemoglobin 8.0 (L) g/dL      Hematocrit 24.4 (L) %      MCV 88.1 fL      MCH 28.9 pg      MCHC 32.8 (L) g/dL      RDW 14.1 %      RDW-SD 45.5 fl      MPV 9.9 fL      Platelets 423 (H) 10*3/mm3         Imaging Results (all)     Procedure Component Value Units Date/Time    XR Chest 1 View  [[id number]] Collected:  03/12/17 1556     Updated:  03/12/17 1559    Narrative:       EXAMINATION: XR CHEST 1 VW- 3/12/2017 3:56 PM CDT     HISTORY: Shortness of breath.     REPORT: Comparison is made with the study from 06/20/2016.     There is no area of focal infiltrate in the right upper lobe compatible  with acute pneumonia. The lungs are mildly hypoaerated. No infiltrate is  seen on the left. There is borderline cardiomegaly, moderate thoracic  aortic ectasia. There is a probable moderate-sized hiatal hernia. There  are stable severe degenerative changes of both shoulders.       Impression:       Acute right upper lobe pneumonia. Follow-up is recommended.  This report was finalized on 03/12/2017 15:57 by Dr. Nba Deras MD.    XR Chest 1 View [[id number]] Collected:  03/13/17 0723     Updated:  03/13/17 1545    Narrative:       EXAMINATION: XR CHEST 1 - 3/13/2017 7:20 AM CDT     HISTORY: Shortness of breath.     REPORT: Comparison is made with the study from 03/12/2017 1524 hours.     There is patchy right upper lobe infiltrate which is unchanged. The  lungs are hyperinflated compatible with COPD. The heart is enlarged and  there is moderate thoracic aortic ectasia as before. No pneumothorax or  significant effusion is seen. There are severe degenerative changes of  both shoulders.       Impression:       Stable 1-day appearance of the chest.    This report was finalized on 03/13/2017 15:42 by Dr. Nba Deras MD.    XR Chest 1 View [[id number]] Collected:  03/16/17 1031     Updated:  03/16/17 1042    Narrative:       HISTORY: Shortness of breath.     FINDINGS: Today's exam is compared to a previous study of 3 days  earlier. There is moderate cardiomegaly. There is tortuosity of the  thoracic aorta. There is a patchy right lower lobe and upper lobe  infiltrate. The left lung remains clear.       Impression:       1. Persistent patchy right upper lobe and right lower lobe infiltrate.  The lungs are  hypoventilated.  2. Mild cardiomegaly.     This report was finalized on 03/16/2017 10:39 by Dr. Huy Bedoya MD.        Exercise Oximetry    ROOM AIR BASELINE   SpO2% 96   Heart Rate 100   Blood Pressure       EXERCISE ON ROOM AIR SpO2% EXERCISE ON O2 @ LPM SpO2%   1 MINUTE 95 1 MINUTE     2 MINUTES 96 2 MINUTES     3 MINUTES 95 3 MINUTES     4 MINUTES 96 4 MINUTES     5 MINUTES 94 5 MINUTES     6 MINUTES 95 6 MINUTES         Distance Walked 50' Distance Walked   Dyspnea (Cintia Scale)  Dyspnea (Cintia Scale)   Fatigue (Cintia Scale)  Fatigue (Cintia Scale)   SpO2% Post Exercise 95 SpO2% Post Exercise   HR Post Exercise 123 HR Post Exercise   Time to Recovery 2 minutes Time to Recovery     Overnight pulse oximetry revealed the longest continuous time on room air with saturation less than 88% 1:08 minutes/seconds    ECHO  Interpretation Summary         · Left ventricular wall thickness is consistent with hypertrophy. Sigmoid-shaped ventricular septum is present.  · Left atrial cavity size is moderately dilated.  · Mild to moderate aortic valve stenosis is present.  · Mild tricuspid valve regurgitation is present.  · Left ventricular diastolic dysfunction.      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 Estimated EF was in agreement with the calculated EF. Normal left ventricular cavity size noted. All left ventricular wall segments contract normally. Left ventricular wall thickness is consistent with hypertrophy. Sigmoid-shaped ventricular septum is present. Left ventricular diastolic dysfunction is noted. Elevated left atrial pressure       Consults: None    Chief Complaint on Day of Discharge: None    Hospital Course  Patient is a 89 y.o. female presented with soreness of breathing.  She has a history positive for atrial fibrillation on Xarelto, hypertension, and GERD.   "Patient's symptoms started a few days prior to admission and progressively got worse therefore they did seek evaluation at [facility].  X-ray revealed right upper lobe pneumonia and positive influenza A.  Patient has been treated with IV Rocephin and vancomycin.  She will continue with Omnicef for total of 10 days therapy and taper steroids.  She did receive days therapy with Tamiflu.  Patient is significantly deconditioned, has been followed with occupational and physical therapy.  Currently patient lives alone but has frequent visits by family therefore we will continue physical therapy and skilled nursing via [facility] after discharge.  During her stay she did have issues with hypoxia however treatment with DuoNeb, oxygen and steroids she is now back to baseline.  She is followed by Dr. Kyler Thompson on outpatient basis and she will follow up with him as soon as possible after discharge.  Patient was also found to have chronic anemia, anemia substrates did reveal iron deficiency for which she has received 900 mg IV.  Today she stated she had dark sticky stools, and I have ordered outpatient stool study for occult blood and repeat CBC to be followed by her primary care physician Dr. Ham.  I do recommend referral to hematology to monitor her iron needs and possible chronic transfusion if in fact she does have a GI bleed as at this point there will be no interventions and she will be unable to stop Xarelto.  The patient's daughter is in the room, all of this has been discussed with her and the patient.  Both are in agreement to discharge today in stable condition via private vehicle to be followed by [facility].    Condition on Discharge:  Stable    Physical Exam on Discharge:  Visit Vitals   • /81 (BP Location: Right arm, Patient Position: Sitting)   • Pulse 106   • Temp 97.5 °F (36.4 °C) (Temporal Artery )   • Resp 20   • Ht 61\" (154.9 cm)   • Wt 136 lb (61.7 kg)   • LMP " Comment: pt postmenapausal   • SpO2 93%   • BMI 25.7 kg/m2     Physical Exam   Constitutional: She is oriented to person, place, and time. She appears well-developed and well-nourished. No distress.   HENT:   Head: Normocephalic and atraumatic.   Eyes: Conjunctivae and EOM are normal. Pupils are equal, round, and reactive to light. No scleral icterus.   Neck: Normal range of motion. Neck supple. No JVD present. No tracheal deviation present.   Cardiovascular: Normal rate, regular rhythm, normal heart sounds and intact distal pulses.  Exam reveals no gallop.    No murmur heard.  Pulmonary/Chest: Effort normal. No respiratory distress. She has wheezes (aint, scattered lateral bases). She has no rales.   Abdominal: Soft. Bowel sounds are normal. She exhibits no distension. There is no tenderness. There is no guarding.   Musculoskeletal: Normal range of motion. She exhibits no edema.   Neurological: She is alert and oriented to person, place, and time.   No obvious deficits noted.   Skin: Skin is warm and dry. No rash noted. She is not diaphoretic. No erythema. No pallor.   Psychiatric: She has a normal mood and affect. Her behavior is normal.   Vitals reviewed.      Discharge Disposition:  Home or Self Care    Discharge Medications:   Heather Burgos   Home Medication Instructions DENNIS:[id number]    Printed on:03/18/17 3311   Medication Information                      acetaminophen (TYLENOL) 325 MG tablet  Take 650 mg by mouth 2 (Two) Times a Day As Needed for Mild Pain (1-3).             albuterol (PROVENTIL HFA;VENTOLIN HFA) 108 (90 BASE) MCG/ACT inhaler  Inhale 2 puffs Every 6 (Six) Hours As Needed for Wheezing or shortness of air.             cefdinir (OMNICEF) 300 MG capsule  Take 1 capsule by mouth 2 (Two) Times a Day for 4 days.             esomeprazole (nexIUM) 40 MG capsule  Take 40 mg by mouth Every Morning Before Breakfast.             furosemide (LASIX) 20 MG tablet  Take 20 mg by mouth Daily.              guaiFENesin (ROBITUSSIN) 100 MG/5ML solution oral solution  Take 10 mL by mouth Every 4 (Four) Hours As Needed (cough).             ipratropium-albuterol (DUO-NEB) 0.5-2.5 mg/mL nebulizer  Take 3 mL by nebulization Every 6 (Six) Hours As Needed for Wheezing or shortness of air.             lisinopril (PRINIVIL,ZESTRIL) 10 MG tablet  Take 10 mg by mouth Daily As Needed (for blood pressure >120).             metoprolol tartrate (LOPRESSOR) 25 MG tablet  Take 25 mg by mouth 2 (Two) Times a Day.             PARoxetine (PAXIL) 10 MG tablet  Take 10 mg by mouth Every Morning.             predniSONE (DELTASONE) 10 MG tablet  4 tabs daily x 3 days; 3 tabs daily x 3 days; 2 tabs daily x 3 days; 1 tab daily x 3 days.             rivaroxaban (XARELTO) 15 MG tablet  Take 15 mg by mouth Every Night.                 Discharge Diet:   Diet Instructions     Diet: Cardiac; Thin Liquids, No Restrictions       Discharge Diet:  Cardiac   Fluid Consistency:  Thin Liquids, No Restrictions                 Discharge Care Plan / Instructions: At this home health for continued physical therapy, skilled nursing to evaluate for home needs.  CBC and stool for occult blood to obtained with results forwarded to Dr. Lupe Ham prior to appointment next week    Activity at Discharge:   Activity Instructions     Activity as Tolerated                     Follow-up Appointments: Lupe Ham M.D. 1 week, primary care provider to monitor hemoglobin/hematocrit, 3 office CBC and stool for occult blood ordered    Kyler Thompson M.D. 1 week as patient missed routine appointment while hospitalized    Test Results Pending at Discharge: None     Plan discussed with Dr Sudha Merchant.    Time spent in face-to-face evaluation, chart review, planning and education 40 minutes.    Samra Milan, APRN  03/18/17  9:41 AM      Chart reviewed and patient examined.   Improved.  Agree with discharge home and appropriate follow up.    Sudha Merchant,  DO  03/18/17  11:25 AM

## 2017-03-18 NOTE — PLAN OF CARE
Problem: Inpatient Physical Therapy  Goal: Transfer Training Goal 1 LTG- PT  Outcome: Unable to achieve outcome(s) by discharge Date Met:  03/18/17 03/17/17 1100 03/18/17 1404   Transfer Training PT LTG   Transfer Training PT LTG, Date Established 03/17/17 --    Transfer Training PT LTG, Time to Achieve by discharge --    Transfer Training PT LTG, Activity Type bed to chair /chair to bed;sit to stand/stand to sit --    Transfer Training PT LTG, [place] Level independent --    Transfer Training PT LTG, Assist Device (with AD) --    Transfer Training PT LTG, Date Goal Reviewed --  03/18/17   Transfer Training PT LTG, Outcome --  goal not met   Transfer Training PT LTG, Reason Goal Not Met --  unable to make needed progress;discharged from facility       Goal: Gait Training Goal LTG- PT    03/17/17 1100 03/18/17 1404   Gait Training PT LTG   Gait Training Goal PT LTG, Date Established 03/17/17 --    Gait Training Goal PT LTG, Time to Achieve by discharge --    Gait Training Goal PT LTG, [place] Level conditional independence;supervision required --    Gait Training Goal PT LTG, Assist Device (with AD) --    Gait Training Goal PT LTG, Distance to Achieve 200 --    Gait Training Goal PT LTG, Date Goal Reviewed --  03/18/17   Gait Training Goal PT LTG, Outcome --  goal not met   Gait Training Goal PT LTG, Reason Goal Not Met --  unable to make needed progress;discharged from facility       Goal: Stair Training Goal LTG- PT  Outcome: Unable to achieve outcome(s) by discharge Date Met:  03/18/17 03/17/17 1100 03/18/17 1404   Stair Training PT LTG   Stair Training Goal PT LTG, Date Established 03/17/17 --    Stair Training Goal PT LTG, Time to Achieve by discharge --    Stair Training Goal PT LTG, Number of Steps 1 --    Stair Training Goal PT LTG, [place] Level supervision required --    Stair Training Goal PT LTG, Additional Goal Attempt if pt. to d/c home --    Stair Training Goal PT LTG, Date Goal  Reviewed --  03/18/17   Stair Training Goal PT LTG, Outcome --  goal not met   Stair Training Goal PT LTG, Reason Goal Not Met --  unable to make needed progress;discharged from facility       Goal: Dynamic Standing Balance Goal LTG- PT  Outcome: Unable to achieve outcome(s) by discharge Date Met:  03/18/17 03/17/17 1100 03/18/17 1404   Dynamic Standing Balance PT LTG   Dynamic Standing Balance PT LTG, Date Established 03/17/17 --    Dynamic Standing Balance PT LTG, Time to Achieve by discharge --    Dynamic Standing Balance PT LTG, [place] Level conditional independence;supervision required --    Dynamic Standing Balance PT LTG, Assist Device assistive Device --    Dynamic Standing Balance PT LTG, Additional Goal With all functional mobility, no LOB --    Dynamic Standing Balance PT LTG, Date Goal Reviewed --  03/18/17   Dynamic Standing Balance PT LTG, Outcome --  goal not met   Dynamic Standing Balance PT LTG, Reason Goal Not Met --  unable to make needed progress;discharged from facility

## 2017-03-20 NOTE — PLAN OF CARE
Problem: Inpatient Occupational Therapy  Goal: Transfer Training Goal 1 LTG- OT  Outcome: Unable to achieve outcome(s) by discharge Date Met:  03/20/17 03/17/17 1457 03/20/17 0809   Transfer Training OT LTG   Transfer Training OT LTG, Date Established 03/17/17 --    Transfer Training OT LTG, Time to Achieve by discharge --    Transfer Training OT LTG, Activity Type all transfers --    Transfer Training OT LTG, [place] Level supervision required --    Transfer Training OT LTG, Assist Device cane, straight --    Transfer Training OT LTG, Date Goal Reviewed --  03/20/17   Transfer Training OT LTG, Outcome --  goal not met   Transfer Training OT LTG, Reason Goal Not Met --  discharged from facility       Goal: Strength Goal LTG- OT  Outcome: Unable to achieve outcome(s) by discharge Date Met:  03/20/17 03/17/17 1457 03/20/17 0809   Strength OT LTG   Strength Goal OT LTG, Date Established 03/17/17 --    Strength Goal OT LTG, Time to Achieve by discharge --    Strength Goal OT LTG, Measure to Achieve Pt. will complete BUE strengthening exercises to increase BUE strength to 4/5 for ADLs.  --    Strength Goal OT LTG, Date Goal Reviewed --  03/20/17   Strength Goal OT LTG, Outcome --  goal not met   Strength Goal OT LTG, Reason Goal Not Met --  discharged from facility       Goal: ADL Goal LTG- OT  Outcome: Unable to achieve outcome(s) by discharge Date Met:  03/20/17 03/17/17 1457 03/20/17 0809   ADL OT LTG   ADL OT LTG, Date Established 03/17/17 --    ADL OT LTG, Time to Achieve by discharge --    ADL OT LTG, Activity Type ADL skills --    ADL OT LTG, [place] Level contact guard --    ADL OT LTG, Additional Goal AE PRN --    ADL OT LTG, Date Goal Reviewed --  03/20/17   ADL OT LTG, Outcome --  goal not met   ADL OT LTG, Reason Goal Not Met --  discharged from facility

## 2017-03-20 NOTE — THERAPY DISCHARGE NOTE
Acute Care - Occupational Therapy Discharge Summary  [facility]     Patient Name: Heather Burgos  : 10/23/1926  MRN: [id number]    Today's Date: 3/20/2017  Onset of Illness/Injury or Date of Surgery Date: 17    Date of Referral to OT: 17  Referring Physician: JIM Hernandez      Admit Date: 3/12/2017        OT Recommendation and Plan    Visit Dx:    ICD-10-CM ICD-9-CM   1. Pneumonia of right upper lobe due to infectious organism J18.9 486   2. Influenza J11.1 487.1   3. Sepsis, due to unspecified organism A41.9 038.9     995.91   4. Dyspnea, unspecified type R06.00 786.09   5. Atrial fibrillation with RVR I48.91 427.31   6. Impaired mobility Z74.09 799.89   7. Decreased activities of daily living (ADL) Z78.9 V49.89                     OT Goals       17 0809 17 1457       Transfer Training OT LTG    Transfer Training OT LTG, Date Established  17  -ND     Transfer Training OT LTG, Time to Achieve  by discharge  -ND     Transfer Training OT LTG, Activity Type  all transfers  -ND     Transfer Training OT LTG, [place] Level  supervision required  -ND     Transfer Training OT LTG, Assist Device  cane, straight  -ND     Transfer Training OT LTG, Date Goal Reviewed 17  -TS      Transfer Training OT LTG, Outcome goal not met  -TS      Transfer Training OT LTG, Reason Goal Not Met discharged from facility  -TS      Strength OT LTG    Strength Goal OT LTG, Date Established  17  -ND     Strength Goal OT LTG, Time to Achieve  by discharge  -ND     Strength Goal OT LTG, Measure to Achieve  Pt. will complete BUE strengthening exercises to increase BUE strength to 4/5 for ADLs.   -ND     Strength Goal OT LTG, Date Goal Reviewed 17  -TS      Strength Goal OT LTG, Outcome goal not met  -TS      Strength Goal OT LTG, Reason Goal Not Met discharged from facility  -TS      ADL OT LTG    ADL OT LTG, Date Established  17  -ND     ADL OT LTG, Time to Achieve  by discharge   -ND     ADL OT LTG, Activity Type  ADL skills  -ND     ADL OT LTG, [place] Level  contact guard  -ND     ADL OT LTG, Additional Goal  AE PRN  -ND     ADL OT LTG, Date Goal Reviewed 03/20/17  -TS      ADL OT LTG, Outcome goal not met  -TS      ADL OT LTG, Reason Goal Not Met discharged from facility  -TS        User Key  (r) = Recorded By, (t) = Taken By, (c) = Cosigned By    Initials Name Provider Type    LORENA Haynes, BRYAN/L Occupational Therapy Assistant    ROLA Black, OTR/L Occupational Therapist                Outcome Measures       03/17/17 1500 03/17/17 1100       How much help from another person do you currently need...    Turning from your back to your side while in flat bed without using bedrails?  3  -MICAH     Moving from lying on back to sitting on the side of a flat bed without bedrails?  3  -MICAH     Moving to and from a bed to a chair (including a wheelchair)?  3  -MICAH     Standing up from a chair using your arms (e.g., wheelchair, bedside chair)?  3  -MICAH     Climbing 3-5 steps with a railing?  2  -MICAH     To walk in hospital room?  3  -MICAH     AM-PAC 6 Clicks Score  17  -MICAH     How much help from another is currently needed...    Putting on and taking off regular lower body clothing? 2  -ND      Bathing (including washing, rinsing, and drying) 2  -ND      Toileting (which includes using toilet bed pan or urinal) 3  -ND      Putting on and taking off regular upper body clothing 3  -ND      Taking care of personal grooming (such as brushing teeth) 3  -ND      Eating meals 4  -ND      Score 17  -ND      Functional Assessment    Outcome Measure Options AM-PAC 6 Clicks Daily Activity (OT)  -ND AM-PAC 6 Clicks Basic Mobility (PT)  -MICAH       User Key  (r) = Recorded By, (t) = Taken By, (c) = Cosigned By    Initials Name Provider Type    MICAH Canada, PT DPT Physical Therapist    ROLA Black, OTR/L Occupational Therapist              OT Discharge Summary  Reason for Discharge:  Discharge from facility  Outcomes Achieved: Refer to plan of care for updates on goals achieved  Discharge Destination: Home with assist      ROMARIO Jones  3/20/2017

## 2017-03-21 ENCOUNTER — LAB REQUISITION (OUTPATIENT)
Dept: LAB | Facility: HOSPITAL | Age: 82
End: 2017-03-21

## 2017-03-21 DIAGNOSIS — Z00.00 ENCOUNTER FOR GENERAL ADULT MEDICAL EXAMINATION WITHOUT ABNORMAL FINDINGS: ICD-10-CM

## 2017-03-21 LAB
ANISOCYTOSIS BLD QL: NORMAL
BASOPHILS # BLD AUTO: 0.03 10*3/MM3 (ref 0–0.2)
BASOPHILS NFR BLD AUTO: 0.2 % (ref 0–2)
DEPRECATED RDW RBC AUTO: 47 FL (ref 40–54)
EOSINOPHIL # BLD AUTO: 0.03 10*3/MM3 (ref 0–0.7)
EOSINOPHIL NFR BLD AUTO: 0.2 % (ref 0–4)
ERYTHROCYTE [DISTWIDTH] IN BLOOD BY AUTOMATED COUNT: 14.5 % (ref 12–15)
GASTROCULT GAST QL: POSITIVE
HCT VFR BLD AUTO: 28.1 % (ref 37–47)
HGB BLD-MCNC: 8.9 G/DL (ref 12–16)
IMM GRANULOCYTES # BLD: 0.2 10*3/MM3 (ref 0–0.03)
IMM GRANULOCYTES NFR BLD: 1.4 % (ref 0–5)
LYMPHOCYTES # BLD AUTO: 2.21 10*3/MM3 (ref 0.72–4.86)
LYMPHOCYTES NFR BLD AUTO: 15.5 % (ref 15–45)
MCH RBC QN AUTO: 28.7 PG (ref 28–32)
MCHC RBC AUTO-ENTMCNC: 31.7 G/DL (ref 33–36)
MCV RBC AUTO: 90.6 FL (ref 82–98)
MONOCYTES # BLD AUTO: 1.02 10*3/MM3 (ref 0.19–1.3)
MONOCYTES NFR BLD AUTO: 7.2 % (ref 4–12)
NEUTROPHILS # BLD AUTO: 10.76 10*3/MM3 (ref 1.87–8.4)
NEUTROPHILS NFR BLD AUTO: 75.5 % (ref 39–78)
NEUTS HYPERSEG # BLD: NORMAL 10*3/UL
NRBC BLD MANUAL-RTO: 0 /100 WBC (ref 0–0)
PLAT MORPH BLD: NORMAL
PLATELET # BLD AUTO: 549 10*3/MM3 (ref 130–400)
PMV BLD AUTO: 9.2 FL (ref 6–12)
POIKILOCYTOSIS BLD QL SMEAR: NORMAL
RBC # BLD AUTO: 3.1 10*6/MM3 (ref 4.2–5.4)
WBC NRBC COR # BLD: 14.25 10*3/MM3 (ref 4.8–10.8)

## 2017-03-21 PROCEDURE — 85007 BL SMEAR W/DIFF WBC COUNT: CPT | Performed by: INTERNAL MEDICINE

## 2017-03-21 PROCEDURE — 82270 OCCULT BLOOD FECES: CPT | Performed by: INTERNAL MEDICINE

## 2017-03-21 PROCEDURE — 85025 COMPLETE CBC W/AUTO DIFF WBC: CPT | Performed by: INTERNAL MEDICINE

## 2017-04-04 ENCOUNTER — HOSPITAL ENCOUNTER (OUTPATIENT)
Dept: GENERAL RADIOLOGY | Age: 82
Discharge: HOME | End: 2017-04-04
Payer: MEDICARE

## 2017-04-04 DIAGNOSIS — R09.81 SINUS CONGESTION: ICD-10-CM

## 2017-04-04 PROCEDURE — 70220 X-RAY EXAM OF SINUSES: CPT

## 2017-05-02 ENCOUNTER — LAB REQUISITION (OUTPATIENT)
Dept: LAB | Facility: HOSPITAL | Age: 82
End: 2017-05-02

## 2017-05-02 DIAGNOSIS — Z00.00 ENCOUNTER FOR GENERAL ADULT MEDICAL EXAMINATION WITHOUT ABNORMAL FINDINGS: ICD-10-CM

## 2017-05-02 PROCEDURE — 87088 URINE BACTERIA CULTURE: CPT | Performed by: INTERNAL MEDICINE

## 2017-05-02 PROCEDURE — 87186 SC STD MICRODIL/AGAR DIL: CPT | Performed by: INTERNAL MEDICINE

## 2017-05-02 PROCEDURE — 87086 URINE CULTURE/COLONY COUNT: CPT | Performed by: INTERNAL MEDICINE

## 2017-05-04 LAB — BACTERIA SPEC AEROBE CULT: ABNORMAL

## 2017-05-12 ENCOUNTER — PROCEDURE VISIT (OUTPATIENT)
Dept: OTOLARYNGOLOGY | Facility: CLINIC | Age: 82
End: 2017-05-12

## 2017-05-12 ENCOUNTER — OFFICE VISIT (OUTPATIENT)
Dept: OTOLARYNGOLOGY | Facility: CLINIC | Age: 82
End: 2017-05-12

## 2017-05-12 VITALS — BODY MASS INDEX: 24.54 KG/M2 | WEIGHT: 125 LBS | TEMPERATURE: 98 F | HEIGHT: 60 IN

## 2017-05-12 DIAGNOSIS — H61.23 BILATERAL IMPACTED CERUMEN: Primary | ICD-10-CM

## 2017-05-12 DIAGNOSIS — H90.5 HEARING LOSS, SENSORINEURAL: Primary | ICD-10-CM

## 2017-05-12 DIAGNOSIS — H72.91 TYMPANIC MEMBRANE PERFORATION, RIGHT: ICD-10-CM

## 2017-05-12 DIAGNOSIS — H90.5 SNHL (SENSORINEURAL HEARING LOSS): ICD-10-CM

## 2017-05-12 DIAGNOSIS — H90.A31 MIXED CONDUCTIVE AND SENSORINEURAL HEARING LOSS OF RIGHT EAR WITH RESTRICTED HEARING OF LEFT EAR: ICD-10-CM

## 2017-05-12 PROCEDURE — 69210 REMOVE IMPACTED EAR WAX UNI: CPT | Performed by: NURSE PRACTITIONER

## 2017-05-12 PROCEDURE — 99202 OFFICE O/P NEW SF 15 MIN: CPT | Performed by: NURSE PRACTITIONER

## 2017-05-12 RX ORDER — IRON FUM,AG/C/B12/FOLIC/CA/SUC 151-60-1MG
TABLET ORAL
Refills: 2 | COMMUNITY
Start: 2017-04-18 | End: 2018-03-28

## 2017-05-22 ENCOUNTER — PROCEDURE VISIT (OUTPATIENT)
Dept: OTOLARYNGOLOGY | Facility: CLINIC | Age: 82
End: 2017-05-22

## 2017-05-22 DIAGNOSIS — H90.5 HEARING LOSS, SENSORINEURAL: Primary | ICD-10-CM

## 2017-05-22 PROCEDURE — HEARINGNOCHG: Performed by: AUDIOLOGIST-HEARING AID FITTER

## 2017-05-30 ENCOUNTER — HOSPITAL ENCOUNTER (OUTPATIENT)
Dept: GENERAL RADIOLOGY | Age: 82
Discharge: HOME | End: 2017-05-30
Payer: MEDICARE

## 2017-05-30 DIAGNOSIS — J44.1 OBSTRUCTIVE CHRONIC BRONCHITIS WITH EXACERBATION (HCC): ICD-10-CM

## 2017-05-30 PROCEDURE — 71020 XR CHEST STANDARD TWO VW: CPT

## 2017-05-31 ENCOUNTER — LAB (OUTPATIENT)
Dept: LAB | Facility: HOSPITAL | Age: 82
End: 2017-05-31

## 2017-05-31 ENCOUNTER — TRANSCRIBE ORDERS (OUTPATIENT)
Dept: ADMINISTRATIVE | Facility: HOSPITAL | Age: 82
End: 2017-05-31

## 2017-05-31 DIAGNOSIS — J44.1 CHRONIC OBSTRUCTIVE PULMONARY DISEASE WITH (ACUTE) EXACERBATION (HCC): Primary | ICD-10-CM

## 2017-05-31 DIAGNOSIS — J44.1 OBSTRUCTIVE CHRONIC BRONCHITIS WITH EXACERBATION (HCC): Primary | ICD-10-CM

## 2017-05-31 PROCEDURE — 87070 CULTURE OTHR SPECIMN AEROBIC: CPT | Performed by: NURSE PRACTITIONER

## 2017-05-31 PROCEDURE — 87205 SMEAR GRAM STAIN: CPT | Performed by: NURSE PRACTITIONER

## 2017-06-02 LAB
BACTERIA SPEC RESP CULT: ABNORMAL
BACTERIA SPEC RESP CULT: ABNORMAL
GRAM STN SPEC: ABNORMAL

## 2017-06-21 RX ORDER — PAROXETINE 10 MG/1
TABLET, FILM COATED ORAL
Qty: 90 TABLET | Refills: 3 | Status: SHIPPED | OUTPATIENT
Start: 2017-06-21 | End: 2018-08-15 | Stop reason: SDUPTHER

## 2017-06-21 RX ORDER — PAROXETINE 10 MG/1
10 TABLET, FILM COATED ORAL DAILY
COMMUNITY
Start: 2017-03-27 | End: 2017-10-23 | Stop reason: SDUPTHER

## 2017-08-10 RX ORDER — LISINOPRIL 10 MG/1
TABLET ORAL
Qty: 90 TABLET | Refills: 2 | Status: ON HOLD | OUTPATIENT
Start: 2017-08-10 | End: 2018-04-13 | Stop reason: HOSPADM

## 2017-08-17 RX ORDER — IRON FUM,AG/C/B12/FOLIC/CA/SUC 151-60-1MG
TABLET ORAL
Qty: 30 TABLET | Refills: 2 | Status: SHIPPED | OUTPATIENT
Start: 2017-08-17 | End: 2017-10-23

## 2017-08-25 ENCOUNTER — TELEPHONE (OUTPATIENT)
Dept: INTERNAL MEDICINE | Age: 82
End: 2017-08-25

## 2017-08-28 ENCOUNTER — TELEPHONE (OUTPATIENT)
Dept: INTERNAL MEDICINE | Age: 82
End: 2017-08-28

## 2017-08-28 ENCOUNTER — OFFICE VISIT (OUTPATIENT)
Dept: INTERNAL MEDICINE | Age: 82
End: 2017-08-28
Payer: MEDICARE

## 2017-08-28 VITALS
HEART RATE: 106 BPM | OXYGEN SATURATION: 95 % | WEIGHT: 132 LBS | HEIGHT: 62 IN | SYSTOLIC BLOOD PRESSURE: 110 MMHG | DIASTOLIC BLOOD PRESSURE: 70 MMHG | BODY MASS INDEX: 24.29 KG/M2 | TEMPERATURE: 98.1 F

## 2017-08-28 DIAGNOSIS — R27.0 ATAXIA: ICD-10-CM

## 2017-08-28 DIAGNOSIS — M25.562 ACUTE PAIN OF LEFT KNEE: Primary | ICD-10-CM

## 2017-08-28 DIAGNOSIS — N39.0 URINARY TRACT INFECTION WITHOUT HEMATURIA, SITE UNSPECIFIED: ICD-10-CM

## 2017-08-28 DIAGNOSIS — R41.0 CONFUSION: ICD-10-CM

## 2017-08-28 DIAGNOSIS — M25.562 ACUTE PAIN OF LEFT KNEE: ICD-10-CM

## 2017-08-28 PROBLEM — I10 ESSENTIAL (PRIMARY) HYPERTENSION: Status: ACTIVE | Noted: 2017-01-25

## 2017-08-28 PROBLEM — I48.20 CHRONIC ATRIAL FIBRILLATION (HCC): Status: ACTIVE | Noted: 2017-01-25

## 2017-08-28 LAB
BACTERIA: ABNORMAL /HPF
BILIRUBIN URINE: NEGATIVE
BLOOD, URINE: ABNORMAL
CLARITY: ABNORMAL
COLOR: YELLOW
GLUCOSE URINE: NEGATIVE MG/DL
KETONES, URINE: NEGATIVE MG/DL
LEUKOCYTE ESTERASE, URINE: ABNORMAL
NITRITE, URINE: POSITIVE
PH UA: 5.5
PROTEIN UA: NEGATIVE MG/DL
SPECIFIC GRAVITY UA: 1.01
UROBILINOGEN, URINE: 0.2 E.U./DL
WBC UA: ABNORMAL /HPF (ref 0–5)

## 2017-08-28 PROCEDURE — 1090F PRES/ABSN URINE INCON ASSESS: CPT | Performed by: NURSE PRACTITIONER

## 2017-08-28 PROCEDURE — G8420 CALC BMI NORM PARAMETERS: HCPCS | Performed by: NURSE PRACTITIONER

## 2017-08-28 PROCEDURE — 1123F ACP DISCUSS/DSCN MKR DOCD: CPT | Performed by: NURSE PRACTITIONER

## 2017-08-28 PROCEDURE — 4040F PNEUMOC VAC/ADMIN/RCVD: CPT | Performed by: NURSE PRACTITIONER

## 2017-08-28 PROCEDURE — 1036F TOBACCO NON-USER: CPT | Performed by: NURSE PRACTITIONER

## 2017-08-28 PROCEDURE — G8427 DOCREV CUR MEDS BY ELIG CLIN: HCPCS | Performed by: NURSE PRACTITIONER

## 2017-08-28 PROCEDURE — 99213 OFFICE O/P EST LOW 20 MIN: CPT | Performed by: NURSE PRACTITIONER

## 2017-08-28 RX ORDER — ALBUTEROL SULFATE 90 UG/1
2 AEROSOL, METERED RESPIRATORY (INHALATION)
COMMUNITY
End: 2017-10-18 | Stop reason: SDUPTHER

## 2017-08-28 RX ORDER — FUROSEMIDE 20 MG/1
TABLET ORAL
Refills: 3 | COMMUNITY
Start: 2017-06-28 | End: 2017-12-09 | Stop reason: SDUPTHER

## 2017-08-28 RX ORDER — METOPROLOL TARTRATE 50 MG/1
TABLET, FILM COATED ORAL
Refills: 3 | COMMUNITY
Start: 2017-08-16 | End: 2017-09-15 | Stop reason: SDUPTHER

## 2017-08-28 RX ORDER — ALBUTEROL SULFATE 2.5 MG/3ML
SOLUTION RESPIRATORY (INHALATION)
Refills: 5 | COMMUNITY
Start: 2017-05-27 | End: 2018-02-19 | Stop reason: SDUPTHER

## 2017-08-28 RX ORDER — CEFDINIR 300 MG/1
300 CAPSULE ORAL 2 TIMES DAILY
Qty: 14 CAPSULE | Refills: 0 | Status: SHIPPED | OUTPATIENT
Start: 2017-08-28 | End: 2017-09-04

## 2017-08-28 RX ORDER — FLUTICASONE PROPIONATE 50 MCG
SPRAY, SUSPENSION (ML) NASAL
Refills: 1 | COMMUNITY
Start: 2017-06-11 | End: 2017-10-23

## 2017-08-28 RX ORDER — ESOMEPRAZOLE MAGNESIUM 40 MG/1
40 CAPSULE, DELAYED RELEASE ORAL
COMMUNITY
End: 2018-02-07 | Stop reason: CLARIF

## 2017-08-28 ASSESSMENT — ENCOUNTER SYMPTOMS
COUGH: 0
SORE THROAT: 0
WHEEZING: 0
SHORTNESS OF BREATH: 0
COLOR CHANGE: 0
ABDOMINAL PAIN: 0
ABDOMINAL DISTENTION: 0
DIARRHEA: 0
EYE DISCHARGE: 0
TROUBLE SWALLOWING: 0
EYE ITCHING: 0
NAUSEA: 0
STRIDOR: 0
CHOKING: 0
BLOOD IN STOOL: 0
VOMITING: 0
CONSTIPATION: 0

## 2017-08-30 ENCOUNTER — TELEPHONE (OUTPATIENT)
Dept: INTERNAL MEDICINE CLINIC | Age: 82
End: 2017-08-30

## 2017-08-30 LAB
ORGANISM: ABNORMAL
URINE CULTURE, ROUTINE: ABNORMAL
URINE CULTURE, ROUTINE: ABNORMAL

## 2017-09-15 RX ORDER — METOPROLOL TARTRATE 50 MG/1
TABLET, FILM COATED ORAL
Qty: 30 TABLET | Refills: 3 | Status: SHIPPED | OUTPATIENT
Start: 2017-09-15 | End: 2018-03-02 | Stop reason: SDUPTHER

## 2017-10-16 ENCOUNTER — TELEPHONE (OUTPATIENT)
Dept: INTERNAL MEDICINE | Age: 82
End: 2017-10-16

## 2017-10-16 DIAGNOSIS — N39.0 URINARY TRACT INFECTION WITHOUT HEMATURIA, SITE UNSPECIFIED: Primary | ICD-10-CM

## 2017-10-16 LAB
ALBUMIN SERPL-MCNC: 3.9 G/DL (ref 3.5–5.2)
ALP BLD-CCNC: 92 U/L (ref 35–104)
ALT SERPL-CCNC: 9 U/L (ref 5–33)
ANION GAP SERPL CALCULATED.3IONS-SCNC: 16 MMOL/L (ref 7–19)
AST SERPL-CCNC: 16 U/L (ref 5–32)
BACTERIA: ABNORMAL /HPF
BASOPHILS ABSOLUTE: 0 K/UL (ref 0–0.2)
BASOPHILS RELATIVE PERCENT: 0.4 % (ref 0–1)
BILIRUB SERPL-MCNC: 0.5 MG/DL (ref 0.2–1.2)
BILIRUBIN URINE: NEGATIVE
BLOOD, URINE: ABNORMAL
BUN BLDV-MCNC: 13 MG/DL (ref 8–23)
CALCIUM SERPL-MCNC: 9.7 MG/DL (ref 8.8–10.2)
CHLORIDE BLD-SCNC: 97 MMOL/L (ref 98–111)
CHOLESTEROL, TOTAL: 156 MG/DL (ref 160–199)
CLARITY: ABNORMAL
CO2: 28 MMOL/L (ref 22–29)
COLOR: YELLOW
CREAT SERPL-MCNC: 0.5 MG/DL (ref 0.5–0.9)
EOSINOPHILS ABSOLUTE: 0.1 K/UL (ref 0–0.6)
EOSINOPHILS RELATIVE PERCENT: 1.5 % (ref 0–5)
EPITHELIAL CELLS, UA: 0 /HPF (ref 0–5)
GFR NON-AFRICAN AMERICAN: >60
GLUCOSE BLD-MCNC: 105 MG/DL (ref 74–109)
GLUCOSE URINE: NEGATIVE MG/DL
HCT VFR BLD CALC: 35.8 % (ref 37–47)
HDLC SERPL-MCNC: 64 MG/DL (ref 65–121)
HEMOGLOBIN: 11.4 G/DL (ref 12–16)
HYALINE CASTS: 0 /HPF (ref 0–8)
IRON: 53 UG/DL (ref 37–145)
KETONES, URINE: NEGATIVE MG/DL
LDL CHOLESTEROL CALCULATED: 79 MG/DL
LEUKOCYTE ESTERASE, URINE: ABNORMAL
LYMPHOCYTES ABSOLUTE: 0.8 K/UL (ref 1.1–4.5)
LYMPHOCYTES RELATIVE PERCENT: 9.2 % (ref 20–40)
MCH RBC QN AUTO: 31.8 PG (ref 27–31)
MCHC RBC AUTO-ENTMCNC: 31.8 G/DL (ref 33–37)
MCV RBC AUTO: 99.7 FL (ref 81–99)
MONOCYTES ABSOLUTE: 0.8 K/UL (ref 0–0.9)
MONOCYTES RELATIVE PERCENT: 8.4 % (ref 0–10)
NEUTROPHILS ABSOLUTE: 7.1 K/UL (ref 1.5–7.5)
NEUTROPHILS RELATIVE PERCENT: 79.9 % (ref 50–65)
NITRITE, URINE: NEGATIVE
PDW BLD-RTO: 13.1 % (ref 11.5–14.5)
PH UA: 6.5
PLATELET # BLD: 365 K/UL (ref 130–400)
PMV BLD AUTO: 9.2 FL (ref 9.4–12.3)
POTASSIUM SERPL-SCNC: 3.9 MMOL/L (ref 3.5–5)
PROTEIN UA: 30 MG/DL
RBC # BLD: 3.59 M/UL (ref 4.2–5.4)
RBC UA: 5 /HPF (ref 0–4)
SODIUM BLD-SCNC: 141 MMOL/L (ref 136–145)
SPECIFIC GRAVITY UA: 1.01
TOTAL PROTEIN: 7.3 G/DL (ref 6.6–8.7)
TRIGL SERPL-MCNC: 64 MG/DL (ref 0–149)
TSH SERPL DL<=0.05 MIU/L-ACNC: 2.83 UIU/ML (ref 0.27–4.2)
UROBILINOGEN, URINE: 0.2 E.U./DL
VITAMIN D 25-HYDROXY: 15.7 NG/ML
WBC # BLD: 8.9 K/UL (ref 4.8–10.8)
WBC UA: 729 /HPF (ref 0–5)

## 2017-10-16 NOTE — TELEPHONE ENCOUNTER
Pt is needing a refill on her two inhalers. Both need to be sent to [facility] at [facility].

## 2017-10-18 ENCOUNTER — TELEPHONE (OUTPATIENT)
Dept: INTERNAL MEDICINE | Age: 82
End: 2017-10-18

## 2017-10-18 LAB
ORGANISM: ABNORMAL
URINE CULTURE, ROUTINE: ABNORMAL
URINE CULTURE, ROUTINE: ABNORMAL

## 2017-10-18 RX ORDER — CEFDINIR 300 MG/1
300 CAPSULE ORAL 2 TIMES DAILY
Qty: 14 CAPSULE | Refills: 0 | Status: SHIPPED | OUTPATIENT
Start: 2017-10-18 | End: 2017-11-21 | Stop reason: HOSPADM

## 2017-10-18 RX ORDER — ALBUTEROL SULFATE 90 UG/1
2 AEROSOL, METERED RESPIRATORY (INHALATION) EVERY 4 HOURS PRN
Qty: 1 INHALER | Refills: 2 | Status: ON HOLD | OUTPATIENT
Start: 2017-10-18 | End: 2018-03-31

## 2017-10-23 ENCOUNTER — OFFICE VISIT (OUTPATIENT)
Dept: INTERNAL MEDICINE | Age: 82
End: 2017-10-23
Payer: MEDICARE

## 2017-10-23 VITALS
HEIGHT: 62 IN | BODY MASS INDEX: 24.11 KG/M2 | WEIGHT: 131 LBS | SYSTOLIC BLOOD PRESSURE: 102 MMHG | HEART RATE: 78 BPM | DIASTOLIC BLOOD PRESSURE: 60 MMHG | OXYGEN SATURATION: 95 %

## 2017-10-23 DIAGNOSIS — F32.A DEPRESSION, UNSPECIFIED DEPRESSION TYPE: ICD-10-CM

## 2017-10-23 DIAGNOSIS — Z00.00 ROUTINE ADULT HEALTH MAINTENANCE: Primary | ICD-10-CM

## 2017-10-23 DIAGNOSIS — Z23 NEED FOR PROPHYLACTIC VACCINATION AGAINST STREPTOCOCCUS PNEUMONIAE (PNEUMOCOCCUS): ICD-10-CM

## 2017-10-23 DIAGNOSIS — R79.89 LOW VITAMIN D LEVEL: ICD-10-CM

## 2017-10-23 DIAGNOSIS — K21.9 GASTROESOPHAGEAL REFLUX DISEASE WITHOUT ESOPHAGITIS: ICD-10-CM

## 2017-10-23 DIAGNOSIS — N39.0 URINARY TRACT INFECTION WITHOUT HEMATURIA, SITE UNSPECIFIED: ICD-10-CM

## 2017-10-23 DIAGNOSIS — J44.9 CHRONIC OBSTRUCTIVE PULMONARY DISEASE, UNSPECIFIED COPD TYPE (HCC): ICD-10-CM

## 2017-10-23 DIAGNOSIS — Z91.09 ENVIRONMENTAL ALLERGIES: ICD-10-CM

## 2017-10-23 DIAGNOSIS — I10 HYPERTENSION, UNSPECIFIED TYPE: ICD-10-CM

## 2017-10-23 DIAGNOSIS — I48.91 ATRIAL FIBRILLATION, UNSPECIFIED TYPE (HCC): ICD-10-CM

## 2017-10-23 DIAGNOSIS — E78.5 HYPERLIPIDEMIA, UNSPECIFIED HYPERLIPIDEMIA TYPE: ICD-10-CM

## 2017-10-23 LAB
BILIRUBIN URINE: NEGATIVE
BLOOD, URINE: NEGATIVE
CLARITY: CLEAR
COLOR: YELLOW
GLUCOSE URINE: NEGATIVE MG/DL
KETONES, URINE: NEGATIVE MG/DL
LEUKOCYTE ESTERASE, URINE: NEGATIVE
NITRITE, URINE: NEGATIVE
PH UA: 6.5
PROTEIN UA: NEGATIVE MG/DL
SPECIFIC GRAVITY UA: 1.01
UROBILINOGEN, URINE: 0.2 E.U./DL

## 2017-10-23 PROCEDURE — 90670 PCV13 VACCINE IM: CPT | Performed by: INTERNAL MEDICINE

## 2017-10-23 PROCEDURE — 3023F SPIROM DOC REV: CPT | Performed by: INTERNAL MEDICINE

## 2017-10-23 PROCEDURE — 99214 OFFICE O/P EST MOD 30 MIN: CPT | Performed by: INTERNAL MEDICINE

## 2017-10-23 PROCEDURE — G0439 PPPS, SUBSEQ VISIT: HCPCS | Performed by: INTERNAL MEDICINE

## 2017-10-23 PROCEDURE — G0009 ADMIN PNEUMOCOCCAL VACCINE: HCPCS | Performed by: INTERNAL MEDICINE

## 2017-10-23 PROCEDURE — G8427 DOCREV CUR MEDS BY ELIG CLIN: HCPCS | Performed by: INTERNAL MEDICINE

## 2017-10-23 PROCEDURE — 1090F PRES/ABSN URINE INCON ASSESS: CPT | Performed by: INTERNAL MEDICINE

## 2017-10-23 PROCEDURE — G8484 FLU IMMUNIZE NO ADMIN: HCPCS | Performed by: INTERNAL MEDICINE

## 2017-10-23 PROCEDURE — 1123F ACP DISCUSS/DSCN MKR DOCD: CPT | Performed by: INTERNAL MEDICINE

## 2017-10-23 PROCEDURE — G8420 CALC BMI NORM PARAMETERS: HCPCS | Performed by: INTERNAL MEDICINE

## 2017-10-23 PROCEDURE — 1036F TOBACCO NON-USER: CPT | Performed by: INTERNAL MEDICINE

## 2017-10-23 PROCEDURE — 4040F PNEUMOC VAC/ADMIN/RCVD: CPT | Performed by: INTERNAL MEDICINE

## 2017-10-23 PROCEDURE — G8926 SPIRO NO PERF OR DOC: HCPCS | Performed by: INTERNAL MEDICINE

## 2017-10-23 RX ORDER — LORATADINE 10 MG/1
10 CAPSULE, LIQUID FILLED ORAL DAILY
COMMUNITY

## 2017-10-23 RX ORDER — ERGOCALCIFEROL (VITAMIN D2) 1250 MCG
50000 CAPSULE ORAL WEEKLY
Qty: 4 CAPSULE | Refills: 3 | Status: ON HOLD | OUTPATIENT
Start: 2017-10-23 | End: 2018-03-31 | Stop reason: ALTCHOICE

## 2017-10-23 RX ORDER — TITANIUM DIOXIDE, OCTINOXATE, ZINC OXIDE 4.61; 1.6; .78 G/40ML; G/40ML; G/40ML
1 CREAM TOPICAL 2 TIMES DAILY
COMMUNITY

## 2017-10-23 ASSESSMENT — PATIENT HEALTH QUESTIONNAIRE - PHQ9
SUM OF ALL RESPONSES TO PHQ QUESTIONS 1-9: 0
1. LITTLE INTEREST OR PLEASURE IN DOING THINGS: 0
2. FEELING DOWN, DEPRESSED OR HOPELESS: 0
SUM OF ALL RESPONSES TO PHQ9 QUESTIONS 1 & 2: 0

## 2017-10-23 NOTE — PATIENT INSTRUCTIONS
regular schedule for other immunizations against diseases such as diphtheria, tetanus, pertussis (whooping cough), measles, mumps, hepatitis, or varicella (chicken pox). Your doctor or state health department can provide you with a recommended immunization schedule. What is pneumococcal 13-valent conjugate vaccine? Pneumococcal disease is a serious infection caused by a bacteria. Pneumococcal bacteria can infect the sinuses and inner ear. It can also infect the lungs, blood, and brain, and these conditions can be fatal.  Pneumococcal 13-valent vaccine is used to prevent infection caused by pneumococcal bacteria. This vaccine contains 13 different types of pneumococcal bacteria. Pneumococcal 13-valent vaccine works by exposing you to a small amount of the bacteria or a protein from the bacteria, which causes the body to develop immunity to the disease. This vaccine will not treat an active infection that has already developed in the body. Pneumococcal 13-valent vaccine is for use in children from 6 weeks to 11years old, and in adults who are 48 and older. Becoming infected with pneumococcal disease (such as pneumonia or meningitis) is much more dangerous to your health than receiving this vaccine. However, like any medicine, this vaccine can cause side effects but the risk of serious side effects is extremely low. Like any vaccine, pneumococcal 13-valent vaccine may not provide protection from disease in every person. What should I discuss with my healthcare provider before receiving this vaccine? Keep track of any and all side effects your child has after receiving this vaccine. When the child receives a booster dose, you will need to tell the doctor if the previous shot caused any side effects. You should not receive this vaccine if you ever had a severe allergic reaction to a pneumococcal or diphtheria vaccine.   Before your child receives this vaccine, tell your doctor if the child was born given and for the next 24 hours. Follow the label directions or your doctor's instructions about how much of this medicine to give your child. It is especially important to prevent fever from occurring in a child who has a seizure disorder such as epilepsy. Be sure to keep your child on a regular schedule for other immunizations such as diphtheria, tetanus, pertussis (whooping cough), hepatitis, and varicella (chicken pox). Your doctor or state health department can provide you with a recommended immunization schedule. What happens if I miss a dose? Contact your doctor if your child will miss a booster dose or gets behind schedule. The next dose should be given as soon as possible. There is no need to start over. Be sure your child receives all recommended doses of this vaccine. If your child does not receive the full series of vaccines, he or she may not be fully protected against the disease. What happens if I overdose? An overdose of this vaccine is unlikely to occur. What should I avoid before or after receiving this vaccine? Follow your doctor's instructions about any restrictions on food, beverages, or activity. What are the possible side effects of this vaccine? Your child should not receive a booster vaccine if he or she had a life-threatening allergic reaction after the first shot. Keep track of any and all side effects your child has after receiving this vaccine. When the child receives a booster dose, you will need to tell the doctor if the previous shot caused any side effects. Get emergency medical help if your child has any of these signs of an allergic reaction: hives; difficulty breathing; swelling of the face, lips, tongue, or throat.   Call your doctor at once if you or your child has a serious side effect such as:  · high fever (103 degrees or higher);  · seizure (convulsions);  · wheezing, trouble breathing;  · severe stomach pain, severe vomiting or diarrhea;  · easy bruising or bleeding; or  · severe pain, itching, irritation, or skin changes where the shot was given. Less serious side effects include  · crying, fussiness;  · headache, tired feeling;  · muscle or joint pain;  · drowsiness, sleeping more or less than usual;  · mild redness, swelling, tenderness, or a hard lump where the shot was given;  · loss of appetite, mild vomiting or diarrhea;  · low fever (102 degrees or less), chills; or  · mild skin rash. This is not a complete list of side effects and others may occur. Call your doctor for medical advice about side effects. You may report vaccine side effects to the [address] [address] and Human Services at [phone]. What other drugs will affect this vaccine? Before receiving this vaccine, tell the doctor about all other vaccines you or your child have recently received. Also tell the doctor if you or your child have recently received drugs or treatments that can weaken the immune system, including:  · an oral, nasal, inhaled, or injectable steroid medicine;  · chemotherapy or radiation;  · medications to treat psoriasis, rheumatoid arthritis, or other autoimmune disorders, such as azathioprine (Imuran), etanercept (Enbrel), leflunomide (Jeffrey Merchant), and others; or  · medicines to treat or prevent organ transplant rejection, such as basiliximab (Simulect), cyclosporine (Sandimmune, Neoral, Gengraf), muromonab CD3 (Orthoclone), mycophenolate mofetil (CellCept), sirolimus (Rapamune), or tacrolimus (Prograf). If you are using any of these medications, you may not be able to receive the vaccine, or may need to wait until the other treatments are finished. There may be other drugs that can interact with pneumococcal 13-valent vaccine. Tell your doctor about all medications you use. This includes prescription, over-the-counter, vitamin, and herbal products. Do not start a new medication without telling your doctor. Where can I get more information?   Your doctor or

## 2017-10-25 LAB — VITAMIN D 1,25-DIHYDROXY: 31.2 PG/ML (ref 19.9–79.3)

## 2017-11-05 ENCOUNTER — TELEPHONE (OUTPATIENT)
Dept: INTERNAL MEDICINE | Age: 82
End: 2017-11-05

## 2017-11-05 ASSESSMENT — ENCOUNTER SYMPTOMS
BACK PAIN: 0
DIARRHEA: 0
VOICE CHANGE: 0
COLOR CHANGE: 0
STRIDOR: 0
PHOTOPHOBIA: 0
COUGH: 1
RHINORRHEA: 0
EYE REDNESS: 0
CHOKING: 0
EYE PAIN: 0
TROUBLE SWALLOWING: 0
NAUSEA: 0
SINUS PAIN: 0
SINUS PRESSURE: 0
VOMITING: 0
EYE ITCHING: 0
CONSTIPATION: 0
BLOOD IN STOOL: 0
ABDOMINAL DISTENTION: 0
EYE DISCHARGE: 0
SHORTNESS OF BREATH: 0
WHEEZING: 0
RECTAL PAIN: 0
SORE THROAT: 0
CHEST TIGHTNESS: 0
FACIAL SWELLING: 0
ABDOMINAL PAIN: 0

## 2017-11-05 NOTE — PROGRESS NOTES
Medicare Annual Wellness Visit - Subsequent    Name: Asif Veras Date: 2017   MRN: [id number] Sex: Female   Age: 80 y.o. Ethnicity: Non-/Non    : 10/23/1926 Race: Joceline Obrien is here for   Chief Complaint   Patient presents with   [facility] Annual Exam     Patient here today for yearly physical with lab review. Patient complains of cough. Screenings for behavioral, psychosocial and functional/safety risks, and cognitive dysfunction are all negative except as indicated below. These results, as well as other patient data from the [address] form, are documented in Flowsheets linked to this Encounter. Allergies   Allergen Reactions    Erythromycin Hives and Swelling    Meperidine Nausea And Vomiting     Other reaction(s): GI Intolerance    Morphine And Related      Other reaction(s): Nausea And Vomiting       Prior to Visit Medications    Medication Sig Taking?  Authorizing Provider   loratadine (CLARITIN) 10 MG capsule Take 10 mg by mouth daily Yes Historical Provider, MD   Cranberry 400 MG CAPS Take 1 capsule by mouth every morning Yes Historical Provider, MD   ergocalciferol (DRISDOL) 19414 units capsule Take 1 capsule by mouth once a week Yes Adolph Cooley MD   cefdinir (OMNICEF) 300 MG capsule Take 1 capsule by mouth 2 times daily Yes Adolph Cooley MD   albuterol sulfate  (90 Base) MCG/ACT inhaler Inhale 2 puffs into the lungs every 4 hours as needed for Wheezing Yes Adolph Cooley MD   metoprolol tartrate (LOPRESSOR) 50 MG tablet TAKE ONE HALF (1/2) TABLET BY MOUTH EVERY 12 HOURS Yes Adolph Cooley MD   furosemide (LASIX) 20 MG tablet TAKE 1 TABLET BY MOUTH EVERY DAY Yes Historical Provider, MD   esomeprazole (NEXIUM) 40 MG delayed release capsule Take 40 mg by mouth every morning (before breakfast)  Yes Historical Provider, MD   albuterol (PROVENTIL) (2.5 MG/3ML) 0.083% nebulizer solution INHALE 1 VIAL VIA NEBULIZER 4X DAILY AS NEEDED FOR mass index is 23.96 kg/m². Health Habits/Nutrition Interventions:  · None indicated       Hearing/Vision Interventions:  · None indicated       Safety Interventions:  · None indicated       ADL Interventions:  · None indicated    Personalized Preventive Plan   Current Health Maintenance Status  Immunization History   Administered Date(s) Administered    Pneumococcal 13-valent Conjugate (Nasrin Cushing) 10/23/2017        Health Maintenance   Topic Date Due    DTaP/Tdap/Td vaccine (1 - Tdap) 10/23/1945    Zostavax vaccine  10/23/1986    Flu vaccine (1) 09/01/2017    Pneumococcal low/med risk (2 of 2 - PPSV23) 10/23/2018       Recommendations for Preventive Services Due: see orders.   Recommended screening schedule for the next 5-10 years is provided to the patient in written form: see Patient Instructions/AVS.

## 2017-11-20 ENCOUNTER — TELEPHONE (OUTPATIENT)
Dept: INTERNAL MEDICINE | Age: 82
End: 2017-11-20

## 2017-11-20 NOTE — TELEPHONE ENCOUNTER
Patients daughter called in concerned with shayna. She states she thinks the patient has been having mini TIA's. She states she had one a few weeks ago and she has had decreased ability with speech. Also states that she has been taken off of xarelto due to bleeding, which also concerns the daughter due to her having A FIB. She would like her to be evaluated and possibly get an MRI to see if this is whats going on with the patient. Put the pt on sched for tomorrow with dr Brandy Little. Daughter stated understanding of appointment date and time.

## 2017-11-21 ENCOUNTER — OFFICE VISIT (OUTPATIENT)
Dept: INTERNAL MEDICINE | Age: 82
End: 2017-11-21
Payer: MEDICARE

## 2017-11-21 ENCOUNTER — HOSPITAL ENCOUNTER (OUTPATIENT)
Dept: NON INVASIVE DIAGNOSTICS | Age: 82
Discharge: HOME | End: 2017-11-21
Payer: MEDICARE

## 2017-11-21 ENCOUNTER — HOSPITAL ENCOUNTER (OUTPATIENT)
Dept: VASCULAR LAB | Age: 82
Discharge: HOME | End: 2017-11-21
Payer: MEDICARE

## 2017-11-21 ENCOUNTER — HOSPITAL ENCOUNTER (OUTPATIENT)
Dept: MRI IMAGING | Age: 82
Discharge: HOME | End: 2017-11-21
Payer: MEDICARE

## 2017-11-21 VITALS
DIASTOLIC BLOOD PRESSURE: 76 MMHG | HEART RATE: 60 BPM | SYSTOLIC BLOOD PRESSURE: 122 MMHG | WEIGHT: 133 LBS | BODY MASS INDEX: 24.33 KG/M2 | OXYGEN SATURATION: 95 %

## 2017-11-21 DIAGNOSIS — Z86.73 H/O TIA (TRANSIENT ISCHEMIC ATTACK) AND STROKE: ICD-10-CM

## 2017-11-21 DIAGNOSIS — G45.9 TRANSIENT CEREBRAL ISCHEMIA, UNSPECIFIED TYPE: Primary | ICD-10-CM

## 2017-11-21 DIAGNOSIS — G45.9 TRANSIENT CEREBRAL ISCHEMIA, UNSPECIFIED TYPE: ICD-10-CM

## 2017-11-21 LAB
ALBUMIN SERPL-MCNC: 3.8 G/DL (ref 3.5–5.2)
ALP BLD-CCNC: 100 U/L (ref 35–104)
ALT SERPL-CCNC: 12 U/L (ref 5–33)
ANION GAP SERPL CALCULATED.3IONS-SCNC: 14 MMOL/L (ref 7–19)
APTT: 26.5 SEC (ref 26–36.2)
AST SERPL-CCNC: 17 U/L (ref 5–32)
BACTERIA: ABNORMAL /HPF
BASOPHILS ABSOLUTE: 0 K/UL (ref 0–0.2)
BASOPHILS RELATIVE PERCENT: 0.2 % (ref 0–1)
BILIRUB SERPL-MCNC: 0.4 MG/DL (ref 0.2–1.2)
BILIRUBIN URINE: NEGATIVE
BLOOD, URINE: NEGATIVE
BUN BLDV-MCNC: 22 MG/DL (ref 8–23)
CALCIUM SERPL-MCNC: 9.5 MG/DL (ref 8.2–9.6)
CHLORIDE BLD-SCNC: 99 MMOL/L (ref 98–111)
CLARITY: ABNORMAL
CO2: 26 MMOL/L (ref 22–29)
COLOR: YELLOW
CREAT SERPL-MCNC: 0.5 MG/DL (ref 0.5–0.9)
EOSINOPHILS ABSOLUTE: 0 K/UL (ref 0–0.6)
EOSINOPHILS RELATIVE PERCENT: 0.2 % (ref 0–5)
EPITHELIAL CELLS, UA: 0 /HPF (ref 0–5)
GFR NON-AFRICAN AMERICAN: >60
GLUCOSE BLD-MCNC: 110 MG/DL (ref 74–109)
GLUCOSE URINE: NEGATIVE MG/DL
HCT VFR BLD CALC: 34.1 % (ref 37–47)
HEMOGLOBIN: 10.5 G/DL (ref 12–16)
HYALINE CASTS: 0 /HPF (ref 0–8)
INR BLD: 1.06 (ref 0.88–1.18)
KETONES, URINE: NEGATIVE MG/DL
LEUKOCYTE ESTERASE, URINE: ABNORMAL
LV EF: 58 %
LVEF MODALITY: NORMAL
LYMPHOCYTES ABSOLUTE: 1.2 K/UL (ref 1.1–4.5)
LYMPHOCYTES RELATIVE PERCENT: 11.9 % (ref 20–40)
MCH RBC QN AUTO: 30.3 PG (ref 27–31)
MCHC RBC AUTO-ENTMCNC: 30.8 G/DL (ref 33–37)
MCV RBC AUTO: 98.6 FL (ref 81–99)
MONOCYTES ABSOLUTE: 1.1 K/UL (ref 0–0.9)
MONOCYTES RELATIVE PERCENT: 11 % (ref 0–10)
NEUTROPHILS ABSOLUTE: 7.7 K/UL (ref 1.5–7.5)
NEUTROPHILS RELATIVE PERCENT: 76.2 % (ref 50–65)
NITRITE, URINE: NEGATIVE
PDW BLD-RTO: 14.2 % (ref 11.5–14.5)
PH UA: 6
PLATELET # BLD: 327 K/UL (ref 130–400)
PMV BLD AUTO: 9.3 FL (ref 9.4–12.3)
POTASSIUM SERPL-SCNC: 4.6 MMOL/L (ref 3.5–5)
PROTEIN UA: NEGATIVE MG/DL
PROTHROMBIN TIME: 13.7 SEC (ref 12–14.6)
RBC # BLD: 3.46 M/UL (ref 4.2–5.4)
RBC UA: 4 /HPF (ref 0–4)
SODIUM BLD-SCNC: 139 MMOL/L (ref 136–145)
SPECIFIC GRAVITY UA: 1.01
TOTAL PROTEIN: 6.9 G/DL (ref 6.6–8.7)
UROBILINOGEN, URINE: 0.2 E.U./DL
WBC # BLD: 10.1 K/UL (ref 4.8–10.8)
WBC UA: 204 /HPF (ref 0–5)

## 2017-11-21 PROCEDURE — G8420 CALC BMI NORM PARAMETERS: HCPCS | Performed by: INTERNAL MEDICINE

## 2017-11-21 PROCEDURE — 1036F TOBACCO NON-USER: CPT | Performed by: INTERNAL MEDICINE

## 2017-11-21 PROCEDURE — G8427 DOCREV CUR MEDS BY ELIG CLIN: HCPCS | Performed by: INTERNAL MEDICINE

## 2017-11-21 PROCEDURE — A9577 INJ MULTIHANCE: HCPCS | Performed by: INTERNAL MEDICINE

## 2017-11-21 PROCEDURE — G8484 FLU IMMUNIZE NO ADMIN: HCPCS | Performed by: INTERNAL MEDICINE

## 2017-11-21 PROCEDURE — 99214 OFFICE O/P EST MOD 30 MIN: CPT | Performed by: INTERNAL MEDICINE

## 2017-11-21 PROCEDURE — 1123F ACP DISCUSS/DSCN MKR DOCD: CPT | Performed by: INTERNAL MEDICINE

## 2017-11-21 PROCEDURE — 81003 URINALYSIS AUTO W/O SCOPE: CPT | Performed by: INTERNAL MEDICINE

## 2017-11-21 PROCEDURE — 70553 MRI BRAIN STEM W/O & W/DYE: CPT

## 2017-11-21 PROCEDURE — 93880 EXTRACRANIAL BILAT STUDY: CPT

## 2017-11-21 PROCEDURE — 4040F PNEUMOC VAC/ADMIN/RCVD: CPT | Performed by: INTERNAL MEDICINE

## 2017-11-21 PROCEDURE — 6360000004 HC RX CONTRAST MEDICATION: Performed by: INTERNAL MEDICINE

## 2017-11-21 PROCEDURE — 1090F PRES/ABSN URINE INCON ASSESS: CPT | Performed by: INTERNAL MEDICINE

## 2017-11-21 PROCEDURE — 93306 TTE W/DOPPLER COMPLETE: CPT

## 2017-11-21 RX ORDER — GUAIFENESIN AND PSEUDOEPHEDRINE HCL 1200; 120 MG/1; MG/1
1 TABLET, EXTENDED RELEASE ORAL
Status: ON HOLD | COMMUNITY
End: 2018-03-31 | Stop reason: ALTCHOICE

## 2017-11-21 RX ORDER — CEFDINIR 300 MG/1
300 CAPSULE ORAL 2 TIMES DAILY
Qty: 14 CAPSULE | Refills: 0 | Status: SHIPPED | OUTPATIENT
Start: 2017-11-21 | End: 2018-02-07 | Stop reason: CLARIF

## 2017-11-21 RX ORDER — CLOPIDOGREL BISULFATE 75 MG/1
75 TABLET ORAL DAILY
Qty: 30 TABLET | Refills: 3 | Status: SHIPPED | OUTPATIENT
Start: 2017-11-21 | End: 2018-02-07 | Stop reason: CLARIF

## 2017-11-21 RX ADMIN — GADOBENATE DIMEGLUMINE 13 ML: 529 INJECTION, SOLUTION INTRAVENOUS at 13:45

## 2017-11-21 ASSESSMENT — ENCOUNTER SYMPTOMS
COLOR CHANGE: 0
CONSTIPATION: 0
RHINORRHEA: 0
ANAL BLEEDING: 0
BACK PAIN: 0
DIARRHEA: 0
NAUSEA: 0
PHOTOPHOBIA: 0
ABDOMINAL DISTENTION: 0
SORE THROAT: 0
TROUBLE SWALLOWING: 0
SHORTNESS OF BREATH: 0
SINUS PAIN: 0
WHEEZING: 0
ABDOMINAL PAIN: 0
SINUS PRESSURE: 0
BLOOD IN STOOL: 0
RECTAL PAIN: 0
EYE PAIN: 0
EYE REDNESS: 0
COUGH: 0
EYE ITCHING: 0
CHEST TIGHTNESS: 0
VOICE CHANGE: 0
VOMITING: 0
EYE DISCHARGE: 0

## 2017-11-21 ASSESSMENT — PATIENT HEALTH QUESTIONNAIRE - PHQ9
SUM OF ALL RESPONSES TO PHQ9 QUESTIONS 1 & 2: 0
2. FEELING DOWN, DEPRESSED OR HOPELESS: 0
SUM OF ALL RESPONSES TO PHQ QUESTIONS 1-9: 0
1. LITTLE INTEREST OR PLEASURE IN DOING THINGS: 0

## 2017-11-21 NOTE — PROGRESS NOTES
heard.  Pulmonary/Chest: Effort normal and breath sounds normal. No respiratory distress. She has no wheezes. She has no rales. She exhibits no tenderness. Abdominal: Soft. Bowel sounds are normal. She exhibits no distension and no mass. There is no tenderness. There is no rebound and no guarding. Musculoskeletal: Normal range of motion. She exhibits no edema, tenderness or deformity. Lymphadenopathy:     She has no cervical adenopathy. Neurological: She is alert and oriented to person, place, and time. She has normal reflexes. She displays normal reflexes. No cranial nerve deficit. She exhibits normal muscle tone. Coordination normal.   Skin: Skin is warm and dry. No rash noted. She is not diaphoretic. No erythema. No pallor. Psychiatric: She has a normal mood and affect. Her behavior is normal. Judgment and thought content normal.   Nursing note and vitals reviewed. 1. Transient cerebral ischemia, unspecified type    2. H/O TIA (transient ischemic attack) and stroke         ASSESSMENT/PLAN:    55-year-old woman who has had 2 TIAs with loss of speech and loss of left arm function and last 2 weeks    1. TIA: Essentially patient had 2 episodes of TIAs within the last week. We need to get an MRI to rule out possible intracranial bleed given the fact that she is having a headaches. We also need to get an MRI to rule out stroke. We'll also check a 2-D echo and carotid ultrasound. We'll also do lab work to check for urinary tract infection, check for infectious causes such obtaining a CBC. Check for electrolyte imbalances by getting a CMP. We'll also check coagulation studies. The, I cannot believe the family did not bring this patient to the emergency room and his symptoms have been, workup is certainly warranted. Lanelle Sacks was seen today for transient ischemic attack and headache.     Diagnoses and all orders for this visit:    Transient cerebral ischemia, unspecified type  -     MRI Brain W WO Contrast;

## 2017-11-21 NOTE — PATIENT INSTRUCTIONS
Patient Education        Transient Ischemic Attack: Care Instructions  Your Care Instructions    A transient ischemic attack (TIA) is when blood flow to a part of your brain is blocked for a short time. A TIA is like a stroke but usually lasts only a few minutes. A TIA does not cause lasting brain damage. Any vision problems, slurred speech, or other symptoms usually go away in 10 to 20 minutes. But they may last for up to 24 hours. TIAs are often warning signs of a stroke. Some people who have a TIA may have a stroke in the future. A stroke can cause symptoms like those of a TIA. But a stroke causes lasting damage to your brain. You can take steps to help prevent a stroke. One thing you can do is get early treatment. If you have other new symptoms, or if your symptoms do not get better, go back to the emergency room or call your doctor right away. Getting treatment right away may prevent long-term brain damage caused by a stroke. The doctor has checked you carefully, but problems can develop later. If you notice any problems or new symptoms, get medical treatment right away. Follow-up care is a key part of your treatment and safety. Be sure to make and go to all appointments, and call your doctor if you are having problems. It's also a good idea to know your test results and keep a list of the medicines you take. How can you care for yourself at home? Medicines  · Be safe with medicines. Take your medicines exactly as prescribed. Call your doctor if you think you are having a problem with your medicine. · If you take a blood thinner, such as aspirin, be sure you get instructions about how to take your medicine safely. Blood thinners can cause serious bleeding problems. · Call your doctor if you are not able to take your medicines for any reason.   · Do not take any over-the-counter medicines or herbal products without talking to your doctor first.  · If you take birth control pills or hormone therapy, talk to your doctor. Ask if these treatments are right for you. Lifestyle changes  · Do not smoke. If you need help quitting, talk to your doctor about stop-smoking programs and medicines. · Be active. If your doctor recommends it, get more exercise. Walking is a good choice. Bit by bit, increase the amount you walk every day. Try for at least 30 minutes on most days of the week. You also may want to swim, bike, or do other activities. · Eat heart-healthy foods. These include fruits, vegetables, high-fiber foods, fish, and foods that are low in sodium, saturated fat, and trans fat. · Stay at a healthy weight. Lose weight if you need to. · Limit alcohol to 2 drinks a day for men and 1 drink a day for women. Staying healthy  · Manage other health problems such as diabetes, high blood pressure, and high cholesterol. · Get the flu vaccine every year. When should you call for help? Call 911 anytime you think you may need emergency care. For example, call if:  · You have new or worse symptoms of a stroke. These may include:  ¨ Sudden numbness, tingling, weakness, or loss of movement in your face, arm, or leg, especially on only one side of your body. ¨ Sudden vision changes. ¨ Sudden trouble speaking. ¨ Sudden confusion or trouble understanding simple statements. ¨ Sudden problems with walking or balance. ¨ A sudden, severe headache that is different from past headaches. Call 911 even if these symptoms go away in a few minutes. · You feel like you are having another TIA. Watch closely for changes in your health, and be sure to contact your doctor if you have any problems. Where can you learn more? Go to https://diane.[employer]. org and sign in to your fflap account. Enter [phone] in the OpenLabel box to learn more about \"Transient Ischemic Attack: Care Instructions. \"     If you do not have an account, please click on the \"Sign Up Now\" link.   Current as of: March 20, 2017  Content Version: 11.3  © 6522-9217 [employer]. Care instructions adapted under license by [facility] ([facility]). If you have questions about a medical condition or this instruction, always ask your healthcare professional. [address] any warranty or liability for your use of this information.

## 2017-11-23 LAB
ORGANISM: ABNORMAL
URINE CULTURE, ROUTINE: ABNORMAL
URINE CULTURE, ROUTINE: ABNORMAL

## 2017-11-27 ENCOUNTER — TELEPHONE (OUTPATIENT)
Dept: INTERNAL MEDICINE | Age: 82
End: 2017-11-27

## 2017-11-27 NOTE — TELEPHONE ENCOUNTER
Called pt to go over her recent lab results with her. She said someone called on Friday and spoke with her daughter, however she is unsure what bout and if her daughter has picked up her rx from the [facility]. She gave this MA her daughters phone number to call and check on that   [employer] at [phone]. She did not answer, however did leave a voicemail asking for a RC to ensure she is aware of her mothers medication.

## 2017-11-29 ENCOUNTER — OFFICE VISIT (OUTPATIENT)
Dept: INTERNAL MEDICINE | Age: 82
End: 2017-11-29
Payer: MEDICARE

## 2017-11-29 VITALS
WEIGHT: 128 LBS | OXYGEN SATURATION: 96 % | SYSTOLIC BLOOD PRESSURE: 108 MMHG | BODY MASS INDEX: 23.55 KG/M2 | TEMPERATURE: 98 F | HEIGHT: 62 IN | HEART RATE: 82 BPM | DIASTOLIC BLOOD PRESSURE: 74 MMHG

## 2017-11-29 DIAGNOSIS — G45.9 TRANSIENT CEREBRAL ISCHEMIA, UNSPECIFIED TYPE: Primary | ICD-10-CM

## 2017-11-29 PROCEDURE — G8427 DOCREV CUR MEDS BY ELIG CLIN: HCPCS | Performed by: NURSE PRACTITIONER

## 2017-11-29 PROCEDURE — G8484 FLU IMMUNIZE NO ADMIN: HCPCS | Performed by: NURSE PRACTITIONER

## 2017-11-29 PROCEDURE — 99214 OFFICE O/P EST MOD 30 MIN: CPT | Performed by: NURSE PRACTITIONER

## 2017-11-29 PROCEDURE — 4040F PNEUMOC VAC/ADMIN/RCVD: CPT | Performed by: NURSE PRACTITIONER

## 2017-11-29 PROCEDURE — 1123F ACP DISCUSS/DSCN MKR DOCD: CPT | Performed by: NURSE PRACTITIONER

## 2017-11-29 PROCEDURE — G8420 CALC BMI NORM PARAMETERS: HCPCS | Performed by: NURSE PRACTITIONER

## 2017-11-29 PROCEDURE — 1036F TOBACCO NON-USER: CPT | Performed by: NURSE PRACTITIONER

## 2017-11-29 PROCEDURE — 1090F PRES/ABSN URINE INCON ASSESS: CPT | Performed by: NURSE PRACTITIONER

## 2017-11-29 RX ORDER — BUDESONIDE AND FORMOTEROL FUMARATE DIHYDRATE 80; 4.5 UG/1; UG/1
2 AEROSOL RESPIRATORY (INHALATION) 2 TIMES DAILY
Qty: 1 INHALER | Refills: 1 | Status: SHIPPED | OUTPATIENT
Start: 2017-11-29

## 2017-11-29 ASSESSMENT — ENCOUNTER SYMPTOMS
SHORTNESS OF BREATH: 0
COLOR CHANGE: 0
NAUSEA: 0
PHOTOPHOBIA: 0
RHINORRHEA: 0
VOICE CHANGE: 0
BACK PAIN: 0
COUGH: 0
VOMITING: 0

## 2017-11-29 NOTE — PROGRESS NOTES
as directed. 2. Referral to neurology-- will call with appointment. 3. Begin Symbicort as directed. 4. Follow-up with Dr. Esau Garnett as scheduled. Patient given educational materials - see patient instructions. Discussed use, benefit, and side effects of prescribed medications. All patient questions answered. Pt voiced understanding. Reviewed health maintenance. Instructed to continue current medications, diet and exercise. Patient agreed with treatment plan. Follow up as directed. MEDICATIONS:  Orders Placed This Encounter   Medications    budesonide-formoterol (SYMBICORT) 80-4.5 MCG/ACT AERO     Sig: Inhale 2 puffs into the lungs 2 times daily     Dispense:  1 Inhaler     Refill:  1         ORDERS:  Orders Placed This Encounter   Procedures   [facility] Neurology- Sundar Medina MD       Follow-up:  Return if symptoms worsen or fail to improve. PATIENT INSTRUCTIONS:  Patient Instructions   1. Plavix 75 mg as directed. 2. Referral to neurology-- will call with appointment. 3. Begin Symbicort as directed. 4. Follow-up with Dr. Esau Garnett as scheduled.      Electronically signed by SUSANNE Montejo on 11/29/2017 at 1:36 PM

## 2017-12-01 ENCOUNTER — TELEPHONE (OUTPATIENT)
Dept: NEUROLOGY | Age: 82
End: 2017-12-01

## 2017-12-01 NOTE — TELEPHONE ENCOUNTER
Called patient about upcoming appointment Told patient to come 30 min. prior to scheduled appointment to complete paper work, bring photo ID and insurance cards, medications.

## 2017-12-07 ENCOUNTER — TELEPHONE (OUTPATIENT)
Dept: INTERNAL MEDICINE | Age: 82
End: 2017-12-07

## 2017-12-07 NOTE — TELEPHONE ENCOUNTER
Voicemail:  Patient's daughter called and wanted to know how they can go about getting a wheelchair for Ms. Kushal Jaime.  The family believes she needs one.  12/7 5535

## 2017-12-08 NOTE — TELEPHONE ENCOUNTER
Called pt to let her know we would need to see her in the office for the wheel chair eval.   Nobody answered.  Left a vm requestinga rc

## 2017-12-11 RX ORDER — FUROSEMIDE 20 MG/1
TABLET ORAL
Qty: 90 TABLET | Refills: 2 | Status: SHIPPED | OUTPATIENT
Start: 2017-12-11 | End: 2018-09-14 | Stop reason: SDUPTHER

## 2017-12-14 NOTE — TELEPHONE ENCOUNTER
Called and spoke with ryan about the wheelchair eval.   We put her on at 1030 on the 19th. Advised if they decided to change from wheelchair to power chair to let us know because that is a longer visit.    She advised understanding

## 2017-12-19 ENCOUNTER — OFFICE VISIT (OUTPATIENT)
Dept: INTERNAL MEDICINE | Age: 82
End: 2017-12-19
Payer: MEDICARE

## 2017-12-19 ENCOUNTER — TELEPHONE (OUTPATIENT)
Dept: INTERNAL MEDICINE | Age: 82
End: 2017-12-19

## 2017-12-19 VITALS
HEART RATE: 78 BPM | DIASTOLIC BLOOD PRESSURE: 80 MMHG | HEIGHT: 60 IN | OXYGEN SATURATION: 98 % | SYSTOLIC BLOOD PRESSURE: 124 MMHG | BODY MASS INDEX: 25.32 KG/M2 | WEIGHT: 129 LBS

## 2017-12-19 DIAGNOSIS — R53.1 WEAKNESS: Primary | ICD-10-CM

## 2017-12-19 DIAGNOSIS — Z12.11 SCREENING FOR COLON CANCER: ICD-10-CM

## 2017-12-19 DIAGNOSIS — N39.0 URINARY TRACT INFECTION WITHOUT HEMATURIA, SITE UNSPECIFIED: ICD-10-CM

## 2017-12-19 PROCEDURE — 1090F PRES/ABSN URINE INCON ASSESS: CPT | Performed by: INTERNAL MEDICINE

## 2017-12-19 PROCEDURE — G8427 DOCREV CUR MEDS BY ELIG CLIN: HCPCS | Performed by: INTERNAL MEDICINE

## 2017-12-19 PROCEDURE — G8419 CALC BMI OUT NRM PARAM NOF/U: HCPCS | Performed by: INTERNAL MEDICINE

## 2017-12-19 PROCEDURE — 99213 OFFICE O/P EST LOW 20 MIN: CPT | Performed by: INTERNAL MEDICINE

## 2017-12-19 PROCEDURE — 1123F ACP DISCUSS/DSCN MKR DOCD: CPT | Performed by: INTERNAL MEDICINE

## 2017-12-19 PROCEDURE — 4040F PNEUMOC VAC/ADMIN/RCVD: CPT | Performed by: INTERNAL MEDICINE

## 2017-12-19 PROCEDURE — G8484 FLU IMMUNIZE NO ADMIN: HCPCS | Performed by: INTERNAL MEDICINE

## 2017-12-19 PROCEDURE — 1036F TOBACCO NON-USER: CPT | Performed by: INTERNAL MEDICINE

## 2017-12-19 ASSESSMENT — ENCOUNTER SYMPTOMS
ABDOMINAL DISTENTION: 0
DIARRHEA: 0
ANAL BLEEDING: 0
SHORTNESS OF BREATH: 1
PHOTOPHOBIA: 0
ABDOMINAL PAIN: 0
CONSTIPATION: 0
EYE DISCHARGE: 0
EYE REDNESS: 0
TROUBLE SWALLOWING: 0
RHINORRHEA: 0
COLOR CHANGE: 0
SINUS PAIN: 0
VOMITING: 0
COUGH: 0
SORE THROAT: 0
WHEEZING: 0
VOICE CHANGE: 0
BLOOD IN STOOL: 0
EYE ITCHING: 0
CHEST TIGHTNESS: 0
RECTAL PAIN: 0
SINUS PRESSURE: 0
EYE PAIN: 0
NAUSEA: 0
BACK PAIN: 0

## 2017-12-19 ASSESSMENT — PATIENT HEALTH QUESTIONNAIRE - PHQ9
1. LITTLE INTEREST OR PLEASURE IN DOING THINGS: 0
SUM OF ALL RESPONSES TO PHQ QUESTIONS 1-9: 0
SUM OF ALL RESPONSES TO PHQ9 QUESTIONS 1 & 2: 0
2. FEELING DOWN, DEPRESSED OR HOPELESS: 0

## 2017-12-19 NOTE — PROGRESS NOTES
Obie Medrano requires the assistance of a lightweight wheelchair to successfully complete daily living tasks such as: toileting, bathing, dressing, and grooming; or any other daily living task in the home. A lightweight wheelchair is necessary due to the patient's impaired ambulation and mobility restrictions. The patient would not be able to resolve these daily living tasks using a cane or walker. The patient can self-propel a lightweight wheelchair safely in their home and can maneuver within their home with adequate access. The patient cannot self-propel in a standard wheelchair. The patient has not expressed an unwillingness to use the wheelchair.       Electronically signed by Jeet Brasher on 12/19/2017 at 10:57 AM

## 2017-12-19 NOTE — PROGRESS NOTES
Chief Complaint   Patient presents with    Other     Wheelchair eval       HPI: Khadijah Dewitt is a 80 y.o. female is here for Evaluation for possible wheelchair. She has not had any further TIAs. She is scheduled to see Dr. Kel Duarte in January. She is in need of a wheelchair. She plans to go up to [place] in [place] to visit her family. She will be in a large city and traveling great distances makes it was difficult for her to walk. She does get short of breath when she walks for a very long period. She does use a cane. She has not fallen. She denies he complaints of joint pain. She feels that having a wheelchair will allow the family to take her out to the [place] and other venues of interest. He also feels that make it much more easy to maneuver in the home. She can propellants lightweight wheelchair without difficulty. She would like to have a UA done today. She would like to rule out urinary tract infection. She denies a complaints of dysuria. However she has had urinary frequency.     Past Medical History:   Diagnosis Date    Abdominal pain     Acute cystitis     Acute sinusitis     Anemia     Atrial fibrillation (HCC)     CHF (congestive heart failure) (HCC)     Chronic bronchitis (HCC)     Colles' fracture of left radius     Colles' fracture of right radius     Dysphagia     GI bleeding     Hemoptysis     Hyperlipidemia     Hypertension     Hypertension     Hypokalemia     Influenza A (H1N1)     Knee pain     Lobar pneumonia (HCC)     Lung nodules     Osteopenia     Pleural effusion, right       Past Surgical History:   Procedure Laterality Date    APPENDECTOMY      HERNIA REPAIR      HIP SURGERY Right 2014    HYSTERECTOMY        Social History     Social History    Marital status:      Spouse name: N/A    Number of children: N/A    Years of education: N/A     Social History Main Topics    Smoking status: Never Smoker    Smokeless tobacco: Never Used    Alcohol use No    Drug use: No    Sexual activity: Not Asked     Other Topics Concern    None     Social History Narrative    None      Family History   Problem Relation Age of Onset    Stroke Mother     Cancer Sister      skin cancer    Cancer Brother      Colon    Emphysema Father     Diabetes Other      daughter   [facility] Other Other      daughter with multiple sclerosis        Current Outpatient Prescriptions   Medication Sig Dispense Refill    furosemide (LASIX) 20 MG tablet TAKE 1 TABLET BY MOUTH EVERY DAY 90 tablet 2    budesonide-formoterol (SYMBICORT) 80-4.5 MCG/ACT AERO Inhale 2 puffs into the lungs 2 times daily 1 Inhaler 1    Pseudoephedrine-guaiFENesin (MUCINEX D MAX STRENGTH) 120-1200 MG TB12 Take 1 tablet by mouth 1 tab in the morning, 1 tab at night      cefdinir (OMNICEF) 300 MG capsule Take 1 capsule by mouth 2 times daily 14 capsule 0    clopidogrel (PLAVIX) 75 MG tablet Take 1 tablet by mouth daily 30 tablet 3    loratadine (CLARITIN) 10 MG capsule Take 10 mg by mouth daily      Cranberry 400 MG CAPS Take 1 capsule by mouth every morning      ergocalciferol (DRISDOL) 81832 units capsule Take 1 capsule by mouth once a week 4 capsule 3    albuterol sulfate  (90 Base) MCG/ACT inhaler Inhale 2 puffs into the lungs every 4 hours as needed for Wheezing 1 Inhaler 2    metoprolol tartrate (LOPRESSOR) 50 MG tablet TAKE ONE HALF (1/2) TABLET BY MOUTH EVERY 12 HOURS 30 tablet 3    esomeprazole (NEXIUM) 40 MG delayed release capsule Take 40 mg by mouth every morning (before breakfast)       albuterol (PROVENTIL) (2.5 MG/3ML) 0.083% nebulizer solution INHALE 1 VIAL VIA NEBULIZER 4X DAILY AS NEEDED FOR FOR SHORTNESS OF BREATH , WHEEZING **J42,R05  5    lisinopril (PRINIVIL;ZESTRIL) 10 MG tablet TAKE 1 TABLET DAILY (Patient taking differently: TAKE 1 TABLET DAILY prn) 90 tablet 2    PARoxetine (PAXIL) 10 MG tablet TAKE 1 TABLET BY MOUTH EVERY DAY 90 tablet 3     No current facility-administered medications for this visit. Patient Active Problem List   Diagnosis    Chronic atrial fibrillation ([facility] [address]    Essential (primary) hypertension        Review of Systems   Constitutional: Negative for activity change, appetite change, chills, diaphoresis, fatigue, fever and unexpected weight change. HENT: Negative for congestion, ear discharge, ear pain, mouth sores, postnasal drip, rhinorrhea, sinus pain, sinus pressure, sneezing, sore throat, tinnitus, trouble swallowing and voice change. Eyes: Negative for photophobia, pain, discharge, redness, itching and visual disturbance. Respiratory: Positive for shortness of breath. Negative for cough, chest tightness and wheezing. Cardiovascular: Negative for chest pain, palpitations and leg swelling. Gastrointestinal: Negative for abdominal distention, abdominal pain, anal bleeding, blood in stool, constipation, diarrhea, nausea, rectal pain and vomiting. She has been taken off her blood thinners secondary to have a blood in the stools. Endocrine: Negative for cold intolerance, heat intolerance, polydipsia, polyphagia and polyuria. Urinary frequency   Genitourinary: Negative for difficulty urinating, dysuria, flank pain, frequency, hematuria and urgency. Musculoskeletal: Negative for arthralgias, back pain, gait problem, joint swelling, myalgias, neck pain and neck stiffness. Skin: Negative for color change, pallor, rash and wound. Allergic/Immunologic: Negative for environmental allergies, food allergies and immunocompromised state. Neurological: Positive for weakness. Negative for dizziness, facial asymmetry, speech difficulty and headaches. Hematological: Negative for adenopathy. Does not bruise/bleed easily. Psychiatric/Behavioral: Negative for agitation, behavioral problems, confusion, decreased concentration, dysphoric mood, hallucinations, self-injury, sleep disturbance and suicidal ideas.  The patient is not nervous/anxious and is not hyperactive. /80   Pulse 78   Ht 5' (1.524 m)   Wt 129 lb (58.5 kg)   SpO2 98%   BMI 25.19 kg/m²   Physical Exam   Constitutional: She is oriented to person, place, and time. She appears well-developed and well-nourished. No distress. HENT:   Head: Normocephalic and atraumatic. Right Ear: External ear normal.   Left Ear: External ear normal.   Nose: Nose normal.   Mouth/Throat: Oropharynx is clear and moist. No oropharyngeal exudate. Eyes: Conjunctivae and EOM are normal. Pupils are equal, round, and reactive to light. Right eye exhibits no discharge. Left eye exhibits no discharge. No scleral icterus. Neck: Normal range of motion. Neck supple. No JVD present. No tracheal deviation present. No thyromegaly present. Cardiovascular: Normal rate, regular rhythm, normal heart sounds and intact distal pulses. Exam reveals no gallop and no friction rub. No murmur heard. Pulmonary/Chest: Effort normal and breath sounds normal. No respiratory distress. She has no wheezes. She has no rales. She exhibits no tenderness. Abdominal: Soft. Bowel sounds are normal. She exhibits no distension and no mass. There is no tenderness. There is no rebound and no guarding. Musculoskeletal: Normal range of motion. She exhibits no edema, tenderness or deformity. Lymphadenopathy:     She has no cervical adenopathy. Neurological: She is alert and oriented to person, place, and time. She has normal reflexes. She displays normal reflexes. No cranial nerve deficit. She exhibits normal muscle tone. Coordination normal.   Skin: Skin is warm and dry. No rash noted. She is not diaphoretic. No erythema. No pallor. Psychiatric: She has a normal mood and affect. Her behavior is normal. Judgment and thought content normal.   Nursing note and vitals reviewed. 1. Urinary tract infection without hematuria, site unspecified    2.  Screening for colon cancer        ASSESSMENT/PLAN:    80-year-old woman here for wheelchair evaluation    1. Weakness and shortness of breath: She does qualify for wheelchair. She will and place with a cane. However, shortly and really short distances. She was certainly benefit from a wheelchair especially given the fact that she's been having recent TIAs. 2. Urinary frequency: Urinalysis ordered    Pineda Keanuumbluisa was seen today for other. Diagnoses and all orders for this visit:    Urinary tract infection without hematuria, site unspecified  -     Urinalysis; Future    Screening for colon cancer  -     Blood Occult Stool Screen #1; Future          Return as scheduled.      Orders Placed This Encounter   Procedures    Urinalysis     Standing Status:   Future     Standing Expiration Date:   12/19/2018    Blood Occult Stool Screen #1     Standing Status:   Future     Standing Expiration Date:   12/19/2018       Marlen Rogel MD

## 2017-12-20 ENCOUNTER — APPOINTMENT (OUTPATIENT)
Dept: GENERAL RADIOLOGY | Facility: HOSPITAL | Age: 82
End: 2017-12-20

## 2017-12-20 ENCOUNTER — HOSPITAL ENCOUNTER (INPATIENT)
Facility: HOSPITAL | Age: 82
LOS: 3 days | Discharge: NURSING, REHAB OR LONG-TERM CARE | End: 2017-12-24
Attending: EMERGENCY MEDICINE | Admitting: FAMILY MEDICINE

## 2017-12-20 ENCOUNTER — APPOINTMENT (OUTPATIENT)
Dept: CT IMAGING | Facility: HOSPITAL | Age: 82
End: 2017-12-20

## 2017-12-20 DIAGNOSIS — Z78.9 IMPAIRED MOBILITY AND ADLS: ICD-10-CM

## 2017-12-20 DIAGNOSIS — Z74.09 IMPAIRED MOBILITY AND ADLS: ICD-10-CM

## 2017-12-20 DIAGNOSIS — I48.20 CHRONIC ATRIAL FIBRILLATION (HCC): ICD-10-CM

## 2017-12-20 DIAGNOSIS — Z79.01 CHRONIC ANTICOAGULATION: ICD-10-CM

## 2017-12-20 DIAGNOSIS — Z74.09 IMPAIRED MOBILITY: ICD-10-CM

## 2017-12-20 DIAGNOSIS — S02.2XXA CLOSED FRACTURE OF NASAL BONE, INITIAL ENCOUNTER: ICD-10-CM

## 2017-12-20 DIAGNOSIS — R04.0 EPISTAXIS: ICD-10-CM

## 2017-12-20 DIAGNOSIS — R55 SYNCOPE AND COLLAPSE: Primary | ICD-10-CM

## 2017-12-20 DIAGNOSIS — S09.90XA MINOR HEAD INJURY, INITIAL ENCOUNTER: ICD-10-CM

## 2017-12-20 PROBLEM — S02.2XXD CLOSED FRACTURE OF NASAL BONE WITH ROUTINE HEALING: Status: ACTIVE | Noted: 2017-12-20

## 2017-12-20 LAB
ALBUMIN SERPL-MCNC: 3.8 G/DL (ref 3.5–5)
ALBUMIN/GLOB SERPL: 1.2 G/DL (ref 1.1–2.5)
ALP SERPL-CCNC: 85 U/L (ref 24–120)
ALT SERPL W P-5'-P-CCNC: 29 U/L (ref 0–54)
ANION GAP SERPL CALCULATED.3IONS-SCNC: 11 MMOL/L (ref 4–13)
APTT PPP: 29.6 SECONDS (ref 24.1–34.8)
AST SERPL-CCNC: 36 U/L (ref 7–45)
BACTERIA UR QL AUTO: ABNORMAL /HPF
BASOPHILS # BLD AUTO: 0.03 10*3/MM3 (ref 0–0.2)
BASOPHILS NFR BLD AUTO: 0.3 % (ref 0–2)
BILIRUB SERPL-MCNC: 0.7 MG/DL (ref 0.1–1)
BILIRUB UR QL STRIP: NEGATIVE
BUN BLD-MCNC: 12 MG/DL (ref 5–21)
BUN/CREAT SERPL: 22.6 (ref 7–25)
CALCIUM SPEC-SCNC: 9.3 MG/DL (ref 8.4–10.4)
CHLORIDE SERPL-SCNC: 100 MMOL/L (ref 98–110)
CK MB SERPL-CCNC: 2.48 NG/ML (ref 0–5)
CK SERPL-CCNC: 121 U/L (ref 0–203)
CLARITY UR: CLEAR
CO2 SERPL-SCNC: 29 MMOL/L (ref 24–31)
COLOR UR: YELLOW
CREAT BLD-MCNC: 0.53 MG/DL (ref 0.5–1.4)
DEPRECATED RDW RBC AUTO: 48.8 FL (ref 40–54)
EOSINOPHIL # BLD AUTO: 0.01 10*3/MM3 (ref 0–0.7)
EOSINOPHIL NFR BLD AUTO: 0.1 % (ref 0–4)
ERYTHROCYTE [DISTWIDTH] IN BLOOD BY AUTOMATED COUNT: 14.6 % (ref 12–15)
GFR SERPL CREATININE-BSD FRML MDRD: 108 ML/MIN/1.73
GLOBULIN UR ELPH-MCNC: 3.1 GM/DL
GLUCOSE BLD-MCNC: 104 MG/DL (ref 70–100)
GLUCOSE UR STRIP-MCNC: NEGATIVE MG/DL
HCT VFR BLD AUTO: 31.8 % (ref 37–47)
HGB BLD-MCNC: 10.2 G/DL (ref 12–16)
HGB UR QL STRIP.AUTO: ABNORMAL
HOLD SPECIMEN: NORMAL
HOLD SPECIMEN: NORMAL
HYALINE CASTS UR QL AUTO: ABNORMAL /LPF
IMM GRANULOCYTES # BLD: 0.05 10*3/MM3 (ref 0–0.03)
IMM GRANULOCYTES NFR BLD: 0.4 % (ref 0–5)
INR PPP: 1.03 (ref 0.91–1.09)
KETONES UR QL STRIP: ABNORMAL
LEUKOCYTE ESTERASE UR QL STRIP.AUTO: NEGATIVE
LYMPHOCYTES # BLD AUTO: 0.56 10*3/MM3 (ref 0.72–4.86)
LYMPHOCYTES NFR BLD AUTO: 4.7 % (ref 15–45)
MCH RBC QN AUTO: 29.5 PG (ref 28–32)
MCHC RBC AUTO-ENTMCNC: 32.1 G/DL (ref 33–36)
MCV RBC AUTO: 91.9 FL (ref 82–98)
MONOCYTES # BLD AUTO: 0.84 10*3/MM3 (ref 0.19–1.3)
MONOCYTES NFR BLD AUTO: 7 % (ref 4–12)
NEUTROPHILS # BLD AUTO: 10.43 10*3/MM3 (ref 1.87–8.4)
NEUTROPHILS NFR BLD AUTO: 87.5 % (ref 39–78)
NITRITE UR QL STRIP: NEGATIVE
NRBC BLD MANUAL-RTO: 0 /100 WBC (ref 0–0)
PH UR STRIP.AUTO: 7 [PH] (ref 5–8)
PLATELET # BLD AUTO: 353 10*3/MM3 (ref 130–400)
PMV BLD AUTO: 9 FL (ref 6–12)
POTASSIUM BLD-SCNC: 4 MMOL/L (ref 3.5–5.3)
PROT SERPL-MCNC: 6.9 G/DL (ref 6.3–8.7)
PROT UR QL STRIP: NEGATIVE
PROTHROMBIN TIME: 13.8 SECONDS (ref 11.9–14.6)
RBC # BLD AUTO: 3.46 10*6/MM3 (ref 4.2–5.4)
RBC # UR: ABNORMAL /HPF
REF LAB TEST METHOD: ABNORMAL
SODIUM BLD-SCNC: 140 MMOL/L (ref 135–145)
SP GR UR STRIP: 1.01 (ref 1–1.03)
SQUAMOUS #/AREA URNS HPF: ABNORMAL /HPF
TROPONIN I SERPL-MCNC: 0.01 NG/ML (ref 0–0.03)
TROPONIN I SERPL-MCNC: <0.012 NG/ML (ref 0–0.03)
URINE MYOGLOBIN, QUALITATIVE: POSITIVE
UROBILINOGEN UR QL STRIP: ABNORMAL
WBC NRBC COR # BLD: 11.92 10*3/MM3 (ref 4.8–10.8)
WBC UR QL AUTO: ABNORMAL /HPF
WHOLE BLOOD HOLD SPECIMEN: NORMAL
WHOLE BLOOD HOLD SPECIMEN: NORMAL

## 2017-12-20 PROCEDURE — 25010000002 TDAP 5-2.5-18.5 LF-MCG/0.5 SUSPENSION: Performed by: EMERGENCY MEDICINE

## 2017-12-20 PROCEDURE — G0378 HOSPITAL OBSERVATION PER HR: HCPCS

## 2017-12-20 PROCEDURE — 85025 COMPLETE CBC W/AUTO DIFF WBC: CPT | Performed by: EMERGENCY MEDICINE

## 2017-12-20 PROCEDURE — 93010 ELECTROCARDIOGRAM REPORT: CPT | Performed by: INTERNAL MEDICINE

## 2017-12-20 PROCEDURE — 83874 ASSAY OF MYOGLOBIN: CPT | Performed by: NURSE PRACTITIONER

## 2017-12-20 PROCEDURE — 85610 PROTHROMBIN TIME: CPT | Performed by: EMERGENCY MEDICINE

## 2017-12-20 PROCEDURE — 99222 1ST HOSP IP/OBS MODERATE 55: CPT | Performed by: OTOLARYNGOLOGY

## 2017-12-20 PROCEDURE — 99283 EMERGENCY DEPT VISIT LOW MDM: CPT

## 2017-12-20 PROCEDURE — 85730 THROMBOPLASTIN TIME PARTIAL: CPT | Performed by: EMERGENCY MEDICINE

## 2017-12-20 PROCEDURE — 36415 COLL VENOUS BLD VENIPUNCTURE: CPT | Performed by: EMERGENCY MEDICINE

## 2017-12-20 PROCEDURE — 80053 COMPREHEN METABOLIC PANEL: CPT | Performed by: EMERGENCY MEDICINE

## 2017-12-20 PROCEDURE — 82550 ASSAY OF CK (CPK): CPT | Performed by: NURSE PRACTITIONER

## 2017-12-20 PROCEDURE — 82553 CREATINE MB FRACTION: CPT | Performed by: NURSE PRACTITIONER

## 2017-12-20 PROCEDURE — 70450 CT HEAD/BRAIN W/O DYE: CPT

## 2017-12-20 PROCEDURE — 70486 CT MAXILLOFACIAL W/O DYE: CPT

## 2017-12-20 PROCEDURE — 93005 ELECTROCARDIOGRAM TRACING: CPT | Performed by: EMERGENCY MEDICINE

## 2017-12-20 PROCEDURE — 81001 URINALYSIS AUTO W/SCOPE: CPT | Performed by: EMERGENCY MEDICINE

## 2017-12-20 PROCEDURE — 73562 X-RAY EXAM OF KNEE 3: CPT

## 2017-12-20 PROCEDURE — 71010 HC CHEST PA OR AP: CPT

## 2017-12-20 PROCEDURE — 90471 IMMUNIZATION ADMIN: CPT | Performed by: EMERGENCY MEDICINE

## 2017-12-20 PROCEDURE — 90715 TDAP VACCINE 7 YRS/> IM: CPT | Performed by: EMERGENCY MEDICINE

## 2017-12-20 PROCEDURE — 84484 ASSAY OF TROPONIN QUANT: CPT | Performed by: EMERGENCY MEDICINE

## 2017-12-20 RX ORDER — HYDROCODONE BITARTRATE AND ACETAMINOPHEN 5; 325 MG/1; MG/1
1 TABLET ORAL EVERY 4 HOURS PRN
Status: DISCONTINUED | OUTPATIENT
Start: 2017-12-20 | End: 2017-12-24 | Stop reason: HOSPADM

## 2017-12-20 RX ORDER — LISINOPRIL 10 MG/1
10 TABLET ORAL DAILY PRN
Status: DISCONTINUED | OUTPATIENT
Start: 2017-12-20 | End: 2017-12-24 | Stop reason: HOSPADM

## 2017-12-20 RX ORDER — DOCUSATE SODIUM 100 MG/1
100 CAPSULE, LIQUID FILLED ORAL 2 TIMES DAILY
Status: DISCONTINUED | OUTPATIENT
Start: 2017-12-20 | End: 2017-12-24 | Stop reason: HOSPADM

## 2017-12-20 RX ORDER — CLOPIDOGREL BISULFATE 75 MG/1
75 TABLET ORAL DAILY
COMMUNITY
End: 2018-03-31 | Stop reason: HOSPADM

## 2017-12-20 RX ORDER — NALOXONE HCL 0.4 MG/ML
0.4 VIAL (ML) INJECTION
Status: DISCONTINUED | OUTPATIENT
Start: 2017-12-20 | End: 2017-12-24 | Stop reason: HOSPADM

## 2017-12-20 RX ORDER — PAROXETINE 10 MG/1
10 TABLET, FILM COATED ORAL DAILY
Status: DISCONTINUED | OUTPATIENT
Start: 2017-12-20 | End: 2017-12-24 | Stop reason: HOSPADM

## 2017-12-20 RX ORDER — SODIUM CHLORIDE 0.9 % (FLUSH) 0.9 %
1-10 SYRINGE (ML) INJECTION AS NEEDED
Status: DISCONTINUED | OUTPATIENT
Start: 2017-12-20 | End: 2017-12-24 | Stop reason: HOSPADM

## 2017-12-20 RX ORDER — PANTOPRAZOLE SODIUM 40 MG/1
40 TABLET, DELAYED RELEASE ORAL
Status: DISCONTINUED | OUTPATIENT
Start: 2017-12-21 | End: 2017-12-24 | Stop reason: HOSPADM

## 2017-12-20 RX ORDER — HYDROMORPHONE HCL 110MG/55ML
0.5 PATIENT CONTROLLED ANALGESIA SYRINGE INTRAVENOUS
Status: DISCONTINUED | OUTPATIENT
Start: 2017-12-20 | End: 2017-12-24 | Stop reason: HOSPADM

## 2017-12-20 RX ORDER — ACETAMINOPHEN 325 MG/1
650 TABLET ORAL EVERY 4 HOURS PRN
Status: DISCONTINUED | OUTPATIENT
Start: 2017-12-20 | End: 2017-12-24 | Stop reason: HOSPADM

## 2017-12-20 RX ORDER — ONDANSETRON 2 MG/ML
4 INJECTION INTRAMUSCULAR; INTRAVENOUS EVERY 6 HOURS PRN
Status: DISCONTINUED | OUTPATIENT
Start: 2017-12-20 | End: 2017-12-24 | Stop reason: HOSPADM

## 2017-12-20 RX ORDER — ALUMINA, MAGNESIA, AND SIMETHICONE 2400; 2400; 240 MG/30ML; MG/30ML; MG/30ML
15 SUSPENSION ORAL EVERY 6 HOURS PRN
Status: DISCONTINUED | OUTPATIENT
Start: 2017-12-20 | End: 2017-12-24 | Stop reason: HOSPADM

## 2017-12-20 RX ORDER — FUROSEMIDE 20 MG/1
20 TABLET ORAL DAILY
Status: DISCONTINUED | OUTPATIENT
Start: 2017-12-20 | End: 2017-12-24 | Stop reason: HOSPADM

## 2017-12-20 RX ADMIN — TETANUS TOXOID, REDUCED DIPHTHERIA TOXOID AND ACELLULAR PERTUSSIS VACCINE, ADSORBED 0.5 ML: 5; 2.5; 8; 8; 2.5 SUSPENSION INTRAMUSCULAR at 13:03

## 2017-12-20 RX ADMIN — MUPIROCIN 1 APPLICATION: 20 OINTMENT TOPICAL at 21:49

## 2017-12-20 RX ADMIN — METOPROLOL TARTRATE 25 MG: 25 TABLET, FILM COATED ORAL at 21:43

## 2017-12-20 RX ADMIN — HYDROCODONE BITARTRATE AND ACETAMINOPHEN 1 TABLET: 5; 325 TABLET ORAL at 20:31

## 2017-12-20 RX ADMIN — DOCUSATE SODIUM 100 MG: 100 CAPSULE ORAL at 21:44

## 2017-12-21 ENCOUNTER — APPOINTMENT (OUTPATIENT)
Dept: ULTRASOUND IMAGING | Facility: HOSPITAL | Age: 82
End: 2017-12-21

## 2017-12-21 ENCOUNTER — APPOINTMENT (OUTPATIENT)
Dept: CARDIOLOGY | Facility: HOSPITAL | Age: 82
End: 2017-12-21

## 2017-12-21 ENCOUNTER — APPOINTMENT (OUTPATIENT)
Dept: MRI IMAGING | Facility: HOSPITAL | Age: 82
End: 2017-12-21

## 2017-12-21 LAB
ANION GAP SERPL CALCULATED.3IONS-SCNC: 13 MMOL/L (ref 4–13)
BASOPHILS # BLD AUTO: 0.02 10*3/MM3 (ref 0–0.2)
BASOPHILS NFR BLD AUTO: 0.2 % (ref 0–2)
BH CV ECHO MEAS - AO MAX PG (FULL): 25.8 MMHG
BH CV ECHO MEAS - AO MAX PG: 29.6 MMHG
BH CV ECHO MEAS - AO MEAN PG (FULL): 10 MMHG
BH CV ECHO MEAS - AO MEAN PG: 12 MMHG
BH CV ECHO MEAS - AO ROOT AREA (BSA CORRECTED): 1.8
BH CV ECHO MEAS - AO ROOT AREA: 6.2 CM^2
BH CV ECHO MEAS - AO ROOT DIAM: 2.8 CM
BH CV ECHO MEAS - AO V2 MAX: 272 CM/SEC
BH CV ECHO MEAS - AO V2 MEAN: 156 CM/SEC
BH CV ECHO MEAS - AO V2 VTI: 43.1 CM
BH CV ECHO MEAS - AVA(I,A): 1.5 CM^2
BH CV ECHO MEAS - AVA(I,D): 1.5 CM^2
BH CV ECHO MEAS - AVA(V,A): 1.1 CM^2
BH CV ECHO MEAS - AVA(V,D): 1.1 CM^2
BH CV ECHO MEAS - BSA(HAYCOCK): 1.6 M^2
BH CV ECHO MEAS - BSA: 1.5 M^2
BH CV ECHO MEAS - BZI_BMI: 25 KILOGRAMS/M^2
BH CV ECHO MEAS - BZI_METRIC_HEIGHT: 152.4 CM
BH CV ECHO MEAS - BZI_METRIC_WEIGHT: 58.1 KG
BH CV ECHO MEAS - CONTRAST EF 4CH: 65.2 ML/M^2
BH CV ECHO MEAS - EDV(CUBED): 38.6 ML
BH CV ECHO MEAS - EDV(MOD-SP4): 30.2 ML
BH CV ECHO MEAS - EDV(TEICH): 46.8 ML
BH CV ECHO MEAS - EF(CUBED): 66.8 %
BH CV ECHO MEAS - EF(MOD-SP4): 65.2 %
BH CV ECHO MEAS - EF(TEICH): 59.5 %
BH CV ECHO MEAS - ESV(CUBED): 12.8 ML
BH CV ECHO MEAS - ESV(MOD-SP4): 10.5 ML
BH CV ECHO MEAS - ESV(TEICH): 18.9 ML
BH CV ECHO MEAS - FS: 30.8 %
BH CV ECHO MEAS - IVS/LVPW: 1.2
BH CV ECHO MEAS - IVSD: 1.1 CM
BH CV ECHO MEAS - LA DIMENSION: 4.4 CM
BH CV ECHO MEAS - LA/AO: 1.6
BH CV ECHO MEAS - LV DIASTOLIC VOL/BSA (35-75): 19.6 ML/M^2
BH CV ECHO MEAS - LV MASS(C)D: 93.2 GRAMS
BH CV ECHO MEAS - LV MASS(C)DI: 60.3 GRAMS/M^2
BH CV ECHO MEAS - LV MAX PG: 3.8 MMHG
BH CV ECHO MEAS - LV MEAN PG: 2 MMHG
BH CV ECHO MEAS - LV SYSTOLIC VOL/BSA (12-30): 6.8 ML/M^2
BH CV ECHO MEAS - LV V1 MAX: 96.9 CM/SEC
BH CV ECHO MEAS - LV V1 MEAN: 63.7 CM/SEC
BH CV ECHO MEAS - LV V1 VTI: 20.8 CM
BH CV ECHO MEAS - LVIDD: 3.4 CM
BH CV ECHO MEAS - LVIDS: 2.3 CM
BH CV ECHO MEAS - LVLD AP4: 5.1 CM
BH CV ECHO MEAS - LVLS AP4: 4.2 CM
BH CV ECHO MEAS - LVOT AREA (M): 3.1 CM^2
BH CV ECHO MEAS - LVOT AREA: 3.1 CM^2
BH CV ECHO MEAS - LVOT DIAM: 2 CM
BH CV ECHO MEAS - LVPWD: 0.86 CM
BH CV ECHO MEAS - MV DEC TIME: 0.13 SEC
BH CV ECHO MEAS - MV E MAX VEL: 145 CM/SEC
BH CV ECHO MEAS - RAP SYSTOLE: 5 MMHG
BH CV ECHO MEAS - RVSP: 69.6 MMHG
BH CV ECHO MEAS - SI(AO): 171.9 ML/M^2
BH CV ECHO MEAS - SI(CUBED): 16.7 ML/M^2
BH CV ECHO MEAS - SI(LVOT): 42.3 ML/M^2
BH CV ECHO MEAS - SI(MOD-SP4): 12.8 ML/M^2
BH CV ECHO MEAS - SI(TEICH): 18 ML/M^2
BH CV ECHO MEAS - SV(AO): 265.4 ML
BH CV ECHO MEAS - SV(CUBED): 25.8 ML
BH CV ECHO MEAS - SV(LVOT): 65.3 ML
BH CV ECHO MEAS - SV(MOD-SP4): 19.7 ML
BH CV ECHO MEAS - SV(TEICH): 27.8 ML
BH CV ECHO MEAS - TR MAX VEL: 402 CM/SEC
BUN BLD-MCNC: 12 MG/DL (ref 5–21)
BUN/CREAT SERPL: 24 (ref 7–25)
CALCIUM SPEC-SCNC: 9.4 MG/DL (ref 8.4–10.4)
CHLORIDE SERPL-SCNC: 102 MMOL/L (ref 98–110)
CO2 SERPL-SCNC: 25 MMOL/L (ref 24–31)
CREAT BLD-MCNC: 0.5 MG/DL (ref 0.5–1.4)
DEPRECATED RDW RBC AUTO: 48.2 FL (ref 40–54)
E/E' RATIO: 18
EOSINOPHIL # BLD AUTO: 0.04 10*3/MM3 (ref 0–0.7)
EOSINOPHIL NFR BLD AUTO: 0.5 % (ref 0–4)
ERYTHROCYTE [DISTWIDTH] IN BLOOD BY AUTOMATED COUNT: 14.6 % (ref 12–15)
GFR SERPL CREATININE-BSD FRML MDRD: 116 ML/MIN/1.73
GLUCOSE BLD-MCNC: 94 MG/DL (ref 70–100)
HCT VFR BLD AUTO: 28.5 % (ref 37–47)
HGB BLD-MCNC: 9.1 G/DL (ref 12–16)
IMM GRANULOCYTES # BLD: 0.03 10*3/MM3 (ref 0–0.03)
IMM GRANULOCYTES NFR BLD: 0.3 % (ref 0–5)
LEFT ATRIUM VOLUME INDEX: 72.7 ML/M2
LEFT ATRIUM VOLUME: 112 CM3
LYMPHOCYTES # BLD AUTO: 0.72 10*3/MM3 (ref 0.72–4.86)
LYMPHOCYTES NFR BLD AUTO: 8.3 % (ref 15–45)
MAXIMAL PREDICTED HEART RATE: 129 BPM
MCH RBC QN AUTO: 29.2 PG (ref 28–32)
MCHC RBC AUTO-ENTMCNC: 31.9 G/DL (ref 33–36)
MCV RBC AUTO: 91.3 FL (ref 82–98)
MONOCYTES # BLD AUTO: 0.93 10*3/MM3 (ref 0.19–1.3)
MONOCYTES NFR BLD AUTO: 10.7 % (ref 4–12)
NEUTROPHILS # BLD AUTO: 6.93 10*3/MM3 (ref 1.87–8.4)
NEUTROPHILS NFR BLD AUTO: 80 % (ref 39–78)
NRBC BLD MANUAL-RTO: 0 /100 WBC (ref 0–0)
PLATELET # BLD AUTO: 310 10*3/MM3 (ref 130–400)
PMV BLD AUTO: 9 FL (ref 6–12)
POTASSIUM BLD-SCNC: 4 MMOL/L (ref 3.5–5.3)
RBC # BLD AUTO: 3.12 10*6/MM3 (ref 4.2–5.4)
SODIUM BLD-SCNC: 140 MMOL/L (ref 135–145)
STRESS TARGET HR: 110 BPM
TROPONIN I SERPL-MCNC: 0.02 NG/ML (ref 0–0.03)
WBC NRBC COR # BLD: 8.67 10*3/MM3 (ref 4.8–10.8)

## 2017-12-21 PROCEDURE — 97162 PT EVAL MOD COMPLEX 30 MIN: CPT

## 2017-12-21 PROCEDURE — 70551 MRI BRAIN STEM W/O DYE: CPT

## 2017-12-21 PROCEDURE — G8987 SELF CARE CURRENT STATUS: HCPCS | Performed by: OCCUPATIONAL THERAPIST

## 2017-12-21 PROCEDURE — 93306 TTE W/DOPPLER COMPLETE: CPT | Performed by: INTERNAL MEDICINE

## 2017-12-21 PROCEDURE — 93306 TTE W/DOPPLER COMPLETE: CPT

## 2017-12-21 PROCEDURE — 93880 EXTRACRANIAL BILAT STUDY: CPT | Performed by: SURGERY

## 2017-12-21 PROCEDURE — G8978 MOBILITY CURRENT STATUS: HCPCS

## 2017-12-21 PROCEDURE — G8988 SELF CARE GOAL STATUS: HCPCS | Performed by: OCCUPATIONAL THERAPIST

## 2017-12-21 PROCEDURE — 93880 EXTRACRANIAL BILAT STUDY: CPT

## 2017-12-21 PROCEDURE — G8979 MOBILITY GOAL STATUS: HCPCS

## 2017-12-21 PROCEDURE — 85025 COMPLETE CBC W/AUTO DIFF WBC: CPT | Performed by: NURSE PRACTITIONER

## 2017-12-21 PROCEDURE — 84484 ASSAY OF TROPONIN QUANT: CPT | Performed by: FAMILY MEDICINE

## 2017-12-21 PROCEDURE — 97166 OT EVAL MOD COMPLEX 45 MIN: CPT | Performed by: OCCUPATIONAL THERAPIST

## 2017-12-21 RX ORDER — ASPIRIN 81 MG/1
81 TABLET ORAL DAILY
Status: DISCONTINUED | OUTPATIENT
Start: 2017-12-21 | End: 2017-12-24 | Stop reason: HOSPADM

## 2017-12-21 RX ORDER — ATORVASTATIN CALCIUM 40 MG/1
80 TABLET, FILM COATED ORAL NIGHTLY
Status: DISCONTINUED | OUTPATIENT
Start: 2017-12-21 | End: 2017-12-24 | Stop reason: HOSPADM

## 2017-12-21 RX ADMIN — ACETAMINOPHEN 650 MG: 325 TABLET ORAL at 20:13

## 2017-12-21 RX ADMIN — MUPIROCIN 1 APPLICATION: 20 OINTMENT TOPICAL at 20:14

## 2017-12-21 RX ADMIN — HYDROCODONE BITARTRATE AND ACETAMINOPHEN 1 TABLET: 5; 325 TABLET ORAL at 03:29

## 2017-12-21 RX ADMIN — DESMOPRESSIN ACETATE 80 MG: 0.2 TABLET ORAL at 20:14

## 2017-12-21 RX ADMIN — Medication 81 MG: at 10:18

## 2017-12-21 RX ADMIN — METOPROLOL TARTRATE 25 MG: 25 TABLET, FILM COATED ORAL at 20:14

## 2017-12-21 RX ADMIN — PANTOPRAZOLE SODIUM 40 MG: 40 TABLET, DELAYED RELEASE ORAL at 10:18

## 2017-12-21 RX ADMIN — FUROSEMIDE 20 MG: 20 TABLET ORAL at 10:18

## 2017-12-21 RX ADMIN — ACETAMINOPHEN 650 MG: 325 TABLET ORAL at 10:41

## 2017-12-21 RX ADMIN — METOPROLOL TARTRATE 25 MG: 25 TABLET, FILM COATED ORAL at 10:15

## 2017-12-21 RX ADMIN — DOCUSATE SODIUM 100 MG: 100 CAPSULE ORAL at 20:14

## 2017-12-21 RX ADMIN — PAROXETINE 10 MG: 10 TABLET, FILM COATED ORAL at 10:15

## 2017-12-21 RX ADMIN — MUPIROCIN 1 APPLICATION: 20 OINTMENT TOPICAL at 05:48

## 2017-12-21 RX ADMIN — DOCUSATE SODIUM 100 MG: 100 CAPSULE ORAL at 10:16

## 2017-12-21 RX ADMIN — MUPIROCIN 1 APPLICATION: 20 OINTMENT TOPICAL at 13:51

## 2017-12-22 LAB
ANION GAP SERPL CALCULATED.3IONS-SCNC: 8 MMOL/L (ref 4–13)
BASOPHILS # BLD AUTO: 0.02 10*3/MM3 (ref 0–0.2)
BASOPHILS NFR BLD AUTO: 0.2 % (ref 0–2)
BUN BLD-MCNC: 11 MG/DL (ref 5–21)
BUN/CREAT SERPL: 20 (ref 7–25)
CALCIUM SPEC-SCNC: 8.7 MG/DL (ref 8.4–10.4)
CHLORIDE SERPL-SCNC: 99 MMOL/L (ref 98–110)
CO2 SERPL-SCNC: 30 MMOL/L (ref 24–31)
CREAT BLD-MCNC: 0.55 MG/DL (ref 0.5–1.4)
DEPRECATED RDW RBC AUTO: 47.6 FL (ref 40–54)
EOSINOPHIL # BLD AUTO: 0.08 10*3/MM3 (ref 0–0.7)
EOSINOPHIL NFR BLD AUTO: 0.9 % (ref 0–4)
ERYTHROCYTE [DISTWIDTH] IN BLOOD BY AUTOMATED COUNT: 14.6 % (ref 12–15)
GFR SERPL CREATININE-BSD FRML MDRD: 104 ML/MIN/1.73
GLUCOSE BLD-MCNC: 130 MG/DL (ref 70–100)
HCT VFR BLD AUTO: 26.3 % (ref 37–47)
HGB BLD-MCNC: 8.7 G/DL (ref 12–16)
IMM GRANULOCYTES # BLD: 0.04 10*3/MM3 (ref 0–0.03)
IMM GRANULOCYTES NFR BLD: 0.4 % (ref 0–5)
LYMPHOCYTES # BLD AUTO: 1.07 10*3/MM3 (ref 0.72–4.86)
LYMPHOCYTES NFR BLD AUTO: 11.6 % (ref 15–45)
MCH RBC QN AUTO: 29.7 PG (ref 28–32)
MCHC RBC AUTO-ENTMCNC: 33.1 G/DL (ref 33–36)
MCV RBC AUTO: 89.8 FL (ref 82–98)
MONOCYTES # BLD AUTO: 1.19 10*3/MM3 (ref 0.19–1.3)
MONOCYTES NFR BLD AUTO: 12.9 % (ref 4–12)
NEUTROPHILS # BLD AUTO: 6.83 10*3/MM3 (ref 1.87–8.4)
NEUTROPHILS NFR BLD AUTO: 74 % (ref 39–78)
NRBC BLD MANUAL-RTO: 0 /100 WBC (ref 0–0)
PLATELET # BLD AUTO: 287 10*3/MM3 (ref 130–400)
PMV BLD AUTO: 9.1 FL (ref 6–12)
POTASSIUM BLD-SCNC: 3.7 MMOL/L (ref 3.5–5.3)
RBC # BLD AUTO: 2.93 10*6/MM3 (ref 4.2–5.4)
SODIUM BLD-SCNC: 137 MMOL/L (ref 135–145)
TROPONIN I SERPL-MCNC: <0.012 NG/ML (ref 0–0.03)
WBC NRBC COR # BLD: 9.23 10*3/MM3 (ref 4.8–10.8)

## 2017-12-22 PROCEDURE — 97116 GAIT TRAINING THERAPY: CPT

## 2017-12-22 PROCEDURE — 85025 COMPLETE CBC W/AUTO DIFF WBC: CPT | Performed by: NURSE PRACTITIONER

## 2017-12-22 PROCEDURE — 97110 THERAPEUTIC EXERCISES: CPT

## 2017-12-22 PROCEDURE — 80048 BASIC METABOLIC PNL TOTAL CA: CPT | Performed by: NURSE PRACTITIONER

## 2017-12-22 RX ORDER — CLOPIDOGREL BISULFATE 75 MG/1
75 TABLET ORAL DAILY
Status: DISCONTINUED | OUTPATIENT
Start: 2017-12-22 | End: 2017-12-24 | Stop reason: HOSPADM

## 2017-12-22 RX ADMIN — Medication 81 MG: at 08:35

## 2017-12-22 RX ADMIN — CLOPIDOGREL 75 MG: 75 TABLET, FILM COATED ORAL at 08:45

## 2017-12-22 RX ADMIN — METOPROLOL TARTRATE 25 MG: 25 TABLET, FILM COATED ORAL at 20:24

## 2017-12-22 RX ADMIN — PAROXETINE 10 MG: 10 TABLET, FILM COATED ORAL at 08:34

## 2017-12-22 RX ADMIN — MUPIROCIN 1 APPLICATION: 20 OINTMENT TOPICAL at 15:16

## 2017-12-22 RX ADMIN — METOPROLOL TARTRATE 25 MG: 25 TABLET, FILM COATED ORAL at 08:35

## 2017-12-22 RX ADMIN — DESMOPRESSIN ACETATE 80 MG: 0.2 TABLET ORAL at 20:25

## 2017-12-22 RX ADMIN — DOCUSATE SODIUM 100 MG: 100 CAPSULE ORAL at 08:34

## 2017-12-22 RX ADMIN — PANTOPRAZOLE SODIUM 40 MG: 40 TABLET, DELAYED RELEASE ORAL at 05:51

## 2017-12-22 RX ADMIN — MUPIROCIN 1 APPLICATION: 20 OINTMENT TOPICAL at 05:51

## 2017-12-22 RX ADMIN — ACETAMINOPHEN 650 MG: 325 TABLET ORAL at 20:24

## 2017-12-22 RX ADMIN — MUPIROCIN 1 APPLICATION: 20 OINTMENT TOPICAL at 20:25

## 2017-12-22 RX ADMIN — ACETAMINOPHEN 650 MG: 325 TABLET ORAL at 08:40

## 2017-12-22 RX ADMIN — FUROSEMIDE 20 MG: 20 TABLET ORAL at 08:40
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
## 2017-12-23 LAB
ANION GAP SERPL CALCULATED.3IONS-SCNC: 7 MMOL/L (ref 4–13)
BASOPHILS # BLD AUTO: 0.04 10*3/MM3 (ref 0–0.2)
BASOPHILS NFR BLD AUTO: 0.4 % (ref 0–2)
BUN BLD-MCNC: 9 MG/DL (ref 5–21)
BUN/CREAT SERPL: 16.7 (ref 7–25)
CALCIUM SPEC-SCNC: 8.9 MG/DL (ref 8.4–10.4)
CHLORIDE SERPL-SCNC: 99 MMOL/L (ref 98–110)
CO2 SERPL-SCNC: 32 MMOL/L (ref 24–31)
CREAT BLD-MCNC: 0.54 MG/DL (ref 0.5–1.4)
DEPRECATED RDW RBC AUTO: 50.8 FL (ref 40–54)
EOSINOPHIL # BLD AUTO: 0.2 10*3/MM3 (ref 0–0.7)
EOSINOPHIL NFR BLD AUTO: 2 % (ref 0–4)
ERYTHROCYTE [DISTWIDTH] IN BLOOD BY AUTOMATED COUNT: 14.9 % (ref 12–15)
GFR SERPL CREATININE-BSD FRML MDRD: 106 ML/MIN/1.73
GLUCOSE BLD-MCNC: 116 MG/DL (ref 70–100)
HCT VFR BLD AUTO: 29.8 % (ref 37–47)
HGB BLD-MCNC: 9.5 G/DL (ref 12–16)
IMM GRANULOCYTES # BLD: 0.03 10*3/MM3 (ref 0–0.03)
IMM GRANULOCYTES NFR BLD: 0.3 % (ref 0–5)
LYMPHOCYTES # BLD AUTO: 0.79 10*3/MM3 (ref 0.72–4.86)
LYMPHOCYTES NFR BLD AUTO: 7.8 % (ref 15–45)
MCH RBC QN AUTO: 29.6 PG (ref 28–32)
MCHC RBC AUTO-ENTMCNC: 31.9 G/DL (ref 33–36)
MCV RBC AUTO: 92.8 FL (ref 82–98)
MONOCYTES # BLD AUTO: 0.94 10*3/MM3 (ref 0.19–1.3)
MONOCYTES NFR BLD AUTO: 9.3 % (ref 4–12)
NEUTROPHILS # BLD AUTO: 8.07 10*3/MM3 (ref 1.87–8.4)
NEUTROPHILS NFR BLD AUTO: 80.2 % (ref 39–78)
NRBC BLD MANUAL-RTO: 0 /100 WBC (ref 0–0)
PLATELET # BLD AUTO: 288 10*3/MM3 (ref 130–400)
PMV BLD AUTO: 9.2 FL (ref 6–12)
POTASSIUM BLD-SCNC: 4 MMOL/L (ref 3.5–5.3)
RBC # BLD AUTO: 3.21 10*6/MM3 (ref 4.2–5.4)
SODIUM BLD-SCNC: 138 MMOL/L (ref 135–145)
WBC NRBC COR # BLD: 10.07 10*3/MM3 (ref 4.8–10.8)

## 2017-12-23 PROCEDURE — 97116 GAIT TRAINING THERAPY: CPT

## 2017-12-23 PROCEDURE — 80048 BASIC METABOLIC PNL TOTAL CA: CPT | Performed by: NURSE PRACTITIONER

## 2017-12-23 PROCEDURE — 85025 COMPLETE CBC W/AUTO DIFF WBC: CPT | Performed by: NURSE PRACTITIONER

## 2017-12-23 PROCEDURE — 97110 THERAPEUTIC EXERCISES: CPT

## 2017-12-23 RX ADMIN — MUPIROCIN 1 APPLICATION: 20 OINTMENT TOPICAL at 06:24

## 2017-12-23 RX ADMIN — ACETAMINOPHEN 650 MG: 325 TABLET ORAL at 21:09

## 2017-12-23 RX ADMIN — PANTOPRAZOLE SODIUM 40 MG: 40 TABLET, DELAYED RELEASE ORAL at 06:24

## 2017-12-23 RX ADMIN — DESMOPRESSIN ACETATE 80 MG: 0.2 TABLET ORAL at 21:09

## 2017-12-23 RX ADMIN — METOPROLOL TARTRATE 25 MG: 25 TABLET, FILM COATED ORAL at 08:14

## 2017-12-23 RX ADMIN — METOPROLOL TARTRATE 25 MG: 25 TABLET, FILM COATED ORAL at 21:09

## 2017-12-23 RX ADMIN — CLOPIDOGREL 75 MG: 75 TABLET, FILM COATED ORAL at 08:13

## 2017-12-23 RX ADMIN — FUROSEMIDE 20 MG: 20 TABLET ORAL at 08:13

## 2017-12-23 RX ADMIN — ACETAMINOPHEN 650 MG: 325 TABLET ORAL at 09:58

## 2017-12-23 RX ADMIN — PAROXETINE 10 MG: 10 TABLET, FILM COATED ORAL at 08:13

## 2017-12-23 RX ADMIN — DOCUSATE SODIUM 100 MG: 100 CAPSULE ORAL at 08:13

## 2017-12-23 RX ADMIN — Medication 81 MG: at 08:13

## 2017-12-23 RX ADMIN — MUPIROCIN 1 APPLICATION: 20 OINTMENT TOPICAL at 14:46

## 2017-12-23 RX ADMIN — MUPIROCIN 1 APPLICATION: 20 OINTMENT TOPICAL at 21:09

## 2017-12-24 VITALS
SYSTOLIC BLOOD PRESSURE: 156 MMHG | RESPIRATION RATE: 18 BRPM | HEIGHT: 60 IN | OXYGEN SATURATION: 98 % | HEART RATE: 74 BPM | DIASTOLIC BLOOD PRESSURE: 84 MMHG | TEMPERATURE: 98 F | WEIGHT: 129.19 LBS | BODY MASS INDEX: 25.36 KG/M2

## 2017-12-24 LAB
ANION GAP SERPL CALCULATED.3IONS-SCNC: 6 MMOL/L (ref 4–13)
BASOPHILS # BLD AUTO: 0.04 10*3/MM3 (ref 0–0.2)
BASOPHILS NFR BLD AUTO: 0.6 % (ref 0–2)
BUN BLD-MCNC: 11 MG/DL (ref 5–21)
BUN/CREAT SERPL: 19.6 (ref 7–25)
CALCIUM SPEC-SCNC: 8.8 MG/DL (ref 8.4–10.4)
CHLORIDE SERPL-SCNC: 100 MMOL/L (ref 98–110)
CO2 SERPL-SCNC: 32 MMOL/L (ref 24–31)
CREAT BLD-MCNC: 0.56 MG/DL (ref 0.5–1.4)
DEPRECATED RDW RBC AUTO: 48.1 FL (ref 40–54)
EOSINOPHIL # BLD AUTO: 0.24 10*3/MM3 (ref 0–0.7)
EOSINOPHIL NFR BLD AUTO: 3.4 % (ref 0–4)
ERYTHROCYTE [DISTWIDTH] IN BLOOD BY AUTOMATED COUNT: 14.8 % (ref 12–15)
GFR SERPL CREATININE-BSD FRML MDRD: 101 ML/MIN/1.73
GLUCOSE BLD-MCNC: 111 MG/DL (ref 70–100)
HCT VFR BLD AUTO: 27.9 % (ref 37–47)
HGB BLD-MCNC: 9.1 G/DL (ref 12–16)
IMM GRANULOCYTES # BLD: 0.04 10*3/MM3 (ref 0–0.03)
IMM GRANULOCYTES NFR BLD: 0.6 % (ref 0–5)
LYMPHOCYTES # BLD AUTO: 0.93 10*3/MM3 (ref 0.72–4.86)
LYMPHOCYTES NFR BLD AUTO: 13 % (ref 15–45)
MCH RBC QN AUTO: 29.3 PG (ref 28–32)
MCHC RBC AUTO-ENTMCNC: 32.6 G/DL (ref 33–36)
MCV RBC AUTO: 89.7 FL (ref 82–98)
MONOCYTES # BLD AUTO: 0.73 10*3/MM3 (ref 0.19–1.3)
MONOCYTES NFR BLD AUTO: 10.2 % (ref 4–12)
NEUTROPHILS # BLD AUTO: 5.18 10*3/MM3 (ref 1.87–8.4)
NEUTROPHILS NFR BLD AUTO: 72.2 % (ref 39–78)
NRBC BLD MANUAL-RTO: 0 /100 WBC (ref 0–0)
PLATELET # BLD AUTO: 290 10*3/MM3 (ref 130–400)
PMV BLD AUTO: 9 FL (ref 6–12)
POTASSIUM BLD-SCNC: 3.9 MMOL/L (ref 3.5–5.3)
RBC # BLD AUTO: 3.11 10*6/MM3 (ref 4.2–5.4)
SODIUM BLD-SCNC: 138 MMOL/L (ref 135–145)
WBC NRBC COR # BLD: 7.16 10*3/MM3 (ref 4.8–10.8)

## 2017-12-24 PROCEDURE — 85025 COMPLETE CBC W/AUTO DIFF WBC: CPT | Performed by: NURSE PRACTITIONER

## 2017-12-24 PROCEDURE — 80048 BASIC METABOLIC PNL TOTAL CA: CPT | Performed by: NURSE PRACTITIONER

## 2017-12-24 RX ORDER — PSEUDOEPHEDRINE HCL 30 MG
100 TABLET ORAL 2 TIMES DAILY
Start: 2017-12-24 | End: 2018-03-28

## 2017-12-24 RX ORDER — ACETAMINOPHEN 325 MG/1
650 TABLET ORAL EVERY 4 HOURS PRN
Start: 2017-12-24 | End: 2018-03-28

## 2017-12-24 RX ORDER — ALUMINA, MAGNESIA, AND SIMETHICONE 2400; 2400; 240 MG/30ML; MG/30ML; MG/30ML
15 SUSPENSION ORAL EVERY 6 HOURS PRN
Start: 2017-12-24 | End: 2018-03-28

## 2017-12-24 RX ADMIN — CLOPIDOGREL 75 MG: 75 TABLET, FILM COATED ORAL at 08:24

## 2017-12-24 RX ADMIN — METOPROLOL TARTRATE 25 MG: 25 TABLET, FILM COATED ORAL at 08:24

## 2017-12-24 RX ADMIN — FUROSEMIDE 20 MG: 20 TABLET ORAL at 08:24

## 2017-12-24 RX ADMIN — PAROXETINE 10 MG: 10 TABLET, FILM COATED ORAL at 08:24

## 2017-12-24 RX ADMIN — PANTOPRAZOLE SODIUM 40 MG: 40 TABLET, DELAYED RELEASE ORAL at 06:15

## 2017-12-24 RX ADMIN — MUPIROCIN 1 APPLICATION: 20 OINTMENT TOPICAL at 06:15

## 2017-12-24 RX ADMIN — DOCUSATE SODIUM 100 MG: 100 CAPSULE ORAL at 08:24

## 2017-12-24 RX ADMIN — Medication 81 MG: at 08:24

## 2017-12-26 LAB — MYOGLOBIN UR-MCNC: 4 NG/ML (ref 0–13)

## 2018-01-29 ENCOUNTER — TELEPHONE (OUTPATIENT)
Dept: INTERNAL MEDICINE | Age: 83
End: 2018-01-29

## 2018-02-02 ENCOUNTER — TELEPHONE (OUTPATIENT)
Dept: INTERNAL MEDICINE | Age: 83
End: 2018-02-02

## 2018-02-02 NOTE — TELEPHONE ENCOUNTER
Pt is being d/c from hospital and is going to receive [facility] will you follow

## 2018-02-07 ENCOUNTER — OFFICE VISIT (OUTPATIENT)
Dept: INTERNAL MEDICINE | Age: 83
End: 2018-02-07
Payer: MEDICARE

## 2018-02-07 ENCOUNTER — TELEPHONE (OUTPATIENT)
Dept: INTERNAL MEDICINE | Age: 83
End: 2018-02-07

## 2018-02-07 VITALS
WEIGHT: 133.5 LBS | OXYGEN SATURATION: 97 % | HEIGHT: 61 IN | HEART RATE: 91 BPM | DIASTOLIC BLOOD PRESSURE: 68 MMHG | BODY MASS INDEX: 25.2 KG/M2 | SYSTOLIC BLOOD PRESSURE: 100 MMHG

## 2018-02-07 DIAGNOSIS — R19.5 DARK STOOLS: ICD-10-CM

## 2018-02-07 DIAGNOSIS — R32 URINARY INCONTINENCE, UNSPECIFIED TYPE: ICD-10-CM

## 2018-02-07 DIAGNOSIS — Z91.81 AT HIGH RISK FOR FALLS: ICD-10-CM

## 2018-02-07 DIAGNOSIS — R53.1 GENERALIZED WEAKNESS: Primary | ICD-10-CM

## 2018-02-07 DIAGNOSIS — I10 ESSENTIAL HYPERTENSION: ICD-10-CM

## 2018-02-07 DIAGNOSIS — Z86.73 HISTORY OF TIA (TRANSIENT ISCHEMIC ATTACK): ICD-10-CM

## 2018-02-07 DIAGNOSIS — F32.A DEPRESSION, UNSPECIFIED DEPRESSION TYPE: ICD-10-CM

## 2018-02-07 LAB
ALBUMIN SERPL-MCNC: 3.6 G/DL (ref 3.5–5.2)
ALP BLD-CCNC: 102 U/L (ref 35–104)
ALT SERPL-CCNC: 5 U/L (ref 5–33)
ANION GAP SERPL CALCULATED.3IONS-SCNC: 14 MMOL/L (ref 7–19)
AST SERPL-CCNC: 12 U/L (ref 5–32)
BACTERIA: ABNORMAL /HPF
BASOPHILS ABSOLUTE: 0 K/UL (ref 0–0.2)
BASOPHILS RELATIVE PERCENT: 0.5 % (ref 0–1)
BILIRUB SERPL-MCNC: 0.3 MG/DL (ref 0.2–1.2)
BILIRUBIN URINE: NEGATIVE
BLOOD, URINE: ABNORMAL
BUN BLDV-MCNC: 12 MG/DL (ref 8–23)
CALCIUM SERPL-MCNC: 8.9 MG/DL (ref 8.2–9.6)
CHLORIDE BLD-SCNC: 100 MMOL/L (ref 98–111)
CLARITY: CLEAR
CO2: 28 MMOL/L (ref 22–29)
COLOR: YELLOW
CREAT SERPL-MCNC: <0.5 MG/DL (ref 0.5–0.9)
EOSINOPHILS ABSOLUTE: 0.1 K/UL (ref 0–0.6)
EOSINOPHILS RELATIVE PERCENT: 1 % (ref 0–5)
GFR NON-AFRICAN AMERICAN: >60
GLUCOSE BLD-MCNC: 102 MG/DL (ref 74–109)
GLUCOSE URINE: NEGATIVE MG/DL
HCT VFR BLD CALC: 33 % (ref 37–47)
HEMOGLOBIN: 10.1 G/DL (ref 12–16)
KETONES, URINE: NEGATIVE MG/DL
LEUKOCYTE ESTERASE, URINE: ABNORMAL
LYMPHOCYTES ABSOLUTE: 1 K/UL (ref 1.1–4.5)
LYMPHOCYTES RELATIVE PERCENT: 11.8 % (ref 20–40)
MCH RBC QN AUTO: 30.2 PG (ref 27–31)
MCHC RBC AUTO-ENTMCNC: 30.6 G/DL (ref 33–37)
MCV RBC AUTO: 98.8 FL (ref 81–99)
MONOCYTES ABSOLUTE: 1 K/UL (ref 0–0.9)
MONOCYTES RELATIVE PERCENT: 11.1 % (ref 0–10)
NEUTROPHILS ABSOLUTE: 6.5 K/UL (ref 1.5–7.5)
NEUTROPHILS RELATIVE PERCENT: 75.4 % (ref 50–65)
NITRITE, URINE: NEGATIVE
PDW BLD-RTO: 17.5 % (ref 11.5–14.5)
PH UA: 6
PLATELET # BLD: 332 K/UL (ref 130–400)
PMV BLD AUTO: 8.9 FL (ref 9.4–12.3)
POTASSIUM SERPL-SCNC: 4 MMOL/L (ref 3.5–5)
PROTEIN UA: NEGATIVE MG/DL
RBC # BLD: 3.34 M/UL (ref 4.2–5.4)
RENAL EPITHELIAL, UA: ABNORMAL /HPF
SODIUM BLD-SCNC: 142 MMOL/L (ref 136–145)
SPECIFIC GRAVITY UA: 1.01
TOTAL PROTEIN: 6.4 G/DL (ref 6.6–8.7)
UROBILINOGEN, URINE: 0.2 E.U./DL
WBC # BLD: 8.6 K/UL (ref 4.8–10.8)
WBC UA: ABNORMAL /HPF (ref 0–5)

## 2018-02-07 PROCEDURE — 99496 TRANSJ CARE MGMT HIGH F2F 7D: CPT | Performed by: INTERNAL MEDICINE

## 2018-02-07 RX ORDER — OMEPRAZOLE 40 MG/1
40 CAPSULE, DELAYED RELEASE ORAL DAILY
Status: ON HOLD | COMMUNITY
End: 2018-03-31 | Stop reason: ALTCHOICE

## 2018-02-07 RX ORDER — CLOPIDOGREL BISULFATE 75 MG/1
75 TABLET ORAL DAILY
Status: ON HOLD | COMMUNITY
End: 2018-03-31 | Stop reason: ALTCHOICE

## 2018-02-07 RX ORDER — CEFDINIR 300 MG/1
300 CAPSULE ORAL 2 TIMES DAILY
Qty: 14 CAPSULE | Refills: 0 | Status: ON HOLD | OUTPATIENT
Start: 2018-02-07 | End: 2018-03-31 | Stop reason: ALTCHOICE

## 2018-02-07 ASSESSMENT — ENCOUNTER SYMPTOMS
BACK PAIN: 0
CHEST TIGHTNESS: 0
TROUBLE SWALLOWING: 0
SORE THROAT: 0
BLOOD IN STOOL: 0
WHEEZING: 0
COLOR CHANGE: 0
SINUS PAIN: 0
ABDOMINAL PAIN: 0
EYE REDNESS: 0
EYE PAIN: 0
SINUS PRESSURE: 0
ABDOMINAL DISTENTION: 0
CONSTIPATION: 0
RECTAL PAIN: 0
EYE ITCHING: 0
PHOTOPHOBIA: 0
EYE DISCHARGE: 0
NAUSEA: 0
VOMITING: 0
VOICE CHANGE: 0
DIARRHEA: 0
RHINORRHEA: 0
SHORTNESS OF BREATH: 1
ANAL BLEEDING: 0
COUGH: 0

## 2018-02-07 ASSESSMENT — PATIENT HEALTH QUESTIONNAIRE - PHQ9
2. FEELING DOWN, DEPRESSED OR HOPELESS: 0
SUM OF ALL RESPONSES TO PHQ QUESTIONS 1-9: 0
1. LITTLE INTEREST OR PLEASURE IN DOING THINGS: 0
SUM OF ALL RESPONSES TO PHQ9 QUESTIONS 1 & 2: 0

## 2018-02-07 NOTE — PROGRESS NOTES
Chief Complaint   Patient presents with    Follow-up     Cognitive declined, incontinence,     Fall     Rehab at [facility] for 5 weeks    Other     Black messy stools early morning Ongoing for 5 days, after a couple hours normal stools       HPI: Ricky Lombard is a 80 y.o. female is here for For follow-up after being discharged from [facility] on Saturday. This is a high complexity TCM visit. She has been in [facility] for the past 5 weeks. She fell in December. She does not recall how she fell. She was hospitalized and later discharged to [facility] for rehabilitation. She did having some difficulty with her cognitive status. Prior to going to [facility], there were some concerns about her having possible TIAs. She may have actually had a stroke. However, the family did not take her to the hospital the time of the incident when she was visiting her speech ability and was confused. She apparently had a mental status exam at [facility] that showed a 6 out of 30 on a mission as 7 out of 30 on discharge. She is getting home health and physical therapy. She's been having some difficulty with urinary incontinence. I have 2 family members in the room. The family members to contradict each other. The patient herself says she feels well. One family member says she's been having some black tarry stools. She is on Plavix. She's been on Plavix since having the TIAs. However the other family members says her stools are normal and brown. She denies he complaints of chest pain. She does get somewhat short of breath at times. This is nothing new however. Her blood pressure is well controlled. She does think the medicine for depression is working well.  Her GERD is stable    Past Medical History:   Diagnosis Date    Abdominal pain     Abnormal chest x-ray     Acute cystitis     Acute sinusitis     Anemia     Atrial fibrillation (HCC)     CHF (congestive heart failure) (HCC)     Chronic bronchitis (HCC) capsule Take 1 capsule by mouth once a week 4 capsule 3    albuterol sulfate  (90 Base) MCG/ACT inhaler Inhale 2 puffs into the lungs every 4 hours as needed for Wheezing 1 Inhaler 2    metoprolol tartrate (LOPRESSOR) 50 MG tablet TAKE ONE HALF (1/2) TABLET BY MOUTH EVERY 12 HOURS 30 tablet 3    albuterol (PROVENTIL) (2.5 MG/3ML) 0.083% nebulizer solution INHALE 1 VIAL VIA NEBULIZER 4X DAILY AS NEEDED FOR FOR SHORTNESS OF BREATH , WHEEZING **J42,R05  5    lisinopril (PRINIVIL;ZESTRIL) 10 MG tablet TAKE 1 TABLET DAILY (Patient taking differently: TAKE 1 TABLET DAILY prn) 90 tablet 2    PARoxetine (PAXIL) 10 MG tablet TAKE 1 TABLET BY MOUTH EVERY DAY 90 tablet 3     No current facility-administered medications for this visit. Patient Active Problem List   Diagnosis    Chronic atrial fibrillation ([facility] [address]    Essential (primary) hypertension        Review of Systems   Constitutional: Negative for activity change, appetite change, chills, diaphoresis, fatigue, fever and unexpected weight change. HENT: Negative for congestion, ear discharge, ear pain, mouth sores, postnasal drip, rhinorrhea, sinus pain, sinus pressure, sneezing, sore throat, tinnitus, trouble swallowing and voice change. Eyes: Negative for photophobia, pain, discharge, redness, itching and visual disturbance. Respiratory: Positive for shortness of breath. Negative for cough, chest tightness and wheezing. Cardiovascular: Negative for chest pain, palpitations and leg swelling. Gastrointestinal: Negative for abdominal distention, abdominal pain, anal bleeding, blood in stool, constipation, diarrhea, nausea, rectal pain and vomiting. She may have had some dark stool   Endocrine: Negative for cold intolerance, heat intolerance, polydipsia, polyphagia and polyuria. Urinary frequency   Genitourinary: Negative for difficulty urinating, dysuria, flank pain, frequency, hematuria and urgency.         She's had some guarding. Musculoskeletal: Normal range of motion. She exhibits no edema, tenderness or deformity. Lymphadenopathy:     She has no cervical adenopathy. Neurological: She is alert and oriented to person, place, and time. She has normal reflexes. She displays normal reflexes. No cranial nerve deficit. She exhibits normal muscle tone. Coordination normal.   Skin: Skin is warm and dry. No rash noted. She is not diaphoretic. No erythema. No pallor. Psychiatric: She has a normal mood and affect. Her behavior is normal. Judgment and thought content normal.   Nursing note and vitals reviewed. 1. Dark stools    2. Urinary incontinence, unspecified type        ASSESSMENT/PLAN:    70-year-old woman here for follow-up after being discharged from [facility]    1. Generalized weakness: Continue physical therapy for fall risk. He is Difficult to assess today. I'm getting some contradicting information from the family members. She did fall at home. I really think she may been deconditioned secondary to the TIAs that she had been having. We'll check a CBC and CMP today. There is some question whether or not she's had some dark tarry stools. Make sure that her CBC is at baseline. We'll also check a urinalysis given a history of incontinence. 2. Hypertension: Blood pressure stable    3. Family wants some forms filled out for the [place]. They actually dropped off a 2 page form today that they would like to have filled out ASAP for benefits. I did tell them today that I cannot fill this out today. I have been given a number of forms in recent days. An this will be filled out intern. I don't think that that was too happy about that, but I explained to them is not unusual for me to get 4-5 forms per day. I will get to it as soon as I can. 4. Depression: Stable    5. History of TIAs: No recent TIAs. Continue Plavix for now. Allegra Berry was seen today for follow-up, fall and other.     Diagnoses and all orders for this visit:    Dark stools  -     CBC Auto Differential; Future  -     Comprehensive Metabolic Panel; Future    Urinary incontinence, unspecified type  -     Urinalysis with Microscopic; Future    Other orders  -     diclofenac sodium (VOLTAREN) 1 % GEL; Apply 2 g topically 2 times daily          Return as scheduled, for hypertension. Orders Placed This Encounter   Procedures    CBC Auto Differential     Standing Status:   Future     Number of Occurrences:   1     Standing Expiration Date:   2/7/2019    Comprehensive Metabolic Panel     Fasting 12 hours     Standing Status:   Future     Number of Occurrences:   1     Standing Expiration Date:   2/7/2019    Urinalysis with Microscopic     Standing Status:   Future     Number of Occurrences:   1     Standing Expiration Date:   2/7/2019     Order Specific Question:   SPECIFY(EX-CATH,MIDSTREAM,CYSTO,ETC)?      Answer:   Alejandra Londono MD

## 2018-02-07 NOTE — PATIENT INSTRUCTIONS
that will allow you to sit while showering. · Get into a tub or shower by putting the weaker leg in first. Get out of a tub or shower with your strong side first.  · Repair loose toilet seats and consider installing a raised toilet seat to make getting on and off the toilet easier. · Keep your bathroom door unlocked while you are in the shower. Where can you learn more? Go to [email or web address].[employer]. org and sign in to your [employer]igon account. Enter [phone] in the [employer] box to learn more about \"Preventing Falls: Care Instructions. \"     If you do not have an account, please click on the \"Sign Up Now\" link. Current as of: May 12, 2017  Content Version: 11.5  © 8826-6161 Healthwise, Incorporated. Care instructions adapted under license by [facility] ([facility]). If you have questions about a medical condition or this instruction, always ask your healthcare professional. [address] any warranty or liability for your use of this information.

## 2018-02-08 ENCOUNTER — TELEPHONE (OUTPATIENT)
Dept: INTERNAL MEDICINE | Age: 83
End: 2018-02-08

## 2018-02-08 NOTE — TELEPHONE ENCOUNTER
Spoke with Gabbie Moreira from [facility],  They need to know if they can have an order for speech therapy for Ximena Garcia. She says you can call [phone] with the ok.   2/8 12:49

## 2018-02-12 RX ORDER — DIPHENOXYLATE HYDROCHLORIDE AND ATROPINE SULFATE 2.5; .025 MG/1; MG/1
1 TABLET ORAL 4 TIMES DAILY PRN
Qty: 40 TABLET | Refills: 0 | Status: SHIPPED | OUTPATIENT
Start: 2018-02-12 | End: 2018-02-15 | Stop reason: SDUPTHER

## 2018-02-15 RX ORDER — DIPHENOXYLATE HYDROCHLORIDE AND ATROPINE SULFATE 2.5; .025 MG/1; MG/1
1 TABLET ORAL 4 TIMES DAILY PRN
Qty: 40 TABLET | Refills: 0 | Status: SHIPPED | OUTPATIENT
Start: 2018-02-15 | End: 2018-02-15 | Stop reason: SDUPTHER

## 2018-02-15 RX ORDER — DIPHENOXYLATE HYDROCHLORIDE AND ATROPINE SULFATE 2.5; .025 MG/1; MG/1
1 TABLET ORAL 4 TIMES DAILY PRN
Qty: 40 TABLET | Refills: 0 | Status: SHIPPED | OUTPATIENT
Start: 2018-02-15 | End: 2018-02-25

## 2018-02-16 ENCOUNTER — TELEPHONE (OUTPATIENT)
Dept: INTERNAL MEDICINE | Age: 83
End: 2018-02-16

## 2018-02-16 NOTE — TELEPHONE ENCOUNTER
Called patient back. With all the trouble with the script printing, she just came in and picked it up and took it to the pharmacy.

## 2018-02-19 RX ORDER — ALBUTEROL SULFATE 2.5 MG/3ML
2.5 SOLUTION RESPIRATORY (INHALATION) EVERY 4 HOURS PRN
Qty: 120 EACH | Refills: 5 | Status: SHIPPED | OUTPATIENT
Start: 2018-02-19 | End: 2018-03-05 | Stop reason: SDUPTHER

## 2018-03-02 RX ORDER — METOPROLOL TARTRATE 50 MG/1
TABLET, FILM COATED ORAL
Qty: 30 TABLET | Refills: 3 | Status: ON HOLD | OUTPATIENT
Start: 2018-03-02 | End: 2018-03-31 | Stop reason: ALTCHOICE

## 2018-03-05 ENCOUNTER — TELEPHONE (OUTPATIENT)
Dept: INTERNAL MEDICINE | Age: 83
End: 2018-03-05

## 2018-03-05 DIAGNOSIS — J44.9 CHRONIC OBSTRUCTIVE PULMONARY DISEASE, UNSPECIFIED COPD TYPE (HCC): Primary | ICD-10-CM

## 2018-03-05 RX ORDER — ALBUTEROL SULFATE 2.5 MG/3ML
2.5 SOLUTION RESPIRATORY (INHALATION) EVERY 4 HOURS PRN
Qty: 120 EACH | Refills: 5 | Status: ON HOLD | OUTPATIENT
Start: 2018-03-05 | End: 2018-03-31 | Stop reason: ALTCHOICE

## 2018-03-05 NOTE — TELEPHONE ENCOUNTER
Refill for albuterol (PROVENTIL) (2.5 MG/3ML was sent to [facility] but missing a DX code. They are needing this resent, otherwise Medicare will not cover.

## 2018-03-06 DIAGNOSIS — R53.1 WEAKNESS: Primary | ICD-10-CM

## 2018-03-07 ENCOUNTER — TELEPHONE (OUTPATIENT)
Dept: INTERNAL MEDICINE | Age: 83
End: 2018-03-07

## 2018-03-07 RX ORDER — ESOMEPRAZOLE MAGNESIUM 40 MG/1
40 CAPSULE, DELAYED RELEASE ORAL DAILY
Qty: 30 CAPSULE | Refills: 3 | Status: SHIPPED | OUTPATIENT
Start: 2018-03-07 | End: 2018-09-10 | Stop reason: SDUPTHER

## 2018-03-28 ENCOUNTER — TELEPHONE (OUTPATIENT)
Dept: INTERNAL MEDICINE | Age: 83
End: 2018-03-28

## 2018-03-28 ENCOUNTER — APPOINTMENT (OUTPATIENT)
Dept: MRI IMAGING | Facility: HOSPITAL | Age: 83
End: 2018-03-28

## 2018-03-28 ENCOUNTER — APPOINTMENT (OUTPATIENT)
Dept: CT IMAGING | Facility: HOSPITAL | Age: 83
End: 2018-03-28

## 2018-03-28 ENCOUNTER — HOSPITAL ENCOUNTER (INPATIENT)
Facility: HOSPITAL | Age: 83
LOS: 3 days | Discharge: NURSING, REHAB OR LONG-TERM CARE | End: 2018-03-31
Attending: EMERGENCY MEDICINE | Admitting: FAMILY MEDICINE

## 2018-03-28 DIAGNOSIS — Z74.09 IMPAIRED FUNCTIONAL MOBILITY, BALANCE, GAIT, AND ENDURANCE: ICD-10-CM

## 2018-03-28 DIAGNOSIS — I63.9 CEREBELLAR STROKE (HCC): Primary | ICD-10-CM

## 2018-03-28 DIAGNOSIS — R47.02 DYSPHASIA: ICD-10-CM

## 2018-03-28 DIAGNOSIS — Z78.9 IMPAIRED MOBILITY AND ADLS: ICD-10-CM

## 2018-03-28 DIAGNOSIS — R13.10 DYSPHAGIA, UNSPECIFIED TYPE: ICD-10-CM

## 2018-03-28 DIAGNOSIS — I69.320 APHASIA AS LATE EFFECT OF CEREBROVASCULAR ACCIDENT (CVA): ICD-10-CM

## 2018-03-28 DIAGNOSIS — Z74.09 IMPAIRED MOBILITY AND ADLS: ICD-10-CM

## 2018-03-28 DIAGNOSIS — I10 ESSENTIAL HYPERTENSION: ICD-10-CM

## 2018-03-28 LAB
ALBUMIN SERPL-MCNC: 3.7 G/DL (ref 3.5–5)
ALBUMIN/GLOB SERPL: 1.1 G/DL (ref 1.1–2.5)
ALP SERPL-CCNC: 93 U/L (ref 24–120)
ALT SERPL W P-5'-P-CCNC: <15 U/L (ref 0–54)
ANION GAP SERPL CALCULATED.3IONS-SCNC: 8 MMOL/L (ref 4–13)
AST SERPL-CCNC: 47 U/L (ref 7–45)
BASOPHILS # BLD AUTO: 0.05 10*3/MM3 (ref 0–0.2)
BASOPHILS NFR BLD AUTO: 0.5 % (ref 0–2)
BILIRUB SERPL-MCNC: 0.3 MG/DL (ref 0.1–1)
BUN BLD-MCNC: 18 MG/DL (ref 5–21)
BUN/CREAT SERPL: 35.3 (ref 7–25)
CALCIUM SPEC-SCNC: 9 MG/DL (ref 8.4–10.4)
CHLORIDE SERPL-SCNC: 97 MMOL/L (ref 98–110)
CO2 SERPL-SCNC: 30 MMOL/L (ref 24–31)
CREAT BLD-MCNC: 0.51 MG/DL (ref 0.5–1.4)
DEPRECATED RDW RBC AUTO: 54 FL (ref 40–54)
EOSINOPHIL # BLD AUTO: 0.13 10*3/MM3 (ref 0–0.7)
EOSINOPHIL NFR BLD AUTO: 1.4 % (ref 0–4)
ERYTHROCYTE [DISTWIDTH] IN BLOOD BY AUTOMATED COUNT: 15.9 % (ref 12–15)
GFR SERPL CREATININE-BSD FRML MDRD: 113 ML/MIN/1.73
GLOBULIN UR ELPH-MCNC: 3.3 GM/DL
GLUCOSE BLD-MCNC: 120 MG/DL (ref 70–100)
HBA1C MFR BLD: 6.1 %
HCT VFR BLD AUTO: 32.7 % (ref 37–47)
HGB BLD-MCNC: 10.4 G/DL (ref 12–16)
IMM GRANULOCYTES # BLD: 0.04 10*3/MM3 (ref 0–0.03)
IMM GRANULOCYTES NFR BLD: 0.4 % (ref 0–5)
LYMPHOCYTES # BLD AUTO: 0.48 10*3/MM3 (ref 0.72–4.86)
LYMPHOCYTES NFR BLD AUTO: 5 % (ref 15–45)
MCH RBC QN AUTO: 29.5 PG (ref 28–32)
MCHC RBC AUTO-ENTMCNC: 31.8 G/DL (ref 33–36)
MCV RBC AUTO: 92.9 FL (ref 82–98)
MONOCYTES # BLD AUTO: 0.64 10*3/MM3 (ref 0.19–1.3)
MONOCYTES NFR BLD AUTO: 6.7 % (ref 4–12)
NEUTROPHILS # BLD AUTO: 8.18 10*3/MM3 (ref 1.87–8.4)
NEUTROPHILS NFR BLD AUTO: 86 % (ref 39–78)
NRBC BLD MANUAL-RTO: 0 /100 WBC (ref 0–0)
PLATELET # BLD AUTO: 392 10*3/MM3 (ref 130–400)
PMV BLD AUTO: 8.7 FL (ref 6–12)
POTASSIUM BLD-SCNC: 4.5 MMOL/L (ref 3.5–5.3)
PROT SERPL-MCNC: 7 G/DL (ref 6.3–8.7)
RBC # BLD AUTO: 3.52 10*6/MM3 (ref 4.2–5.4)
SODIUM BLD-SCNC: 135 MMOL/L (ref 135–145)
TROPONIN I SERPL-MCNC: <0.012 NG/ML (ref 0–0.03)
WBC NRBC COR # BLD: 9.52 10*3/MM3 (ref 4.8–10.8)

## 2018-03-28 PROCEDURE — 70551 MRI BRAIN STEM W/O DYE: CPT

## 2018-03-28 PROCEDURE — 25010000002 DEXAMETHASONE PER 1 MG: Performed by: EMERGENCY MEDICINE

## 2018-03-28 PROCEDURE — 83036 HEMOGLOBIN GLYCOSYLATED A1C: CPT | Performed by: NURSE PRACTITIONER

## 2018-03-28 PROCEDURE — 93005 ELECTROCARDIOGRAM TRACING: CPT | Performed by: EMERGENCY MEDICINE

## 2018-03-28 PROCEDURE — 94640 AIRWAY INHALATION TREATMENT: CPT

## 2018-03-28 PROCEDURE — 70450 CT HEAD/BRAIN W/O DYE: CPT

## 2018-03-28 PROCEDURE — 80053 COMPREHEN METABOLIC PANEL: CPT | Performed by: EMERGENCY MEDICINE

## 2018-03-28 PROCEDURE — 25010000002 ONDANSETRON PER 1 MG: Performed by: EMERGENCY MEDICINE

## 2018-03-28 PROCEDURE — 84484 ASSAY OF TROPONIN QUANT: CPT | Performed by: EMERGENCY MEDICINE

## 2018-03-28 PROCEDURE — 94799 UNLISTED PULMONARY SVC/PX: CPT

## 2018-03-28 PROCEDURE — 93010 ELECTROCARDIOGRAM REPORT: CPT | Performed by: INTERNAL MEDICINE

## 2018-03-28 PROCEDURE — 85025 COMPLETE CBC W/AUTO DIFF WBC: CPT | Performed by: EMERGENCY MEDICINE

## 2018-03-28 PROCEDURE — 25010000002 LORAZEPAM PER 2 MG: Performed by: EMERGENCY MEDICINE

## 2018-03-28 PROCEDURE — 99285 EMERGENCY DEPT VISIT HI MDM: CPT

## 2018-03-28 RX ORDER — TITANIUM DIOXIDE, OCTINOXATE, ZINC OXIDE 4.61; 1.6; .78 G/40ML; G/40ML; G/40ML
2 CREAM TOPICAL DAILY
COMMUNITY

## 2018-03-28 RX ORDER — ONDANSETRON 2 MG/ML
4 INJECTION INTRAMUSCULAR; INTRAVENOUS EVERY 6 HOURS PRN
Status: DISCONTINUED | OUTPATIENT
Start: 2018-03-28 | End: 2018-03-31 | Stop reason: HOSPADM

## 2018-03-28 RX ORDER — LABETALOL HYDROCHLORIDE 5 MG/ML
10 INJECTION, SOLUTION INTRAVENOUS ONCE
Status: COMPLETED | OUTPATIENT
Start: 2018-03-28 | End: 2018-03-28

## 2018-03-28 RX ORDER — LISINOPRIL 10 MG/1
10 TABLET ORAL DAILY
Status: DISCONTINUED | OUTPATIENT
Start: 2018-03-28 | End: 2018-03-31 | Stop reason: HOSPADM

## 2018-03-28 RX ORDER — ONDANSETRON 2 MG/ML
4 INJECTION INTRAMUSCULAR; INTRAVENOUS ONCE
Status: COMPLETED | OUTPATIENT
Start: 2018-03-28 | End: 2018-03-28

## 2018-03-28 RX ORDER — ACETAMINOPHEN 325 MG/1
650 TABLET ORAL EVERY 4 HOURS PRN
Status: DISCONTINUED | OUTPATIENT
Start: 2018-03-28 | End: 2018-03-31 | Stop reason: HOSPADM

## 2018-03-28 RX ORDER — BUDESONIDE AND FORMOTEROL FUMARATE DIHYDRATE 80; 4.5 UG/1; UG/1
2 AEROSOL RESPIRATORY (INHALATION)
Status: DISCONTINUED | OUTPATIENT
Start: 2018-03-28 | End: 2018-03-31 | Stop reason: HOSPADM

## 2018-03-28 RX ORDER — CLOPIDOGREL BISULFATE 75 MG/1
75 TABLET ORAL DAILY
Status: DISCONTINUED | OUTPATIENT
Start: 2018-03-28 | End: 2018-03-29

## 2018-03-28 RX ORDER — PAROXETINE 10 MG/1
10 TABLET, FILM COATED ORAL EVERY MORNING
Status: DISCONTINUED | OUTPATIENT
Start: 2018-03-29 | End: 2018-03-31 | Stop reason: HOSPADM

## 2018-03-28 RX ORDER — SODIUM CHLORIDE 0.9 % (FLUSH) 0.9 %
1-10 SYRINGE (ML) INJECTION AS NEEDED
Status: DISCONTINUED | OUTPATIENT
Start: 2018-03-28 | End: 2018-03-31 | Stop reason: HOSPADM

## 2018-03-28 RX ORDER — IPRATROPIUM BROMIDE AND ALBUTEROL SULFATE 2.5; .5 MG/3ML; MG/3ML
3 SOLUTION RESPIRATORY (INHALATION)
Status: DISCONTINUED | OUTPATIENT
Start: 2018-03-28 | End: 2018-03-31 | Stop reason: HOSPADM

## 2018-03-28 RX ORDER — DEXTROSE AND SODIUM CHLORIDE 5; .45 G/100ML; G/100ML
75 INJECTION, SOLUTION INTRAVENOUS CONTINUOUS
Status: DISCONTINUED | OUTPATIENT
Start: 2018-03-28 | End: 2018-03-29

## 2018-03-28 RX ORDER — FUROSEMIDE 20 MG/1
20 TABLET ORAL DAILY
Status: DISCONTINUED | OUTPATIENT
Start: 2018-03-28 | End: 2018-03-31 | Stop reason: HOSPADM

## 2018-03-28 RX ORDER — DEXAMETHASONE SODIUM PHOSPHATE 10 MG/ML
10 INJECTION INTRAMUSCULAR; INTRAVENOUS ONCE
Status: COMPLETED | OUTPATIENT
Start: 2018-03-28 | End: 2018-03-28

## 2018-03-28 RX ORDER — LORATADINE 10 MG/1
10 TABLET ORAL NIGHTLY
COMMUNITY

## 2018-03-28 RX ORDER — ATORVASTATIN CALCIUM 40 MG/1
80 TABLET, FILM COATED ORAL NIGHTLY
Status: DISCONTINUED | OUTPATIENT
Start: 2018-03-28 | End: 2018-03-29

## 2018-03-28 RX ORDER — PANTOPRAZOLE SODIUM 40 MG/1
40 TABLET, DELAYED RELEASE ORAL
Status: DISCONTINUED | OUTPATIENT
Start: 2018-03-29 | End: 2018-03-31 | Stop reason: HOSPADM

## 2018-03-28 RX ORDER — BUDESONIDE AND FORMOTEROL FUMARATE DIHYDRATE 80; 4.5 UG/1; UG/1
2 AEROSOL RESPIRATORY (INHALATION)
COMMUNITY
End: 2019-01-01

## 2018-03-28 RX ORDER — CHLORDIAZEPOXIDE HYDROCHLORIDE 5 MG/1
10 CAPSULE, GELATIN COATED ORAL ONCE
Status: COMPLETED | OUTPATIENT
Start: 2018-03-28 | End: 2018-03-28

## 2018-03-28 RX ORDER — LORAZEPAM 2 MG/ML
1 INJECTION INTRAMUSCULAR ONCE
Status: COMPLETED | OUTPATIENT
Start: 2018-03-28 | End: 2018-03-28

## 2018-03-28 RX ADMIN — BUDESONIDE AND FORMOTEROL FUMARATE DIHYDRATE 2 PUFF: 80; 4.5 AEROSOL RESPIRATORY (INHALATION) at 20:38

## 2018-03-28 RX ADMIN — SODIUM CHLORIDE 5 MG/HR: 9 INJECTION, SOLUTION INTRAVENOUS at 16:40

## 2018-03-28 RX ADMIN — ONDANSETRON 4 MG: 2 INJECTION, SOLUTION INTRAMUSCULAR; INTRAVENOUS at 14:47

## 2018-03-28 RX ADMIN — DEXTROSE AND SODIUM CHLORIDE 75 ML/HR: 5; 450 INJECTION, SOLUTION INTRAVENOUS at 20:24

## 2018-03-28 RX ADMIN — LABETALOL HYDROCHLORIDE 10 MG: 5 INJECTION, SOLUTION INTRAVENOUS at 16:23

## 2018-03-28 RX ADMIN — IPRATROPIUM BROMIDE AND ALBUTEROL SULFATE 3 ML: 2.5; .5 SOLUTION RESPIRATORY (INHALATION) at 20:37

## 2018-03-28 RX ADMIN — CHLORDIAZEPOXIDE HYDROCHLORIDE 10 MG: 5 CAPSULE ORAL at 14:47

## 2018-03-28 RX ADMIN — LORAZEPAM 1 MG: 2 INJECTION, SOLUTION INTRAMUSCULAR; INTRAVENOUS at 15:03

## 2018-03-28 RX ADMIN — DEXAMETHASONE SODIUM PHOSPHATE 10 MG: 10 INJECTION, SOLUTION INTRAMUSCULAR; INTRAVENOUS at 14:47

## 2018-03-29 ENCOUNTER — APPOINTMENT (OUTPATIENT)
Dept: CARDIOLOGY | Facility: HOSPITAL | Age: 83
End: 2018-03-29

## 2018-03-29 ENCOUNTER — APPOINTMENT (OUTPATIENT)
Dept: ULTRASOUND IMAGING | Facility: HOSPITAL | Age: 83
End: 2018-03-29

## 2018-03-29 LAB
ANION GAP SERPL CALCULATED.3IONS-SCNC: 10 MMOL/L (ref 4–13)
ARTICHOKE IGE QN: 79 MG/DL (ref 0–99)
BASOPHILS # BLD AUTO: 0 10*3/MM3 (ref 0–0.2)
BASOPHILS NFR BLD AUTO: 0 % (ref 0–2)
BH CV ECHO MEAS - AO MAX PG (FULL): 43.7 MMHG
BH CV ECHO MEAS - AO MAX PG: 46.2 MMHG
BH CV ECHO MEAS - AO MEAN PG (FULL): 23 MMHG
BH CV ECHO MEAS - AO MEAN PG: 24 MMHG
BH CV ECHO MEAS - AO ROOT AREA (BSA CORRECTED): 2.1
BH CV ECHO MEAS - AO ROOT AREA: 8.6 CM^2
BH CV ECHO MEAS - AO ROOT DIAM: 3.3 CM
BH CV ECHO MEAS - AO V2 MAX: 340 CM/SEC
BH CV ECHO MEAS - AO V2 MEAN: 225 CM/SEC
BH CV ECHO MEAS - AO V2 VTI: 70.2 CM
BH CV ECHO MEAS - AVA(I,A): 0.77 CM^2
BH CV ECHO MEAS - AVA(I,D): 0.77 CM^2
BH CV ECHO MEAS - AVA(V,A): 0.74 CM^2
BH CV ECHO MEAS - AVA(V,D): 0.74 CM^2
BH CV ECHO MEAS - BSA(HAYCOCK): 1.6 M^2
BH CV ECHO MEAS - BSA: 1.6 M^2
BH CV ECHO MEAS - BZI_BMI: 23.2 KILOGRAMS/M^2
BH CV ECHO MEAS - BZI_METRIC_HEIGHT: 157.5 CM
BH CV ECHO MEAS - BZI_METRIC_WEIGHT: 57.6 KG
BH CV ECHO MEAS - CONTRAST EF 4CH: 65.2 ML/M^2
BH CV ECHO MEAS - EDV(CUBED): 65.9 ML
BH CV ECHO MEAS - EDV(MOD-SP4): 58.7 ML
BH CV ECHO MEAS - EDV(TEICH): 71.7 ML
BH CV ECHO MEAS - EF(CUBED): 84.7 %
BH CV ECHO MEAS - EF(MOD-SP4): 65.2 %
BH CV ECHO MEAS - EF(TEICH): 78.4 %
BH CV ECHO MEAS - ESV(CUBED): 10.1 ML
BH CV ECHO MEAS - ESV(MOD-SP4): 20.4 ML
BH CV ECHO MEAS - ESV(TEICH): 15.5 ML
BH CV ECHO MEAS - FS: 46.5 %
BH CV ECHO MEAS - IVS/LVPW: 0.95
BH CV ECHO MEAS - IVSD: 0.98 CM
BH CV ECHO MEAS - LA DIMENSION: 4 CM
BH CV ECHO MEAS - LA/AO: 1.2
BH CV ECHO MEAS - LV DIASTOLIC VOL/BSA (35-75): 37.2 ML/M^2
BH CV ECHO MEAS - LV MASS(C)D: 129.5 GRAMS
BH CV ECHO MEAS - LV MASS(C)DI: 82.2 GRAMS/M^2
BH CV ECHO MEAS - LV MAX PG: 2.5 MMHG
BH CV ECHO MEAS - LV MEAN PG: 1 MMHG
BH CV ECHO MEAS - LV SYSTOLIC VOL/BSA (12-30): 12.9 ML/M^2
BH CV ECHO MEAS - LV V1 MAX: 79.8 CM/SEC
BH CV ECHO MEAS - LV V1 MEAN: 52.4 CM/SEC
BH CV ECHO MEAS - LV V1 VTI: 17.2 CM
BH CV ECHO MEAS - LVIDD: 4 CM
BH CV ECHO MEAS - LVIDS: 2.2 CM
BH CV ECHO MEAS - LVLD AP4: 5.8 CM
BH CV ECHO MEAS - LVLS AP4: 5.3 CM
BH CV ECHO MEAS - LVOT AREA (M): 3.1 CM^2
BH CV ECHO MEAS - LVOT AREA: 3.1 CM^2
BH CV ECHO MEAS - LVOT DIAM: 2 CM
BH CV ECHO MEAS - LVPWD: 1 CM
BH CV ECHO MEAS - MV DEC TIME: 0.16 SEC
BH CV ECHO MEAS - MV E MAX VEL: 137 CM/SEC
BH CV ECHO MEAS - RAP SYSTOLE: 5 MMHG
BH CV ECHO MEAS - RVSP: 47.8 MMHG
BH CV ECHO MEAS - SI(AO): 381 ML/M^2
BH CV ECHO MEAS - SI(CUBED): 35.4 ML/M^2
BH CV ECHO MEAS - SI(LVOT): 34.3 ML/M^2
BH CV ECHO MEAS - SI(MOD-SP4): 24.3 ML/M^2
BH CV ECHO MEAS - SI(TEICH): 35.7 ML/M^2
BH CV ECHO MEAS - SV(AO): 600.4 ML
BH CV ECHO MEAS - SV(CUBED): 55.9 ML
BH CV ECHO MEAS - SV(LVOT): 54 ML
BH CV ECHO MEAS - SV(MOD-SP4): 38.3 ML
BH CV ECHO MEAS - SV(TEICH): 56.2 ML
BH CV ECHO MEAS - TR MAX VEL: 327 CM/SEC
BUN BLD-MCNC: 16 MG/DL (ref 5–21)
BUN/CREAT SERPL: 37.2 (ref 7–25)
CALCIUM SPEC-SCNC: 9.2 MG/DL (ref 8.4–10.4)
CHLORIDE SERPL-SCNC: 98 MMOL/L (ref 98–110)
CHOLEST SERPL-MCNC: 148 MG/DL (ref 130–200)
CO2 SERPL-SCNC: 29 MMOL/L (ref 24–31)
CREAT BLD-MCNC: 0.43 MG/DL (ref 0.5–1.4)
DEPRECATED RDW RBC AUTO: 51.7 FL (ref 40–54)
E/E' RATIO: 28.6
EOSINOPHIL # BLD AUTO: 0 10*3/MM3 (ref 0–0.7)
EOSINOPHIL NFR BLD AUTO: 0 % (ref 0–4)
ERYTHROCYTE [DISTWIDTH] IN BLOOD BY AUTOMATED COUNT: 15.5 % (ref 12–15)
FERRITIN SERPL-MCNC: 28.5 NG/ML (ref 11.1–264)
FOLATE SERPL-MCNC: 16.5 NG/ML
GFR SERPL CREATININE-BSD FRML MDRD: 138 ML/MIN/1.73
GLUCOSE BLD-MCNC: 173 MG/DL (ref 70–100)
HCT VFR BLD AUTO: 31.2 % (ref 37–47)
HCT VFR BLD AUTO: 31.5 % (ref 37–47)
HDLC SERPL-MCNC: 52 MG/DL
HGB BLD-MCNC: 10.2 G/DL (ref 12–16)
IMM GRANULOCYTES # BLD: 0.02 10*3/MM3 (ref 0–0.03)
IMM GRANULOCYTES NFR BLD: 0.3 % (ref 0–5)
IRON 24H UR-MRATE: 30 MCG/DL (ref 42–180)
IRON SATN MFR SERPL: 8 % (ref 20–45)
LDLC/HDLC SERPL: 1.71 {RATIO}
LEFT ATRIUM VOLUME: 95 CM3
LYMPHOCYTES # BLD AUTO: 0.38 10*3/MM3 (ref 0.72–4.86)
LYMPHOCYTES NFR BLD AUTO: 6.4 % (ref 15–45)
MAXIMAL PREDICTED HEART RATE: 129 BPM
MCH RBC QN AUTO: 29.7 PG (ref 28–32)
MCHC RBC AUTO-ENTMCNC: 32.4 G/DL (ref 33–36)
MCV RBC AUTO: 91.6 FL (ref 82–98)
MONOCYTES # BLD AUTO: 0.04 10*3/MM3 (ref 0.19–1.3)
MONOCYTES NFR BLD AUTO: 0.7 % (ref 4–12)
NEUTROPHILS # BLD AUTO: 5.5 10*3/MM3 (ref 1.87–8.4)
NEUTROPHILS NFR BLD AUTO: 92.6 % (ref 39–78)
NRBC BLD MANUAL-RTO: 0 /100 WBC (ref 0–0)
PA ADP PRP-ACNC: 160 PRU
PLATELET # BLD AUTO: 353 10*3/MM3 (ref 130–400)
PLATELET # BLD AUTO: 361 10*3/MM3 (ref 130–400)
PMV BLD AUTO: 8.5 FL (ref 6–12)
POTASSIUM BLD-SCNC: 4.1 MMOL/L (ref 3.5–5.3)
RBC # BLD AUTO: 3.44 10*6/MM3 (ref 4.2–5.4)
SODIUM BLD-SCNC: 137 MMOL/L (ref 135–145)
STRESS TARGET HR: 110 BPM
TIBC SERPL-MCNC: 366 MCG/DL (ref 225–420)
TRIGL SERPL-MCNC: 36 MG/DL (ref 0–149)
TROPONIN I SERPL-MCNC: <0.012 NG/ML (ref 0–0.03)
VIT B12 BLD-MCNC: 539 PG/ML (ref 239–931)
WBC NRBC COR # BLD: 5.94 10*3/MM3 (ref 4.8–10.8)

## 2018-03-29 PROCEDURE — 93880 EXTRACRANIAL BILAT STUDY: CPT | Performed by: SURGERY

## 2018-03-29 PROCEDURE — 85025 COMPLETE CBC W/AUTO DIFF WBC: CPT | Performed by: NURSE PRACTITIONER

## 2018-03-29 PROCEDURE — 93880 EXTRACRANIAL BILAT STUDY: CPT

## 2018-03-29 PROCEDURE — G8998 SWALLOW D/C STATUS: HCPCS | Performed by: SPEECH-LANGUAGE PATHOLOGIST

## 2018-03-29 PROCEDURE — G8978 MOBILITY CURRENT STATUS: HCPCS

## 2018-03-29 PROCEDURE — 82746 ASSAY OF FOLIC ACID SERUM: CPT | Performed by: FAMILY MEDICINE

## 2018-03-29 PROCEDURE — G8997 SWALLOW GOAL STATUS: HCPCS | Performed by: SPEECH-LANGUAGE PATHOLOGIST

## 2018-03-29 PROCEDURE — 97166 OT EVAL MOD COMPLEX 45 MIN: CPT

## 2018-03-29 PROCEDURE — 97116 GAIT TRAINING THERAPY: CPT

## 2018-03-29 PROCEDURE — 94799 UNLISTED PULMONARY SVC/PX: CPT

## 2018-03-29 PROCEDURE — 85576 BLOOD PLATELET AGGREGATION: CPT | Performed by: FAMILY MEDICINE

## 2018-03-29 PROCEDURE — 83550 IRON BINDING TEST: CPT | Performed by: FAMILY MEDICINE

## 2018-03-29 PROCEDURE — 84484 ASSAY OF TROPONIN QUANT: CPT | Performed by: NURSE PRACTITIONER

## 2018-03-29 PROCEDURE — 80048 BASIC METABOLIC PNL TOTAL CA: CPT | Performed by: NURSE PRACTITIONER

## 2018-03-29 PROCEDURE — 93306 TTE W/DOPPLER COMPLETE: CPT

## 2018-03-29 PROCEDURE — G8988 SELF CARE GOAL STATUS: HCPCS

## 2018-03-29 PROCEDURE — 82728 ASSAY OF FERRITIN: CPT | Performed by: FAMILY MEDICINE

## 2018-03-29 PROCEDURE — 93306 TTE W/DOPPLER COMPLETE: CPT | Performed by: INTERNAL MEDICINE

## 2018-03-29 PROCEDURE — 97162 PT EVAL MOD COMPLEX 30 MIN: CPT

## 2018-03-29 PROCEDURE — 83540 ASSAY OF IRON: CPT | Performed by: FAMILY MEDICINE

## 2018-03-29 PROCEDURE — 82607 VITAMIN B-12: CPT | Performed by: FAMILY MEDICINE

## 2018-03-29 PROCEDURE — 92610 EVALUATE SWALLOWING FUNCTION: CPT

## 2018-03-29 PROCEDURE — 94760 N-INVAS EAR/PLS OXIMETRY 1: CPT

## 2018-03-29 PROCEDURE — G8987 SELF CARE CURRENT STATUS: HCPCS

## 2018-03-29 PROCEDURE — 99223 1ST HOSP IP/OBS HIGH 75: CPT | Performed by: PSYCHIATRY & NEUROLOGY

## 2018-03-29 PROCEDURE — 80061 LIPID PANEL: CPT | Performed by: NURSE PRACTITIONER

## 2018-03-29 PROCEDURE — G8996 SWALLOW CURRENT STATUS: HCPCS | Performed by: SPEECH-LANGUAGE PATHOLOGIST

## 2018-03-29 PROCEDURE — G8979 MOBILITY GOAL STATUS: HCPCS

## 2018-03-29 RX ORDER — ATORVASTATIN CALCIUM 40 MG/1
40 TABLET, FILM COATED ORAL NIGHTLY
Status: DISCONTINUED | OUTPATIENT
Start: 2018-03-29 | End: 2018-03-31 | Stop reason: HOSPADM

## 2018-03-29 RX ORDER — DABIGATRAN ETEXILATE 150 MG/1
150 CAPSULE ORAL EVERY 12 HOURS SCHEDULED
Status: DISCONTINUED | OUTPATIENT
Start: 2018-03-29 | End: 2018-03-31 | Stop reason: HOSPADM

## 2018-03-29 RX ADMIN — BUDESONIDE AND FORMOTEROL FUMARATE DIHYDRATE 2 PUFF: 80; 4.5 AEROSOL RESPIRATORY (INHALATION) at 18:51

## 2018-03-29 RX ADMIN — IPRATROPIUM BROMIDE AND ALBUTEROL SULFATE 3 ML: 2.5; .5 SOLUTION RESPIRATORY (INHALATION) at 18:50

## 2018-03-29 RX ADMIN — METOPROLOL TARTRATE 25 MG: 25 TABLET, FILM COATED ORAL at 10:36

## 2018-03-29 RX ADMIN — IPRATROPIUM BROMIDE AND ALBUTEROL SULFATE 3 ML: 2.5; .5 SOLUTION RESPIRATORY (INHALATION) at 11:00

## 2018-03-29 RX ADMIN — FUROSEMIDE 20 MG: 20 TABLET ORAL at 10:36

## 2018-03-29 RX ADMIN — IPRATROPIUM BROMIDE AND ALBUTEROL SULFATE 3 ML: 2.5; .5 SOLUTION RESPIRATORY (INHALATION) at 15:40

## 2018-03-29 RX ADMIN — LISINOPRIL 10 MG: 10 TABLET ORAL at 10:36

## 2018-03-29 RX ADMIN — IPRATROPIUM BROMIDE AND ALBUTEROL SULFATE 3 ML: 2.5; .5 SOLUTION RESPIRATORY (INHALATION) at 07:42

## 2018-03-29 RX ADMIN — BUDESONIDE AND FORMOTEROL FUMARATE DIHYDRATE 2 PUFF: 80; 4.5 AEROSOL RESPIRATORY (INHALATION) at 07:43

## 2018-03-29 RX ADMIN — DABIGATRAN ETEXILATE MESYLATE 150 MG: 150 CAPSULE ORAL at 21:44

## 2018-03-29 RX ADMIN — METOPROLOL TARTRATE 25 MG: 25 TABLET, FILM COATED ORAL at 21:44

## 2018-03-29 RX ADMIN — DESMOPRESSIN ACETATE 40 MG: 0.2 TABLET ORAL at 21:44

## 2018-03-29 RX ADMIN — DABIGATRAN ETEXILATE MESYLATE 150 MG: 150 CAPSULE ORAL at 11:51

## 2018-03-30 ENCOUNTER — APPOINTMENT (OUTPATIENT)
Dept: GENERAL RADIOLOGY | Facility: HOSPITAL | Age: 83
End: 2018-03-30

## 2018-03-30 LAB
ANION GAP SERPL CALCULATED.3IONS-SCNC: 9 MMOL/L (ref 4–13)
BASOPHILS # BLD AUTO: 0.03 10*3/MM3 (ref 0–0.2)
BASOPHILS NFR BLD AUTO: 0.3 % (ref 0–2)
BUN BLD-MCNC: 12 MG/DL (ref 5–21)
BUN/CREAT SERPL: 21.8 (ref 7–25)
CALCIUM SPEC-SCNC: 9.2 MG/DL (ref 8.4–10.4)
CHLORIDE SERPL-SCNC: 100 MMOL/L (ref 98–110)
CO2 SERPL-SCNC: 30 MMOL/L (ref 24–31)
CREAT BLD-MCNC: 0.55 MG/DL (ref 0.5–1.4)
DEPRECATED RDW RBC AUTO: 51.6 FL (ref 40–54)
EOSINOPHIL # BLD AUTO: 0.05 10*3/MM3 (ref 0–0.7)
EOSINOPHIL NFR BLD AUTO: 0.6 % (ref 0–4)
ERYTHROCYTE [DISTWIDTH] IN BLOOD BY AUTOMATED COUNT: 15.4 % (ref 12–15)
GFR SERPL CREATININE-BSD FRML MDRD: 104 ML/MIN/1.73
GLUCOSE BLD-MCNC: 87 MG/DL (ref 70–100)
HCT VFR BLD AUTO: 29.3 % (ref 37–47)
HGB BLD-MCNC: 9.5 G/DL (ref 12–16)
IMM GRANULOCYTES # BLD: 0.03 10*3/MM3 (ref 0–0.03)
IMM GRANULOCYTES NFR BLD: 0.3 % (ref 0–5)
LYMPHOCYTES # BLD AUTO: 1.15 10*3/MM3 (ref 0.72–4.86)
LYMPHOCYTES NFR BLD AUTO: 13 % (ref 15–45)
MCH RBC QN AUTO: 29.7 PG (ref 28–32)
MCHC RBC AUTO-ENTMCNC: 32.4 G/DL (ref 33–36)
MCV RBC AUTO: 91.6 FL (ref 82–98)
MONOCYTES # BLD AUTO: 0.97 10*3/MM3 (ref 0.19–1.3)
MONOCYTES NFR BLD AUTO: 10.9 % (ref 4–12)
NEUTROPHILS # BLD AUTO: 6.65 10*3/MM3 (ref 1.87–8.4)
NEUTROPHILS NFR BLD AUTO: 74.9 % (ref 39–78)
NRBC BLD MANUAL-RTO: 0 /100 WBC (ref 0–0)
PLATELET # BLD AUTO: 347 10*3/MM3 (ref 130–400)
PMV BLD AUTO: 8.6 FL (ref 6–12)
POTASSIUM BLD-SCNC: 3.7 MMOL/L (ref 3.5–5.3)
RBC # BLD AUTO: 3.2 10*6/MM3 (ref 4.2–5.4)
SODIUM BLD-SCNC: 139 MMOL/L (ref 135–145)
WBC NRBC COR # BLD: 8.88 10*3/MM3 (ref 4.8–10.8)

## 2018-03-30 PROCEDURE — 99233 SBSQ HOSP IP/OBS HIGH 50: CPT | Performed by: PSYCHIATRY & NEUROLOGY

## 2018-03-30 PROCEDURE — 94760 N-INVAS EAR/PLS OXIMETRY 1: CPT

## 2018-03-30 PROCEDURE — 80048 BASIC METABOLIC PNL TOTAL CA: CPT | Performed by: NURSE PRACTITIONER

## 2018-03-30 PROCEDURE — 85025 COMPLETE CBC W/AUTO DIFF WBC: CPT | Performed by: NURSE PRACTITIONER

## 2018-03-30 PROCEDURE — 96105 ASSESSMENT OF APHASIA: CPT

## 2018-03-30 PROCEDURE — 94799 UNLISTED PULMONARY SVC/PX: CPT

## 2018-03-30 PROCEDURE — 97535 SELF CARE MNGMENT TRAINING: CPT

## 2018-03-30 PROCEDURE — G9163 LANG EXPRESS GOAL STATUS: HCPCS

## 2018-03-30 PROCEDURE — 71046 X-RAY EXAM CHEST 2 VIEWS: CPT

## 2018-03-30 PROCEDURE — 97116 GAIT TRAINING THERAPY: CPT

## 2018-03-30 PROCEDURE — 97110 THERAPEUTIC EXERCISES: CPT

## 2018-03-30 PROCEDURE — 25010000002 IRON SUCROSE PER 1 MG: Performed by: PSYCHIATRY & NEUROLOGY

## 2018-03-30 PROCEDURE — 25010000002 FUROSEMIDE PER 20 MG: Performed by: FAMILY MEDICINE

## 2018-03-30 PROCEDURE — G9162 LANG EXPRESS CURRENT STATUS: HCPCS

## 2018-03-30 RX ORDER — FUROSEMIDE 10 MG/ML
20 INJECTION INTRAMUSCULAR; INTRAVENOUS ONCE
Status: COMPLETED | OUTPATIENT
Start: 2018-03-30 | End: 2018-03-30

## 2018-03-30 RX ORDER — SENNA AND DOCUSATE SODIUM 50; 8.6 MG/1; MG/1
2 TABLET, FILM COATED ORAL NIGHTLY
Status: DISCONTINUED | OUTPATIENT
Start: 2018-03-30 | End: 2018-03-31 | Stop reason: HOSPADM

## 2018-03-30 RX ADMIN — IRON SUCROSE 200 MG: 20 INJECTION, SOLUTION INTRAVENOUS at 13:11

## 2018-03-30 RX ADMIN — DOCUSATE SODIUM AND SENNOSIDES 2 TABLET: 8.6; 5 TABLET, FILM COATED ORAL at 21:47

## 2018-03-30 RX ADMIN — FUROSEMIDE 20 MG: 10 INJECTION, SOLUTION INTRAVENOUS at 11:53

## 2018-03-30 RX ADMIN — PAROXETINE 10 MG: 10 TABLET, FILM COATED ORAL at 08:48

## 2018-03-30 RX ADMIN — PANTOPRAZOLE SODIUM 40 MG: 40 TABLET, DELAYED RELEASE ORAL at 06:40

## 2018-03-30 RX ADMIN — IPRATROPIUM BROMIDE AND ALBUTEROL SULFATE 3 ML: 2.5; .5 SOLUTION RESPIRATORY (INHALATION) at 11:02

## 2018-03-30 RX ADMIN — METOPROLOL TARTRATE 25 MG: 25 TABLET, FILM COATED ORAL at 21:47

## 2018-03-30 RX ADMIN — BUDESONIDE AND FORMOTEROL FUMARATE DIHYDRATE 2 PUFF: 80; 4.5 AEROSOL RESPIRATORY (INHALATION) at 20:15

## 2018-03-30 RX ADMIN — IPRATROPIUM BROMIDE AND ALBUTEROL SULFATE 3 ML: 2.5; .5 SOLUTION RESPIRATORY (INHALATION) at 15:00

## 2018-03-30 RX ADMIN — BUDESONIDE AND FORMOTEROL FUMARATE DIHYDRATE 2 PUFF: 80; 4.5 AEROSOL RESPIRATORY (INHALATION) at 06:44

## 2018-03-30 RX ADMIN — FUROSEMIDE 20 MG: 20 TABLET ORAL at 08:48

## 2018-03-30 RX ADMIN — IPRATROPIUM BROMIDE AND ALBUTEROL SULFATE 3 ML: 2.5; .5 SOLUTION RESPIRATORY (INHALATION) at 06:44

## 2018-03-30 RX ADMIN — METOPROLOL TARTRATE 25 MG: 25 TABLET, FILM COATED ORAL at 08:48

## 2018-03-30 RX ADMIN — DESMOPRESSIN ACETATE 40 MG: 0.2 TABLET ORAL at 21:47

## 2018-03-30 RX ADMIN — DABIGATRAN ETEXILATE MESYLATE 150 MG: 150 CAPSULE ORAL at 21:47

## 2018-03-30 RX ADMIN — DABIGATRAN ETEXILATE MESYLATE 150 MG: 150 CAPSULE ORAL at 08:48

## 2018-03-30 RX ADMIN — IPRATROPIUM BROMIDE AND ALBUTEROL SULFATE 3 ML: 2.5; .5 SOLUTION RESPIRATORY (INHALATION) at 20:14

## 2018-03-30 RX ADMIN — LISINOPRIL 10 MG: 10 TABLET ORAL at 08:48

## 2018-03-31 ENCOUNTER — HOSPITAL ENCOUNTER (INPATIENT)
Age: 83
LOS: 13 days | Discharge: NURSING, REHAB OR LONG-TERM CARE | DRG: 057 | End: 2018-04-13
Attending: PSYCHIATRY & NEUROLOGY | Admitting: PSYCHIATRY & NEUROLOGY
Payer: MEDICARE

## 2018-03-31 VITALS
TEMPERATURE: 97.8 F | HEIGHT: 62 IN | DIASTOLIC BLOOD PRESSURE: 72 MMHG | HEART RATE: 78 BPM | WEIGHT: 127.2 LBS | OXYGEN SATURATION: 95 % | RESPIRATION RATE: 18 BRPM | SYSTOLIC BLOOD PRESSURE: 99 MMHG | BODY MASS INDEX: 23.41 KG/M2

## 2018-03-31 PROBLEM — I63.9 ACUTE ISCHEMIC STROKE (HCC): Status: ACTIVE | Noted: 2018-03-31

## 2018-03-31 LAB
ANION GAP SERPL CALCULATED.3IONS-SCNC: 8 MMOL/L (ref 4–13)
BASOPHILS # BLD AUTO: 0.03 10*3/MM3 (ref 0–0.2)
BASOPHILS ABSOLUTE: 0.1 K/UL (ref 0–0.2)
BASOPHILS NFR BLD AUTO: 0.4 % (ref 0–2)
BASOPHILS RELATIVE PERCENT: 0.6 % (ref 0–1)
BUN BLD-MCNC: 12 MG/DL (ref 5–21)
BUN/CREAT SERPL: 23.5 (ref 7–25)
CALCIUM SPEC-SCNC: 9.2 MG/DL (ref 8.4–10.4)
CHLORIDE SERPL-SCNC: 97 MMOL/L (ref 98–110)
CO2 SERPL-SCNC: 32 MMOL/L (ref 24–31)
CREAT BLD-MCNC: 0.51 MG/DL (ref 0.5–1.4)
DEPRECATED RDW RBC AUTO: 51.6 FL (ref 40–54)
EOSINOPHIL # BLD AUTO: 0.18 10*3/MM3 (ref 0–0.7)
EOSINOPHIL NFR BLD AUTO: 2.4 % (ref 0–4)
EOSINOPHILS ABSOLUTE: 0.2 K/UL (ref 0–0.6)
EOSINOPHILS RELATIVE PERCENT: 3 % (ref 0–5)
ERYTHROCYTE [DISTWIDTH] IN BLOOD BY AUTOMATED COUNT: 15.4 % (ref 12–15)
GFR SERPL CREATININE-BSD FRML MDRD: 113 ML/MIN/1.73
GLUCOSE BLD-MCNC: 95 MG/DL (ref 70–100)
HCT VFR BLD AUTO: 29.7 % (ref 37–47)
HCT VFR BLD CALC: 32.6 % (ref 37–47)
HEMOGLOBIN: 10.1 G/DL (ref 12–16)
HGB BLD-MCNC: 9.7 G/DL (ref 12–16)
IMM GRANULOCYTES # BLD: 0.03 10*3/MM3 (ref 0–0.03)
IMM GRANULOCYTES NFR BLD: 0.4 % (ref 0–5)
LYMPHOCYTES # BLD AUTO: 0.85 10*3/MM3 (ref 0.72–4.86)
LYMPHOCYTES ABSOLUTE: 0.9 K/UL (ref 1.1–4.5)
LYMPHOCYTES NFR BLD AUTO: 11.5 % (ref 15–45)
LYMPHOCYTES RELATIVE PERCENT: 11.3 % (ref 20–40)
MCH RBC QN AUTO: 29.4 PG (ref 27–31)
MCH RBC QN AUTO: 30 PG (ref 28–32)
MCHC RBC AUTO-ENTMCNC: 31 G/DL (ref 33–37)
MCHC RBC AUTO-ENTMCNC: 32.7 G/DL (ref 33–36)
MCV RBC AUTO: 92 FL (ref 82–98)
MCV RBC AUTO: 95 FL (ref 81–99)
MONOCYTES # BLD AUTO: 0.85 10*3/MM3 (ref 0.19–1.3)
MONOCYTES ABSOLUTE: 0.9 K/UL (ref 0–0.9)
MONOCYTES NFR BLD AUTO: 11.5 % (ref 4–12)
MONOCYTES RELATIVE PERCENT: 11.3 % (ref 0–10)
NEUTROPHILS # BLD AUTO: 5.43 10*3/MM3 (ref 1.87–8.4)
NEUTROPHILS ABSOLUTE: 5.9 K/UL (ref 1.5–7.5)
NEUTROPHILS NFR BLD AUTO: 73.8 % (ref 39–78)
NEUTROPHILS RELATIVE PERCENT: 73.4 % (ref 50–65)
NRBC BLD MANUAL-RTO: 0 /100 WBC (ref 0–0)
PDW BLD-RTO: 15.4 % (ref 11.5–14.5)
PLATELET # BLD AUTO: 353 10*3/MM3 (ref 130–400)
PLATELET # BLD: 307 K/UL (ref 130–400)
PMV BLD AUTO: 8.3 FL (ref 9.4–12.3)
PMV BLD AUTO: 8.9 FL (ref 6–12)
POTASSIUM BLD-SCNC: 3.5 MMOL/L (ref 3.5–5.3)
PREALBUMIN: 14 MG/DL (ref 20–40)
RBC # BLD AUTO: 3.23 10*6/MM3 (ref 4.2–5.4)
RBC # BLD: 3.43 M/UL (ref 4.2–5.4)
SODIUM BLD-SCNC: 137 MMOL/L (ref 135–145)
WBC # BLD: 8.1 K/UL (ref 4.8–10.8)
WBC NRBC COR # BLD: 7.37 10*3/MM3 (ref 4.8–10.8)

## 2018-03-31 PROCEDURE — 94664 DEMO&/EVAL PT USE INHALER: CPT

## 2018-03-31 PROCEDURE — 6370000000 HC RX 637 (ALT 250 FOR IP): Performed by: PSYCHIATRY & NEUROLOGY

## 2018-03-31 PROCEDURE — 94760 N-INVAS EAR/PLS OXIMETRY 1: CPT

## 2018-03-31 PROCEDURE — 94799 UNLISTED PULMONARY SVC/PX: CPT

## 2018-03-31 PROCEDURE — 84134 ASSAY OF PREALBUMIN: CPT

## 2018-03-31 PROCEDURE — 25010000002 FUROSEMIDE PER 20 MG: Performed by: FAMILY MEDICINE

## 2018-03-31 PROCEDURE — 97530 THERAPEUTIC ACTIVITIES: CPT

## 2018-03-31 PROCEDURE — 85025 COMPLETE CBC W/AUTO DIFF WBC: CPT | Performed by: NURSE PRACTITIONER

## 2018-03-31 PROCEDURE — 1180000000 HC REHAB R&B

## 2018-03-31 PROCEDURE — 80048 BASIC METABOLIC PNL TOTAL CA: CPT | Performed by: NURSE PRACTITIONER

## 2018-03-31 PROCEDURE — 25010000002 IRON SUCROSE PER 1 MG: Performed by: PSYCHIATRY & NEUROLOGY

## 2018-03-31 PROCEDURE — 97116 GAIT TRAINING THERAPY: CPT

## 2018-03-31 PROCEDURE — 94640 AIRWAY INHALATION TREATMENT: CPT

## 2018-03-31 PROCEDURE — 85025 COMPLETE CBC W/AUTO DIFF WBC: CPT

## 2018-03-31 PROCEDURE — 36415 COLL VENOUS BLD VENIPUNCTURE: CPT

## 2018-03-31 PROCEDURE — 99232 SBSQ HOSP IP/OBS MODERATE 35: CPT | Performed by: PSYCHIATRY & NEUROLOGY

## 2018-03-31 PROCEDURE — 97110 THERAPEUTIC EXERCISES: CPT

## 2018-03-31 PROCEDURE — 25010000002 ONDANSETRON PER 1 MG: Performed by: NURSE PRACTITIONER

## 2018-03-31 RX ORDER — GUAIFENESIN 600 MG/1
600 TABLET, EXTENDED RELEASE ORAL EVERY 12 HOURS SCHEDULED
Status: DISCONTINUED | OUTPATIENT
Start: 2018-03-31 | End: 2018-03-31 | Stop reason: HOSPADM

## 2018-03-31 RX ORDER — SODIUM PHOSPHATE, DIBASIC AND SODIUM PHOSPHATE, MONOBASIC 7; 19 G/133ML; G/133ML
1 ENEMA RECTAL
Status: ACTIVE | OUTPATIENT
Start: 2018-03-31 | End: 2018-03-31

## 2018-03-31 RX ORDER — IPRATROPIUM BROMIDE AND ALBUTEROL SULFATE 2.5; .5 MG/3ML; MG/3ML
3 SOLUTION RESPIRATORY (INHALATION) ONCE
Status: DISCONTINUED | OUTPATIENT
Start: 2018-03-31 | End: 2018-03-31 | Stop reason: HOSPADM

## 2018-03-31 RX ORDER — FUROSEMIDE 20 MG/1
20 TABLET ORAL DAILY
Status: DISCONTINUED | OUTPATIENT
Start: 2018-03-31 | End: 2018-04-13 | Stop reason: HOSPADM

## 2018-03-31 RX ORDER — DABIGATRAN ETEXILATE 150 MG/1
150 CAPSULE ORAL EVERY 12 HOURS SCHEDULED
Qty: 60 CAPSULE | Refills: 0 | Status: SHIPPED | OUTPATIENT
Start: 2018-03-31

## 2018-03-31 RX ORDER — LISINOPRIL 10 MG/1
10 TABLET ORAL DAILY
Status: DISCONTINUED | OUTPATIENT
Start: 2018-03-31 | End: 2018-04-04

## 2018-03-31 RX ORDER — DOCUSATE SODIUM 100 MG/1
100 CAPSULE, LIQUID FILLED ORAL 2 TIMES DAILY
Status: DISCONTINUED | OUTPATIENT
Start: 2018-03-31 | End: 2018-04-13 | Stop reason: HOSPADM

## 2018-03-31 RX ORDER — FUROSEMIDE 10 MG/ML
20 INJECTION INTRAMUSCULAR; INTRAVENOUS ONCE
Status: COMPLETED | OUTPATIENT
Start: 2018-03-31 | End: 2018-03-31

## 2018-03-31 RX ORDER — ATORVASTATIN CALCIUM 40 MG/1
40 TABLET, FILM COATED ORAL NIGHTLY
COMMUNITY
End: 2018-07-23 | Stop reason: SDUPTHER

## 2018-03-31 RX ORDER — TITANIUM DIOXIDE, OCTINOXATE, ZINC OXIDE 4.61; 1.6; .78 G/40ML; G/40ML; G/40ML
1 CREAM TOPICAL EVERY MORNING
Status: DISCONTINUED | OUTPATIENT
Start: 2018-04-01 | End: 2018-03-31 | Stop reason: CLARIF

## 2018-03-31 RX ORDER — CETIRIZINE HYDROCHLORIDE 10 MG/1
5 TABLET ORAL DAILY
Status: DISCONTINUED | OUTPATIENT
Start: 2018-03-31 | End: 2018-04-13 | Stop reason: HOSPADM

## 2018-03-31 RX ORDER — DABIGATRAN ETEXILATE 75 MG/1
150 CAPSULE, COATED PELLETS ORAL 2 TIMES DAILY
Status: DISCONTINUED | OUTPATIENT
Start: 2018-03-31 | End: 2018-04-13 | Stop reason: HOSPADM

## 2018-03-31 RX ORDER — ATORVASTATIN CALCIUM 40 MG/1
40 TABLET, FILM COATED ORAL NIGHTLY
Qty: 30 TABLET | Refills: 0 | Status: SHIPPED | OUTPATIENT
Start: 2018-03-31 | End: 2019-01-01 | Stop reason: ALTCHOICE

## 2018-03-31 RX ORDER — PANTOPRAZOLE SODIUM 40 MG/1
40 TABLET, DELAYED RELEASE ORAL
Status: DISCONTINUED | OUTPATIENT
Start: 2018-04-01 | End: 2018-04-13 | Stop reason: HOSPADM

## 2018-03-31 RX ORDER — ALBUTEROL SULFATE 90 UG/1
2 AEROSOL, METERED RESPIRATORY (INHALATION) EVERY 6 HOURS PRN
Status: DISCONTINUED | OUTPATIENT
Start: 2018-03-31 | End: 2018-04-13 | Stop reason: HOSPADM

## 2018-03-31 RX ORDER — ATORVASTATIN CALCIUM 40 MG/1
40 TABLET, FILM COATED ORAL NIGHTLY
Status: DISCONTINUED | OUTPATIENT
Start: 2018-03-31 | End: 2018-04-13 | Stop reason: HOSPADM

## 2018-03-31 RX ORDER — BISACODYL 10 MG
10 SUPPOSITORY, RECTAL RECTAL DAILY PRN
Status: DISCONTINUED | OUTPATIENT
Start: 2018-03-31 | End: 2018-04-13 | Stop reason: HOSPADM

## 2018-03-31 RX ORDER — POLYETHYLENE GLYCOL 3350 17 G/17G
17 POWDER, FOR SOLUTION ORAL DAILY
Status: DISCONTINUED | OUTPATIENT
Start: 2018-03-31 | End: 2018-04-13 | Stop reason: HOSPADM

## 2018-03-31 RX ORDER — ACETAMINOPHEN 325 MG/1
650 TABLET ORAL EVERY 4 HOURS PRN
Status: DISCONTINUED | OUTPATIENT
Start: 2018-03-31 | End: 2018-04-13 | Stop reason: HOSPADM

## 2018-03-31 RX ORDER — ALBUTEROL SULFATE 90 UG/1
2 AEROSOL, METERED RESPIRATORY (INHALATION) EVERY 6 HOURS PRN
COMMUNITY
End: 2018-10-10 | Stop reason: SDUPTHER

## 2018-03-31 RX ORDER — IPRATROPIUM BROMIDE AND ALBUTEROL SULFATE 2.5; .5 MG/3ML; MG/3ML
1 SOLUTION RESPIRATORY (INHALATION) EVERY 6 HOURS PRN
Status: DISCONTINUED | OUTPATIENT
Start: 2018-03-31 | End: 2018-04-01

## 2018-03-31 RX ORDER — PAROXETINE 10 MG/1
10 TABLET, FILM COATED ORAL DAILY
Status: DISCONTINUED | OUTPATIENT
Start: 2018-03-31 | End: 2018-04-13 | Stop reason: HOSPADM

## 2018-03-31 RX ORDER — DABIGATRAN ETEXILATE 150 MG/1
150 CAPSULE, COATED PELLETS ORAL 2 TIMES DAILY
COMMUNITY
End: 2018-07-23 | Stop reason: SDUPTHER

## 2018-03-31 RX ADMIN — MOMETASONE FUROATE AND FORMOTEROL FUMARATE DIHYDRATE 2 PUFF: 100; 5 AEROSOL RESPIRATORY (INHALATION) at 22:58

## 2018-03-31 RX ADMIN — ALBUTEROL SULFATE 2 PUFF: 90 AEROSOL, METERED RESPIRATORY (INHALATION) at 22:57

## 2018-03-31 RX ADMIN — DABIGATRAN ETEXILATE MESYLATE 150 MG: 150 CAPSULE ORAL at 09:58

## 2018-03-31 RX ADMIN — ATORVASTATIN CALCIUM 40 MG: 40 TABLET, FILM COATED ORAL at 22:54

## 2018-03-31 RX ADMIN — PAROXETINE 10 MG: 10 TABLET, FILM COATED ORAL at 06:19

## 2018-03-31 RX ADMIN — IPRATROPIUM BROMIDE AND ALBUTEROL SULFATE 3 ML: 2.5; .5 SOLUTION RESPIRATORY (INHALATION) at 10:46

## 2018-03-31 RX ADMIN — PANTOPRAZOLE SODIUM 40 MG: 40 TABLET, DELAYED RELEASE ORAL at 06:19

## 2018-03-31 RX ADMIN — BUDESONIDE AND FORMOTEROL FUMARATE DIHYDRATE 2 PUFF: 80; 4.5 AEROSOL RESPIRATORY (INHALATION) at 07:12

## 2018-03-31 RX ADMIN — IPRATROPIUM BROMIDE AND ALBUTEROL SULFATE 3 ML: 2.5; .5 SOLUTION RESPIRATORY (INHALATION) at 07:12

## 2018-03-31 RX ADMIN — ACETAMINOPHEN 650 MG: 325 TABLET ORAL at 06:18

## 2018-03-31 RX ADMIN — GUAIFENESIN 600 MG: 600 TABLET, EXTENDED RELEASE ORAL at 11:20

## 2018-03-31 RX ADMIN — LISINOPRIL 10 MG: 10 TABLET ORAL at 09:58

## 2018-03-31 RX ADMIN — IPRATROPIUM BROMIDE AND ALBUTEROL SULFATE 3 ML: 2.5; .5 SOLUTION RESPIRATORY (INHALATION) at 14:50

## 2018-03-31 RX ADMIN — FUROSEMIDE 20 MG: 10 INJECTION, SOLUTION INTRAMUSCULAR; INTRAVENOUS at 11:20

## 2018-03-31 RX ADMIN — ONDANSETRON 4 MG: 2 INJECTION INTRAMUSCULAR; INTRAVENOUS at 04:58

## 2018-03-31 RX ADMIN — IRON SUCROSE 200 MG: 20 INJECTION, SOLUTION INTRAVENOUS at 13:15

## 2018-03-31 RX ADMIN — DABIGATRAN ETEXILATE MESYLATE 150 MG: 75 CAPSULE ORAL at 22:55

## 2018-03-31 RX ADMIN — CETIRIZINE HYDROCHLORIDE 5 MG: 10 TABLET, FILM COATED ORAL at 22:53

## 2018-03-31 RX ADMIN — METOPROLOL TARTRATE 25 MG: 25 TABLET ORAL at 22:57

## 2018-03-31 RX ADMIN — METOPROLOL TARTRATE 25 MG: 25 TABLET, FILM COATED ORAL at 09:58

## 2018-03-31 RX ADMIN — IPRATROPIUM BROMIDE AND ALBUTEROL SULFATE 1 AMPULE: .5; 3 SOLUTION RESPIRATORY (INHALATION) at 23:05

## 2018-03-31 RX ADMIN — FUROSEMIDE 20 MG: 20 TABLET ORAL at 09:58

## 2018-03-31 RX ADMIN — DOCUSATE SODIUM 100 MG: 100 CAPSULE, LIQUID FILLED ORAL at 22:55

## 2018-04-01 PROCEDURE — 6370000000 HC RX 637 (ALT 250 FOR IP): Performed by: PSYCHIATRY & NEUROLOGY

## 2018-04-01 PROCEDURE — 1180000000 HC REHAB R&B

## 2018-04-01 PROCEDURE — 99223 1ST HOSP IP/OBS HIGH 75: CPT | Performed by: PSYCHIATRY & NEUROLOGY

## 2018-04-01 PROCEDURE — 94640 AIRWAY INHALATION TREATMENT: CPT

## 2018-04-01 RX ORDER — IPRATROPIUM BROMIDE AND ALBUTEROL SULFATE 2.5; .5 MG/3ML; MG/3ML
1 SOLUTION RESPIRATORY (INHALATION) EVERY 6 HOURS
Status: DISCONTINUED | OUTPATIENT
Start: 2018-04-01 | End: 2018-04-13 | Stop reason: HOSPADM

## 2018-04-01 RX ADMIN — PANTOPRAZOLE SODIUM 40 MG: 40 TABLET, DELAYED RELEASE ORAL at 06:36

## 2018-04-01 RX ADMIN — IPRATROPIUM BROMIDE AND ALBUTEROL SULFATE 1 AMPULE: .5; 3 SOLUTION RESPIRATORY (INHALATION) at 14:15

## 2018-04-01 RX ADMIN — MOMETASONE FUROATE AND FORMOTEROL FUMARATE DIHYDRATE 2 PUFF: 100; 5 AEROSOL RESPIRATORY (INHALATION) at 09:13

## 2018-04-01 RX ADMIN — CETIRIZINE HYDROCHLORIDE 5 MG: 10 TABLET, FILM COATED ORAL at 09:15

## 2018-04-01 RX ADMIN — METOPROLOL TARTRATE 25 MG: 25 TABLET ORAL at 09:18

## 2018-04-01 RX ADMIN — METOPROLOL TARTRATE 25 MG: 25 TABLET ORAL at 21:27

## 2018-04-01 RX ADMIN — FUROSEMIDE 20 MG: 20 TABLET ORAL at 09:16

## 2018-04-01 RX ADMIN — MOMETASONE FUROATE AND FORMOTEROL FUMARATE DIHYDRATE 2 PUFF: 100; 5 AEROSOL RESPIRATORY (INHALATION) at 21:27

## 2018-04-01 RX ADMIN — DABIGATRAN ETEXILATE MESYLATE 150 MG: 75 CAPSULE ORAL at 21:27

## 2018-04-01 RX ADMIN — IPRATROPIUM BROMIDE AND ALBUTEROL SULFATE 1 AMPULE: .5; 3 SOLUTION RESPIRATORY (INHALATION) at 18:58

## 2018-04-01 RX ADMIN — DABIGATRAN ETEXILATE MESYLATE 150 MG: 75 CAPSULE ORAL at 09:16

## 2018-04-01 RX ADMIN — ATORVASTATIN CALCIUM 40 MG: 40 TABLET, FILM COATED ORAL at 21:27

## 2018-04-01 RX ADMIN — DOCUSATE SODIUM 100 MG: 100 CAPSULE, LIQUID FILLED ORAL at 21:27

## 2018-04-01 RX ADMIN — LISINOPRIL 10 MG: 10 TABLET ORAL at 09:14

## 2018-04-01 RX ADMIN — PAROXETINE 10 MG: 10 TABLET, FILM COATED ORAL at 09:15

## 2018-04-01 NOTE — THERAPY DISCHARGE NOTE
Acute Care - Occupational Therapy Discharge Summary  [facility]     Patient Name: Heather Burgos  : 10/23/1926  MRN: [id number]    Today's Date: 2018       Date of Referral to OT: 18         Admit Date: 3/28/2018        OT Recommendation and Plan    Visit Dx:    ICD-10-CM ICD-9-CM   1. Cerebellar stroke I63.9 434.91   2. Essential hypertension I10 401.9   3. Dysphasia R47.02 784.59   4. Impaired mobility and ADLs Z74.09 799.89   5. Impaired functional mobility, balance, gait, and endurance Z74.09 V49.89   6. Dysphagia, unspecified type R13.10 787.20   7. 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  -CS     Progress/Outcome (Transfer Goal 1, OT) goal not met  -CS goal ongoing  -CS        Dressing Goal 1 (OT)    Activity/Assistive Device (Dressing Goal 1, OT)  -- dressing skills, all  -CS     [place]/Cues Needed (Dressing Goal 1, OT)  -- contact guard assist;verbal cues required  -CS     Time Frame (Dressing Goal 1, OT)  -- 2 weeks  -CS     Barriers (Dressing Goal 1, OT)  -- .  -CS     Progress/Outcome (Dressing Goal 1, OT) goal not met  -CS goal ongoing  -CS        Toileting Goal 1 (OT)    Activity/Device (Toileting Goal 1, OT)  -- toileting skills, all;commode chair  -CS     [place] Level/Cues Needed (Toileting Goal 1, OT)  -- contact guard assist;verbal cues required  -CS     Time Frame (Toileting Goal 1, OT)  -- 2 weeks  -CS     Barriers (Toileting Goal 1, OT)  -- .  -CS     Progress/Outcome (Toileting Goal 1, OT) goal met  -CS goal ongoing   -CS        Strength Goal 1 (OT)    Strength Goal 1 (OT)  -- Pt will increase BUE strength to 4+/5 for increased I with ADLs and functional mobility.  -CS     Time Frame (Strength Goal 1, OT)  -- 2 weeks  -CS     Barriers (Strength Goal 1, OT)  -- .  -CS     Progress/Outcome (Strength Goal 1, OT) goal not met  -CS goal ongoing  -CS       User Key  (r) = Recorded By, (t) = Taken By, (c) = Cosigned By    Initials Name Provider Type    CS Ana Polanco OTR/L Occupational Therapist                Outcome Measures     Row Name 03/31/18 1350 03/31/18 1020 03/30/18 0946       How much help from another person do you currently need...    Turning from your back to your side while in flat bed without using bedrails?  -- 3  -LG  --    Moving from lying on back to sitting on the side of a flat bed without bedrails?  -- 3  -LG  --    Moving to and from a bed to a chair (including a wheelchair)?  -- 3  -LG  --    Standing up from a chair using your arms (e.g., wheelchair, bedside chair)?  -- 3  -LG  --    Climbing 3-5 steps with a railing?  -- 3  -LG  --    To walk in hospital room?  -- 3  -LG  --    AM-PAC 6 Clicks Score  -- 18  -LG  --       How much help from another is currently needed...    Putting on and taking off regular lower body clothing? 3  -CJ  -- 3  -CS    Bathing (including washing, rinsing, and drying) 3  -CJ  -- 3  -CS    Toileting (which includes using toilet bed pan or urinal) 3  -CJ  -- 3  -CS    Putting on and taking off regular upper body clothing 3  -CJ  -- 3  -CS    Taking care of personal grooming (such as brushing teeth) 3  -CJ  -- 3  -CS    Eating meals 4  -CJ  -- 4  -CS    Score 19  -CJ  -- 19  -CS       Functional Assessment    Outcome Measure Options AM-PAC 6 Clicks Daily Activity (OT)  -CJ AM-PAC 6 Clicks Basic Mobility (PT)  -LG AM-PAC 6 Clicks Daily Activity (OT)  -CS    Row Name 03/29/18 0930 03/29/18 0920          How much help from another person do you currently need...    Turning from your  back to your side while in flat bed without using bedrails? 3  -PB  --     Moving from lying on back to sitting on the side of a flat bed without bedrails? 3  -PB  --     Moving to and from a bed to a chair (including a wheelchair)? 3  -PB  --     Standing up from a chair using your arms (e.g., wheelchair, bedside chair)? 3  -PB  --     Climbing 3-5 steps with a railing? 2  -PB  --     To walk in hospital room? 3  -PB  --     AM-PAC 6 Clicks Score 17  -PB  --        How much help from another is currently needed...    Putting on and taking off regular lower body clothing?  -- 2  -CS     Bathing (including washing, rinsing, and drying)  -- 2  -CS     Toileting (which includes using toilet bed pan or urinal)  -- 2  -CS     Putting on and taking off regular upper body clothing  -- 3  -CS     Taking care of personal grooming (such as brushing teeth)  -- 3  -CS     Eating meals  -- 4  -CS     Score  -- 16  -CS        Functional Assessment    Outcome Measure Options AM-PAC 6 Clicks Basic Mobility (PT)  -PB AM-PAC 6 Clicks Daily Activity (OT)  -CS       User Key  (r) = Recorded By, (t) = Taken By, (c) = Cosigned By    Initials Name Provider Type    CHARLOTTE Fuentes PTA Physical Therapy Assistant    MAMTA Beltran BRYAN/DIETER Occupational Therapy Assistant    REYNALDO Polanco, OTR/L Occupational Therapist    PB José Leal, PT DPT Physical Therapist              OT Discharge Summary  Anticipated Discharge Disposition (OT): skilled nursing facility (SNF)  Reason for Discharge: Discharge from facility  Outcomes Achieved: Refer to plan of care for updates on goals achieved  Discharge Destination: SNF      Ana Polanco, OTR/L  4/1/2018

## 2018-04-01 NOTE — THERAPY DISCHARGE NOTE
Acute Care - Physical Therapy Discharge Summary  [facility]       Patient Name: Heather Burgos  : 10/23/1926  MRN: [id number]    Today's Date: 2018  Onset of Illness/Injury or Date of Surgery: 18    Date of Referral to PT: 18  Referring Physician: Dr Singh      Admit Date: 3/28/2018      PT Recommendation and Plan    Visit Dx:    ICD-10-CM ICD-9-CM   1. Cerebellar stroke I63.9 434.91   2. Essential hypertension I10 401.9   3. Dysphasia R47.02 784.59   4. Impaired mobility and ADLs Z74.09 799.89   5. Impaired functional mobility, balance, gait, and endurance Z74.09 V49.89   6. Dysphagia, unspecified type R13.10 787.20   7. 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    Turning from your back to your side while in flat bed without using bedrails?  -- 3  -LG  --    Moving from lying on back to sitting on the side of a flat bed without bedrails?  -- 3  -LG  --    Moving to and from a bed to a chair (including a wheelchair)?  -- 3  -LG  --    Standing up from a chair using your arms (e.g., wheelchair, bedside chair)?  -- 3  -LG  --    Climbing 3-5 steps with a railing?  -- 3  -LG  --    To walk in hospital room?  -- 3  -LG  --    AM-PAC 6 Clicks Score  -- 18  -LG  --       How much help from another is currently needed...    Putting on and taking off regular lower body clothing? 3  -CJ  -- 3  -CS    Bathing (including washing, rinsing, and drying) 3  -CJ  -- 3  -CS    Toileting (which includes using toilet bed pan or urinal) 3  -CJ  -- 3  -CS    Putting on and taking off regular upper body clothing 3  -CJ  -- 3  -CS    Taking care of personal grooming (such as brushing teeth) 3  -CJ  -- 3  -CS    Eating meals 4  -CJ  -- 4  -CS    Score 19  -CJ  -- 19  -CS       Functional Assessment    Outcome Measure Options AM-PAC 6 Clicks  Daily Activity (OT)  -[facility] 6 Clicks Basic Mobility (PT)  - AM-[facility] 6 Clicks Daily Activity (OT)  -CS    Row Name 03/29/18 0930 03/29/18 0920          How much help from another person do you currently need...    Turning from your back to your side while in flat bed without using bedrails? 3  -PB  --     Moving from lying on back to sitting on the side of a flat bed without bedrails? 3  -PB  --     Moving to and from a bed to a chair (including a wheelchair)? 3  -PB  --     Standing up from a chair using your arms (e.g., wheelchair, bedside chair)? 3  -PB  --     Climbing 3-5 steps with a railing? 2  -PB  --     To walk in hospital room? 3  -PB  --     AM-[facility] 6 Clicks Score 17  -PB  --        How much help from another is currently needed...    Putting on and taking off regular lower body clothing?  -- 2  -CS     Bathing (including washing, rinsing, and drying)  -- 2  -CS     Toileting (which includes using toilet bed pan or urinal)  -- 2  -CS     Putting on and taking off regular upper body clothing  -- 3  -CS     Taking care of personal grooming (such as brushing teeth)  -- 3  -CS     Eating meals  -- 4  -CS     Score  -- 16  -CS        Functional Assessment    Outcome Measure Options AM-PAC 6 Clicks Basic Mobility (PT)  -PB AM-[facility] 6 Clicks Daily Activity (OT)  -CS       User Key  (r) = Recorded By, (t) = Taken By, (c) = Cosigned By    Initials Name Provider Type    LG Sang Fuentes PTA Physical Therapy Assistant    MAMAT Beltran BRYAN/L Occupational Therapy Assistant    REYNALDO Polanco OTR/L Occupational Therapist    CARY Leal, PT DPT Physical Therapist                      PT Rehab Goals     Row Name 03/30/18 0820 03/29/18 0930          Bed Mobility Goal 1 (PT)    Activity/Assistive Device (Bed Mobility Goal 1, PT)  -- bed mobility activities, all  -PB     [place] Level/Cues Needed (Bed Mobility Goal 1, PT)  -- supervision required  -PB     Time Frame (Bed Mobility Goal 1, PT)  --  long term goal (LTG);by discharge  -PB     Barriers (Bed Mobility Goal 1, PT)  -- none  -PB     Progress/Outcomes (Bed Mobility Goal 1, PT) goal met  -KJ goal ongoing  -PB        Transfer Goal 1 (PT)    Activity/Assistive Device (Transfer Goal 1, PT)  -- sit-to-stand/stand-to-sit;walker, rolling  -PB     [place] Level/Cues Needed (Transfer Goal 1, PT)  -- supervision required  -PB     Time Frame (Transfer Goal 1, PT)  -- long term goal (LTG);by discharge  -PB     Barriers (Transfers Goal 1, PT)  -- none  -PB     Progress/Outcome (Transfer Goal 1, PT) goal not met  -KJ goal ongoing  -PB        Gait Training Goal 1 (PT)    Activity/Assistive Device (Gait Training Goal 1, PT)  -- gait (walking locomotion);walker, rolling  -PB     [place] Level (Gait Training Goal 1, PT)  -- contact guard assist  -PB     Distance (Gait Goal 1, PT)  -- 100  -PB     Time Frame (Gait Training Goal 1, PT)  -- long term goal (LTG);by discharge  -PB     Barriers (Gait Training Goal 1, PT)  -- none  -PB     Progress/Outcome (Gait Training Goal 1, PT) goal met  -KJ goal ongoing  -PB        Stairs Goal 1 (PT)    Activity/Assistive Device (Stairs Goal 1, PT)  -- ascending stairs;descending stairs;walker, rolling  -PB     [place] Level/Cues Needed (Stairs Goal 1, PT)  -- minimum assist (75% or more patient effort)  -PB     Number of Stairs (Stairs Goal 1, PT)  -- 1  -PB     Time Frame (Stairs Goal 1, PT)  -- long term goal (LTG);by discharge  -PB     Barriers (Stairs Goal 1, PT)  -- none  -PB     Progress/Outcome (Stairs Goal 1, PT) goal not met  -KJ goal ongoing  -PB       User Key  (r) = Recorded By, (t) = Taken By, (c) = Cosigned By    Initials Name Provider Type    GLADYS Gaines, PTA Physical Therapy Assistant    CARY Leal, PT DPT Physical Therapist              PT Discharge Summary  Anticipated Discharge Disposition (PT): skilled nursing facility (SNF)  Reason for Discharge: Discharge from facility  Outcomes  Achieved: Refer to plan of care for updates on goals achieved  Discharge Destination: SNF      Donna Gaines, PTA   4/1/2018

## 2018-04-02 LAB
ANION GAP SERPL CALCULATED.3IONS-SCNC: 10 MMOL/L (ref 7–19)
BASOPHILS ABSOLUTE: 0 K/UL (ref 0–0.2)
BASOPHILS RELATIVE PERCENT: 0.3 % (ref 0–1)
BUN BLDV-MCNC: 8 MG/DL (ref 8–23)
CALCIUM SERPL-MCNC: 8.9 MG/DL (ref 8.2–9.6)
CHLORIDE BLD-SCNC: 98 MMOL/L (ref 98–111)
CO2: 29 MMOL/L (ref 22–29)
CREAT SERPL-MCNC: <0.5 MG/DL (ref 0.5–0.9)
EOSINOPHILS ABSOLUTE: 0.3 K/UL (ref 0–0.6)
EOSINOPHILS RELATIVE PERCENT: 2.9 % (ref 0–5)
GFR NON-AFRICAN AMERICAN: >60
GLUCOSE BLD-MCNC: 117 MG/DL (ref 74–109)
HCT VFR BLD CALC: 32 % (ref 37–47)
HEMOGLOBIN: 10.2 G/DL (ref 12–16)
LYMPHOCYTES ABSOLUTE: 1 K/UL (ref 1.1–4.5)
LYMPHOCYTES RELATIVE PERCENT: 9.2 % (ref 20–40)
MCH RBC QN AUTO: 30.1 PG (ref 27–31)
MCHC RBC AUTO-ENTMCNC: 31.9 G/DL (ref 33–37)
MCV RBC AUTO: 94.4 FL (ref 81–99)
MONOCYTES ABSOLUTE: 1.4 K/UL (ref 0–0.9)
MONOCYTES RELATIVE PERCENT: 13 % (ref 0–10)
NEUTROPHILS ABSOLUTE: 7.9 K/UL (ref 1.5–7.5)
NEUTROPHILS RELATIVE PERCENT: 74.1 % (ref 50–65)
PDW BLD-RTO: 15.4 % (ref 11.5–14.5)
PLATELET # BLD: 329 K/UL (ref 130–400)
PMV BLD AUTO: 8.3 FL (ref 9.4–12.3)
POTASSIUM REFLEX MAGNESIUM: 3.8 MMOL/L (ref 3.5–5)
RBC # BLD: 3.39 M/UL (ref 4.2–5.4)
SODIUM BLD-SCNC: 137 MMOL/L (ref 136–145)
WBC # BLD: 10.6 K/UL (ref 4.8–10.8)

## 2018-04-02 PROCEDURE — 99232 SBSQ HOSP IP/OBS MODERATE 35: CPT | Performed by: PSYCHIATRY & NEUROLOGY

## 2018-04-02 PROCEDURE — 97535 SELF CARE MNGMENT TRAINING: CPT

## 2018-04-02 PROCEDURE — 36415 COLL VENOUS BLD VENIPUNCTURE: CPT

## 2018-04-02 PROCEDURE — 6370000000 HC RX 637 (ALT 250 FOR IP): Performed by: PSYCHIATRY & NEUROLOGY

## 2018-04-02 PROCEDURE — 85025 COMPLETE CBC W/AUTO DIFF WBC: CPT

## 2018-04-02 PROCEDURE — 97165 OT EVAL LOW COMPLEX 30 MIN: CPT

## 2018-04-02 PROCEDURE — 92610 EVALUATE SWALLOWING FUNCTION: CPT

## 2018-04-02 PROCEDURE — 94640 AIRWAY INHALATION TREATMENT: CPT

## 2018-04-02 PROCEDURE — 96125 COGNITIVE TEST BY HC PRO: CPT

## 2018-04-02 PROCEDURE — 97161 PT EVAL LOW COMPLEX 20 MIN: CPT

## 2018-04-02 PROCEDURE — 92522 EVALUATE SPEECH PRODUCTION: CPT

## 2018-04-02 PROCEDURE — 80048 BASIC METABOLIC PNL TOTAL CA: CPT

## 2018-04-02 PROCEDURE — 97116 GAIT TRAINING THERAPY: CPT

## 2018-04-02 PROCEDURE — 97110 THERAPEUTIC EXERCISES: CPT

## 2018-04-02 PROCEDURE — 1180000000 HC REHAB R&B

## 2018-04-02 RX ADMIN — PAROXETINE 10 MG: 10 TABLET, FILM COATED ORAL at 08:16

## 2018-04-02 RX ADMIN — MOMETASONE FUROATE AND FORMOTEROL FUMARATE DIHYDRATE 2 PUFF: 100; 5 AEROSOL RESPIRATORY (INHALATION) at 08:16

## 2018-04-02 RX ADMIN — CETIRIZINE HYDROCHLORIDE 5 MG: 10 TABLET, FILM COATED ORAL at 08:17

## 2018-04-02 RX ADMIN — DOCUSATE SODIUM 100 MG: 100 CAPSULE, LIQUID FILLED ORAL at 20:35

## 2018-04-02 RX ADMIN — IPRATROPIUM BROMIDE AND ALBUTEROL SULFATE 1 AMPULE: .5; 3 SOLUTION RESPIRATORY (INHALATION) at 06:12

## 2018-04-02 RX ADMIN — DABIGATRAN ETEXILATE MESYLATE 150 MG: 75 CAPSULE ORAL at 08:16

## 2018-04-02 RX ADMIN — METOPROLOL TARTRATE 25 MG: 25 TABLET ORAL at 20:35

## 2018-04-02 RX ADMIN — MOMETASONE FUROATE AND FORMOTEROL FUMARATE DIHYDRATE 2 PUFF: 100; 5 AEROSOL RESPIRATORY (INHALATION) at 20:35

## 2018-04-02 RX ADMIN — IPRATROPIUM BROMIDE AND ALBUTEROL SULFATE 1 AMPULE: .5; 3 SOLUTION RESPIRATORY (INHALATION) at 14:17

## 2018-04-02 RX ADMIN — IPRATROPIUM BROMIDE AND ALBUTEROL SULFATE 1 AMPULE: .5; 3 SOLUTION RESPIRATORY (INHALATION) at 19:23

## 2018-04-02 RX ADMIN — LISINOPRIL 10 MG: 10 TABLET ORAL at 08:16

## 2018-04-02 RX ADMIN — METOPROLOL TARTRATE 25 MG: 25 TABLET ORAL at 08:16

## 2018-04-02 RX ADMIN — DABIGATRAN ETEXILATE MESYLATE 150 MG: 75 CAPSULE ORAL at 20:34

## 2018-04-02 RX ADMIN — FUROSEMIDE 20 MG: 20 TABLET ORAL at 08:16

## 2018-04-02 RX ADMIN — PANTOPRAZOLE SODIUM 40 MG: 40 TABLET, DELAYED RELEASE ORAL at 06:06

## 2018-04-02 RX ADMIN — ATORVASTATIN CALCIUM 40 MG: 40 TABLET, FILM COATED ORAL at 20:34

## 2018-04-03 PROCEDURE — 97535 SELF CARE MNGMENT TRAINING: CPT

## 2018-04-03 PROCEDURE — 6370000000 HC RX 637 (ALT 250 FOR IP): Performed by: PSYCHIATRY & NEUROLOGY

## 2018-04-03 PROCEDURE — 1180000000 HC REHAB R&B

## 2018-04-03 PROCEDURE — 99232 SBSQ HOSP IP/OBS MODERATE 35: CPT | Performed by: PSYCHIATRY & NEUROLOGY

## 2018-04-03 PROCEDURE — G0515 COGNITIVE SKILLS DEVELOPMENT: HCPCS

## 2018-04-03 PROCEDURE — 94640 AIRWAY INHALATION TREATMENT: CPT

## 2018-04-03 PROCEDURE — 97110 THERAPEUTIC EXERCISES: CPT

## 2018-04-03 PROCEDURE — 6360000002 HC RX W HCPCS: Performed by: PSYCHIATRY & NEUROLOGY

## 2018-04-03 RX ORDER — ONDANSETRON 4 MG/1
4 TABLET, ORALLY DISINTEGRATING ORAL EVERY 8 HOURS PRN
Status: DISCONTINUED | OUTPATIENT
Start: 2018-04-03 | End: 2018-04-13 | Stop reason: HOSPADM

## 2018-04-03 RX ADMIN — IPRATROPIUM BROMIDE AND ALBUTEROL SULFATE 1 AMPULE: .5; 3 SOLUTION RESPIRATORY (INHALATION) at 06:30

## 2018-04-03 RX ADMIN — IPRATROPIUM BROMIDE AND ALBUTEROL SULFATE 1 AMPULE: .5; 3 SOLUTION RESPIRATORY (INHALATION) at 18:38

## 2018-04-03 RX ADMIN — MOMETASONE FUROATE AND FORMOTEROL FUMARATE DIHYDRATE 2 PUFF: 100; 5 AEROSOL RESPIRATORY (INHALATION) at 21:13

## 2018-04-03 RX ADMIN — METOPROLOL TARTRATE 25 MG: 25 TABLET ORAL at 07:46

## 2018-04-03 RX ADMIN — ATORVASTATIN CALCIUM 40 MG: 40 TABLET, FILM COATED ORAL at 21:13

## 2018-04-03 RX ADMIN — MOMETASONE FUROATE AND FORMOTEROL FUMARATE DIHYDRATE 2 PUFF: 100; 5 AEROSOL RESPIRATORY (INHALATION) at 07:46

## 2018-04-03 RX ADMIN — IPRATROPIUM BROMIDE AND ALBUTEROL SULFATE 1 AMPULE: .5; 3 SOLUTION RESPIRATORY (INHALATION) at 14:58

## 2018-04-03 RX ADMIN — FUROSEMIDE 20 MG: 20 TABLET ORAL at 07:46

## 2018-04-03 RX ADMIN — METOPROLOL TARTRATE 25 MG: 25 TABLET ORAL at 21:13

## 2018-04-03 RX ADMIN — ONDANSETRON 4 MG: 4 TABLET, ORALLY DISINTEGRATING ORAL at 09:15

## 2018-04-03 RX ADMIN — LISINOPRIL 10 MG: 10 TABLET ORAL at 07:46

## 2018-04-03 RX ADMIN — PANTOPRAZOLE SODIUM 40 MG: 40 TABLET, DELAYED RELEASE ORAL at 06:10

## 2018-04-03 RX ADMIN — CETIRIZINE HYDROCHLORIDE 5 MG: 10 TABLET, FILM COATED ORAL at 07:46

## 2018-04-03 RX ADMIN — DOCUSATE SODIUM 100 MG: 100 CAPSULE, LIQUID FILLED ORAL at 21:13

## 2018-04-03 RX ADMIN — PAROXETINE 10 MG: 10 TABLET, FILM COATED ORAL at 07:46

## 2018-04-03 RX ADMIN — DABIGATRAN ETEXILATE MESYLATE 150 MG: 75 CAPSULE ORAL at 21:12

## 2018-04-03 RX ADMIN — DABIGATRAN ETEXILATE MESYLATE 150 MG: 75 CAPSULE ORAL at 07:45

## 2018-04-03 NOTE — THERAPY DISCHARGE NOTE
Acute Care - Speech Language Pathology Discharge Summary  [facility]       Patient Name: Heather Burgos  : 10/23/1926  MRN: [id number]    Today's Date: 4/3/2018  Onset of Illness/Injury or Date of Surgery: 18       Referring Physician: Dr Singh        Admit Date: 3/28/2018      SLP Recommendation and Plan    Visit Dx:    ICD-10-CM ICD-9-CM   1. Cerebellar stroke I63.9 434.91   2. Essential hypertension I10 401.9   3. Dysphasia R47.02 784.59   4. Impaired mobility and ADLs Z74.09 799.89   5. Impaired functional mobility, balance, gait, and endurance Z74.09 V49.89   6. Dysphagia, unspecified type R13.10 787.20   7. 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) I69.320 438.11                     SLP GOALS     Row Name 18 1400             Word Retrieval Skills Goal 1 (SLP)    Progress/Outcomes (Word Retrieval Goal 1, SLP) goal not met  -MB         Connected Speech to Express Thoughts Goal 1 (SLP)    Progress/Outcomes (Connected Speech Goal 1, SLP) goal not met  -MB         Awareness of Basic Personal Information Goal 1 (SLP)    Progress/Outcomes (Awareness of Basic Personal Information Goal 1, SLP) goal not met  -MB         Organizational Skills Goal 1 (SLP)    Progress/Outcomes (Thought Organization Skills Goal 1, SLP) goal not met  -MB        User Key  (r) = Recorded By, (t) = Taken By, (c) = Cosigned By    Initials Name Provider Type    JULIANA Magallanes CCC-SLP Speech and Language Pathologist                  SLP Discharge Summary  Anticipated Dischage Disposition: unknown  Reason for Discharge: discharge from this facility  Progress Toward Achieving Short/long Term Goals: goals not met within established timelines  Discharge Destination: [facility]      Digna Magallanes CCC-SLP  4/3/2018

## 2018-04-04 PROCEDURE — 94640 AIRWAY INHALATION TREATMENT: CPT

## 2018-04-04 PROCEDURE — G0515 COGNITIVE SKILLS DEVELOPMENT: HCPCS

## 2018-04-04 PROCEDURE — 1180000000 HC REHAB R&B

## 2018-04-04 PROCEDURE — 97116 GAIT TRAINING THERAPY: CPT

## 2018-04-04 PROCEDURE — 99233 SBSQ HOSP IP/OBS HIGH 50: CPT | Performed by: PSYCHIATRY & NEUROLOGY

## 2018-04-04 PROCEDURE — 97530 THERAPEUTIC ACTIVITIES: CPT

## 2018-04-04 PROCEDURE — 6370000000 HC RX 637 (ALT 250 FOR IP): Performed by: PSYCHIATRY & NEUROLOGY

## 2018-04-04 PROCEDURE — 97110 THERAPEUTIC EXERCISES: CPT

## 2018-04-04 RX ADMIN — IPRATROPIUM BROMIDE AND ALBUTEROL SULFATE 1 AMPULE: .5; 3 SOLUTION RESPIRATORY (INHALATION) at 06:03

## 2018-04-04 RX ADMIN — ACETAMINOPHEN 650 MG: 325 TABLET, FILM COATED ORAL at 10:33

## 2018-04-04 RX ADMIN — CETIRIZINE HYDROCHLORIDE 5 MG: 10 TABLET, FILM COATED ORAL at 08:22

## 2018-04-04 RX ADMIN — DABIGATRAN ETEXILATE MESYLATE 150 MG: 75 CAPSULE ORAL at 08:21

## 2018-04-04 RX ADMIN — ATORVASTATIN CALCIUM 40 MG: 40 TABLET, FILM COATED ORAL at 20:55

## 2018-04-04 RX ADMIN — METOPROLOL TARTRATE 25 MG: 25 TABLET ORAL at 08:22

## 2018-04-04 RX ADMIN — PANTOPRAZOLE SODIUM 40 MG: 40 TABLET, DELAYED RELEASE ORAL at 06:11

## 2018-04-04 RX ADMIN — MOMETASONE FUROATE AND FORMOTEROL FUMARATE DIHYDRATE 2 PUFF: 100; 5 AEROSOL RESPIRATORY (INHALATION) at 20:56

## 2018-04-04 RX ADMIN — IPRATROPIUM BROMIDE AND ALBUTEROL SULFATE 1 AMPULE: .5; 3 SOLUTION RESPIRATORY (INHALATION) at 19:50

## 2018-04-04 RX ADMIN — FUROSEMIDE 20 MG: 20 TABLET ORAL at 08:22

## 2018-04-04 RX ADMIN — DABIGATRAN ETEXILATE MESYLATE 150 MG: 75 CAPSULE ORAL at 20:55

## 2018-04-04 RX ADMIN — PAROXETINE 10 MG: 10 TABLET, FILM COATED ORAL at 08:23

## 2018-04-04 RX ADMIN — DOCUSATE SODIUM 100 MG: 100 CAPSULE, LIQUID FILLED ORAL at 20:55

## 2018-04-04 RX ADMIN — METOPROLOL TARTRATE 25 MG: 25 TABLET ORAL at 20:55

## 2018-04-04 RX ADMIN — IPRATROPIUM BROMIDE AND ALBUTEROL SULFATE 1 AMPULE: .5; 3 SOLUTION RESPIRATORY (INHALATION) at 15:24

## 2018-04-04 RX ADMIN — MOMETASONE FUROATE AND FORMOTEROL FUMARATE DIHYDRATE 2 PUFF: 100; 5 AEROSOL RESPIRATORY (INHALATION) at 08:25

## 2018-04-04 ASSESSMENT — PAIN SCALES - GENERAL
PAINLEVEL_OUTOF10: 4
PAINLEVEL_OUTOF10: 0

## 2018-04-04 ASSESSMENT — PAIN DESCRIPTION - LOCATION: LOCATION: HEAD

## 2018-04-05 LAB
ANION GAP SERPL CALCULATED.3IONS-SCNC: 10 MMOL/L (ref 7–19)
BASOPHILS ABSOLUTE: 0 K/UL (ref 0–0.2)
BASOPHILS RELATIVE PERCENT: 0.5 % (ref 0–1)
BUN BLDV-MCNC: 18 MG/DL (ref 8–23)
CALCIUM SERPL-MCNC: 9.1 MG/DL (ref 8.2–9.6)
CHLORIDE BLD-SCNC: 96 MMOL/L (ref 98–111)
CO2: 30 MMOL/L (ref 22–29)
CREAT SERPL-MCNC: 0.6 MG/DL (ref 0.5–0.9)
EOSINOPHILS ABSOLUTE: 0.3 K/UL (ref 0–0.6)
EOSINOPHILS RELATIVE PERCENT: 3.4 % (ref 0–5)
GFR NON-AFRICAN AMERICAN: >60
GLUCOSE BLD-MCNC: 119 MG/DL (ref 74–109)
HCT VFR BLD CALC: 30.6 % (ref 37–47)
HEMOGLOBIN: 9.6 G/DL (ref 12–16)
LYMPHOCYTES ABSOLUTE: 1 K/UL (ref 1.1–4.5)
LYMPHOCYTES RELATIVE PERCENT: 11.4 % (ref 20–40)
MCH RBC QN AUTO: 29.8 PG (ref 27–31)
MCHC RBC AUTO-ENTMCNC: 31.4 G/DL (ref 33–37)
MCV RBC AUTO: 95 FL (ref 81–99)
MONOCYTES ABSOLUTE: 1.1 K/UL (ref 0–0.9)
MONOCYTES RELATIVE PERCENT: 13.2 % (ref 0–10)
NEUTROPHILS ABSOLUTE: 6 K/UL (ref 1.5–7.5)
NEUTROPHILS RELATIVE PERCENT: 71.1 % (ref 50–65)
PDW BLD-RTO: 15.2 % (ref 11.5–14.5)
PLATELET # BLD: 339 K/UL (ref 130–400)
PMV BLD AUTO: 9 FL (ref 9.4–12.3)
POTASSIUM SERPL-SCNC: 4.4 MMOL/L (ref 3.5–5)
RBC # BLD: 3.22 M/UL (ref 4.2–5.4)
SODIUM BLD-SCNC: 136 MMOL/L (ref 136–145)
WBC # BLD: 8.4 K/UL (ref 4.8–10.8)

## 2018-04-05 PROCEDURE — G0515 COGNITIVE SKILLS DEVELOPMENT: HCPCS

## 2018-04-05 PROCEDURE — 6370000000 HC RX 637 (ALT 250 FOR IP): Performed by: PSYCHIATRY & NEUROLOGY

## 2018-04-05 PROCEDURE — 99232 SBSQ HOSP IP/OBS MODERATE 35: CPT | Performed by: PSYCHIATRY & NEUROLOGY

## 2018-04-05 PROCEDURE — 80048 BASIC METABOLIC PNL TOTAL CA: CPT

## 2018-04-05 PROCEDURE — 97110 THERAPEUTIC EXERCISES: CPT

## 2018-04-05 PROCEDURE — 36415 COLL VENOUS BLD VENIPUNCTURE: CPT

## 2018-04-05 PROCEDURE — 1180000000 HC REHAB R&B

## 2018-04-05 PROCEDURE — 97116 GAIT TRAINING THERAPY: CPT

## 2018-04-05 PROCEDURE — 85025 COMPLETE CBC W/AUTO DIFF WBC: CPT

## 2018-04-05 PROCEDURE — 97535 SELF CARE MNGMENT TRAINING: CPT

## 2018-04-05 PROCEDURE — 94640 AIRWAY INHALATION TREATMENT: CPT

## 2018-04-05 RX ADMIN — METOPROLOL TARTRATE 25 MG: 25 TABLET ORAL at 08:18

## 2018-04-05 RX ADMIN — PANTOPRAZOLE SODIUM 40 MG: 40 TABLET, DELAYED RELEASE ORAL at 06:02

## 2018-04-05 RX ADMIN — DABIGATRAN ETEXILATE MESYLATE 150 MG: 75 CAPSULE ORAL at 21:03

## 2018-04-05 RX ADMIN — FUROSEMIDE 20 MG: 20 TABLET ORAL at 08:17

## 2018-04-05 RX ADMIN — PAROXETINE 10 MG: 10 TABLET, FILM COATED ORAL at 08:17

## 2018-04-05 RX ADMIN — CETIRIZINE HYDROCHLORIDE 5 MG: 10 TABLET, FILM COATED ORAL at 08:17

## 2018-04-05 RX ADMIN — DABIGATRAN ETEXILATE MESYLATE 150 MG: 75 CAPSULE ORAL at 08:17

## 2018-04-05 RX ADMIN — IPRATROPIUM BROMIDE AND ALBUTEROL SULFATE 1 AMPULE: .5; 3 SOLUTION RESPIRATORY (INHALATION) at 05:59

## 2018-04-05 RX ADMIN — DOCUSATE SODIUM 100 MG: 100 CAPSULE, LIQUID FILLED ORAL at 08:17

## 2018-04-05 RX ADMIN — IPRATROPIUM BROMIDE AND ALBUTEROL SULFATE 1 AMPULE: .5; 3 SOLUTION RESPIRATORY (INHALATION) at 18:25

## 2018-04-05 RX ADMIN — ATORVASTATIN CALCIUM 40 MG: 40 TABLET, FILM COATED ORAL at 21:03

## 2018-04-05 RX ADMIN — DOCUSATE SODIUM 100 MG: 100 CAPSULE, LIQUID FILLED ORAL at 21:03

## 2018-04-05 RX ADMIN — IPRATROPIUM BROMIDE AND ALBUTEROL SULFATE 1 AMPULE: .5; 3 SOLUTION RESPIRATORY (INHALATION) at 14:11

## 2018-04-05 RX ADMIN — POLYETHYLENE GLYCOL 3350 17 G: 17 POWDER, FOR SOLUTION ORAL at 08:16

## 2018-04-05 RX ADMIN — METOPROLOL TARTRATE 25 MG: 25 TABLET ORAL at 21:03

## 2018-04-05 RX ADMIN — MOMETASONE FUROATE AND FORMOTEROL FUMARATE DIHYDRATE 2 PUFF: 100; 5 AEROSOL RESPIRATORY (INHALATION) at 08:17

## 2018-04-05 RX ADMIN — MOMETASONE FUROATE AND FORMOTEROL FUMARATE DIHYDRATE 2 PUFF: 100; 5 AEROSOL RESPIRATORY (INHALATION) at 21:02

## 2018-04-05 ASSESSMENT — PAIN SCALES - GENERAL: PAINLEVEL_OUTOF10: 0

## 2018-04-06 PROCEDURE — 97530 THERAPEUTIC ACTIVITIES: CPT

## 2018-04-06 PROCEDURE — G0515 COGNITIVE SKILLS DEVELOPMENT: HCPCS

## 2018-04-06 PROCEDURE — 97535 SELF CARE MNGMENT TRAINING: CPT

## 2018-04-06 PROCEDURE — 6370000000 HC RX 637 (ALT 250 FOR IP): Performed by: PSYCHIATRY & NEUROLOGY

## 2018-04-06 PROCEDURE — 97116 GAIT TRAINING THERAPY: CPT

## 2018-04-06 PROCEDURE — 97110 THERAPEUTIC EXERCISES: CPT

## 2018-04-06 PROCEDURE — 1180000000 HC REHAB R&B

## 2018-04-06 PROCEDURE — 99232 SBSQ HOSP IP/OBS MODERATE 35: CPT | Performed by: PSYCHIATRY & NEUROLOGY

## 2018-04-06 PROCEDURE — 94640 AIRWAY INHALATION TREATMENT: CPT

## 2018-04-06 RX ADMIN — IPRATROPIUM BROMIDE AND ALBUTEROL SULFATE 1 AMPULE: .5; 3 SOLUTION RESPIRATORY (INHALATION) at 05:57

## 2018-04-06 RX ADMIN — ATORVASTATIN CALCIUM 40 MG: 40 TABLET, FILM COATED ORAL at 20:41

## 2018-04-06 RX ADMIN — MOMETASONE FUROATE AND FORMOTEROL FUMARATE DIHYDRATE 2 PUFF: 100; 5 AEROSOL RESPIRATORY (INHALATION) at 07:59

## 2018-04-06 RX ADMIN — MOMETASONE FUROATE AND FORMOTEROL FUMARATE DIHYDRATE 2 PUFF: 100; 5 AEROSOL RESPIRATORY (INHALATION) at 20:40

## 2018-04-06 RX ADMIN — DABIGATRAN ETEXILATE MESYLATE 150 MG: 75 CAPSULE ORAL at 07:59

## 2018-04-06 RX ADMIN — DABIGATRAN ETEXILATE MESYLATE 150 MG: 75 CAPSULE ORAL at 20:41

## 2018-04-06 RX ADMIN — CETIRIZINE HYDROCHLORIDE 5 MG: 10 TABLET, FILM COATED ORAL at 08:00

## 2018-04-06 RX ADMIN — IPRATROPIUM BROMIDE AND ALBUTEROL SULFATE 1 AMPULE: .5; 3 SOLUTION RESPIRATORY (INHALATION) at 13:48

## 2018-04-06 RX ADMIN — PANTOPRAZOLE SODIUM 40 MG: 40 TABLET, DELAYED RELEASE ORAL at 05:41

## 2018-04-06 RX ADMIN — METOPROLOL TARTRATE 25 MG: 25 TABLET ORAL at 07:59

## 2018-04-06 RX ADMIN — PAROXETINE 10 MG: 10 TABLET, FILM COATED ORAL at 07:59

## 2018-04-06 RX ADMIN — METOPROLOL TARTRATE 25 MG: 25 TABLET ORAL at 20:41

## 2018-04-06 RX ADMIN — DOCUSATE SODIUM 100 MG: 100 CAPSULE, LIQUID FILLED ORAL at 20:41

## 2018-04-06 RX ADMIN — IPRATROPIUM BROMIDE AND ALBUTEROL SULFATE 1 AMPULE: .5; 3 SOLUTION RESPIRATORY (INHALATION) at 18:28

## 2018-04-06 RX ADMIN — FUROSEMIDE 20 MG: 20 TABLET ORAL at 08:00

## 2018-04-06 ASSESSMENT — PAIN SCALES - GENERAL: PAINLEVEL_OUTOF10: 0

## 2018-04-07 PROCEDURE — 1180000000 HC REHAB R&B

## 2018-04-07 PROCEDURE — 6370000000 HC RX 637 (ALT 250 FOR IP): Performed by: PSYCHIATRY & NEUROLOGY

## 2018-04-07 PROCEDURE — 99232 SBSQ HOSP IP/OBS MODERATE 35: CPT | Performed by: PSYCHIATRY & NEUROLOGY

## 2018-04-07 PROCEDURE — 94640 AIRWAY INHALATION TREATMENT: CPT

## 2018-04-07 RX ADMIN — DABIGATRAN ETEXILATE MESYLATE 150 MG: 75 CAPSULE ORAL at 20:45

## 2018-04-07 RX ADMIN — IPRATROPIUM BROMIDE AND ALBUTEROL SULFATE 1 AMPULE: .5; 3 SOLUTION RESPIRATORY (INHALATION) at 06:22

## 2018-04-07 RX ADMIN — ATORVASTATIN CALCIUM 40 MG: 40 TABLET, FILM COATED ORAL at 20:45

## 2018-04-07 RX ADMIN — PANTOPRAZOLE SODIUM 40 MG: 40 TABLET, DELAYED RELEASE ORAL at 05:28

## 2018-04-07 RX ADMIN — IPRATROPIUM BROMIDE AND ALBUTEROL SULFATE 1 AMPULE: .5; 3 SOLUTION RESPIRATORY (INHALATION) at 15:17

## 2018-04-07 RX ADMIN — METOPROLOL TARTRATE 25 MG: 25 TABLET ORAL at 08:00

## 2018-04-07 RX ADMIN — MOMETASONE FUROATE AND FORMOTEROL FUMARATE DIHYDRATE 2 PUFF: 100; 5 AEROSOL RESPIRATORY (INHALATION) at 20:44

## 2018-04-07 RX ADMIN — PAROXETINE 10 MG: 10 TABLET, FILM COATED ORAL at 07:59

## 2018-04-07 RX ADMIN — DABIGATRAN ETEXILATE MESYLATE 150 MG: 75 CAPSULE ORAL at 07:59

## 2018-04-07 RX ADMIN — MOMETASONE FUROATE AND FORMOTEROL FUMARATE DIHYDRATE 2 PUFF: 100; 5 AEROSOL RESPIRATORY (INHALATION) at 07:59

## 2018-04-07 RX ADMIN — METOPROLOL TARTRATE 25 MG: 25 TABLET ORAL at 20:45

## 2018-04-07 RX ADMIN — FUROSEMIDE 20 MG: 20 TABLET ORAL at 07:59

## 2018-04-07 RX ADMIN — IPRATROPIUM BROMIDE AND ALBUTEROL SULFATE 1 AMPULE: .5; 3 SOLUTION RESPIRATORY (INHALATION) at 19:16

## 2018-04-07 RX ADMIN — CETIRIZINE HYDROCHLORIDE 5 MG: 10 TABLET, FILM COATED ORAL at 07:59

## 2018-04-08 PROCEDURE — 94640 AIRWAY INHALATION TREATMENT: CPT

## 2018-04-08 PROCEDURE — 1180000000 HC REHAB R&B

## 2018-04-08 PROCEDURE — 6370000000 HC RX 637 (ALT 250 FOR IP): Performed by: PSYCHIATRY & NEUROLOGY

## 2018-04-08 RX ADMIN — IPRATROPIUM BROMIDE AND ALBUTEROL SULFATE 1 AMPULE: .5; 3 SOLUTION RESPIRATORY (INHALATION) at 21:09

## 2018-04-08 RX ADMIN — ATORVASTATIN CALCIUM 40 MG: 40 TABLET, FILM COATED ORAL at 20:28

## 2018-04-08 RX ADMIN — FUROSEMIDE 20 MG: 20 TABLET ORAL at 08:14

## 2018-04-08 RX ADMIN — IPRATROPIUM BROMIDE AND ALBUTEROL SULFATE 1 AMPULE: .5; 3 SOLUTION RESPIRATORY (INHALATION) at 06:24

## 2018-04-08 RX ADMIN — DABIGATRAN ETEXILATE MESYLATE 150 MG: 75 CAPSULE ORAL at 08:13

## 2018-04-08 RX ADMIN — MOMETASONE FUROATE AND FORMOTEROL FUMARATE DIHYDRATE 2 PUFF: 100; 5 AEROSOL RESPIRATORY (INHALATION) at 20:28

## 2018-04-08 RX ADMIN — DOCUSATE SODIUM 100 MG: 100 CAPSULE, LIQUID FILLED ORAL at 08:14

## 2018-04-08 RX ADMIN — CETIRIZINE HYDROCHLORIDE 5 MG: 10 TABLET, FILM COATED ORAL at 08:14

## 2018-04-08 RX ADMIN — METOPROLOL TARTRATE 25 MG: 25 TABLET ORAL at 20:28

## 2018-04-08 RX ADMIN — DOCUSATE SODIUM 100 MG: 100 CAPSULE, LIQUID FILLED ORAL at 20:28

## 2018-04-08 RX ADMIN — PAROXETINE 10 MG: 10 TABLET, FILM COATED ORAL at 08:14

## 2018-04-08 RX ADMIN — METOPROLOL TARTRATE 25 MG: 25 TABLET ORAL at 08:14

## 2018-04-08 RX ADMIN — PANTOPRAZOLE SODIUM 40 MG: 40 TABLET, DELAYED RELEASE ORAL at 05:49

## 2018-04-08 RX ADMIN — MOMETASONE FUROATE AND FORMOTEROL FUMARATE DIHYDRATE 2 PUFF: 100; 5 AEROSOL RESPIRATORY (INHALATION) at 08:13

## 2018-04-08 RX ADMIN — IPRATROPIUM BROMIDE AND ALBUTEROL SULFATE 1 AMPULE: .5; 3 SOLUTION RESPIRATORY (INHALATION) at 14:25

## 2018-04-08 RX ADMIN — DABIGATRAN ETEXILATE MESYLATE 150 MG: 75 CAPSULE ORAL at 20:28

## 2018-04-09 LAB
ANION GAP SERPL CALCULATED.3IONS-SCNC: 8 MMOL/L (ref 7–19)
BASOPHILS ABSOLUTE: 0 K/UL (ref 0–0.2)
BASOPHILS RELATIVE PERCENT: 0.4 % (ref 0–1)
BUN BLDV-MCNC: 8 MG/DL (ref 8–23)
CALCIUM SERPL-MCNC: 9.1 MG/DL (ref 8.2–9.6)
CHLORIDE BLD-SCNC: 95 MMOL/L (ref 98–111)
CO2: 31 MMOL/L (ref 22–29)
CREAT SERPL-MCNC: <0.5 MG/DL (ref 0.5–0.9)
EOSINOPHILS ABSOLUTE: 0.2 K/UL (ref 0–0.6)
EOSINOPHILS RELATIVE PERCENT: 2 % (ref 0–5)
GFR NON-AFRICAN AMERICAN: >60
GLUCOSE BLD-MCNC: 113 MG/DL (ref 74–109)
HCT VFR BLD CALC: 30.1 % (ref 37–47)
HEMOGLOBIN: 9.5 G/DL (ref 12–16)
LYMPHOCYTES ABSOLUTE: 0.9 K/UL (ref 1.1–4.5)
LYMPHOCYTES RELATIVE PERCENT: 9.1 % (ref 20–40)
MCH RBC QN AUTO: 29.8 PG (ref 27–31)
MCHC RBC AUTO-ENTMCNC: 31.6 G/DL (ref 33–37)
MCV RBC AUTO: 94.4 FL (ref 81–99)
MONOCYTES ABSOLUTE: 1.1 K/UL (ref 0–0.9)
MONOCYTES RELATIVE PERCENT: 11.5 % (ref 0–10)
NEUTROPHILS ABSOLUTE: 7.2 K/UL (ref 1.5–7.5)
NEUTROPHILS RELATIVE PERCENT: 76.6 % (ref 50–65)
PDW BLD-RTO: 15.1 % (ref 11.5–14.5)
PLATELET # BLD: 343 K/UL (ref 130–400)
PMV BLD AUTO: 8.9 FL (ref 9.4–12.3)
POTASSIUM SERPL-SCNC: 3.9 MMOL/L (ref 3.5–5)
RBC # BLD: 3.19 M/UL (ref 4.2–5.4)
SODIUM BLD-SCNC: 134 MMOL/L (ref 136–145)
WBC # BLD: 9.5 K/UL (ref 4.8–10.8)

## 2018-04-09 PROCEDURE — 85025 COMPLETE CBC W/AUTO DIFF WBC: CPT

## 2018-04-09 PROCEDURE — 36415 COLL VENOUS BLD VENIPUNCTURE: CPT

## 2018-04-09 PROCEDURE — 97110 THERAPEUTIC EXERCISES: CPT

## 2018-04-09 PROCEDURE — 97535 SELF CARE MNGMENT TRAINING: CPT

## 2018-04-09 PROCEDURE — 94640 AIRWAY INHALATION TREATMENT: CPT

## 2018-04-09 PROCEDURE — 80048 BASIC METABOLIC PNL TOTAL CA: CPT

## 2018-04-09 PROCEDURE — 6370000000 HC RX 637 (ALT 250 FOR IP): Performed by: PSYCHIATRY & NEUROLOGY

## 2018-04-09 PROCEDURE — 99232 SBSQ HOSP IP/OBS MODERATE 35: CPT | Performed by: PSYCHIATRY & NEUROLOGY

## 2018-04-09 PROCEDURE — 1180000000 HC REHAB R&B

## 2018-04-09 PROCEDURE — 97116 GAIT TRAINING THERAPY: CPT

## 2018-04-09 PROCEDURE — G0515 COGNITIVE SKILLS DEVELOPMENT: HCPCS

## 2018-04-09 RX ADMIN — PANTOPRAZOLE SODIUM 40 MG: 40 TABLET, DELAYED RELEASE ORAL at 05:55

## 2018-04-09 RX ADMIN — MOMETASONE FUROATE AND FORMOTEROL FUMARATE DIHYDRATE 2 PUFF: 100; 5 AEROSOL RESPIRATORY (INHALATION) at 09:00

## 2018-04-09 RX ADMIN — IPRATROPIUM BROMIDE AND ALBUTEROL SULFATE 1 AMPULE: .5; 3 SOLUTION RESPIRATORY (INHALATION) at 18:27

## 2018-04-09 RX ADMIN — DABIGATRAN ETEXILATE MESYLATE 150 MG: 75 CAPSULE ORAL at 20:43

## 2018-04-09 RX ADMIN — PAROXETINE 10 MG: 10 TABLET, FILM COATED ORAL at 09:00

## 2018-04-09 RX ADMIN — DOCUSATE SODIUM 100 MG: 100 CAPSULE, LIQUID FILLED ORAL at 20:43

## 2018-04-09 RX ADMIN — METOPROLOL TARTRATE 25 MG: 25 TABLET ORAL at 20:43

## 2018-04-09 RX ADMIN — POLYETHYLENE GLYCOL 3350 17 G: 17 POWDER, FOR SOLUTION ORAL at 09:00

## 2018-04-09 RX ADMIN — CETIRIZINE HYDROCHLORIDE 5 MG: 10 TABLET, FILM COATED ORAL at 09:01

## 2018-04-09 RX ADMIN — METOPROLOL TARTRATE 25 MG: 25 TABLET ORAL at 09:00

## 2018-04-09 RX ADMIN — ATORVASTATIN CALCIUM 40 MG: 40 TABLET, FILM COATED ORAL at 20:43

## 2018-04-09 RX ADMIN — DOCUSATE SODIUM 100 MG: 100 CAPSULE, LIQUID FILLED ORAL at 09:00

## 2018-04-09 RX ADMIN — FUROSEMIDE 20 MG: 20 TABLET ORAL at 09:00

## 2018-04-09 RX ADMIN — IPRATROPIUM BROMIDE AND ALBUTEROL SULFATE 1 AMPULE: .5; 3 SOLUTION RESPIRATORY (INHALATION) at 14:28

## 2018-04-09 RX ADMIN — IPRATROPIUM BROMIDE AND ALBUTEROL SULFATE 1 AMPULE: .5; 3 SOLUTION RESPIRATORY (INHALATION) at 06:24

## 2018-04-09 RX ADMIN — MOMETASONE FUROATE AND FORMOTEROL FUMARATE DIHYDRATE 2 PUFF: 100; 5 AEROSOL RESPIRATORY (INHALATION) at 20:43

## 2018-04-09 RX ADMIN — DABIGATRAN ETEXILATE MESYLATE 150 MG: 75 CAPSULE ORAL at 09:00

## 2018-04-09 ASSESSMENT — PAIN SCALES - GENERAL: PAINLEVEL_OUTOF10: 0

## 2018-04-10 PROCEDURE — 97110 THERAPEUTIC EXERCISES: CPT

## 2018-04-10 PROCEDURE — 97535 SELF CARE MNGMENT TRAINING: CPT

## 2018-04-10 PROCEDURE — 6370000000 HC RX 637 (ALT 250 FOR IP): Performed by: PSYCHIATRY & NEUROLOGY

## 2018-04-10 PROCEDURE — 97530 THERAPEUTIC ACTIVITIES: CPT

## 2018-04-10 PROCEDURE — 1180000000 HC REHAB R&B

## 2018-04-10 PROCEDURE — 99232 SBSQ HOSP IP/OBS MODERATE 35: CPT | Performed by: PSYCHIATRY & NEUROLOGY

## 2018-04-10 PROCEDURE — 94640 AIRWAY INHALATION TREATMENT: CPT

## 2018-04-10 PROCEDURE — 97116 GAIT TRAINING THERAPY: CPT

## 2018-04-10 PROCEDURE — G0515 COGNITIVE SKILLS DEVELOPMENT: HCPCS

## 2018-04-10 RX ADMIN — DABIGATRAN ETEXILATE MESYLATE 150 MG: 75 CAPSULE ORAL at 20:34

## 2018-04-10 RX ADMIN — IPRATROPIUM BROMIDE AND ALBUTEROL SULFATE 1 AMPULE: .5; 3 SOLUTION RESPIRATORY (INHALATION) at 14:00

## 2018-04-10 RX ADMIN — CETIRIZINE HYDROCHLORIDE 5 MG: 10 TABLET, FILM COATED ORAL at 08:36

## 2018-04-10 RX ADMIN — IPRATROPIUM BROMIDE AND ALBUTEROL SULFATE 1 AMPULE: .5; 3 SOLUTION RESPIRATORY (INHALATION) at 06:48

## 2018-04-10 RX ADMIN — MOMETASONE FUROATE AND FORMOTEROL FUMARATE DIHYDRATE 2 PUFF: 100; 5 AEROSOL RESPIRATORY (INHALATION) at 09:33

## 2018-04-10 RX ADMIN — PANTOPRAZOLE SODIUM 40 MG: 40 TABLET, DELAYED RELEASE ORAL at 06:21

## 2018-04-10 RX ADMIN — PAROXETINE 10 MG: 10 TABLET, FILM COATED ORAL at 08:36

## 2018-04-10 RX ADMIN — DOCUSATE SODIUM 100 MG: 100 CAPSULE, LIQUID FILLED ORAL at 08:36

## 2018-04-10 RX ADMIN — MOMETASONE FUROATE AND FORMOTEROL FUMARATE DIHYDRATE 2 PUFF: 100; 5 AEROSOL RESPIRATORY (INHALATION) at 20:34

## 2018-04-10 RX ADMIN — FUROSEMIDE 20 MG: 20 TABLET ORAL at 08:36

## 2018-04-10 RX ADMIN — METOPROLOL TARTRATE 25 MG: 25 TABLET ORAL at 20:34

## 2018-04-10 RX ADMIN — DABIGATRAN ETEXILATE MESYLATE 150 MG: 75 CAPSULE ORAL at 08:36

## 2018-04-10 RX ADMIN — METOPROLOL TARTRATE 25 MG: 25 TABLET ORAL at 08:36

## 2018-04-10 RX ADMIN — IPRATROPIUM BROMIDE AND ALBUTEROL SULFATE 1 AMPULE: .5; 3 SOLUTION RESPIRATORY (INHALATION) at 20:13

## 2018-04-10 RX ADMIN — POLYETHYLENE GLYCOL 3350 17 G: 17 POWDER, FOR SOLUTION ORAL at 08:35

## 2018-04-10 RX ADMIN — ATORVASTATIN CALCIUM 40 MG: 40 TABLET, FILM COATED ORAL at 20:34

## 2018-04-10 ASSESSMENT — PAIN SCALES - GENERAL: PAINLEVEL_OUTOF10: 0

## 2018-04-11 PROCEDURE — 97110 THERAPEUTIC EXERCISES: CPT

## 2018-04-11 PROCEDURE — 97535 SELF CARE MNGMENT TRAINING: CPT

## 2018-04-11 PROCEDURE — G0515 COGNITIVE SKILLS DEVELOPMENT: HCPCS

## 2018-04-11 PROCEDURE — 6370000000 HC RX 637 (ALT 250 FOR IP): Performed by: PSYCHIATRY & NEUROLOGY

## 2018-04-11 PROCEDURE — 97116 GAIT TRAINING THERAPY: CPT

## 2018-04-11 PROCEDURE — 97530 THERAPEUTIC ACTIVITIES: CPT

## 2018-04-11 PROCEDURE — 1180000000 HC REHAB R&B

## 2018-04-11 PROCEDURE — 99233 SBSQ HOSP IP/OBS HIGH 50: CPT | Performed by: PSYCHIATRY & NEUROLOGY

## 2018-04-11 PROCEDURE — 94640 AIRWAY INHALATION TREATMENT: CPT

## 2018-04-11 RX ADMIN — PANTOPRAZOLE SODIUM 40 MG: 40 TABLET, DELAYED RELEASE ORAL at 05:47

## 2018-04-11 RX ADMIN — MOMETASONE FUROATE AND FORMOTEROL FUMARATE DIHYDRATE 2 PUFF: 100; 5 AEROSOL RESPIRATORY (INHALATION) at 07:33

## 2018-04-11 RX ADMIN — PAROXETINE 10 MG: 10 TABLET, FILM COATED ORAL at 07:34

## 2018-04-11 RX ADMIN — IPRATROPIUM BROMIDE AND ALBUTEROL SULFATE 1 AMPULE: .5; 3 SOLUTION RESPIRATORY (INHALATION) at 20:07

## 2018-04-11 RX ADMIN — FUROSEMIDE 20 MG: 20 TABLET ORAL at 07:34

## 2018-04-11 RX ADMIN — IPRATROPIUM BROMIDE AND ALBUTEROL SULFATE 1 AMPULE: .5; 3 SOLUTION RESPIRATORY (INHALATION) at 06:27

## 2018-04-11 RX ADMIN — METOPROLOL TARTRATE 25 MG: 25 TABLET ORAL at 20:53

## 2018-04-11 RX ADMIN — CETIRIZINE HYDROCHLORIDE 5 MG: 10 TABLET, FILM COATED ORAL at 07:34

## 2018-04-11 RX ADMIN — IPRATROPIUM BROMIDE AND ALBUTEROL SULFATE 1 AMPULE: .5; 3 SOLUTION RESPIRATORY (INHALATION) at 15:56

## 2018-04-11 RX ADMIN — METOPROLOL TARTRATE 25 MG: 25 TABLET ORAL at 07:34

## 2018-04-11 RX ADMIN — DABIGATRAN ETEXILATE MESYLATE 150 MG: 75 CAPSULE ORAL at 20:53

## 2018-04-11 RX ADMIN — MOMETASONE FUROATE AND FORMOTEROL FUMARATE DIHYDRATE 2 PUFF: 100; 5 AEROSOL RESPIRATORY (INHALATION) at 20:53

## 2018-04-11 RX ADMIN — ATORVASTATIN CALCIUM 40 MG: 40 TABLET, FILM COATED ORAL at 20:53

## 2018-04-11 RX ADMIN — DABIGATRAN ETEXILATE MESYLATE 150 MG: 75 CAPSULE ORAL at 07:33

## 2018-04-11 ASSESSMENT — PAIN SCALES - GENERAL
PAINLEVEL_OUTOF10: 0
PAINLEVEL_OUTOF10: 0

## 2018-04-12 LAB
ANION GAP SERPL CALCULATED.3IONS-SCNC: 9 MMOL/L (ref 7–19)
BASOPHILS ABSOLUTE: 0.1 K/UL (ref 0–0.2)
BASOPHILS RELATIVE PERCENT: 0.7 % (ref 0–1)
BUN BLDV-MCNC: 10 MG/DL (ref 8–23)
CALCIUM SERPL-MCNC: 9 MG/DL (ref 8.2–9.6)
CHLORIDE BLD-SCNC: 97 MMOL/L (ref 98–111)
CO2: 31 MMOL/L (ref 22–29)
CREAT SERPL-MCNC: <0.5 MG/DL (ref 0.5–0.9)
EOSINOPHILS ABSOLUTE: 0.3 K/UL (ref 0–0.6)
EOSINOPHILS RELATIVE PERCENT: 3.3 % (ref 0–5)
GFR NON-AFRICAN AMERICAN: >60
GLUCOSE BLD-MCNC: 115 MG/DL (ref 74–109)
HCT VFR BLD CALC: 31 % (ref 37–47)
HEMOGLOBIN: 9.7 G/DL (ref 12–16)
LYMPHOCYTES ABSOLUTE: 0.8 K/UL (ref 1.1–4.5)
LYMPHOCYTES RELATIVE PERCENT: 10.2 % (ref 20–40)
MCH RBC QN AUTO: 29.7 PG (ref 27–31)
MCHC RBC AUTO-ENTMCNC: 31.3 G/DL (ref 33–37)
MCV RBC AUTO: 94.8 FL (ref 81–99)
MONOCYTES ABSOLUTE: 0.7 K/UL (ref 0–0.9)
MONOCYTES RELATIVE PERCENT: 9.6 % (ref 0–10)
NEUTROPHILS ABSOLUTE: 5.7 K/UL (ref 1.5–7.5)
NEUTROPHILS RELATIVE PERCENT: 75.7 % (ref 50–65)
PDW BLD-RTO: 14.7 % (ref 11.5–14.5)
PLATELET # BLD: 365 K/UL (ref 130–400)
PMV BLD AUTO: 8.8 FL (ref 9.4–12.3)
POTASSIUM SERPL-SCNC: 4.1 MMOL/L (ref 3.5–5)
RBC # BLD: 3.27 M/UL (ref 4.2–5.4)
SODIUM BLD-SCNC: 137 MMOL/L (ref 136–145)
WBC # BLD: 7.6 K/UL (ref 4.8–10.8)

## 2018-04-12 PROCEDURE — 6370000000 HC RX 637 (ALT 250 FOR IP): Performed by: PSYCHIATRY & NEUROLOGY

## 2018-04-12 PROCEDURE — 94640 AIRWAY INHALATION TREATMENT: CPT

## 2018-04-12 PROCEDURE — G0515 COGNITIVE SKILLS DEVELOPMENT: HCPCS

## 2018-04-12 PROCEDURE — 97110 THERAPEUTIC EXERCISES: CPT

## 2018-04-12 PROCEDURE — 97116 GAIT TRAINING THERAPY: CPT

## 2018-04-12 PROCEDURE — 97535 SELF CARE MNGMENT TRAINING: CPT

## 2018-04-12 PROCEDURE — 85025 COMPLETE CBC W/AUTO DIFF WBC: CPT

## 2018-04-12 PROCEDURE — 36415 COLL VENOUS BLD VENIPUNCTURE: CPT

## 2018-04-12 PROCEDURE — 97530 THERAPEUTIC ACTIVITIES: CPT

## 2018-04-12 PROCEDURE — 80048 BASIC METABOLIC PNL TOTAL CA: CPT

## 2018-04-12 PROCEDURE — 1180000000 HC REHAB R&B

## 2018-04-12 RX ADMIN — PANTOPRAZOLE SODIUM 40 MG: 40 TABLET, DELAYED RELEASE ORAL at 05:50

## 2018-04-12 RX ADMIN — MOMETASONE FUROATE AND FORMOTEROL FUMARATE DIHYDRATE 2 PUFF: 100; 5 AEROSOL RESPIRATORY (INHALATION) at 08:03

## 2018-04-12 RX ADMIN — DOCUSATE SODIUM 100 MG: 100 CAPSULE, LIQUID FILLED ORAL at 21:28

## 2018-04-12 RX ADMIN — IPRATROPIUM BROMIDE AND ALBUTEROL SULFATE 1 AMPULE: .5; 3 SOLUTION RESPIRATORY (INHALATION) at 21:00

## 2018-04-12 RX ADMIN — PAROXETINE 10 MG: 10 TABLET, FILM COATED ORAL at 08:04

## 2018-04-12 RX ADMIN — FUROSEMIDE 20 MG: 20 TABLET ORAL at 08:05

## 2018-04-12 RX ADMIN — METOPROLOL TARTRATE 25 MG: 25 TABLET ORAL at 08:04

## 2018-04-12 RX ADMIN — METOPROLOL TARTRATE 25 MG: 25 TABLET ORAL at 21:28

## 2018-04-12 RX ADMIN — IPRATROPIUM BROMIDE AND ALBUTEROL SULFATE 1 AMPULE: .5; 3 SOLUTION RESPIRATORY (INHALATION) at 14:06

## 2018-04-12 RX ADMIN — CETIRIZINE HYDROCHLORIDE 5 MG: 10 TABLET, FILM COATED ORAL at 08:03

## 2018-04-12 RX ADMIN — IPRATROPIUM BROMIDE AND ALBUTEROL SULFATE 1 AMPULE: .5; 3 SOLUTION RESPIRATORY (INHALATION) at 06:16

## 2018-04-12 RX ADMIN — ATORVASTATIN CALCIUM 40 MG: 40 TABLET, FILM COATED ORAL at 21:28

## 2018-04-12 RX ADMIN — DABIGATRAN ETEXILATE MESYLATE 150 MG: 75 CAPSULE ORAL at 21:28

## 2018-04-12 RX ADMIN — DABIGATRAN ETEXILATE MESYLATE 150 MG: 75 CAPSULE ORAL at 08:05

## 2018-04-12 ASSESSMENT — PAIN SCALES - GENERAL: PAINLEVEL_OUTOF10: 0

## 2018-04-13 VITALS
SYSTOLIC BLOOD PRESSURE: 136 MMHG | HEIGHT: 62 IN | RESPIRATION RATE: 16 BRPM | DIASTOLIC BLOOD PRESSURE: 82 MMHG | WEIGHT: 131.2 LBS | OXYGEN SATURATION: 96 % | TEMPERATURE: 97.4 F | HEART RATE: 96 BPM | BODY MASS INDEX: 24.14 KG/M2

## 2018-04-13 PROCEDURE — 94640 AIRWAY INHALATION TREATMENT: CPT

## 2018-04-13 PROCEDURE — 99239 HOSP IP/OBS DSCHRG MGMT >30: CPT | Performed by: PSYCHIATRY & NEUROLOGY

## 2018-04-13 PROCEDURE — 6370000000 HC RX 637 (ALT 250 FOR IP): Performed by: PSYCHIATRY & NEUROLOGY

## 2018-04-13 RX ADMIN — METOPROLOL TARTRATE 25 MG: 25 TABLET ORAL at 09:28

## 2018-04-13 RX ADMIN — DABIGATRAN ETEXILATE MESYLATE 150 MG: 75 CAPSULE ORAL at 09:27

## 2018-04-13 RX ADMIN — PAROXETINE 10 MG: 10 TABLET, FILM COATED ORAL at 09:28

## 2018-04-13 RX ADMIN — DOCUSATE SODIUM 100 MG: 100 CAPSULE, LIQUID FILLED ORAL at 09:26

## 2018-04-13 RX ADMIN — IPRATROPIUM BROMIDE AND ALBUTEROL SULFATE 1 AMPULE: .5; 3 SOLUTION RESPIRATORY (INHALATION) at 06:07

## 2018-04-13 RX ADMIN — CETIRIZINE HYDROCHLORIDE 5 MG: 10 TABLET, FILM COATED ORAL at 09:26

## 2018-04-13 RX ADMIN — PANTOPRAZOLE SODIUM 40 MG: 40 TABLET, DELAYED RELEASE ORAL at 05:50

## 2018-04-13 RX ADMIN — MOMETASONE FUROATE AND FORMOTEROL FUMARATE DIHYDRATE 2 PUFF: 100; 5 AEROSOL RESPIRATORY (INHALATION) at 08:00

## 2018-04-13 RX ADMIN — FUROSEMIDE 20 MG: 20 TABLET ORAL at 09:27

## 2018-04-16 ENCOUNTER — LAB REQUISITION (OUTPATIENT)
Dept: LAB | Facility: HOSPITAL | Age: 83
End: 2018-04-16

## 2018-04-16 ENCOUNTER — TELEPHONE (OUTPATIENT)
Dept: INTERNAL MEDICINE | Age: 83
End: 2018-04-16

## 2018-04-16 DIAGNOSIS — Z00.00 ENCOUNTER FOR GENERAL ADULT MEDICAL EXAMINATION WITHOUT ABNORMAL FINDINGS: ICD-10-CM

## 2018-04-16 LAB
ALBUMIN SERPL-MCNC: 2.8 G/DL (ref 3.5–5)
ALBUMIN/GLOB SERPL: 1.1 G/DL (ref 1.1–2.5)
ALP SERPL-CCNC: 85 U/L (ref 24–120)
ALT SERPL W P-5'-P-CCNC: 27 U/L (ref 0–54)
ANION GAP SERPL CALCULATED.3IONS-SCNC: 8 MMOL/L (ref 4–13)
AST SERPL-CCNC: 12 U/L (ref 7–45)
BASOPHILS # BLD AUTO: 0.05 10*3/MM3 (ref 0–0.2)
BASOPHILS NFR BLD AUTO: 0.7 % (ref 0–2)
BILIRUB SERPL-MCNC: 0.1 MG/DL (ref 0.1–1)
BUN BLD-MCNC: 12 MG/DL (ref 5–21)
BUN/CREAT SERPL: 21.1 (ref 7–25)
CALCIUM SPEC-SCNC: 8.9 MG/DL (ref 8.4–10.4)
CHLORIDE SERPL-SCNC: 98 MMOL/L (ref 98–110)
CO2 SERPL-SCNC: 32 MMOL/L (ref 24–31)
CREAT BLD-MCNC: 0.57 MG/DL (ref 0.5–1.4)
DEPRECATED RDW RBC AUTO: 49.5 FL (ref 40–54)
EOSINOPHIL # BLD AUTO: 0.3 10*3/MM3 (ref 0–0.7)
EOSINOPHIL NFR BLD AUTO: 4.1 % (ref 0–4)
ERYTHROCYTE [DISTWIDTH] IN BLOOD BY AUTOMATED COUNT: 14.8 % (ref 12–15)
GFR SERPL CREATININE-BSD FRML MDRD: 99 ML/MIN/1.73
GLOBULIN UR ELPH-MCNC: 2.6 GM/DL
GLUCOSE BLD-MCNC: 93 MG/DL (ref 70–100)
HCT VFR BLD AUTO: 27.9 % (ref 37–47)
HGB BLD-MCNC: 9 G/DL (ref 12–16)
IMM GRANULOCYTES # BLD: 0.04 10*3/MM3 (ref 0–0.03)
IMM GRANULOCYTES NFR BLD: 0.5 % (ref 0–5)
LYMPHOCYTES # BLD AUTO: 0.94 10*3/MM3 (ref 0.72–4.86)
LYMPHOCYTES NFR BLD AUTO: 12.8 % (ref 15–45)
MCH RBC QN AUTO: 29.7 PG (ref 28–32)
MCHC RBC AUTO-ENTMCNC: 32.3 G/DL (ref 33–36)
MCV RBC AUTO: 92.1 FL (ref 82–98)
MONOCYTES # BLD AUTO: 0.8 10*3/MM3 (ref 0.19–1.3)
MONOCYTES NFR BLD AUTO: 10.9 % (ref 4–12)
NEUTROPHILS # BLD AUTO: 5.23 10*3/MM3 (ref 1.87–8.4)
NEUTROPHILS NFR BLD AUTO: 71 % (ref 39–78)
NRBC BLD MANUAL-RTO: 0 /100 WBC (ref 0–0)
PLATELET # BLD AUTO: 406 10*3/MM3 (ref 130–400)
PMV BLD AUTO: 9.4 FL (ref 6–12)
POTASSIUM BLD-SCNC: 4 MMOL/L (ref 3.5–5.3)
PROT SERPL-MCNC: 5.4 G/DL (ref 6.3–8.7)
RBC # BLD AUTO: 3.03 10*6/MM3 (ref 4.2–5.4)
SODIUM BLD-SCNC: 138 MMOL/L (ref 135–145)
WBC NRBC COR # BLD: 7.36 10*3/MM3 (ref 4.8–10.8)

## 2018-04-16 PROCEDURE — 80053 COMPREHEN METABOLIC PANEL: CPT | Performed by: NURSE PRACTITIONER

## 2018-04-16 PROCEDURE — 36415 COLL VENOUS BLD VENIPUNCTURE: CPT | Performed by: NURSE PRACTITIONER

## 2018-04-16 PROCEDURE — 85025 COMPLETE CBC W/AUTO DIFF WBC: CPT | Performed by: NURSE PRACTITIONER

## 2018-05-03 ENCOUNTER — TELEPHONE (OUTPATIENT)
Dept: INTERNAL MEDICINE | Age: 83
End: 2018-05-03

## 2018-05-05 ENCOUNTER — LAB REQUISITION (OUTPATIENT)
Dept: LAB | Facility: HOSPITAL | Age: 83
End: 2018-05-05

## 2018-05-05 DIAGNOSIS — N39.0 URINARY TRACT INFECTION: ICD-10-CM

## 2018-05-05 LAB
BACTERIA UR QL AUTO: ABNORMAL /HPF
BILIRUB UR QL STRIP: NEGATIVE
CLARITY UR: ABNORMAL
COLOR UR: YELLOW
GLUCOSE UR STRIP-MCNC: NEGATIVE MG/DL
HGB UR QL STRIP.AUTO: ABNORMAL
HYALINE CASTS UR QL AUTO: ABNORMAL /LPF
KETONES UR QL STRIP: NEGATIVE
LEUKOCYTE ESTERASE UR QL STRIP.AUTO: ABNORMAL
NITRITE UR QL STRIP: NEGATIVE
PH UR STRIP.AUTO: 6 [PH] (ref 5–8)
PROT UR QL STRIP: ABNORMAL
RBC # UR: ABNORMAL /HPF
REF LAB TEST METHOD: ABNORMAL
SP GR UR STRIP: >=1.03 (ref 1–1.03)
SQUAMOUS #/AREA URNS HPF: ABNORMAL /HPF
UROBILINOGEN UR QL STRIP: ABNORMAL
WBC UR QL AUTO: ABNORMAL /HPF

## 2018-05-05 PROCEDURE — 87086 URINE CULTURE/COLONY COUNT: CPT | Performed by: NURSE PRACTITIONER

## 2018-05-05 PROCEDURE — 87186 SC STD MICRODIL/AGAR DIL: CPT | Performed by: NURSE PRACTITIONER

## 2018-05-05 PROCEDURE — 87077 CULTURE AEROBIC IDENTIFY: CPT | Performed by: NURSE PRACTITIONER

## 2018-05-05 PROCEDURE — 81001 URINALYSIS AUTO W/SCOPE: CPT | Performed by: NURSE PRACTITIONER

## 2018-05-07 LAB
BACTERIA SPEC AEROBE CULT: ABNORMAL
BACTERIA SPEC AEROBE CULT: ABNORMAL

## 2018-05-25 ENCOUNTER — TELEPHONE (OUTPATIENT)
Dept: INTERNAL MEDICINE | Age: 83
End: 2018-05-25

## 2018-05-29 ENCOUNTER — LAB REQUISITION (OUTPATIENT)
Dept: LAB | Facility: HOSPITAL | Age: 83
End: 2018-05-29

## 2018-05-29 DIAGNOSIS — Z00.00 ENCOUNTER FOR GENERAL ADULT MEDICAL EXAMINATION WITHOUT ABNORMAL FINDINGS: ICD-10-CM

## 2018-05-29 LAB
ALBUMIN SERPL-MCNC: 2.6 G/DL (ref 3.5–5)
ALBUMIN/GLOB SERPL: 1 G/DL (ref 1.1–2.5)
ALP SERPL-CCNC: 87 U/L (ref 24–120)
ALT SERPL W P-5'-P-CCNC: 23 U/L (ref 0–54)
ANION GAP SERPL CALCULATED.3IONS-SCNC: 6 MMOL/L (ref 4–13)
AST SERPL-CCNC: 11 U/L (ref 7–45)
BASOPHILS # BLD AUTO: 0.02 10*3/MM3 (ref 0–0.2)
BASOPHILS NFR BLD AUTO: 0.3 % (ref 0–2)
BILIRUB SERPL-MCNC: 0.6 MG/DL (ref 0.1–1)
BUN BLD-MCNC: 10 MG/DL (ref 5–21)
BUN/CREAT SERPL: 21.3 (ref 7–25)
CALCIUM SPEC-SCNC: 8.9 MG/DL (ref 8.4–10.4)
CHLORIDE SERPL-SCNC: 99 MMOL/L (ref 98–110)
CO2 SERPL-SCNC: 32 MMOL/L (ref 24–31)
CREAT BLD-MCNC: 0.47 MG/DL (ref 0.5–1.4)
DEPRECATED RDW RBC AUTO: 52.5 FL (ref 40–54)
EOSINOPHIL # BLD AUTO: 0.09 10*3/MM3 (ref 0–0.7)
EOSINOPHIL NFR BLD AUTO: 1.2 % (ref 0–4)
ERYTHROCYTE [DISTWIDTH] IN BLOOD BY AUTOMATED COUNT: 16.1 % (ref 12–15)
GFR SERPL CREATININE-BSD FRML MDRD: 124 ML/MIN/1.73
GLOBULIN UR ELPH-MCNC: 2.7 GM/DL
GLUCOSE BLD-MCNC: 100 MG/DL (ref 70–100)
HCT VFR BLD AUTO: 25.9 % (ref 37–47)
HGB BLD-MCNC: 8.3 G/DL (ref 12–16)
IMM GRANULOCYTES # BLD: 0.04 10*3/MM3 (ref 0–0.03)
IMM GRANULOCYTES NFR BLD: 0.5 % (ref 0–5)
LYMPHOCYTES # BLD AUTO: 1.01 10*3/MM3 (ref 0.72–4.86)
LYMPHOCYTES NFR BLD AUTO: 13.6 % (ref 15–45)
MCH RBC QN AUTO: 29 PG (ref 28–32)
MCHC RBC AUTO-ENTMCNC: 32 G/DL (ref 33–36)
MCV RBC AUTO: 90.6 FL (ref 82–98)
MONOCYTES # BLD AUTO: 1.05 10*3/MM3 (ref 0.19–1.3)
MONOCYTES NFR BLD AUTO: 14.1 % (ref 4–12)
NEUTROPHILS # BLD AUTO: 5.23 10*3/MM3 (ref 1.87–8.4)
NEUTROPHILS NFR BLD AUTO: 70.3 % (ref 39–78)
NRBC BLD MANUAL-RTO: 0 /100 WBC (ref 0–0)
PLATELET # BLD AUTO: 293 10*3/MM3 (ref 130–400)
PMV BLD AUTO: 9.1 FL (ref 6–12)
POTASSIUM BLD-SCNC: 3.6 MMOL/L (ref 3.5–5.3)
PROT SERPL-MCNC: 5.3 G/DL (ref 6.3–8.7)
RBC # BLD AUTO: 2.86 10*6/MM3 (ref 4.2–5.4)
SODIUM BLD-SCNC: 137 MMOL/L (ref 135–145)
WBC NRBC COR # BLD: 7.44 10*3/MM3 (ref 4.8–10.8)

## 2018-05-29 PROCEDURE — 85025 COMPLETE CBC W/AUTO DIFF WBC: CPT | Performed by: NURSE PRACTITIONER

## 2018-05-29 PROCEDURE — 36415 COLL VENOUS BLD VENIPUNCTURE: CPT | Performed by: NURSE PRACTITIONER

## 2018-05-29 PROCEDURE — 80053 COMPREHEN METABOLIC PANEL: CPT | Performed by: NURSE PRACTITIONER

## 2018-05-31 ENCOUNTER — TELEPHONE (OUTPATIENT)
Dept: INTERNAL MEDICINE | Age: 83
End: 2018-05-31

## 2018-05-31 DIAGNOSIS — I63.9 ACUTE ISCHEMIC STROKE (HCC): Primary | ICD-10-CM

## 2018-06-07 ENCOUNTER — TELEPHONE (OUTPATIENT)
Dept: INTERNAL MEDICINE | Age: 83
End: 2018-06-07

## 2018-06-08 ENCOUNTER — TELEPHONE (OUTPATIENT)
Dept: INTERNAL MEDICINE | Age: 83
End: 2018-06-08

## 2018-06-13 ENCOUNTER — OFFICE VISIT (OUTPATIENT)
Dept: CARDIOLOGY | Facility: CLINIC | Age: 83
End: 2018-06-13

## 2018-06-13 VITALS
RESPIRATION RATE: 20 BRPM | HEART RATE: 95 BPM | OXYGEN SATURATION: 99 % | HEIGHT: 62 IN | SYSTOLIC BLOOD PRESSURE: 115 MMHG | WEIGHT: 121 LBS | DIASTOLIC BLOOD PRESSURE: 70 MMHG | BODY MASS INDEX: 22.26 KG/M2

## 2018-06-13 DIAGNOSIS — I10 ESSENTIAL HYPERTENSION: ICD-10-CM

## 2018-06-13 DIAGNOSIS — I63.9 CEREBELLAR STROKE (HCC): ICD-10-CM

## 2018-06-13 DIAGNOSIS — I48.20 CHRONIC ATRIAL FIBRILLATION (HCC): Primary | ICD-10-CM

## 2018-06-13 DIAGNOSIS — I35.0 AORTIC VALVE STENOSIS, ETIOLOGY OF CARDIAC VALVE DISEASE UNSPECIFIED: ICD-10-CM

## 2018-06-13 PROCEDURE — 99214 OFFICE O/P EST MOD 30 MIN: CPT | Performed by: NURSE PRACTITIONER

## 2018-06-13 PROCEDURE — 93000 ELECTROCARDIOGRAM COMPLETE: CPT | Performed by: NURSE PRACTITIONER

## 2018-06-13 NOTE — PROGRESS NOTES
"    Subjective:     Encounter Date:06/13/2018      Patient ID: Heather Burgos is a 89 y.o. female.    Chief Complaint:  The patient reports she is feeling fair overall. She had a recent stroke in March and has been living with her daughter. She admits some weakness and fatigue, but states she has no major deficits from her stroke. She reports she has been battling bronchitis and has some associated congestion, cough, and shortness of breath. She denies palpitations, edema, significant weight gain, syncope, pre syncope, orthopnea or PND. Her daughter reports her heart rates per her home monitor are controlled in the 70s-80s typically. She is now on Pradaxa and denies any bleeding complications.         The following portions of the patient's history were reviewed and updated as appropriate: allergies, current medications, past family history, past medical history, past social history, past surgical history and problem list.  /70 (BP Location: Left arm, Patient Position: Sitting, Cuff Size: Adult)   Pulse 95   Resp 20   Ht 157.5 cm (62\")   Wt 54.9 kg (121 lb)   SpO2 99%   Breastfeeding? No   BMI 22.13 kg/m²   Allergies   Allergen Reactions   • Erythromycin Hives and Swelling   • Demerol [Meperidine] Nausea And Vomiting and GI Intolerance   • Morphine And Related Nausea And Vomiting       Current Outpatient Prescriptions:   •  albuterol (PROVENTIL HFA;VENTOLIN HFA) 108 (90 BASE) MCG/ACT inhaler, Inhale 2 puffs Every 6 (Six) Hours As Needed for Wheezing or shortness of air., Disp: , Rfl:   •  atorvastatin (LIPITOR) 40 MG tablet, Take 1 tablet by mouth Every Night., Disp: 30 tablet, Rfl: 0  •  budesonide-formoterol (SYMBICORT) 80-4.5 MCG/ACT inhaler, Inhale 2 puffs 2 (Two) Times a Day., Disp: , Rfl:   •  Cranberry 400 MG capsule, Take 1 capsule by mouth Daily., Disp: , Rfl:   •  dabigatran etexilate (PRADAXA) 150 MG capsu, Take 1 capsule by mouth Every 12 (Twelve) Hours., Disp: 60 capsule, Rfl: 0  •  " esomeprazole (nexIUM) 40 MG capsule, Take 40 mg by mouth Every Morning Before Breakfast., Disp: , Rfl:   •  furosemide (LASIX) 20 MG tablet, Take 20 mg by mouth Daily., Disp: , Rfl:   •  ipratropium-albuterol (DUO-NEB) 0.5-2.5 mg/mL nebulizer, Take 3 mL by nebulization Every 6 (Six) Hours As Needed for Wheezing or shortness of air., Disp: , Rfl:   •  lisinopril (PRINIVIL,ZESTRIL) 10 MG tablet, Take 10 mg by mouth Daily., Disp: , Rfl:   •  loratadine (CLARITIN) 10 MG tablet, Take 10 mg by mouth Daily., Disp: , Rfl:   •  metoprolol tartrate (LOPRESSOR) 25 MG tablet, Take 25 mg by mouth 2 (Two) Times a Day., Disp: , Rfl:   •  PARoxetine (PAXIL) 10 MG tablet, Take 10 mg by mouth Every Morning., Disp: , Rfl:   Past Medical History:   Diagnosis Date   • Atrial fibrillation with rapid ventricular response    • Degenerative joint disease    • Diastolic murmur of aorta    • Dysphagia    • Essential hypertension    • Pneumonia      Past Surgical History:   Procedure Laterality Date   • APPENDECTOMY     • HYSTERECTOMY     • TONSILLECTOMY     • TUBAL ABDOMINAL LIGATION       Social History     Social History   • Marital status:      Spouse name: N/A   • Number of children: N/A   • Years of education: N/A     Occupational History   • Not on file.     Social History Main Topics   • Smoking status: Never Smoker   • Smokeless tobacco: Never Used   • Alcohol use No   • Drug use: No   • Sexual activity: Defer     Other Topics Concern   • Not on file     Social History Narrative   • No narrative on file     Family History   Problem Relation Age of Onset   • Stroke Mother         Age 90   • Emphysema Father    • Cancer Sister    • Cancer Brother        Review of Systems   Constitution: Positive for weakness and malaise/fatigue. Negative for chills, diaphoresis and fever.   HENT: Negative for nosebleeds.    Eyes: Negative for visual disturbance.   Cardiovascular: Negative for chest pain, claudication, cyanosis, dyspnea on  exertion, irregular heartbeat, leg swelling, near-syncope, orthopnea, palpitations, paroxysmal nocturnal dyspnea and syncope.   Respiratory: Positive for cough (and upper respiratory congestion ) and shortness of breath. Negative for hemoptysis, sputum production and wheezing.    Hematologic/Lymphatic: Negative for bleeding problem.   Skin: Negative for color change and flushing.   Musculoskeletal: Negative for falls.   Gastrointestinal: Negative for bloating, abdominal pain, hematemesis, hematochezia, melena, nausea and vomiting.   Genitourinary: Negative for hematuria.   Neurological: Negative for dizziness and light-headedness.   Psychiatric/Behavioral: Negative for altered mental status.         ECG 12 Lead  Date/Time: 6/13/2018 4:58 PM  Performed by: BLU SANTORO  Authorized by: BLU SANTORO   Comparison: compared with previous ECG from 3/28/2018  Similar to previous ECG  Rhythm: atrial fibrillation               Objective:     Physical Exam   Constitutional: She is oriented to person, place, and time. She appears well-developed and well-nourished. No distress.   HENT:   Head: Normocephalic and atraumatic.   Eyes: Pupils are equal, round, and reactive to light.   Neck: Normal range of motion. Neck supple. No JVD present. No thyromegaly present.   Cardiovascular: Normal rate and intact distal pulses.  An irregular rhythm present. Exam reveals no gallop and no friction rub.    Murmur (3/6 systolic murmur ) heard.  Pulmonary/Chest: Effort normal. No respiratory distress. She has no wheezes. She has rhonchi (expiratory ). She has no rales. She exhibits no tenderness.   Abdominal: Soft. Bowel sounds are normal. She exhibits no distension. There is no tenderness.   Musculoskeletal: Normal range of motion. She exhibits no edema.   Neurological: She is alert and oriented to person, place, and time. No cranial nerve deficit.   Skin: Skin is warm and dry. She is not diaphoretic.   Psychiatric: She has a normal mood and  affect. Her behavior is normal.       Lab Review:       Assessment:          Diagnosis Plan   1. Chronic atrial fibrillation  Rate controlled, anticoagulated with Pradaxa without s/s of bleeding     2. Aortic valve stenosis, etiology of cardiac valve disease unspecified  Stable.  Reviewed 3/2018 echo - normal LVEF, mild TR and MR, moderate to severe AS and AI      3. Essential hypertension  Controlled     4. Cerebellar stroke    3/2018. Followed by neurology        5. Bronchitis - followed by pcp- minimize albuterol if possible      Plan:       Continue current medical therapy- Pradaxa, beta blocker, statin, lasix, ACE inhibitor  Monitor closely for new or worsening symptoms r/t aortic stenosis and will plan to repeat echo in 6 months, sooner if warranted by symptoms  Follow up 6 months, sooner if needed for new or worsening symptoms or concerns.

## 2018-06-14 ENCOUNTER — TELEPHONE (OUTPATIENT)
Dept: CARDIOLOGY | Facility: CLINIC | Age: 83
End: 2018-06-14

## 2018-06-14 NOTE — TELEPHONE ENCOUNTER
----- Message from Arpit Smith MD sent at 6/14/2018  7:50 AM CDT -----  Its up to her. I would not do anything if it were me. Could set her up with TAVR program eval if she desires  ----- Message -----  From: JIM Guerra  Sent: 6/13/2018   5:08 PM  To: Arpit Smith MD    See note. Aortic stenosis is now moderate to severe. Given her advanced age, deconditioning, and co morbidities, would you refer to CTS or hold off and continue to monitor?  The patient currently does not want to pursue anything invasive, if anything she may consider TAVR in future if it was felt to be warranted.

## 2018-06-15 ENCOUNTER — TELEPHONE (OUTPATIENT)
Dept: CARDIOLOGY | Facility: CLINIC | Age: 83
End: 2018-06-15

## 2018-06-15 NOTE — TELEPHONE ENCOUNTER
----- Message from JIM Guerra sent at 6/14/2018  4:45 PM CDT -----  Please notify the pt I discussed her aortic stenosis with Dr. Smith and he agrees with continued monitoring and conservative management for now, but it is up to her if she would like to be evaluated for a possible TAVR (minimally invasive aortic valve replacement). If she is interested, we can refer her. Otherwise, continue with the same plan and we will see her in follow up as scheduled.    ----- Message -----  From: Arpit Smith MD  Sent: 6/14/2018   7:50 AM  To: JIM Guerra    Its up to her. I would not do anything if it were me. Could set her up with TAVR program eval if she desires  ----- Message -----  From: JIM Guerra  Sent: 6/13/2018   5:08 PM  To: Arpit Smith MD    See note. Aortic stenosis is now moderate to severe. Given her advanced age, deconditioning, and co morbidities, would you refer to CTS or hold off and continue to monitor?  The patient currently does not want to pursue anything invasive, if anything she may consider TAVR in future if it was felt to be warranted.

## 2018-06-15 NOTE — TELEPHONE ENCOUNTER
I talked to her daughter (Abeba) about this and she will talk it over with her mom and see what she wants to do and will let us know if a referral is needed.

## 2018-06-18 ENCOUNTER — TELEPHONE (OUTPATIENT)
Dept: INTERNAL MEDICINE | Age: 83
End: 2018-06-18

## 2018-07-10 ENCOUNTER — OFFICE VISIT (OUTPATIENT)
Dept: INTERNAL MEDICINE | Age: 83
End: 2018-07-10
Payer: MEDICARE

## 2018-07-10 VITALS
WEIGHT: 122 LBS | OXYGEN SATURATION: 99 % | DIASTOLIC BLOOD PRESSURE: 60 MMHG | HEART RATE: 66 BPM | HEIGHT: 62 IN | SYSTOLIC BLOOD PRESSURE: 110 MMHG | BODY MASS INDEX: 22.45 KG/M2

## 2018-07-10 DIAGNOSIS — E55.9 VITAMIN D DEFICIENCY: ICD-10-CM

## 2018-07-10 DIAGNOSIS — K21.9 GASTROESOPHAGEAL REFLUX DISEASE WITHOUT ESOPHAGITIS: ICD-10-CM

## 2018-07-10 DIAGNOSIS — I10 ESSENTIAL HYPERTENSION: Primary | ICD-10-CM

## 2018-07-10 DIAGNOSIS — M79.642 PAIN OF LEFT HAND: ICD-10-CM

## 2018-07-10 DIAGNOSIS — J45.909 MILD REACTIVE AIRWAYS DISEASE, UNSPECIFIED WHETHER PERSISTENT: ICD-10-CM

## 2018-07-10 DIAGNOSIS — Z86.73 HISTORY OF RECENT STROKE: ICD-10-CM

## 2018-07-10 PROCEDURE — 4040F PNEUMOC VAC/ADMIN/RCVD: CPT | Performed by: INTERNAL MEDICINE

## 2018-07-10 PROCEDURE — G8598 ASA/ANTIPLAT THER USED: HCPCS | Performed by: INTERNAL MEDICINE

## 2018-07-10 PROCEDURE — 1101F PT FALLS ASSESS-DOCD LE1/YR: CPT | Performed by: INTERNAL MEDICINE

## 2018-07-10 PROCEDURE — 1090F PRES/ABSN URINE INCON ASSESS: CPT | Performed by: INTERNAL MEDICINE

## 2018-07-10 PROCEDURE — 1036F TOBACCO NON-USER: CPT | Performed by: INTERNAL MEDICINE

## 2018-07-10 PROCEDURE — 99214 OFFICE O/P EST MOD 30 MIN: CPT | Performed by: INTERNAL MEDICINE

## 2018-07-10 PROCEDURE — 1123F ACP DISCUSS/DSCN MKR DOCD: CPT | Performed by: INTERNAL MEDICINE

## 2018-07-10 PROCEDURE — G8427 DOCREV CUR MEDS BY ELIG CLIN: HCPCS | Performed by: INTERNAL MEDICINE

## 2018-07-10 PROCEDURE — G8420 CALC BMI NORM PARAMETERS: HCPCS | Performed by: INTERNAL MEDICINE

## 2018-07-10 RX ORDER — TETANUS AND DIPHTHERIA TOXOIDS ADSORBED 2; 2 [LF]/.5ML; [LF]/.5ML
0.5 INJECTION INTRAMUSCULAR ONCE
Qty: 0.5 ML | Refills: 0 | Status: CANCELLED | OUTPATIENT
Start: 2018-07-10 | End: 2018-07-10

## 2018-07-10 NOTE — PATIENT INSTRUCTIONS
therapy, talk to your doctor. Ask if these treatments are right for you.    Lifestyle changes    · Do not smoke. If you need help quitting, talk to your doctor about stop-smoking programs and medicines.     · Be active. If your doctor recommends it, get more exercise. Walking is a good choice. Bit by bit, increase the amount you walk every day. Try for at least 30 minutes on most days of the week. You also may want to swim, bike, or do other activities.     · Eat heart-healthy foods. These include fruits, vegetables, high-fiber foods, fish, and foods that are low in sodium, saturated fat, and trans fat.     · Stay at a healthy weight. Lose weight if you need to.     · Limit alcohol to 2 drinks a day for men and 1 drink a day for women.    Staying healthy    · Manage other health problems such as diabetes, high blood pressure, and high cholesterol.     · Get the flu vaccine every year. When should you call for help? Call 911 anytime you think you may need emergency care. For example, call if:    · You have new or worse symptoms of a stroke. These may include:  ¨ Sudden numbness, tingling, weakness, or loss of movement in your face, arm, or leg, especially on only one side of your body. ¨ Sudden vision changes. ¨ Sudden trouble speaking. ¨ Sudden confusion or trouble understanding simple statements. ¨ Sudden problems with walking or balance. ¨ A sudden, severe headache that is different from past headaches. Call 911 even if these symptoms go away in a few minutes.     · You feel like you are having another TIA.    Watch closely for changes in your health, and be sure to contact your doctor if you have any problems. Where can you learn more? Go to https://diane.[employer]. org and sign in to your [employer] account. Enter [phone] in the [place] box to learn more about \"Transient Ischemic Attack: Care Instructions. \"     If you do not have an account, please click on the \"Sign Up Now\" link.  Current as of: November 21, 2017  Content Version: 11.6  © 2515-2362 [employer]. Care instructions adapted under license by [facility] ([facility]). If you have questions about a medical condition or this instruction, always ask your healthcare professional. [address] any warranty or liability for your use of this information.

## 2018-07-15 ASSESSMENT — ENCOUNTER SYMPTOMS
RHINORRHEA: 0
COUGH: 0
BLOOD IN STOOL: 0
TROUBLE SWALLOWING: 0
COLOR CHANGE: 0
SHORTNESS OF BREATH: 1
EYE PAIN: 0
SORE THROAT: 0
SINUS PRESSURE: 0
WHEEZING: 0
SINUS PAIN: 0
VOMITING: 0
DIARRHEA: 0
ABDOMINAL PAIN: 0
NAUSEA: 0
EYE REDNESS: 0
BACK PAIN: 0
EYE ITCHING: 0
VOICE CHANGE: 0
PHOTOPHOBIA: 0
CONSTIPATION: 0
ANAL BLEEDING: 0
CHEST TIGHTNESS: 0
RECTAL PAIN: 0
ABDOMINAL DISTENTION: 0
EYE DISCHARGE: 0

## 2018-07-15 NOTE — PROGRESS NOTES
Chief Complaint   Patient presents with    Other     nursing home follow up had a stroke in May was in [place]       HPI: Jackson Cotto is a 80 y.o. female is here for Follow-up after being discharged from a nursing home in June after having a stroke. She was discharged from the nursing home on June 11. She is getting physical therapy and getting stronger. She does have a history of asthma. Her breathing has been stable with her current medication regimen. Her, if she ambulates very far she will get short of breath in this heat. Her blood pressures well controlled. She denies he complaints of chest pain, chest pressure or shortness of breath. Nexium is working well for her GERD. Her mood is stable on Paxil. She is complaining of left hand pain. Her left hand is sore and swollen since yesterday morning. She denies any history of injury. She states that she woke up and the hand was sore and tender to touch. Past Medical History:   Diagnosis Date    Abdominal pain     Abnormal chest x-ray     Acute cystitis     Acute sinusitis     Anemia     Atrial fibrillation (HCC)     CHF (congestive heart failure) (HCC)     Chronic bronchitis (HCC)     Colles' fracture of left radius     Colles' fracture of right radius     Dysphagia     GI bleeding     Hemoptysis     Hyperlipidemia     Hypertension     Hypertension     Hypokalemia     Influenza A (H1N1)     Knee pain     Lobar pneumonia (HCC)     Lobar pneumonia (HCC)     Lung nodules     Osteopenia     Pleural effusion, right       Past Surgical History:   Procedure Laterality Date    APPENDECTOMY      HERNIA REPAIR      HIP SURGERY Right 2014    HYSTERECTOMY        Social History     Social History    Marital status:      Spouse name: N/A    Number of children: N/A    Years of education: N/A     Social History Main Topics    Smoking status: Never Smoker    Smokeless tobacco: Never Used    Alcohol use No    Drug use:  No  Sexual activity: Not Asked     Other Topics Concern    None     Social History Narrative    None      Family History   Problem Relation Age of Onset    Stroke Mother     Cancer Sister         skin cancer---melanoma    Cancer Brother         Colon    Emphysema Father     Diabetes Other         daughter   [facility] Other Other         daughter with multiple sclerosis        Current Outpatient Prescriptions   Medication Sig Dispense Refill    albuterol sulfate  (90 Base) MCG/ACT inhaler Inhale 2 puffs into the lungs every 6 hours as needed for Wheezing or Shortness of Breath      atorvastatin (LIPITOR) 40 MG tablet Take 40 mg by mouth nightly      dabigatran (PRADAXA) 150 MG capsule Take 150 mg by mouth 2 times daily      Ipratropium-Albuterol (DUONEB IN) Take 3 mLs by nebulization every 6 hours as needed       metoprolol tartrate (LOPRESSOR) 25 MG tablet Take 25 mg by mouth 2 times daily      esomeprazole (NEXIUM) 40 MG delayed release capsule Take 1 capsule by mouth daily 30 capsule 3    furosemide (LASIX) 20 MG tablet TAKE 1 TABLET BY MOUTH EVERY DAY 90 tablet 2    budesonide-formoterol (SYMBICORT) 80-4.5 MCG/ACT AERO Inhale 2 puffs into the lungs 2 times daily 1 Inhaler 1    loratadine (CLARITIN) 10 MG capsule Take 10 mg by mouth daily      Cranberry 400 MG CAPS Take 1 capsule by mouth every morning      PARoxetine (PAXIL) 10 MG tablet TAKE 1 TABLET BY MOUTH EVERY DAY 90 tablet 3     No current facility-administered medications for this visit. Patient Active Problem List   Diagnosis    Chronic atrial fibrillation ([facility] [address]    Essential (primary) hypertension    Acute ischemic stroke ([facility] [address]        Review of Systems   Constitutional: Negative for activity change, appetite change, chills, diaphoresis, fatigue, fever and unexpected weight change.    HENT: Negative for congestion, ear discharge, ear pain, mouth sores, postnasal drip, rhinorrhea, sinus pain, sinus pressure, sneezing, sore throat, tinnitus, trouble swallowing and voice change. Eyes: Negative for photophobia, pain, discharge, redness, itching and visual disturbance. Respiratory: Positive for shortness of breath. Negative for cough, chest tightness and wheezing. Cardiovascular: Negative for chest pain, palpitations and leg swelling. Gastrointestinal: Negative for abdominal distention, abdominal pain, anal bleeding, blood in stool, constipation, diarrhea, nausea, rectal pain and vomiting. Endocrine: Negative for cold intolerance, heat intolerance, polydipsia, polyphagia and polyuria. Genitourinary: Negative for difficulty urinating, dysuria, flank pain, frequency, hematuria and urgency. Musculoskeletal: Positive for arthralgias and joint swelling. Negative for back pain, gait problem, myalgias, neck pain and neck stiffness. Skin: Negative for color change, pallor, rash and wound. Allergic/Immunologic: Negative for environmental allergies, food allergies and immunocompromised state. Neurological: Positive for weakness. Negative for dizziness, facial asymmetry, speech difficulty and headaches. Hematological: Negative for adenopathy. Does not bruise/bleed easily. Psychiatric/Behavioral: Negative for agitation, behavioral problems, confusion, decreased concentration, dysphoric mood, hallucinations, self-injury, sleep disturbance and suicidal ideas. The patient is not nervous/anxious and is not hyperactive. /60 (Site: Left Arm, Position: Sitting, Cuff Size: Medium Adult)   Pulse 66   Ht 5' 2\" (1.575 m)   Wt 122 lb (55.3 kg)   SpO2 99%   BMI 22.31 kg/m²   Physical Exam   Constitutional: She is oriented to person, place, and time. She appears well-developed and well-nourished. No distress. HENT:   Head: Normocephalic and atraumatic. Right Ear: External ear normal.   Left Ear: External ear normal.   Nose: Nose normal.   Mouth/Throat: Oropharynx is clear and moist. No oropharyngeal exudate.    Eyes: Conjunctivae and EOM are normal. Pupils are equal, round, and reactive to light. Right eye exhibits no discharge. Left eye exhibits no discharge. No scleral icterus. Neck: Normal range of motion. Neck supple. No JVD present. No tracheal deviation present. No thyromegaly present. Cardiovascular: Normal rate, regular rhythm, normal heart sounds and intact distal pulses. Exam reveals no gallop and no friction rub. No murmur heard. Pulmonary/Chest: Effort normal and breath sounds normal. No respiratory distress. She has no wheezes. She has no rales. She exhibits no tenderness. Abdominal: Soft. Bowel sounds are normal. She exhibits no distension and no mass. There is no tenderness. There is no rebound and no guarding. Musculoskeletal: Normal range of motion. She exhibits tenderness. She exhibits no edema or deformity. Her left hand is slightly swollen and tender to the touch   Lymphadenopathy:     She has no cervical adenopathy. Neurological: She is alert and oriented to person, place, and time. She has normal reflexes. She displays normal reflexes. No cranial nerve deficit. She exhibits normal muscle tone. Coordination normal.   Skin: Skin is warm and dry. No rash noted. She is not diaphoretic. No erythema. No pallor. Psychiatric: She has a normal mood and affect. Her behavior is normal. Judgment and thought content normal.   Nursing note and vitals reviewed. 1. Pain of left hand    2. Need for prophylactic vaccination with tetanus-diphtheria (TD)    3. Need for prophylactic vaccination and inoculation against varicella    4. Essential hypertension    5. Vitamin D deficiency         ASSESSMENT/PLAN:    51-year-old woman here for follow-up    1. Hypertension: Blood pressure well controlled on metoprolol. 2. GERD stable on Nexium    3. History of stroke: Continue current medications. Continue physical therapy    4. Left hand pain: X-ray ordered    5.  Reactive airways disease: Continue medications as prescribed    Aristides Jacobs was seen today for other. Diagnoses and all orders for this visit:    Pain of left hand  -     XR HAND LEFT (2 VIEWS); Future    Need for prophylactic vaccination with tetanus-diphtheria (TD)    Need for prophylactic vaccination and inoculation against varicella    Essential hypertension  -     CBC Auto Differential; Future  -     Comprehensive Metabolic Panel; Future  -     Lipid Panel; Future  -     TSH without Reflex; Future  -     Vitamin D 25 Hydrox, D2 & D3; Future    Vitamin D deficiency   -     Vitamin D 25 Hydrox, D2 & D3; Future    Other orders  -     Cancel: diptheria-tetanus toxoids (DECAVAC) 2-2 LF/0.5ML injection; Inject 0.5 mLs into the muscle once for 1 dose  -     Cancel: zoster recombinant adjuvanted vaccine (SHINGRIX) 50 MCG SUSR injection; 50 MCG IM then repeat 2-6 months.           Return in about 4 months (around 11/10/2018) for physical.     Orders Placed This Encounter   Procedures    XR HAND LEFT (2 VIEWS)     Standing Status:   Future     Standing Expiration Date:   7/10/2019     Order Specific Question:   Reason for exam:     Answer:   sudden onset of left hand pain and swelling    CBC Auto Differential     Fast 12 hours     Standing Status:   Future     Standing Expiration Date:   12/31/2018    Comprehensive Metabolic Panel     Fasting 12 hours     Standing Status:   Future     Standing Expiration Date:   12/31/2018    Lipid Panel     Standing Status:   Future     Standing Expiration Date:   12/31/2018     Order Specific Question:   Is Patient Fasting?/# of Hours     Answer:   12    TSH without Reflex     Fast 12 hours     Standing Status:   Future     Standing Expiration Date:   12/31/2018    Vitamin D 25 Hydrox, D2 & D3     Standing Status:   Future     Standing Expiration Date:   12/31/2018       Donn Jerry MD

## 2018-07-18 ENCOUNTER — LAB REQUISITION (OUTPATIENT)
Dept: LAB | Facility: HOSPITAL | Age: 83
End: 2018-07-18

## 2018-07-18 ENCOUNTER — TELEPHONE (OUTPATIENT)
Dept: INTERNAL MEDICINE | Age: 83
End: 2018-07-18

## 2018-07-18 DIAGNOSIS — Z00.00 ENCOUNTER FOR GENERAL ADULT MEDICAL EXAMINATION WITHOUT ABNORMAL FINDINGS: ICD-10-CM

## 2018-07-18 LAB
BACTERIA UR QL AUTO: ABNORMAL /HPF
BILIRUB UR QL STRIP: NEGATIVE
CLARITY UR: ABNORMAL
COLOR UR: YELLOW
GLUCOSE UR STRIP-MCNC: NEGATIVE MG/DL
HGB UR QL STRIP.AUTO: ABNORMAL
HYALINE CASTS UR QL AUTO: ABNORMAL /LPF
KETONES UR QL STRIP: NEGATIVE
LEUKOCYTE ESTERASE UR QL STRIP.AUTO: ABNORMAL
NITRITE UR QL STRIP: NEGATIVE
PH UR STRIP.AUTO: 5.5 [PH] (ref 5–8)
PROT UR QL STRIP: ABNORMAL
RBC # UR: ABNORMAL /HPF
REF LAB TEST METHOD: ABNORMAL
SP GR UR STRIP: 1.02 (ref 1–1.03)
SQUAMOUS #/AREA URNS HPF: ABNORMAL /HPF
UROBILINOGEN UR QL STRIP: ABNORMAL
WBC UR QL AUTO: ABNORMAL /HPF

## 2018-07-18 PROCEDURE — 87086 URINE CULTURE/COLONY COUNT: CPT | Performed by: INTERNAL MEDICINE

## 2018-07-18 PROCEDURE — 87077 CULTURE AEROBIC IDENTIFY: CPT | Performed by: INTERNAL MEDICINE

## 2018-07-18 PROCEDURE — 81001 URINALYSIS AUTO W/SCOPE: CPT | Performed by: INTERNAL MEDICINE

## 2018-07-18 PROCEDURE — 87186 SC STD MICRODIL/AGAR DIL: CPT | Performed by: INTERNAL MEDICINE

## 2018-07-20 ENCOUNTER — TELEPHONE (OUTPATIENT)
Dept: INTERNAL MEDICINE | Age: 83
End: 2018-07-20

## 2018-07-20 LAB — BACTERIA SPEC AEROBE CULT: ABNORMAL

## 2018-07-20 RX ORDER — CEFDINIR 300 MG/1
300 CAPSULE ORAL 2 TIMES DAILY
Qty: 14 CAPSULE | Refills: 0 | Status: SHIPPED | OUTPATIENT
Start: 2018-07-20 | End: 2018-07-27

## 2018-07-20 NOTE — TELEPHONE ENCOUNTER
Called Whitney back at [facility] and advised meds were sent in. Also called family and let them know also.

## 2018-07-23 ENCOUNTER — TELEPHONE (OUTPATIENT)
Dept: INTERNAL MEDICINE | Age: 83
End: 2018-07-23

## 2018-07-23 RX ORDER — ATORVASTATIN CALCIUM 40 MG/1
40 TABLET, FILM COATED ORAL NIGHTLY
Qty: 90 TABLET | Refills: 3 | Status: SHIPPED | OUTPATIENT
Start: 2018-07-23

## 2018-07-23 RX ORDER — DABIGATRAN ETEXILATE 150 MG/1
150 CAPSULE, COATED PELLETS ORAL 2 TIMES DAILY
Qty: 180 CAPSULE | Refills: 3 | Status: SHIPPED | OUTPATIENT
Start: 2018-07-23 | End: 2019-07-24 | Stop reason: SDUPTHER

## 2018-08-15 RX ORDER — PAROXETINE 10 MG/1
TABLET, FILM COATED ORAL
Qty: 90 TABLET | Refills: 3 | Status: SHIPPED | OUTPATIENT
Start: 2018-08-15 | End: 2018-11-08 | Stop reason: SDUPTHER

## 2018-08-15 NOTE — TELEPHONE ENCOUNTER
Kelli Bhakta called requesting a refill of the below medication which has been pended for you:     Requested Prescriptions     Pending Prescriptions Disp Refills    PARoxetine (PAXIL) 10 MG tablet 90 tablet 3    metoprolol tartrate (LOPRESSOR) 25 MG tablet 60 tablet      Sig: Take 1 tablet by mouth 2 times daily       Last Appointment Date: 7/10/2018  Next Appointment Date: 11/13/2018    Allergies   Allergen Reactions    Erythromycin Hives and Swelling    Meperidine Nausea And Vomiting     Other reaction(s): GI Intolerance    Morphine And Related      Other reaction(s): Nausea And Vomiting

## 2018-08-21 NOTE — PROGRESS NOTES
Chief Complaint   Patient presents with    Annual Exam     Patient here today for yearly physical with lab review. Patient complains of cough. HPI: Tanya Gandhi is a 80 y.o. female is here for Her annual physical examination. Her functional status is good. She denies a complaints of falls. She has no concerns related to safety, hearing, or cognition. She sees Dr. Natasha Ruiz for a history of COPD. She sees Dr. Cathryn Valentin about every 3 months for knee pain. She does report a lot of chest congestion. She's been using her nebulizers twice a day. She does take Mucinex and Claritin. She does have a chronic cough which is monitored by Dr. Natasha Ruiz. She did try to stop her Nexium recently secondary to some of the comments that she's heard about on the TV. However, when she stopped the Nexium, the reflux got worse. Recent lab work has revealed a UTI. She is currently taking Omnicef. She is currently asymptomatic at this time. Paxil is working well for her depression. Her blood pressure is well controlled. She denies a complaints of chest pain. She has not had any heart palpitations. Her vitamin D is low. We will replete this with ergocalciferol. Routine screening is as follows:  Vision exam yearly  Flu vaccine and advised yearly  She had the pneumonia vaccine 9/23/2014. Prevnar vaccine was given today  She has never had a colonoscopy and declines. Mammogram declined. She declines a bone density and Pap smear. Her cholesterol is at goal at 156.       Past Medical History:   Diagnosis Date    Hyperlipidemia     Hypertension       Past Surgical History:   Procedure Laterality Date    APPENDECTOMY      HERNIA REPAIR      HIP SURGERY Right 2014    HYSTERECTOMY        Social History     Social History    Marital status:      Spouse name: N/A    Number of children: N/A    Years of education: N/A     Social History Main Topics    Smoking status: Never Smoker    Smokeless tobacco: Never Used    drip, rhinorrhea, sinus pain, sinus pressure, sneezing, sore throat, tinnitus, trouble swallowing and voice change. Eyes: Negative for photophobia, pain, discharge, redness, itching and visual disturbance. Respiratory: Positive for cough. Negative for choking, chest tightness, shortness of breath, wheezing and stridor. Does have a chronic cough   Cardiovascular: Negative for chest pain, palpitations and leg swelling. Gastrointestinal: Negative for abdominal distention, abdominal pain, blood in stool, constipation, diarrhea, nausea, rectal pain and vomiting. Endocrine: Negative for cold intolerance, heat intolerance, polydipsia, polyphagia and polyuria. Genitourinary: Negative for decreased urine volume, difficulty urinating, dysuria, frequency, hematuria and urgency. Musculoskeletal: Negative for arthralgias, back pain, gait problem, joint swelling, myalgias, neck pain and neck stiffness. Skin: Negative for color change, pallor, rash and wound. Allergic/Immunologic: Negative for environmental allergies, food allergies and immunocompromised state. Neurological: Negative for dizziness, tremors, seizures, syncope, facial asymmetry, speech difficulty, weakness, light-headedness, numbness and headaches. Hematological: Negative for adenopathy. Does not bruise/bleed easily. Psychiatric/Behavioral: Positive for dysphoric mood. Negative for agitation, behavioral problems, confusion, decreased concentration, hallucinations, self-injury, sleep disturbance and suicidal ideas. The patient is not nervous/anxious and is not hyperactive. She does feel that her medications work well for depression       /60   Pulse 78   Ht 5' 2\" (1.575 m)   Wt 131 lb (59.4 kg)   SpO2 95%   BMI 23.96 kg/m²   Physical Exam   Constitutional: She is oriented to person, place, and time. She appears well-developed and well-nourished. No distress. HENT:   Head: Normocephalic and atraumatic.    Right Ear: Status:   Future     Standing Expiration Date:   10/29/2018    Lipid Panel     Standing Status:   Future     Standing Expiration Date:   10/29/2018     Order Specific Question:   Is Patient Fasting?/# of Hours     Answer:   12    Urinalysis     Standing Status:   Future     Standing Expiration Date:   10/29/2018    Vitamin D 1,25 Dihydroxy     Standing Status:   Future     Number of Occurrences:   1     Standing Expiration Date:   10/23/2018    Vitamin D 1,25 Dihydroxy     Standing Status:   Future     Standing Expiration Date:   5/23/2018       Dylan David MD ANDREINA: pt confused and a poor historian

## 2018-09-10 ENCOUNTER — TELEPHONE (OUTPATIENT)
Dept: INTERNAL MEDICINE | Age: 83
End: 2018-09-10

## 2018-09-10 RX ORDER — ESOMEPRAZOLE MAGNESIUM 40 MG/1
40 CAPSULE, DELAYED RELEASE ORAL DAILY
Qty: 90 CAPSULE | Refills: 3 | Status: SHIPPED | OUTPATIENT
Start: 2018-09-10 | End: 2018-12-10 | Stop reason: SDUPTHER

## 2018-09-10 NOTE — TELEPHONE ENCOUNTER
Adrienne Sic called requesting a refill of the below medication which has been pended for you:     Requested Prescriptions     Pending Prescriptions Disp Refills    esomeprazole (NEXIUM) 40 MG delayed release capsule 90 capsule 3     Sig: Take 1 capsule by mouth daily       Last Appointment Date: 7/10/2018  Next Appointment Date: 11/13/2018    Allergies   Allergen Reactions    Erythromycin Hives and Swelling    Meperidine Nausea And Vomiting     Other reaction(s): GI Intolerance    Morphine And Related      Other reaction(s): Nausea And Vomiting

## 2018-09-14 RX ORDER — FUROSEMIDE 20 MG/1
TABLET ORAL
Qty: 90 TABLET | Refills: 3 | Status: SHIPPED | OUTPATIENT
Start: 2018-09-14 | End: 2018-12-14 | Stop reason: SDUPTHER

## 2018-09-14 NOTE — TELEPHONE ENCOUNTER
Roxy Mckeon called requesting a refill of the below medication which has been pended for you:     Requested Prescriptions     Pending Prescriptions Disp Refills    furosemide (LASIX) 20 MG tablet [Pharmacy Med Name: FUROSEMIDE 20 MG TABLET] 90 tablet 2     Sig: TAKE 1 TABLET BY MOUTH EVERY DAY       Last Appointment Date: 7/10/2018  Next Appointment Date: 11/13/2018    Allergies   Allergen Reactions    Erythromycin Hives and Swelling    Meperidine Nausea And Vomiting     Other reaction(s): GI Intolerance    Morphine And Related      Other reaction(s): Nausea And Vomiting

## 2018-09-27 ENCOUNTER — TELEPHONE (OUTPATIENT)
Dept: INTERNAL MEDICINE | Age: 83
End: 2018-09-27

## 2018-09-27 NOTE — TELEPHONE ENCOUNTER
Called pts daughter to let her know the forms are complete. She states she will be right here and get them  I did advise we close at 5. She states she will be here before then.

## 2018-10-10 ENCOUNTER — TELEPHONE (OUTPATIENT)
Dept: INTERNAL MEDICINE | Age: 83
End: 2018-10-10

## 2018-10-10 RX ORDER — ALBUTEROL SULFATE 90 UG/1
2 AEROSOL, METERED RESPIRATORY (INHALATION) EVERY 6 HOURS PRN
Qty: 1 INHALER | Refills: 3 | Status: SHIPPED | OUTPATIENT
Start: 2018-10-10

## 2018-10-10 NOTE — TELEPHONE ENCOUNTER
Patricia Aleman called requesting a refill of the below medication which has been pended for you:     Requested Prescriptions     Pending Prescriptions Disp Refills    albuterol sulfate  (90 Base) MCG/ACT inhaler 1 Inhaler 3     Sig: Inhale 2 puffs into the lungs every 6 hours as needed for Wheezing or Shortness of Breath       Last Appointment Date: 7/10/2018  Next Appointment Date: 11/13/2018    Allergies   Allergen Reactions    Erythromycin Hives and Swelling    Meperidine Nausea And Vomiting     Other reaction(s): GI Intolerance    Morphine And Related      Other reaction(s): Nausea And Vomiting

## 2018-10-31 ENCOUNTER — OFFICE VISIT (OUTPATIENT)
Dept: PULMONOLOGY | Facility: CLINIC | Age: 83
End: 2018-10-31

## 2018-10-31 ENCOUNTER — TELEPHONE (OUTPATIENT)
Dept: INTERNAL MEDICINE | Age: 83
End: 2018-10-31

## 2018-10-31 VITALS
DIASTOLIC BLOOD PRESSURE: 78 MMHG | RESPIRATION RATE: 16 BRPM | HEART RATE: 88 BPM | WEIGHT: 122 LBS | SYSTOLIC BLOOD PRESSURE: 118 MMHG | HEIGHT: 62 IN | OXYGEN SATURATION: 95 % | BODY MASS INDEX: 22.45 KG/M2

## 2018-10-31 DIAGNOSIS — N30.01 ACUTE CYSTITIS WITH HEMATURIA: ICD-10-CM

## 2018-10-31 DIAGNOSIS — K21.9 GASTROESOPHAGEAL REFLUX DISEASE, ESOPHAGITIS PRESENCE NOT SPECIFIED: ICD-10-CM

## 2018-10-31 DIAGNOSIS — H61.23 BILATERAL IMPACTED CERUMEN: ICD-10-CM

## 2018-10-31 DIAGNOSIS — J44.9 COPD, GROUP B, BY GOLD 2017 CLASSIFICATION (HCC): Primary | ICD-10-CM

## 2018-10-31 DIAGNOSIS — J30.89 SEASONAL ALLERGIC RHINITIS DUE TO OTHER ALLERGIC TRIGGER: ICD-10-CM

## 2018-10-31 DIAGNOSIS — Z87.891 PERSONAL HISTORY OF NICOTINE DEPENDENCE: ICD-10-CM

## 2018-10-31 DIAGNOSIS — J44.9 OBSTRUCTIVE CHRONIC BRONCHITIS WITHOUT EXACERBATION (HCC): Primary | ICD-10-CM

## 2018-10-31 DIAGNOSIS — N30.01 ACUTE CYSTITIS WITH HEMATURIA: Primary | ICD-10-CM

## 2018-10-31 DIAGNOSIS — R05.9 COUGH: ICD-10-CM

## 2018-10-31 PROBLEM — J30.9 ALLERGIC RHINITIS DUE TO ALLERGEN: Status: ACTIVE | Noted: 2018-10-31

## 2018-10-31 LAB
BACTERIA: ABNORMAL /HPF
BILIRUBIN URINE: ABNORMAL
BLOOD, URINE: NEGATIVE
CLARITY: ABNORMAL
COLOR: YELLOW
DIFF CAP.CO: NORMAL ML/MMHG SEC
EPITHELIAL CELLS, UA: 1 /HPF (ref 0–5)
FEV1/FVC: NORMAL %
FEV1: NORMAL LITERS
FVC VOL RESPIRATORY: NORMAL LITERS
GLUCOSE URINE: NEGATIVE MG/DL
HYALINE CASTS: 4 /HPF (ref 0–8)
KETONES, URINE: NEGATIVE MG/DL
LEUKOCYTE ESTERASE, URINE: ABNORMAL
NITRITE, URINE: NEGATIVE
PH UA: 5.5
PROTEIN UA: NEGATIVE MG/DL
RBC UA: 1 /HPF (ref 0–4)
SPECIFIC GRAVITY UA: 1.02
URINE REFLEX TO CULTURE: YES
UROBILINOGEN, URINE: 0.2 E.U./DL
WBC UA: 79 /HPF (ref 0–5)

## 2018-10-31 PROCEDURE — 94010 BREATHING CAPACITY TEST: CPT | Performed by: NURSE PRACTITIONER

## 2018-10-31 PROCEDURE — 94729 DIFFUSING CAPACITY: CPT | Performed by: NURSE PRACTITIONER

## 2018-10-31 PROCEDURE — 99214 OFFICE O/P EST MOD 30 MIN: CPT | Performed by: NURSE PRACTITIONER

## 2018-10-31 RX ORDER — NITROFURANTOIN 25; 75 MG/1; MG/1
100 CAPSULE ORAL 2 TIMES DAILY
Qty: 20 CAPSULE | Refills: 0 | Status: SHIPPED | OUTPATIENT
Start: 2018-10-31 | End: 2018-11-10

## 2018-10-31 NOTE — PATIENT INSTRUCTIONS
Chronic Obstructive Pulmonary Disease  Chronic obstructive pulmonary disease (COPD) is a long-term (chronic) lung problem. When you have COPD, it is hard for air to get in and out of your lungs. The way your lungs work will never return to normal. Usually the condition gets worse over time. There are things you can do to keep yourself as healthy as possible. Your doctor may treat your condition with:  · Medicines.  · Quitting smoking, if you smoke.  · Rehabilitation. This may involve a team of specialists.  · Oxygen.  · Exercise and changes to your diet.  · Lung surgery.  · Comfort measures (palliative care).    Follow these instructions at home:  Medicines  · Take over-the-counter and prescription medicines only as told by your doctor.  · Talk to your doctor before taking any cough or allergy medicines. You may need to avoid medicines that cause your lungs to be dry.  Lifestyle  · If you smoke, stop. Smoking makes the problem worse. If you need help quitting, ask your doctor.  · Avoid being around things that make your breathing worse. This may include smoke, chemicals, and fumes.  · Stay active, but remember to also rest.  · Learn and use tips on how to relax.  · Make sure you get enough sleep. Most adults need at least 7 hours a night.  · Eat healthy foods. Eat smaller meals more often. Rest before meals.  Controlled breathing  · Learn and use tips on how to control your breathing as told by your doctor. Try:  ? Breathing in (inhaling) through your nose for 1 second. Then, pucker your lips and breath out (exhale) through your lips for 2 seconds.  ? Putting one hand on your belly (abdomen). Breathe in slowly through your nose for 1 second. Your hand on your belly should move out. Pucker your lips and breathe out slowly through your lips. Your hand on your belly should move in as you breathe out.  Controlled coughing  · Learn and use controlled coughing to clear mucus from your lungs. The steps are:  1. Lean your  head a little forward.  2. Breathe in deeply.  3. Try to hold your breath for 3 seconds.  4. Keep your mouth slightly open while coughing 2 times.  5. Spit any mucus out into a tissue.  6. Rest and do the steps again 1 or 2 times as needed.  General instructions  · Make sure you get all the shots (vaccines) that your doctor recommends. Ask your doctor about a flu shot and a pneumonia shot.  · Use oxygen therapy and therapy to help improve your lungs (pulmonary rehabilitation) if told by your doctor. If you need home oxygen therapy, ask your doctor if you should buy a tool to measure your oxygen level (oximeter).  · Make a COPD action plan with your doctor. This helps you know what to do if you feel worse than usual.  · Manage any other conditions you have as told by your doctor.  · Avoid going outside when it is very hot, cold, or humid.  · Avoid people who have a sickness you can catch (contagious).  · Keep all follow-up visits as told by your doctor. This is important.  Contact a doctor if:  · You cough up more mucus than usual.  · There is a change in the color or thickness of the mucus.  · It is harder to breathe than usual.  · Your breathing is faster than usual.  · You have trouble sleeping.  · You need to use your medicines more often than usual.  · You have trouble doing your normal activities such as getting dressed or walking around the house.  Get help right away if:  · You have shortness of breath while resting.  · You have shortness of breath that stops you from:  ? Being able to talk.  ? Doing normal activities.  · Your chest hurts for longer than 5 minutes.  · Your skin color is more blue than usual.  · Your pulse oximeter shows that you have low oxygen for longer than 5 minutes.  · You have a fever.  · You feel too tired to breathe normally.  Summary  · Chronic obstructive pulmonary disease (COPD) is a long-term lung problem.  · The way your lungs work will never return to normal. Usually the  condition gets worse over time. There are things you can do to keep yourself as healthy as possible.  · Take over-the-counter and prescription medicines only as told by your doctor.  · If you smoke, stop. Smoking makes the problem worse.  This information is not intended to replace advice given to you by your health care provider. Make sure you discuss any questions you have with your health care provider.  Document Released: 06/05/2009 Document Revised: 05/25/2017 Document Reviewed: 08/14/2014  [employer] Interactive Patient Education © 2017 [employer]

## 2018-10-31 NOTE — PROGRESS NOTES
"  JIM Canales  [facility]   [facility]  [address] [place] [address]  Phone: [phone]  Fax: [phone]     Heather Burgos is a 89 y.o. female.   CC:   Chief Complaint   Patient presents with   • COPD        HPI: This is a pleasant 89-year-old female who presents today for her follow-up of COPD.  And the patient states that she has been doing okay however her daughter states that her breathing has not been okay.  The patient's daughter states she has had a cough and congestion.  No fever, no chills, no purulent sputum.  They did increase her breathing treatments from twice a day to up to 4 times a day, and that has helped some.  She currently uses Symbicort, albuterol nebs twice a day, Mucinex twice a day, and a pro-air inhaler occasionally.  She has joint inflammation noted to the first digit of her left hand.  Her daughter states that she believes the patient has a urinary tract infection as she has been more \"looney\".  She is going to contact her primary care provider for this.  She follows with Dr. Ham as her primary care provider. She does receive her flu and pneumonia vaccines.    The following portions of the patient's history were reviewed and updated as appropriate: allergies, current medications, past family history, past medical history, past social history, past surgical history and problem list.  Past Medical History:   Diagnosis Date   • Atrial fibrillation with rapid ventricular response (CMS/HCC)    • Degenerative joint disease    • Diastolic murmur of aorta    • Dysphagia    • Essential hypertension    • Pneumonia      Family History   Problem Relation Age of Onset   • Stroke Mother         Age 90   • Emphysema Father    • Cancer Sister    • Cancer Brother      Social History     Social History   • Marital status:      Spouse name: N/A   • Number of children: N/A   • Years of education: N/A     Occupational History   • Not on file. "     Social History Main Topics   • Smoking status: Never Smoker   • Smokeless tobacco: Never Used   • Alcohol use No   • Drug use: No   • Sexual activity: Defer     Other Topics Concern   • Not on file     Social History Narrative   • No narrative on file     Review of Systems   Constitutional: Negative for chills and fever.   HENT: Positive for congestion.    Eyes: Negative for blurred vision.   Respiratory: Positive for cough. Negative for shortness of breath.    Cardiovascular: Negative for chest pain.   Gastrointestinal: Negative for diarrhea, nausea and vomiting.   Endocrine: Negative for cold intolerance and heat intolerance.   Genitourinary: Negative for dysuria.   Musculoskeletal: Negative for arthralgias.   Skin: Negative for rash.   Neurological: Negative for dizziness, weakness and light-headedness.   Hematological: Does not bruise/bleed easily.   Psychiatric/Behavioral: Negative for agitation. The patient is not nervous/anxious.      Vitals:    10/31/18 1112   BP: 118/78   Pulse: 88   Resp: 16   SpO2: 95%     Physical Exam   Constitutional: She is oriented to person, place, and time. No distress.   Frail, elderly, ambulates with walker   HENT:   Head: Normocephalic and atraumatic.   Right Ear: Decreased hearing is noted. cerumen impaction is present.  Left Ear: Decreased hearing is noted. An impacted cerumen is present.  Nose: Mucosal edema present.   Eyes: Pupils are equal, round, and reactive to light. Conjunctivae and EOM are normal. No scleral icterus.   Neck: Normal range of motion. Neck supple.   Cardiovascular: Normal rate, regular rhythm and normal heart sounds.  Exam reveals no friction rub.    No murmur heard.  Pulmonary/Chest: Effort normal. No respiratory distress. She has decreased breath sounds. She has no wheezes. She has no rales.   Abdominal: Soft. Bowel sounds are normal. She exhibits no distension. There is no tenderness.   Musculoskeletal: Normal range of motion. She exhibits no  edema.   Left index finger with joint inflammation.  Mild kyphosis   Neurological: She is alert and oriented to person, place, and time.   Skin: Skin is warm and dry.   Psychiatric: She has a normal mood and affect. Her behavior is normal. Judgment and thought content normal.   Nursing note and vitals reviewed.    Pulmonary Functions Testing Results:  FEV1   Date Value Ref Range Status   10/31/2018 51% liters Final     FVC   Date Value Ref Range Status   10/31/2018 71% liters Final     FEV1/FVC   Date Value Ref Range Status   10/31/2018 52.19% % Final     DLCO   Date Value Ref Range Status   10/31/2018 57% ml/mmHg sec Final     PFT Interpretation:   Spirometry shows moderate obstructive disease with very severe small airway disease.  Diffusion shows a moderate diffusion impairment, corrected for alveolar volume remains moderate.    Assessment and Plan:   Heather was seen today for copd.    Diagnoses and all orders for this visit:    COPD, group B, by GOLD 2017 classification (CMS/[facility])  She is to continue Symbicort, albuterol nebs, pro-air, and Mucinex as she is doing well with this and wishes to continue it.  Personal history of nicotine dependence  Continues not to smoke  Gastroesophageal reflux disease, esophagitis presence not specified   She is to continue on her reflux medication as uncontrolled reflux can flare COPD.  Cough  At this time, she does not want to trial an anticholinergic inhaler in addition to the Symbicort.  She would like to try over-the-counter nasal spray such as Flonase and continue on Claritin and Mucinex.  She can increase her dose of Mucinex to 1200 mg twice a day with a full glass of water.   Seasonal allergic rhinitis due to other allergic trigger  She will trial Flonase and continue on Claritin  Cerumen Impaction   Recommend to check with her primary care provider regarding removal of cerumen impaction.  She can also try debrox at home.  Otherwise, recommend ENT.     Patient's Body mass  index is 22.31 kg/m². BMI is within normal parameters. No follow-up required.    Follow-up in 6 months.    Donna Macias, APRN  10/31/2018  11:56 AM

## 2018-11-01 ENCOUNTER — TELEPHONE (OUTPATIENT)
Dept: INTERNAL MEDICINE | Age: 83
End: 2018-11-01

## 2018-11-02 LAB
ORGANISM: ABNORMAL
URINE CULTURE, ROUTINE: ABNORMAL
URINE CULTURE, ROUTINE: ABNORMAL

## 2018-11-08 ENCOUNTER — TELEPHONE (OUTPATIENT)
Dept: INTERNAL MEDICINE | Age: 83
End: 2018-11-08

## 2018-11-08 RX ORDER — PAROXETINE 10 MG/1
TABLET, FILM COATED ORAL
Qty: 90 TABLET | Refills: 3 | Status: SHIPPED | OUTPATIENT
Start: 2018-11-08 | End: 2018-11-09 | Stop reason: SDUPTHER

## 2018-11-09 RX ORDER — PAROXETINE 10 MG/1
TABLET, FILM COATED ORAL
Qty: 90 TABLET | Refills: 3 | Status: SHIPPED | OUTPATIENT
Start: 2018-11-09

## 2018-12-03 ENCOUNTER — HOSPITAL ENCOUNTER (OUTPATIENT)
Dept: GENERAL RADIOLOGY | Age: 83
Discharge: HOME | End: 2018-12-03
Payer: MEDICARE

## 2018-12-03 ENCOUNTER — OFFICE VISIT (OUTPATIENT)
Dept: INTERNAL MEDICINE | Age: 83
End: 2018-12-03
Payer: MEDICARE

## 2018-12-03 VITALS
SYSTOLIC BLOOD PRESSURE: 118 MMHG | HEART RATE: 101 BPM | TEMPERATURE: 98 F | OXYGEN SATURATION: 90 % | DIASTOLIC BLOOD PRESSURE: 64 MMHG

## 2018-12-03 DIAGNOSIS — R06.2 WHEEZING: ICD-10-CM

## 2018-12-03 DIAGNOSIS — J40 BRONCHITIS: Primary | ICD-10-CM

## 2018-12-03 PROCEDURE — 1036F TOBACCO NON-USER: CPT | Performed by: NURSE PRACTITIONER

## 2018-12-03 PROCEDURE — 4040F PNEUMOC VAC/ADMIN/RCVD: CPT | Performed by: NURSE PRACTITIONER

## 2018-12-03 PROCEDURE — G8484 FLU IMMUNIZE NO ADMIN: HCPCS | Performed by: NURSE PRACTITIONER

## 2018-12-03 PROCEDURE — 71046 X-RAY EXAM CHEST 2 VIEWS: CPT

## 2018-12-03 PROCEDURE — G8598 ASA/ANTIPLAT THER USED: HCPCS | Performed by: NURSE PRACTITIONER

## 2018-12-03 PROCEDURE — 99214 OFFICE O/P EST MOD 30 MIN: CPT | Performed by: NURSE PRACTITIONER

## 2018-12-03 PROCEDURE — G8420 CALC BMI NORM PARAMETERS: HCPCS | Performed by: NURSE PRACTITIONER

## 2018-12-03 PROCEDURE — 1101F PT FALLS ASSESS-DOCD LE1/YR: CPT | Performed by: NURSE PRACTITIONER

## 2018-12-03 PROCEDURE — 1090F PRES/ABSN URINE INCON ASSESS: CPT | Performed by: NURSE PRACTITIONER

## 2018-12-03 PROCEDURE — 1123F ACP DISCUSS/DSCN MKR DOCD: CPT | Performed by: NURSE PRACTITIONER

## 2018-12-03 PROCEDURE — G8427 DOCREV CUR MEDS BY ELIG CLIN: HCPCS | Performed by: NURSE PRACTITIONER

## 2018-12-03 RX ORDER — CEFDINIR 300 MG/1
300 CAPSULE ORAL 2 TIMES DAILY
Qty: 20 CAPSULE | Refills: 0 | Status: SHIPPED | OUTPATIENT
Start: 2018-12-03 | End: 2019-01-07 | Stop reason: ALTCHOICE

## 2018-12-03 RX ORDER — METHYLPREDNISOLONE 4 MG/1
TABLET ORAL
Qty: 1 KIT | Refills: 0 | Status: SHIPPED | OUTPATIENT
Start: 2018-12-03 | End: 2018-12-09

## 2018-12-03 ASSESSMENT — ENCOUNTER SYMPTOMS
NAUSEA: 0
COLOR CHANGE: 0
COUGH: 1
BACK PAIN: 0
RHINORRHEA: 0
WHEEZING: 1
VOICE CHANGE: 0
VOMITING: 0
PHOTOPHOBIA: 0
SHORTNESS OF BREATH: 1

## 2018-12-03 NOTE — PROGRESS NOTES
Reactions    Erythromycin Hives and Swelling    Meperidine Nausea And Vomiting     Other reaction(s): GI Intolerance    Morphine And Related      Other reaction(s): Nausea And Vomiting       Health Maintenance   Topic Date Due    DTaP/Tdap/Td vaccine (1 - Tdap) 10/23/1945    Shingles Vaccine (1 of 2 - 2 Dose Series) 10/23/1976    Flu vaccine (1) 09/01/2018    Potassium monitoring  04/12/2019    Creatinine monitoring  04/12/2019    Pneumococcal low/med risk  Completed       Subjective:      Review of Systems   Constitutional: Negative for chills and fever. HENT: Positive for congestion. Negative for ear pain, hearing loss, rhinorrhea and voice change. Eyes: Negative for photophobia and visual disturbance. Respiratory: Positive for cough, shortness of breath and wheezing. Cardiovascular: Negative for chest pain and palpitations. Gastrointestinal: Negative for nausea and vomiting. Endocrine: Negative. Negative for cold intolerance and heat intolerance. Genitourinary: Negative for difficulty urinating and flank pain. Musculoskeletal: Negative for back pain and neck pain. Skin: Negative for color change and rash. Allergic/Immunologic: Negative for environmental allergies and food allergies. Neurological: Negative for dizziness, speech difficulty and headaches. Hematological: Does not bruise/bleed easily. Psychiatric/Behavioral: Negative for sleep disturbance and suicidal ideas. Objective:     Physical Exam   Constitutional: She is oriented to person, place, and time. She appears well-developed and well-nourished. HENT:   Head: Atraumatic. Right Ear: External ear normal.   Left Ear: External ear normal.   Nose: Nose normal.   Mouth/Throat: Oropharynx is clear and moist.   Eyes: Pupils are equal, round, and reactive to light. Conjunctivae are normal.   Neck: Normal range of motion. Neck supple.    Cardiovascular: Normal rate, regular rhythm, S1 normal, S2 normal and normal

## 2018-12-04 ENCOUNTER — TELEPHONE (OUTPATIENT)
Dept: INTERNAL MEDICINE | Age: 83
End: 2018-12-04

## 2018-12-04 DIAGNOSIS — E87.70 HYPERVOLEMIA, UNSPECIFIED HYPERVOLEMIA TYPE: Primary | ICD-10-CM

## 2018-12-04 NOTE — TELEPHONE ENCOUNTER
----- Message from SUSANNE Mariano sent at 12/4/2018 12:19 PM CST -----  Please have patient increase lasix to twice daily; repeat CXR in 1 week with BMP. She has some fluid overload.

## 2018-12-10 DIAGNOSIS — J44.9 CHRONIC OBSTRUCTIVE PULMONARY DISEASE, UNSPECIFIED COPD TYPE (HCC): ICD-10-CM

## 2018-12-10 RX ORDER — ALBUTEROL SULFATE 2.5 MG/3ML
2.5 SOLUTION RESPIRATORY (INHALATION) EVERY 4 HOURS PRN
Qty: 120 EACH | Refills: 5 | Status: SHIPPED | OUTPATIENT
Start: 2018-12-10 | End: 2019-10-07 | Stop reason: SDUPTHER

## 2018-12-10 RX ORDER — ESOMEPRAZOLE MAGNESIUM 40 MG/1
40 CAPSULE, DELAYED RELEASE ORAL DAILY
Qty: 90 CAPSULE | Refills: 3 | Status: SHIPPED | OUTPATIENT
Start: 2018-12-10

## 2018-12-11 ENCOUNTER — HOSPITAL ENCOUNTER (OUTPATIENT)
Dept: GENERAL RADIOLOGY | Age: 83
Discharge: HOME | End: 2018-12-11
Payer: MEDICARE

## 2018-12-11 ENCOUNTER — TELEPHONE (OUTPATIENT)
Dept: INTERNAL MEDICINE | Age: 83
End: 2018-12-11

## 2018-12-11 ENCOUNTER — TELEPHONE (OUTPATIENT)
Dept: ADMINISTRATIVE | Age: 83
End: 2018-12-11

## 2018-12-11 DIAGNOSIS — E87.70 HYPERVOLEMIA, UNSPECIFIED HYPERVOLEMIA TYPE: ICD-10-CM

## 2018-12-11 DIAGNOSIS — I10 ESSENTIAL HYPERTENSION: ICD-10-CM

## 2018-12-11 DIAGNOSIS — E55.9 VITAMIN D DEFICIENCY: ICD-10-CM

## 2018-12-11 LAB
ALBUMIN SERPL-MCNC: 3.5 G/DL (ref 3.5–5.2)
ALP BLD-CCNC: 83 U/L (ref 35–104)
ALT SERPL-CCNC: 9 U/L (ref 5–33)
ANION GAP SERPL CALCULATED.3IONS-SCNC: 14 MMOL/L (ref 7–19)
AST SERPL-CCNC: 10 U/L (ref 5–32)
BASOPHILS ABSOLUTE: 0 K/UL (ref 0–0.2)
BASOPHILS RELATIVE PERCENT: 0.3 % (ref 0–1)
BILIRUB SERPL-MCNC: 0.5 MG/DL (ref 0.2–1.2)
BUN BLDV-MCNC: 14 MG/DL (ref 8–23)
CALCIUM SERPL-MCNC: 8.9 MG/DL (ref 8.2–9.6)
CHLORIDE BLD-SCNC: 98 MMOL/L (ref 98–111)
CHOLESTEROL, TOTAL: 107 MG/DL (ref 160–199)
CO2: 27 MMOL/L (ref 22–29)
CREAT SERPL-MCNC: 0.5 MG/DL (ref 0.5–0.9)
EOSINOPHILS ABSOLUTE: 0.2 K/UL (ref 0–0.6)
EOSINOPHILS RELATIVE PERCENT: 1.7 % (ref 0–5)
GFR NON-AFRICAN AMERICAN: >60
GLUCOSE BLD-MCNC: 120 MG/DL (ref 74–109)
HCT VFR BLD CALC: 28.3 % (ref 37–47)
HDLC SERPL-MCNC: 61 MG/DL (ref 65–121)
HEMOGLOBIN: 8.4 G/DL (ref 12–16)
LDL CHOLESTEROL CALCULATED: 34 MG/DL
LYMPHOCYTES ABSOLUTE: 1.1 K/UL (ref 1.1–4.5)
LYMPHOCYTES RELATIVE PERCENT: 12.3 % (ref 20–40)
MCH RBC QN AUTO: 25.5 PG (ref 27–31)
MCHC RBC AUTO-ENTMCNC: 29.7 G/DL (ref 33–37)
MCV RBC AUTO: 86 FL (ref 81–99)
MONOCYTES ABSOLUTE: 1.1 K/UL (ref 0–0.9)
MONOCYTES RELATIVE PERCENT: 11.5 % (ref 0–10)
NEUTROPHILS ABSOLUTE: 6.8 K/UL (ref 1.5–7.5)
NEUTROPHILS RELATIVE PERCENT: 73.8 % (ref 50–65)
PDW BLD-RTO: 17.4 % (ref 11.5–14.5)
PLATELET # BLD: 401 K/UL (ref 130–400)
PMV BLD AUTO: 8.6 FL (ref 9.4–12.3)
POTASSIUM SERPL-SCNC: 3.6 MMOL/L (ref 3.5–5)
RBC # BLD: 3.29 M/UL (ref 4.2–5.4)
SODIUM BLD-SCNC: 139 MMOL/L (ref 136–145)
TOTAL PROTEIN: 6.4 G/DL (ref 6.6–8.7)
TRIGL SERPL-MCNC: 62 MG/DL (ref 0–149)
TSH SERPL DL<=0.05 MIU/L-ACNC: 2.86 UIU/ML (ref 0.27–4.2)
WBC # BLD: 9.2 K/UL (ref 4.8–10.8)

## 2018-12-11 PROCEDURE — 71046 X-RAY EXAM CHEST 2 VIEWS: CPT

## 2018-12-11 NOTE — TELEPHONE ENCOUNTER
Called the patient spoke to the daughter and let her know the results, she voice understood and didn't want to schedule her a follow up until after the holidays. Got her scheduled to come back in.

## 2018-12-14 ENCOUNTER — TELEPHONE (OUTPATIENT)
Dept: INTERNAL MEDICINE | Age: 83
End: 2018-12-14

## 2018-12-14 DIAGNOSIS — R79.89 LOW VITAMIN D LEVEL: Primary | ICD-10-CM

## 2018-12-14 LAB
VITAMIN D2 AND D3, TOTAL: 13.4 NG/ML (ref 30–80)
VITAMIN D2, 25 HYDROXY: 1.6 NG/ML
VITAMIN D3,25 HYDROXY: 11.8 NG/ML

## 2018-12-14 RX ORDER — FUROSEMIDE 20 MG/1
TABLET ORAL
Qty: 90 TABLET | Refills: 3 | Status: SHIPPED | OUTPATIENT
Start: 2018-12-14 | End: 2018-12-17 | Stop reason: SDUPTHER

## 2018-12-14 RX ORDER — ERGOCALCIFEROL 1.25 MG/1
50000 CAPSULE ORAL WEEKLY
Qty: 12 CAPSULE | Refills: 1 | Status: SHIPPED | OUTPATIENT
Start: 2018-12-14 | End: 2019-05-20 | Stop reason: SDUPTHER

## 2018-12-17 ENCOUNTER — TELEPHONE (OUTPATIENT)
Dept: INTERNAL MEDICINE | Age: 83
End: 2018-12-17

## 2018-12-17 RX ORDER — FUROSEMIDE 20 MG/1
TABLET ORAL
Qty: 90 TABLET | Refills: 3 | Status: SHIPPED | OUTPATIENT
Start: 2018-12-17

## 2019-01-01 ENCOUNTER — OFFICE VISIT (OUTPATIENT)
Dept: PULMONOLOGY | Facility: CLINIC | Age: 84
End: 2019-01-01

## 2019-01-01 ENCOUNTER — OFFICE VISIT (OUTPATIENT)
Dept: CARDIOLOGY | Facility: CLINIC | Age: 84
End: 2019-01-01

## 2019-01-01 ENCOUNTER — TELEPHONE (OUTPATIENT)
Dept: CARDIOLOGY | Facility: CLINIC | Age: 84
End: 2019-01-01

## 2019-01-01 ENCOUNTER — APPOINTMENT (OUTPATIENT)
Dept: GENERAL RADIOLOGY | Facility: HOSPITAL | Age: 84
End: 2019-01-01

## 2019-01-01 ENCOUNTER — HOSPITAL ENCOUNTER (INPATIENT)
Facility: HOSPITAL | Age: 84
LOS: 2 days | Discharge: HOME HEALTH | End: 2019-02-21
Attending: EMERGENCY MEDICINE | Admitting: INTERNAL MEDICINE

## 2019-01-01 ENCOUNTER — READMISSION MANAGEMENT (OUTPATIENT)
Dept: CALL CENTER | Facility: HOSPITAL | Age: 84
End: 2019-01-01

## 2019-01-01 ENCOUNTER — LAB REQUISITION (OUTPATIENT)
Dept: LAB | Facility: HOSPITAL | Age: 84
End: 2019-01-01

## 2019-01-01 ENCOUNTER — HOSPITAL ENCOUNTER (OUTPATIENT)
Dept: CARDIOLOGY | Facility: HOSPITAL | Age: 84
Discharge: HOME | End: 2019-02-19
Admitting: NURSE PRACTITIONER

## 2019-01-01 ENCOUNTER — HOSPITAL ENCOUNTER (INPATIENT)
Facility: HOSPITAL | Age: 84
LOS: 3 days | End: 2019-11-14
Attending: FAMILY MEDICINE | Admitting: FAMILY MEDICINE

## 2019-01-01 ENCOUNTER — APPOINTMENT (OUTPATIENT)
Dept: CARDIOLOGY | Facility: HOSPITAL | Age: 84
End: 2019-01-01

## 2019-01-01 VITALS
HEART RATE: 106 BPM | BODY MASS INDEX: 19.85 KG/M2 | RESPIRATION RATE: 32 BRPM | TEMPERATURE: 98.3 F | HEIGHT: 62 IN | WEIGHT: 107.9 LBS | OXYGEN SATURATION: 93 % | DIASTOLIC BLOOD PRESSURE: 77 MMHG | SYSTOLIC BLOOD PRESSURE: 132 MMHG

## 2019-01-01 VITALS
HEIGHT: 62 IN | SYSTOLIC BLOOD PRESSURE: 110 MMHG | WEIGHT: 123.02 LBS | BODY MASS INDEX: 22.64 KG/M2 | DIASTOLIC BLOOD PRESSURE: 66 MMHG

## 2019-01-01 VITALS
HEART RATE: 100 BPM | WEIGHT: 117 LBS | HEIGHT: 62 IN | DIASTOLIC BLOOD PRESSURE: 56 MMHG | SYSTOLIC BLOOD PRESSURE: 119 MMHG | BODY MASS INDEX: 21.53 KG/M2

## 2019-01-01 VITALS
HEIGHT: 62 IN | WEIGHT: 120.8 LBS | DIASTOLIC BLOOD PRESSURE: 80 MMHG | TEMPERATURE: 98.3 F | HEART RATE: 77 BPM | RESPIRATION RATE: 16 BRPM | BODY MASS INDEX: 22.23 KG/M2 | OXYGEN SATURATION: 97 % | SYSTOLIC BLOOD PRESSURE: 122 MMHG

## 2019-01-01 VITALS
BODY MASS INDEX: 22.26 KG/M2 | SYSTOLIC BLOOD PRESSURE: 110 MMHG | WEIGHT: 121 LBS | HEART RATE: 62 BPM | DIASTOLIC BLOOD PRESSURE: 80 MMHG | HEIGHT: 62 IN | OXYGEN SATURATION: 67 %

## 2019-01-01 VITALS
BODY MASS INDEX: 21.53 KG/M2 | HEART RATE: 67 BPM | OXYGEN SATURATION: 94 % | SYSTOLIC BLOOD PRESSURE: 124 MMHG | WEIGHT: 117 LBS | HEIGHT: 62 IN | DIASTOLIC BLOOD PRESSURE: 76 MMHG

## 2019-01-01 VITALS
HEART RATE: 80 BPM | HEIGHT: 62 IN | WEIGHT: 123 LBS | BODY MASS INDEX: 22.63 KG/M2 | DIASTOLIC BLOOD PRESSURE: 60 MMHG | SYSTOLIC BLOOD PRESSURE: 102 MMHG

## 2019-01-01 VITALS
DIASTOLIC BLOOD PRESSURE: 74 MMHG | HEART RATE: 76 BPM | OXYGEN SATURATION: 96 % | SYSTOLIC BLOOD PRESSURE: 118 MMHG | WEIGHT: 122 LBS | BODY MASS INDEX: 22.45 KG/M2 | HEIGHT: 62 IN

## 2019-01-01 DIAGNOSIS — Z78.9 DECREASED ACTIVITIES OF DAILY LIVING (ADL): ICD-10-CM

## 2019-01-01 DIAGNOSIS — K21.9 GASTROESOPHAGEAL REFLUX DISEASE, ESOPHAGITIS PRESENCE NOT SPECIFIED: ICD-10-CM

## 2019-01-01 DIAGNOSIS — Z86.73 HISTORY OF CEREBELLAR STROKE: ICD-10-CM

## 2019-01-01 DIAGNOSIS — I27.20 PULMONARY HYPERTENSION (HCC): ICD-10-CM

## 2019-01-01 DIAGNOSIS — J44.9 COPD, GROUP B, BY GOLD 2017 CLASSIFICATION (HCC): Primary | ICD-10-CM

## 2019-01-01 DIAGNOSIS — J44.9 CHRONIC OBSTRUCTIVE PULMONARY DISEASE, UNSPECIFIED COPD TYPE (HCC): ICD-10-CM

## 2019-01-01 DIAGNOSIS — Z87.891 PERSONAL HISTORY OF NICOTINE DEPENDENCE: ICD-10-CM

## 2019-01-01 DIAGNOSIS — R63.6 UNDERWEIGHT: ICD-10-CM

## 2019-01-01 DIAGNOSIS — R06.00 DYSPNEA, UNSPECIFIED TYPE: ICD-10-CM

## 2019-01-01 DIAGNOSIS — I48.20 CHRONIC ATRIAL FIBRILLATION (HCC): ICD-10-CM

## 2019-01-01 DIAGNOSIS — I35.0 AORTIC STENOSIS, SEVERE: Primary | ICD-10-CM

## 2019-01-01 DIAGNOSIS — I35.0 NONRHEUMATIC AORTIC VALVE STENOSIS: ICD-10-CM

## 2019-01-01 DIAGNOSIS — I10 ESSENTIAL HYPERTENSION: ICD-10-CM

## 2019-01-01 DIAGNOSIS — D64.9 SYMPTOMATIC ANEMIA: Primary | ICD-10-CM

## 2019-01-01 DIAGNOSIS — D64.9 ANEMIA, UNSPECIFIED TYPE: ICD-10-CM

## 2019-01-01 DIAGNOSIS — R13.12 OROPHARYNGEAL DYSPHAGIA: ICD-10-CM

## 2019-01-01 DIAGNOSIS — J44.9 COPD, GROUP B, BY GOLD 2017 CLASSIFICATION (HCC): ICD-10-CM

## 2019-01-01 DIAGNOSIS — R06.02 SHORTNESS OF BREATH: Primary | ICD-10-CM

## 2019-01-01 DIAGNOSIS — J96.01 ACUTE RESPIRATORY FAILURE WITH HYPOXIA (HCC): ICD-10-CM

## 2019-01-01 DIAGNOSIS — Z23 NEED FOR IMMUNIZATION AGAINST INFLUENZA: ICD-10-CM

## 2019-01-01 DIAGNOSIS — J30.89 SEASONAL ALLERGIC RHINITIS DUE TO OTHER ALLERGIC TRIGGER: ICD-10-CM

## 2019-01-01 DIAGNOSIS — Z74.09 IMPAIRED MOBILITY: ICD-10-CM

## 2019-01-01 DIAGNOSIS — I48.20 CHRONIC ATRIAL FIBRILLATION (HCC): Primary | ICD-10-CM

## 2019-01-01 DIAGNOSIS — J18.9 PNEUMONIA DUE TO INFECTIOUS ORGANISM, UNSPECIFIED LATERALITY, UNSPECIFIED PART OF LUNG: Primary | ICD-10-CM

## 2019-01-01 DIAGNOSIS — J96.11 CHRONIC RESPIRATORY FAILURE WITH HYPOXIA (HCC): ICD-10-CM

## 2019-01-01 DIAGNOSIS — Z00.00 ENCOUNTER FOR GENERAL ADULT MEDICAL EXAMINATION WITHOUT ABNORMAL FINDINGS: ICD-10-CM

## 2019-01-01 DIAGNOSIS — J44.9 STAGE 2 MODERATE COPD BY GOLD CLASSIFICATION (HCC): Primary | ICD-10-CM

## 2019-01-01 DIAGNOSIS — Z79.01 CURRENT USE OF LONG TERM ANTICOAGULATION: ICD-10-CM

## 2019-01-01 LAB
A-A DO2: 35.1 MMHG
ABO + RH BLD: NORMAL
ABO GROUP BLD: NORMAL
ABO GROUP BLD: NORMAL
ALBUMIN SERPL-MCNC: 3.4 G/DL (ref 3.5–5)
ALBUMIN SERPL-MCNC: 3.5 G/DL (ref 3.5–5.2)
ALBUMIN/GLOB SERPL: 1.1 G/DL
ALBUMIN/GLOB SERPL: 1.3 G/DL (ref 1.1–2.5)
ALP SERPL-CCNC: 69 U/L (ref 24–120)
ALP SERPL-CCNC: 77 U/L (ref 39–117)
ALT SERPL W P-5'-P-CCNC: 25 U/L (ref 0–54)
ALT SERPL W P-5'-P-CCNC: 7 U/L (ref 1–33)
ANION GAP SERPL CALCULATED.3IONS-SCNC: 6 MMOL/L (ref 4–13)
ANION GAP SERPL CALCULATED.3IONS-SCNC: 6 MMOL/L (ref 4–13)
ANION GAP SERPL CALCULATED.3IONS-SCNC: 8 MMOL/L (ref 4–13)
ANION GAP SERPL CALCULATED.3IONS-SCNC: 9 MMOL/L (ref 5–15)
ANION GAP SERPL CALCULATED.3IONS-SCNC: 9 MMOL/L (ref 5–15)
APTT PPP: 53.9 SECONDS (ref 24.1–34.8)
ARTERIAL PATENCY WRIST A: ABNORMAL
ARTERIAL PATENCY WRIST A: POSITIVE
AST SERPL-CCNC: 13 U/L (ref 1–32)
AST SERPL-CCNC: 23 U/L (ref 7–45)
ATMOSPHERIC PRESS: 753 MMHG
ATMOSPHERIC PRESS: 757 MMHG
ATMOSPHERIC PRESS: 763 MMHG
B PARAPERT DNA SPEC QL NAA+PROBE: NOT DETECTED
B PERT DNA SPEC QL NAA+PROBE: NOT DETECTED
BACTERIA SPEC RESP CULT: NORMAL
BASE EXCESS BLDA CALC-SCNC: 3.6 MMOL/L (ref 0–2)
BASE EXCESS BLDA CALC-SCNC: 3.7 MMOL/L (ref 0–2)
BASE EXCESS BLDV CALC-SCNC: 5.2 MMOL/L (ref 0–2)
BASOPHILS # BLD AUTO: 0.01 10*3/MM3 (ref 0–0.2)
BASOPHILS # BLD AUTO: 0.03 10*3/MM3 (ref 0–0.2)
BASOPHILS # BLD AUTO: 0.04 10*3/MM3 (ref 0–0.2)
BASOPHILS NFR BLD AUTO: 0.1 % (ref 0–1.5)
BASOPHILS NFR BLD AUTO: 0.3 % (ref 0–1.5)
BASOPHILS NFR BLD AUTO: 0.3 % (ref 0–2)
BASOPHILS NFR BLD AUTO: 0.4 % (ref 0–2)
BASOPHILS NFR BLD AUTO: 0.5 % (ref 0–2)
BDY SITE: ABNORMAL
BH BB BLOOD EXPIRATION DATE: NORMAL
BH BB BLOOD TYPE BARCODE: 5100
BH BB DISPENSE STATUS: NORMAL
BH BB PRODUCT CODE: NORMAL
BH BB UNIT NUMBER: NORMAL
BH CV ECHO MEAS - AO MAX PG (FULL): 34.3 MMHG
BH CV ECHO MEAS - AO MAX PG (FULL): 57.5 MMHG
BH CV ECHO MEAS - AO MAX PG: 37.5 MMHG
BH CV ECHO MEAS - AO MAX PG: 60.2 MMHG
BH CV ECHO MEAS - AO MEAN PG (FULL): 18 MMHG
BH CV ECHO MEAS - AO MEAN PG (FULL): 37 MMHG
BH CV ECHO MEAS - AO MEAN PG: 20 MMHG
BH CV ECHO MEAS - AO MEAN PG: 39 MMHG
BH CV ECHO MEAS - AO ROOT AREA (BSA CORRECTED): 1.7
BH CV ECHO MEAS - AO ROOT AREA (BSA CORRECTED): 1.8
BH CV ECHO MEAS - AO ROOT AREA: 5.3 CM^2
BH CV ECHO MEAS - AO ROOT AREA: 6.2 CM^2
BH CV ECHO MEAS - AO ROOT DIAM: 2.6 CM
BH CV ECHO MEAS - AO ROOT DIAM: 2.8 CM
BH CV ECHO MEAS - AO V2 MAX: 306 CM/SEC
BH CV ECHO MEAS - AO V2 MAX: 388 CM/SEC
BH CV ECHO MEAS - AO V2 MEAN: 193 CM/SEC
BH CV ECHO MEAS - AO V2 MEAN: 299 CM/SEC
BH CV ECHO MEAS - AO V2 VTI: 58.7 CM
BH CV ECHO MEAS - AO V2 VTI: 89 CM
BH CV ECHO MEAS - AVA(I,A): 0.72 CM^2
BH CV ECHO MEAS - AVA(I,A): 1.1 CM^2
BH CV ECHO MEAS - AVA(I,D): 0.72 CM^2
BH CV ECHO MEAS - AVA(I,D): 1.1 CM^2
BH CV ECHO MEAS - AVA(V,A): 0.74 CM^2
BH CV ECHO MEAS - AVA(V,A): 0.91 CM^2
BH CV ECHO MEAS - AVA(V,D): 0.74 CM^2
BH CV ECHO MEAS - AVA(V,D): 0.91 CM^2
BH CV ECHO MEAS - BSA(HAYCOCK): 1.5 M^2
BH CV ECHO MEAS - BSA(HAYCOCK): 1.6 M^2
BH CV ECHO MEAS - BSA: 1.5 M^2
BH CV ECHO MEAS - BSA: 1.6 M^2
BH CV ECHO MEAS - BZI_BMI: 21.6 KILOGRAMS/M^2
BH CV ECHO MEAS - BZI_BMI: 22.5 KILOGRAMS/M^2
BH CV ECHO MEAS - BZI_METRIC_HEIGHT: 157.5 CM
BH CV ECHO MEAS - BZI_METRIC_HEIGHT: 157.5 CM
BH CV ECHO MEAS - BZI_METRIC_WEIGHT: 53.5 KG
BH CV ECHO MEAS - BZI_METRIC_WEIGHT: 55.8 KG
BH CV ECHO MEAS - EDV(CUBED): 39.3 ML
BH CV ECHO MEAS - EDV(CUBED): 45.5 ML
BH CV ECHO MEAS - EDV(MOD-SP4): 28.8 ML
BH CV ECHO MEAS - EDV(MOD-SP4): 37.3 ML
BH CV ECHO MEAS - EDV(TEICH): 47.4 ML
BH CV ECHO MEAS - EDV(TEICH): 53.3 ML
BH CV ECHO MEAS - EF(CUBED): 79.6 %
BH CV ECHO MEAS - EF(CUBED): 91.2 %
BH CV ECHO MEAS - EF(MOD-SP4): 62.8 %
BH CV ECHO MEAS - EF(MOD-SP4): 80.1 %
BH CV ECHO MEAS - EF(TEICH): 73.2 %
BH CV ECHO MEAS - EF(TEICH): 86.8 %
BH CV ECHO MEAS - ESV(CUBED): 4 ML
BH CV ECHO MEAS - ESV(CUBED): 8 ML
BH CV ECHO MEAS - ESV(MOD-SP4): 10.7 ML
BH CV ECHO MEAS - ESV(MOD-SP4): 7.4 ML
BH CV ECHO MEAS - ESV(TEICH): 12.7 ML
BH CV ECHO MEAS - ESV(TEICH): 7.1 ML
BH CV ECHO MEAS - FS: 41.2 %
BH CV ECHO MEAS - FS: 55.5 %
BH CV ECHO MEAS - IVS/LVPW: 0.82
BH CV ECHO MEAS - IVS/LVPW: 1.2
BH CV ECHO MEAS - IVSD: 0.98 CM
BH CV ECHO MEAS - IVSD: 1.2 CM
BH CV ECHO MEAS - LA DIMENSION: 4.2 CM
BH CV ECHO MEAS - LA DIMENSION: 4.4 CM
BH CV ECHO MEAS - LA/AO: 1.6
BH CV ECHO MEAS - LA/AO: 1.6
BH CV ECHO MEAS - LAT PEAK E' VEL: 16.7 CM/SEC
BH CV ECHO MEAS - LAT PEAK E' VEL: 6.2 CM/SEC
BH CV ECHO MEAS - LV DIASTOLIC VOL/BSA (35-75): 18.9 ML/M^2
BH CV ECHO MEAS - LV DIASTOLIC VOL/BSA (35-75): 24 ML/M^2
BH CV ECHO MEAS - LV MASS(C)D: 121.2 GRAMS
BH CV ECHO MEAS - LV MASS(C)D: 130.2 GRAMS
BH CV ECHO MEAS - LV MASS(C)DI: 79.4 GRAMS/M^2
BH CV ECHO MEAS - LV MASS(C)DI: 83.8 GRAMS/M^2
BH CV ECHO MEAS - LV MAX PG: 2.8 MMHG
BH CV ECHO MEAS - LV MAX PG: 3.1 MMHG
BH CV ECHO MEAS - LV MEAN PG: 2 MMHG
BH CV ECHO MEAS - LV MEAN PG: 2 MMHG
BH CV ECHO MEAS - LV SYSTOLIC VOL/BSA (12-30): 4.8 ML/M^2
BH CV ECHO MEAS - LV SYSTOLIC VOL/BSA (12-30): 7 ML/M^2
BH CV ECHO MEAS - LV V1 MAX: 82.6 CM/SEC
BH CV ECHO MEAS - LV V1 MAX: 88.3 CM/SEC
BH CV ECHO MEAS - LV V1 MEAN: 59.7 CM/SEC
BH CV ECHO MEAS - LV V1 MEAN: 63.7 CM/SEC
BH CV ECHO MEAS - LV V1 VTI: 18.5 CM
BH CV ECHO MEAS - LV V1 VTI: 20.9 CM
BH CV ECHO MEAS - LVIDD: 3.4 CM
BH CV ECHO MEAS - LVIDD: 3.6 CM
BH CV ECHO MEAS - LVIDS: 1.6 CM
BH CV ECHO MEAS - LVIDS: 2 CM
BH CV ECHO MEAS - LVLD AP4: 5.3 CM
BH CV ECHO MEAS - LVLD AP4: 6.4 CM
BH CV ECHO MEAS - LVLS AP4: 4.7 CM
BH CV ECHO MEAS - LVLS AP4: 5.3 CM
BH CV ECHO MEAS - LVOT AREA (M): 3.1 CM^2
BH CV ECHO MEAS - LVOT AREA (M): 3.5 CM^2
BH CV ECHO MEAS - LVOT AREA: 3.1 CM^2
BH CV ECHO MEAS - LVOT AREA: 3.5 CM^2
BH CV ECHO MEAS - LVOT DIAM: 2 CM
BH CV ECHO MEAS - LVOT DIAM: 2.1 CM
BH CV ECHO MEAS - LVPWD: 1.2 CM
BH CV ECHO MEAS - LVPWD: 1.2 CM
BH CV ECHO MEAS - MED PEAK E' VEL: 29.5 CM/SEC
BH CV ECHO MEAS - MED PEAK E' VEL: 5 CM/SEC
BH CV ECHO MEAS - MV DEC TIME: 0.17 SEC
BH CV ECHO MEAS - MV DEC TIME: 0.21 SEC
BH CV ECHO MEAS - MV E MAX VEL: 113 CM/SEC
BH CV ECHO MEAS - MV E MAX VEL: 150 CM/SEC
BH CV ECHO MEAS - PI END-D VEL: 166 CM/SEC
BH CV ECHO MEAS - RAP SYSTOLE: 5 MMHG
BH CV ECHO MEAS - RAP SYSTOLE: 5 MMHG
BH CV ECHO MEAS - RVSP: 42.7 MMHG
BH CV ECHO MEAS - RVSP: 93 MMHG
BH CV ECHO MEAS - SI(AO): 204 ML/M^2
BH CV ECHO MEAS - SI(AO): 352.5 ML/M^2
BH CV ECHO MEAS - SI(CUBED): 20.1 ML/M^2
BH CV ECHO MEAS - SI(CUBED): 27.2 ML/M^2
BH CV ECHO MEAS - SI(LVOT): 41.2 ML/M^2
BH CV ECHO MEAS - SI(LVOT): 43 ML/M^2
BH CV ECHO MEAS - SI(MOD-SP4): 11.8 ML/M^2
BH CV ECHO MEAS - SI(MOD-SP4): 19.2 ML/M^2
BH CV ECHO MEAS - SI(TEICH): 22.3 ML/M^2
BH CV ECHO MEAS - SI(TEICH): 30.3 ML/M^2
BH CV ECHO MEAS - SV(AO): 311.7 ML
BH CV ECHO MEAS - SV(AO): 548 ML
BH CV ECHO MEAS - SV(CUBED): 31.3 ML
BH CV ECHO MEAS - SV(CUBED): 41.5 ML
BH CV ECHO MEAS - SV(LVOT): 64.1 ML
BH CV ECHO MEAS - SV(LVOT): 65.7 ML
BH CV ECHO MEAS - SV(MOD-SP4): 18.1 ML
BH CV ECHO MEAS - SV(MOD-SP4): 29.9 ML
BH CV ECHO MEAS - SV(TEICH): 34.7 ML
BH CV ECHO MEAS - SV(TEICH): 46.3 ML
BH CV ECHO MEAS - TR MAX VEL: 307 CM/SEC
BH CV ECHO MEAS - TR MAX VEL: 469 CM/SEC
BH CV ECHO MEASUREMENTS AVERAGE E/E' RATIO: 20.18
BH CV ECHO MEASUREMENTS AVERAGE E/E' RATIO: 6.49
BILIRUB SERPL-MCNC: 0.6 MG/DL (ref 0.1–1)
BILIRUB SERPL-MCNC: 0.7 MG/DL (ref 0.2–1.2)
BLD GP AB SCN SERPL QL: NEGATIVE
BLD GP AB SCN SERPL QL: NEGATIVE
BODY TEMPERATURE: 37 C
BUN BLD-MCNC: 11 MG/DL (ref 5–21)
BUN BLD-MCNC: 11 MG/DL (ref 5–21)
BUN BLD-MCNC: 11 MG/DL (ref 8–23)
BUN BLD-MCNC: 14 MG/DL (ref 5–21)
BUN BLD-MCNC: 14 MG/DL (ref 8–23)
BUN/CREAT SERPL: 14.7 (ref 7–25)
BUN/CREAT SERPL: 22 (ref 7–25)
BUN/CREAT SERPL: 22.9 (ref 7–25)
BUN/CREAT SERPL: 32.6 (ref 7–25)
BUN/CREAT SERPL: 35 (ref 7–25)
C PNEUM DNA NPH QL NAA+NON-PROBE: NOT DETECTED
CALCIUM SPEC-SCNC: 8.3 MG/DL (ref 8.2–9.6)
CALCIUM SPEC-SCNC: 8.4 MG/DL (ref 8.4–10.4)
CALCIUM SPEC-SCNC: 8.7 MG/DL (ref 8.4–10.4)
CALCIUM SPEC-SCNC: 9 MG/DL (ref 8.2–9.6)
CALCIUM SPEC-SCNC: 9.1 MG/DL (ref 8.4–10.4)
CHLORIDE SERPL-SCNC: 102 MMOL/L (ref 98–107)
CHLORIDE SERPL-SCNC: 104 MMOL/L (ref 98–107)
CHLORIDE SERPL-SCNC: 97 MMOL/L (ref 98–110)
CHLORIDE SERPL-SCNC: 98 MMOL/L (ref 98–110)
CHLORIDE SERPL-SCNC: 99 MMOL/L (ref 98–110)
CO2 SERPL-SCNC: 26 MMOL/L (ref 22–29)
CO2 SERPL-SCNC: 27 MMOL/L (ref 24–31)
CO2 SERPL-SCNC: 30 MMOL/L (ref 22–29)
CO2 SERPL-SCNC: 31 MMOL/L (ref 24–31)
CO2 SERPL-SCNC: 33 MMOL/L (ref 24–31)
COHGB MFR BLD: 2 % (ref 0–5)
CREAT BLD-MCNC: 0.4 MG/DL (ref 0.5–1.4)
CREAT BLD-MCNC: 0.43 MG/DL (ref 0.57–1)
CREAT BLD-MCNC: 0.48 MG/DL (ref 0.5–1.4)
CREAT BLD-MCNC: 0.5 MG/DL (ref 0.57–1)
CREAT BLD-MCNC: 0.75 MG/DL (ref 0.5–1.4)
CROSSMATCH INTERPRETATION: NORMAL
CROSSMATCH INTERPRETATION: NORMAL
D DIMER PPP FEU-MCNC: 2.37 MG/L (FEU) (ref 0–0.5)
D-LACTATE SERPL-SCNC: 1.9 MMOL/L (ref 0.5–2)
DEPRECATED RDW RBC AUTO: 49.9 FL (ref 40–54)
DEPRECATED RDW RBC AUTO: 51.2 FL (ref 37–54)
DEPRECATED RDW RBC AUTO: 51.5 FL (ref 40–54)
DEPRECATED RDW RBC AUTO: 53.1 FL (ref 37–54)
DEPRECATED RDW RBC AUTO: 53.5 FL (ref 40–54)
DEPRECATED RDW RBC AUTO: 61.1 FL (ref 40–54)
DEVELOPER EXPIRATION DATE: NORMAL
DEVELOPER LOT NUMBER: 145
EOSINOPHIL # BLD AUTO: 0 10*3/MM3 (ref 0–0.4)
EOSINOPHIL # BLD AUTO: 0.03 10*3/MM3 (ref 0–0.4)
EOSINOPHIL # BLD AUTO: 0.07 10*3/MM3 (ref 0–0.7)
EOSINOPHIL # BLD AUTO: 0.08 10*3/MM3 (ref 0–0.7)
EOSINOPHIL # BLD AUTO: 0.08 10*3/MM3 (ref 0–0.7)
EOSINOPHIL NFR BLD AUTO: 0 % (ref 0.3–6.2)
EOSINOPHIL NFR BLD AUTO: 0.3 % (ref 0.3–6.2)
EOSINOPHIL NFR BLD AUTO: 0.9 % (ref 0–4)
EOSINOPHIL NFR BLD AUTO: 1 % (ref 0–4)
EOSINOPHIL NFR BLD AUTO: 1.1 % (ref 0–4)
ERYTHROCYTE [DISTWIDTH] IN BLOOD BY AUTOMATED COUNT: 17.2 % (ref 12–15)
ERYTHROCYTE [DISTWIDTH] IN BLOOD BY AUTOMATED COUNT: 17.6 % (ref 12.3–15.4)
ERYTHROCYTE [DISTWIDTH] IN BLOOD BY AUTOMATED COUNT: 18 % (ref 12–15)
ERYTHROCYTE [DISTWIDTH] IN BLOOD BY AUTOMATED COUNT: 18.1 % (ref 12.3–15.4)
ERYTHROCYTE [DISTWIDTH] IN BLOOD BY AUTOMATED COUNT: 18.7 % (ref 12–15)
ERYTHROCYTE [DISTWIDTH] IN BLOOD BY AUTOMATED COUNT: 20.6 % (ref 12–15)
EXPIRATION DATE: NORMAL
FECAL OCCULT BLOOD SCREEN, POC: NEGATIVE
FLUAV AG NPH QL: NEGATIVE
FLUAV H1 2009 PAND RNA NPH QL NAA+PROBE: NOT DETECTED
FLUAV H1 HA GENE NPH QL NAA+PROBE: NOT DETECTED
FLUAV H3 RNA NPH QL NAA+PROBE: NOT DETECTED
FLUAV SUBTYP SPEC NAA+PROBE: NOT DETECTED
FLUBV AG NPH QL IA: NEGATIVE
FLUBV RNA ISLT QL NAA+PROBE: NOT DETECTED
GAS FLOW AIRWAY: 2 LPM
GFR SERPL CREATININE-BSD FRML MDRD: 115 ML/MIN/1.73
GFR SERPL CREATININE-BSD FRML MDRD: 121 ML/MIN/1.73
GFR SERPL CREATININE-BSD FRML MDRD: 137 ML/MIN/1.73
GFR SERPL CREATININE-BSD FRML MDRD: 149 ML/MIN/1.73
GFR SERPL CREATININE-BSD FRML MDRD: 72 ML/MIN/1.73
GLOBULIN UR ELPH-MCNC: 2.7 GM/DL
GLOBULIN UR ELPH-MCNC: 3.3 GM/DL
GLUCOSE BLD-MCNC: 104 MG/DL (ref 70–100)
GLUCOSE BLD-MCNC: 111 MG/DL (ref 65–99)
GLUCOSE BLD-MCNC: 146 MG/DL (ref 70–100)
GLUCOSE BLD-MCNC: 170 MG/DL (ref 65–99)
GLUCOSE BLD-MCNC: 95 MG/DL (ref 70–100)
GLUCOSE BLDC GLUCOMTR-MCNC: 208 MG/DL (ref 70–130)
GRAM STN SPEC: NORMAL
HADV DNA SPEC NAA+PROBE: NOT DETECTED
HCO3 BLDA-SCNC: 27.6 MMOL/L (ref 20–26)
HCO3 BLDA-SCNC: 27.7 MMOL/L (ref 20–26)
HCO3 BLDV-SCNC: 30.3 MMOL/L (ref 22–28)
HCOV 229E RNA SPEC QL NAA+PROBE: NOT DETECTED
HCOV HKU1 RNA SPEC QL NAA+PROBE: NOT DETECTED
HCOV NL63 RNA SPEC QL NAA+PROBE: NOT DETECTED
HCOV OC43 RNA SPEC QL NAA+PROBE: NOT DETECTED
HCT VFR BLD AUTO: 21.8 % (ref 37–47)
HCT VFR BLD AUTO: 24.1 % (ref 34–46.6)
HCT VFR BLD AUTO: 26.1 % (ref 37–47)
HCT VFR BLD AUTO: 26.4 % (ref 34–46.6)
HCT VFR BLD AUTO: 26.5 % (ref 37–47)
HCT VFR BLD AUTO: 28.1 % (ref 34–46.6)
HCT VFR BLD AUTO: 29.1 % (ref 34–46.6)
HCT VFR BLD AUTO: 29.6 % (ref 34–46.6)
HCT VFR BLD AUTO: 29.8 % (ref 34–46.6)
HCT VFR BLD AUTO: 29.9 % (ref 37–47)
HCT VFR BLD AUTO: 31 % (ref 37–47)
HCT VFR BLD AUTO: 33 % (ref 37–47)
HCT VFR BLD CALC: 21.8 % (ref 38–51)
HGB BLD-MCNC: 10.4 G/DL (ref 12–16)
HGB BLD-MCNC: 6.4 G/DL (ref 12–16)
HGB BLD-MCNC: 7.3 G/DL (ref 12–15.9)
HGB BLD-MCNC: 8 G/DL (ref 12–16)
HGB BLD-MCNC: 8.1 G/DL (ref 12–15.9)
HGB BLD-MCNC: 8.1 G/DL (ref 12–16)
HGB BLD-MCNC: 8.5 G/DL (ref 12–15.9)
HGB BLD-MCNC: 8.7 G/DL (ref 12–15.9)
HGB BLD-MCNC: 9.1 G/DL (ref 12–15.9)
HGB BLD-MCNC: 9.1 G/DL (ref 12–15.9)
HGB BLD-MCNC: 9.2 G/DL (ref 12–16)
HGB BLD-MCNC: 9.7 G/DL (ref 12–16)
HGB BLDA-MCNC: 7.1 G/DL (ref 12–16)
HMPV RNA NPH QL NAA+NON-PROBE: NOT DETECTED
HOLD SPECIMEN: NORMAL
HOROWITZ INDEX BLD+IHG-RTO: 100 %
HPIV1 RNA SPEC QL NAA+PROBE: NOT DETECTED
HPIV2 RNA SPEC QL NAA+PROBE: NOT DETECTED
HPIV3 RNA NPH QL NAA+PROBE: NOT DETECTED
HPIV4 P GENE NPH QL NAA+PROBE: NOT DETECTED
IMM GRANULOCYTES # BLD AUTO: 0.02 10*3/MM3 (ref 0–0.05)
IMM GRANULOCYTES # BLD AUTO: 0.04 10*3/MM3 (ref 0–0.05)
IMM GRANULOCYTES # BLD AUTO: 0.05 10*3/MM3 (ref 0–0.05)
IMM GRANULOCYTES NFR BLD AUTO: 0.3 % (ref 0–5)
IMM GRANULOCYTES NFR BLD AUTO: 0.4 % (ref 0–0.5)
IMM GRANULOCYTES NFR BLD AUTO: 0.5 % (ref 0–5)
IMM GRANULOCYTES NFR BLD AUTO: 0.6 % (ref 0–0.5)
IMM GRANULOCYTES NFR BLD AUTO: 0.6 % (ref 0–5)
INR PPP: 1.22 (ref 0.91–1.09)
INR PPP: 1.81 (ref 0.91–1.09)
L PNEUMO1 AG UR QL IA: NEGATIVE
LEFT ATRIUM VOLUME INDEX: 64.6 ML/M2
LEFT ATRIUM VOLUME INDEX: 78.1 ML/M2
LEFT ATRIUM VOLUME: 121 CM3
LEFT ATRIUM VOLUME: 98.9 CM3
LYMPHOCYTES # BLD AUTO: 0.57 10*3/MM3 (ref 0.7–3.1)
LYMPHOCYTES # BLD AUTO: 0.59 10*3/MM3 (ref 0.72–4.86)
LYMPHOCYTES # BLD AUTO: 0.78 10*3/MM3 (ref 0.7–3.1)
LYMPHOCYTES # BLD AUTO: 0.83 10*3/MM3 (ref 0.72–4.86)
LYMPHOCYTES # BLD AUTO: 0.89 10*3/MM3 (ref 0.72–4.86)
LYMPHOCYTES NFR BLD AUTO: 12.1 % (ref 15–45)
LYMPHOCYTES NFR BLD AUTO: 6.6 % (ref 19.6–45.3)
LYMPHOCYTES NFR BLD AUTO: 6.9 % (ref 19.6–45.3)
LYMPHOCYTES NFR BLD AUTO: 8.1 % (ref 15–45)
LYMPHOCYTES NFR BLD AUTO: 9.6 % (ref 15–45)
Lab: 145
Lab: ABNORMAL
M PNEUMO IGG SER IA-ACNC: NOT DETECTED
MAXIMAL PREDICTED HEART RATE: 127 BPM
MAXIMAL PREDICTED HEART RATE: 128 BPM
MCH RBC QN AUTO: 23.1 PG (ref 28–32)
MCH RBC QN AUTO: 24.4 PG (ref 28–32)
MCH RBC QN AUTO: 24.5 PG (ref 26.6–33)
MCH RBC QN AUTO: 24.7 PG (ref 26.6–33)
MCH RBC QN AUTO: 25.5 PG (ref 28–32)
MCH RBC QN AUTO: 25.8 PG (ref 28–32)
MCHC RBC AUTO-ENTMCNC: 29.4 G/DL (ref 33–36)
MCHC RBC AUTO-ENTMCNC: 30.3 G/DL (ref 31.5–35.7)
MCHC RBC AUTO-ENTMCNC: 30.7 G/DL (ref 31.5–35.7)
MCHC RBC AUTO-ENTMCNC: 30.7 G/DL (ref 33–36)
MCHC RBC AUTO-ENTMCNC: 30.8 G/DL (ref 33–36)
MCHC RBC AUTO-ENTMCNC: 31.3 G/DL (ref 33–36)
MCV RBC AUTO: 78.7 FL (ref 82–98)
MCV RBC AUTO: 79.6 FL (ref 82–98)
MCV RBC AUTO: 80.5 FL (ref 79–97)
MCV RBC AUTO: 80.9 FL (ref 79–97)
MCV RBC AUTO: 81.6 FL (ref 82–98)
MCV RBC AUTO: 83.8 FL (ref 82–98)
METHGB BLD QL: 1.1 % (ref 0–3)
MODALITY: ABNORMAL
MONOCYTES # BLD AUTO: 0.63 10*3/MM3 (ref 0.19–1.3)
MONOCYTES # BLD AUTO: 0.8 10*3/MM3 (ref 0.1–0.9)
MONOCYTES # BLD AUTO: 0.82 10*3/MM3 (ref 0.19–1.3)
MONOCYTES # BLD AUTO: 0.82 10*3/MM3 (ref 0.19–1.3)
MONOCYTES # BLD AUTO: 1.07 10*3/MM3 (ref 0.1–0.9)
MONOCYTES NFR BLD AUTO: 11.2 % (ref 4–12)
MONOCYTES NFR BLD AUTO: 11.2 % (ref 4–12)
MONOCYTES NFR BLD AUTO: 7.3 % (ref 4–12)
MONOCYTES NFR BLD AUTO: 9.2 % (ref 5–12)
MONOCYTES NFR BLD AUTO: 9.5 % (ref 5–12)
NEGATIVE CONTROL: NEGATIVE
NEUTROPHILS # BLD AUTO: 5.51 10*3/MM3 (ref 1.87–8.4)
NEUTROPHILS # BLD AUTO: 5.72 10*3/MM3 (ref 1.87–8.4)
NEUTROPHILS # BLD AUTO: 7.06 10*3/MM3 (ref 1.87–8.4)
NEUTROPHILS # BLD AUTO: 7.23 10*3/MM3 (ref 1.7–7)
NEUTROPHILS # BLD AUTO: 9.3 10*3/MM3 (ref 1.7–7)
NEUTROPHILS NFR BLD AUTO: 75 % (ref 39–78)
NEUTROPHILS NFR BLD AUTO: 78.6 % (ref 39–78)
NEUTROPHILS NFR BLD AUTO: 81.3 % (ref 39–78)
NEUTROPHILS NFR BLD AUTO: 82.6 % (ref 42.7–76)
NEUTROPHILS NFR BLD AUTO: 83.5 % (ref 42.7–76)
NOTE: ABNORMAL
NRBC BLD AUTO-RTO: 0 /100 WBC (ref 0–0)
NRBC BLD AUTO-RTO: 0 /100 WBC (ref 0–0.2)
NRBC BLD AUTO-RTO: 0 /100 WBC (ref 0–0.2)
NT-PROBNP SERPL-MCNC: 1933 PG/ML (ref 5–1800)
NT-PROBNP SERPL-MCNC: 2853 PG/ML (ref 5–1800)
NT-PROBNP SERPL-MCNC: 2920 PG/ML (ref 0–1800)
OXYHGB MFR BLDV: 92.3 % (ref 94–99)
PCO2 BLDA: 38.1 MM HG (ref 35–45)
PCO2 BLDA: 38.9 MM HG (ref 35–45)
PCO2 BLDV: 47.1 MM HG (ref 41–51)
PCO2 TEMP ADJ BLD: ABNORMAL MM[HG]
PEEP RESPIRATORY: 5 CM[H2O]
PH BLDA: 7.46 PH UNITS (ref 7.35–7.45)
PH BLDA: 7.47 PH UNITS (ref 7.35–7.45)
PH BLDV: 7.42 PH UNITS (ref 7.32–7.42)
PH, TEMP CORRECTED: ABNORMAL
PLATELET # BLD AUTO: 251 10*3/MM3 (ref 140–450)
PLATELET # BLD AUTO: 305 10*3/MM3 (ref 130–400)
PLATELET # BLD AUTO: 324 10*3/MM3 (ref 130–400)
PLATELET # BLD AUTO: 328 10*3/MM3 (ref 140–450)
PLATELET # BLD AUTO: 340 10*3/MM3 (ref 130–400)
PLATELET # BLD AUTO: 347 10*3/MM3 (ref 130–400)
PMV BLD AUTO: 9 FL (ref 6–12)
PMV BLD AUTO: 9 FL (ref 6–12)
PMV BLD AUTO: 9.1 FL (ref 6–12)
PMV BLD AUTO: 9.1 FL (ref 6–12)
PMV BLD AUTO: 9.2 FL (ref 6–12)
PMV BLD AUTO: 9.3 FL (ref 6–12)
PO2 BLDA: 401 MM HG (ref 83–108)
PO2 BLDA: 70.2 MM HG (ref 83–108)
PO2 BLDV: 24.2 MM HG (ref 27–53)
PO2 TEMP ADJ BLD: ABNORMAL MM[HG]
POSITIVE CONTROL: POSITIVE
POTASSIUM BLD-SCNC: 3.5 MMOL/L (ref 3.5–5.3)
POTASSIUM BLD-SCNC: 3.7 MMOL/L (ref 3.5–5.3)
POTASSIUM BLD-SCNC: 4 MMOL/L (ref 3.5–5.3)
POTASSIUM BLD-SCNC: 4.3 MMOL/L (ref 3.5–5.2)
POTASSIUM BLD-SCNC: 4.7 MMOL/L (ref 3.5–5.2)
POTASSIUM BLDA-SCNC: 3.9 MMOL/L (ref 3.5–5.2)
PROT SERPL-MCNC: 6.1 G/DL (ref 6.3–8.7)
PROT SERPL-MCNC: 6.8 G/DL (ref 6–8.5)
PROTHROMBIN TIME: 15.8 SECONDS (ref 11.9–14.6)
PROTHROMBIN TIME: 21.6 SECONDS (ref 11.9–14.6)
RBC # BLD AUTO: 2.77 10*6/MM3 (ref 4.2–5.4)
RBC # BLD AUTO: 2.98 10*6/MM3 (ref 3.77–5.28)
RBC # BLD AUTO: 3.28 10*6/MM3 (ref 3.77–5.28)
RBC # BLD AUTO: 3.28 10*6/MM3 (ref 4.2–5.4)
RBC # BLD AUTO: 3.57 10*6/MM3 (ref 4.2–5.4)
RBC # BLD AUTO: 3.8 10*6/MM3 (ref 4.2–5.4)
RH BLD: POSITIVE
RH BLD: POSITIVE
RHINOVIRUS RNA SPEC NAA+PROBE: DETECTED
RSV RNA NPH QL NAA+NON-PROBE: NOT DETECTED
S PNEUM AG SPEC QL LA: NEGATIVE
SAO2 % BLDCOA: 95.2 % (ref 94–99)
SAO2 % BLDCOA: >100.1 % (ref 94–99)
SAO2 % BLDCOV: 34.9 % (ref 45–75)
SET MECH RESP RATE: 14
SODIUM BLD-SCNC: 133 MMOL/L (ref 135–145)
SODIUM BLD-SCNC: 136 MMOL/L (ref 135–145)
SODIUM BLD-SCNC: 136 MMOL/L (ref 135–145)
SODIUM BLD-SCNC: 139 MMOL/L (ref 136–145)
SODIUM BLD-SCNC: 141 MMOL/L (ref 136–145)
SODIUM BLDA-SCNC: 138 MMOL/L (ref 136–145)
STRESS TARGET HR: 108 BPM
STRESS TARGET HR: 109 BPM
T&S EXPIRATION DATE: NORMAL
T&S EXPIRATION DATE: NORMAL
TROPONIN I SERPL-MCNC: <0.012 NG/ML (ref 0–0.03)
TROPONIN T SERPL-MCNC: <0.01 NG/ML (ref 0–0.03)
UNIT  ABO: NORMAL
UNIT  RH: NORMAL
VENTILATOR MODE: ABNORMAL
VENTILATOR MODE: ABNORMAL
VENTILATOR MODE: AC
VT ON VENT VENT: 400 ML
WBC NRBC COR # BLD: 11.26 10*3/MM3 (ref 3.4–10.8)
WBC NRBC COR # BLD: 6.14 10*3/MM3 (ref 4.8–10.8)
WBC NRBC COR # BLD: 7.29 10*3/MM3 (ref 4.8–10.8)
WBC NRBC COR # BLD: 7.34 10*3/MM3 (ref 4.8–10.8)
WBC NRBC COR # BLD: 8.66 10*3/MM3 (ref 3.4–10.8)
WBC NRBC COR # BLD: 8.68 10*3/MM3 (ref 4.8–10.8)
WHOLE BLOOD HOLD SPECIMEN: NORMAL

## 2019-01-01 PROCEDURE — 92610 EVALUATE SWALLOWING FUNCTION: CPT

## 2019-01-01 PROCEDURE — 94799 UNLISTED PULMONARY SVC/PX: CPT

## 2019-01-01 PROCEDURE — 99222 1ST HOSP IP/OBS MODERATE 55: CPT | Performed by: INTERNAL MEDICINE

## 2019-01-01 PROCEDURE — 83880 ASSAY OF NATRIURETIC PEPTIDE: CPT | Performed by: NURSE PRACTITIONER

## 2019-01-01 PROCEDURE — 25010000002 SUCCINYLCHOLINE PER 20 MG

## 2019-01-01 PROCEDURE — 97162 PT EVAL MOD COMPLEX 30 MIN: CPT

## 2019-01-01 PROCEDURE — 93005 ELECTROCARDIOGRAM TRACING: CPT | Performed by: FAMILY MEDICINE

## 2019-01-01 PROCEDURE — 85025 COMPLETE CBC W/AUTO DIFF WBC: CPT | Performed by: FAMILY MEDICINE

## 2019-01-01 PROCEDURE — 90653 IIV ADJUVANT VACCINE IM: CPT | Performed by: NURSE PRACTITIONER

## 2019-01-01 PROCEDURE — 99214 OFFICE O/P EST MOD 30 MIN: CPT | Performed by: NURSE PRACTITIONER

## 2019-01-01 PROCEDURE — 85018 HEMOGLOBIN: CPT | Performed by: FAMILY MEDICINE

## 2019-01-01 PROCEDURE — 99233 SBSQ HOSP IP/OBS HIGH 50: CPT | Performed by: CLINICAL NURSE SPECIALIST

## 2019-01-01 PROCEDURE — 86901 BLOOD TYPING SEROLOGIC RH(D): CPT | Performed by: FAMILY MEDICINE

## 2019-01-01 PROCEDURE — 99231 SBSQ HOSP IP/OBS SF/LOW 25: CPT | Performed by: INTERNAL MEDICINE

## 2019-01-01 PROCEDURE — 87899 AGENT NOS ASSAY W/OPTIC: CPT | Performed by: FAMILY MEDICINE

## 2019-01-01 PROCEDURE — 86900 BLOOD TYPING SEROLOGIC ABO: CPT

## 2019-01-01 PROCEDURE — 99223 1ST HOSP IP/OBS HIGH 75: CPT | Performed by: INTERNAL MEDICINE

## 2019-01-01 PROCEDURE — 86920 COMPATIBILITY TEST SPIN: CPT

## 2019-01-01 PROCEDURE — 80048 BASIC METABOLIC PNL TOTAL CA: CPT | Performed by: FAMILY MEDICINE

## 2019-01-01 PROCEDURE — 93306 TTE W/DOPPLER COMPLETE: CPT

## 2019-01-01 PROCEDURE — 25010000002 MORPHINE SULFATE (PF) 10 MG/ML SOLUTION: Performed by: FAMILY MEDICINE

## 2019-01-01 PROCEDURE — 82803 BLOOD GASES ANY COMBINATION: CPT

## 2019-01-01 PROCEDURE — 85025 COMPLETE CBC W/AUTO DIFF WBC: CPT | Performed by: INTERNAL MEDICINE

## 2019-01-01 PROCEDURE — 86850 RBC ANTIBODY SCREEN: CPT | Performed by: EMERGENCY MEDICINE

## 2019-01-01 PROCEDURE — 94664 DEMO&/EVAL PT USE INHALER: CPT | Performed by: NURSE PRACTITIONER

## 2019-01-01 PROCEDURE — 94760 N-INVAS EAR/PLS OXIMETRY 1: CPT

## 2019-01-01 PROCEDURE — 30233N1 TRANSFUSION OF NONAUTOLOGOUS RED BLOOD CELLS INTO PERIPHERAL VEIN, PERCUTANEOUS APPROACH: ICD-10-PCS | Performed by: FAMILY MEDICINE

## 2019-01-01 PROCEDURE — 82962 GLUCOSE BLOOD TEST: CPT

## 2019-01-01 PROCEDURE — 25010000002 FUROSEMIDE PER 20 MG: Performed by: FAMILY MEDICINE

## 2019-01-01 PROCEDURE — 94770: CPT

## 2019-01-01 PROCEDURE — 36430 TRANSFUSION BLD/BLD COMPNT: CPT

## 2019-01-01 PROCEDURE — 99232 SBSQ HOSP IP/OBS MODERATE 35: CPT | Performed by: INTERNAL MEDICINE

## 2019-01-01 PROCEDURE — 80048 BASIC METABOLIC PNL TOTAL CA: CPT | Performed by: NURSE PRACTITIONER

## 2019-01-01 PROCEDURE — 99284 EMERGENCY DEPT VISIT MOD MDM: CPT

## 2019-01-01 PROCEDURE — 94640 AIRWAY INHALATION TREATMENT: CPT

## 2019-01-01 PROCEDURE — 85610 PROTHROMBIN TIME: CPT | Performed by: NURSE PRACTITIONER

## 2019-01-01 PROCEDURE — 93005 ELECTROCARDIOGRAM TRACING: CPT

## 2019-01-01 PROCEDURE — 99498 ADVNCD CARE PLAN ADDL 30 MIN: CPT | Performed by: CLINICAL NURSE SPECIALIST

## 2019-01-01 PROCEDURE — 25010000002 CEFTRIAXONE PER 250 MG: Performed by: NURSE PRACTITIONER

## 2019-01-01 PROCEDURE — 86900 BLOOD TYPING SEROLOGIC ABO: CPT | Performed by: FAMILY MEDICINE

## 2019-01-01 PROCEDURE — 87804 INFLUENZA ASSAY W/OPTIC: CPT | Performed by: NURSE PRACTITIONER

## 2019-01-01 PROCEDURE — G0008 ADMIN INFLUENZA VIRUS VAC: HCPCS | Performed by: NURSE PRACTITIONER

## 2019-01-01 PROCEDURE — 83880 ASSAY OF NATRIURETIC PEPTIDE: CPT | Performed by: EMERGENCY MEDICINE

## 2019-01-01 PROCEDURE — 80053 COMPREHEN METABOLIC PANEL: CPT | Performed by: NURSE PRACTITIONER

## 2019-01-01 PROCEDURE — 93000 ELECTROCARDIOGRAM COMPLETE: CPT | Performed by: NURSE PRACTITIONER

## 2019-01-01 PROCEDURE — 85014 HEMATOCRIT: CPT | Performed by: NURSE PRACTITIONER

## 2019-01-01 PROCEDURE — 25010000002 DIGOXIN PER 500 MCG: Performed by: INTERNAL MEDICINE

## 2019-01-01 PROCEDURE — 85610 PROTHROMBIN TIME: CPT | Performed by: EMERGENCY MEDICINE

## 2019-01-01 PROCEDURE — 85025 COMPLETE CBC W/AUTO DIFF WBC: CPT | Performed by: NURSE PRACTITIONER

## 2019-01-01 PROCEDURE — 25010000002 CEFTRIAXONE PER 250 MG: Performed by: FAMILY MEDICINE

## 2019-01-01 PROCEDURE — 87205 SMEAR GRAM STAIN: CPT | Performed by: INTERNAL MEDICINE

## 2019-01-01 PROCEDURE — 25010000002 PROPOFOL 10 MG/ML EMULSION: Performed by: INTERNAL MEDICINE

## 2019-01-01 PROCEDURE — P9016 RBC LEUKOCYTES REDUCED: HCPCS

## 2019-01-01 PROCEDURE — 94002 VENT MGMT INPAT INIT DAY: CPT

## 2019-01-01 PROCEDURE — 71046 X-RAY EXAM CHEST 2 VIEWS: CPT

## 2019-01-01 PROCEDURE — 85027 COMPLETE CBC AUTOMATED: CPT | Performed by: NURSE PRACTITIONER

## 2019-01-01 PROCEDURE — 84484 ASSAY OF TROPONIN QUANT: CPT | Performed by: EMERGENCY MEDICINE

## 2019-01-01 PROCEDURE — 93306 TTE W/DOPPLER COMPLETE: CPT | Performed by: INTERNAL MEDICINE

## 2019-01-01 PROCEDURE — 0100U HC BIOFIRE FILMARRAY RESP PANEL 2: CPT | Performed by: FAMILY MEDICINE

## 2019-01-01 PROCEDURE — 85025 COMPLETE CBC W/AUTO DIFF WBC: CPT | Performed by: EMERGENCY MEDICINE

## 2019-01-01 PROCEDURE — 93005 ELECTROCARDIOGRAM TRACING: CPT | Performed by: EMERGENCY MEDICINE

## 2019-01-01 PROCEDURE — 82270 OCCULT BLOOD FECES: CPT | Performed by: EMERGENCY MEDICINE

## 2019-01-01 PROCEDURE — 25010000002 FUROSEMIDE PER 20 MG

## 2019-01-01 PROCEDURE — 36600 WITHDRAWAL OF ARTERIAL BLOOD: CPT

## 2019-01-01 PROCEDURE — 25010000002 METHYLPREDNISOLONE PER 40 MG: Performed by: NURSE PRACTITIONER

## 2019-01-01 PROCEDURE — 93010 ELECTROCARDIOGRAM REPORT: CPT | Performed by: INTERNAL MEDICINE

## 2019-01-01 PROCEDURE — 83050 HGB METHEMOGLOBIN QUAN: CPT

## 2019-01-01 PROCEDURE — 86900 BLOOD TYPING SEROLOGIC ABO: CPT | Performed by: EMERGENCY MEDICINE

## 2019-01-01 PROCEDURE — 71045 X-RAY EXAM CHEST 1 VIEW: CPT

## 2019-01-01 PROCEDURE — 93005 ELECTROCARDIOGRAM TRACING: CPT | Performed by: NURSE PRACTITIONER

## 2019-01-01 PROCEDURE — 87040 BLOOD CULTURE FOR BACTERIA: CPT | Performed by: NURSE PRACTITIONER

## 2019-01-01 PROCEDURE — 94003 VENT MGMT INPAT SUBQ DAY: CPT

## 2019-01-01 PROCEDURE — 86850 RBC ANTIBODY SCREEN: CPT | Performed by: FAMILY MEDICINE

## 2019-01-01 PROCEDURE — 25010000002 LORAZEPAM PER 2 MG: Performed by: CLINICAL NURSE SPECIALIST

## 2019-01-01 PROCEDURE — 85014 HEMATOCRIT: CPT | Performed by: FAMILY MEDICINE

## 2019-01-01 PROCEDURE — 85018 HEMOGLOBIN: CPT | Performed by: NURSE PRACTITIONER

## 2019-01-01 PROCEDURE — 84484 ASSAY OF TROPONIN QUANT: CPT | Performed by: NURSE PRACTITIONER

## 2019-01-01 PROCEDURE — 86923 COMPATIBILITY TEST ELECTRIC: CPT

## 2019-01-01 PROCEDURE — 80053 COMPREHEN METABOLIC PANEL: CPT | Performed by: EMERGENCY MEDICINE

## 2019-01-01 PROCEDURE — 99215 OFFICE O/P EST HI 40 MIN: CPT | Performed by: NURSE PRACTITIONER

## 2019-01-01 PROCEDURE — 0BH17EZ INSERTION OF ENDOTRACHEAL AIRWAY INTO TRACHEA, VIA NATURAL OR ARTIFICIAL OPENING: ICD-10-PCS | Performed by: INTERNAL MEDICINE

## 2019-01-01 PROCEDURE — 86901 BLOOD TYPING SEROLOGIC RH(D): CPT | Performed by: EMERGENCY MEDICINE

## 2019-01-01 PROCEDURE — 99223 1ST HOSP IP/OBS HIGH 75: CPT | Performed by: CLINICAL NURSE SPECIALIST

## 2019-01-01 PROCEDURE — 25010000002 LORAZEPAM PER 2 MG: Performed by: NURSE PRACTITIONER

## 2019-01-01 PROCEDURE — 82805 BLOOD GASES W/O2 SATURATION: CPT

## 2019-01-01 PROCEDURE — 85379 FIBRIN DEGRADATION QUANT: CPT | Performed by: NURSE PRACTITIONER

## 2019-01-01 PROCEDURE — 5A1945Z RESPIRATORY VENTILATION, 24-96 CONSECUTIVE HOURS: ICD-10-PCS | Performed by: INTERNAL MEDICINE

## 2019-01-01 PROCEDURE — 99497 ADVNCD CARE PLAN 30 MIN: CPT | Performed by: CLINICAL NURSE SPECIALIST

## 2019-01-01 PROCEDURE — 85730 THROMBOPLASTIN TIME PARTIAL: CPT | Performed by: EMERGENCY MEDICINE

## 2019-01-01 PROCEDURE — 82375 ASSAY CARBOXYHB QUANT: CPT

## 2019-01-01 PROCEDURE — 97165 OT EVAL LOW COMPLEX 30 MIN: CPT | Performed by: OCCUPATIONAL THERAPIST

## 2019-01-01 PROCEDURE — 87070 CULTURE OTHR SPECIMN AEROBIC: CPT | Performed by: INTERNAL MEDICINE

## 2019-01-01 PROCEDURE — 83605 ASSAY OF LACTIC ACID: CPT | Performed by: NURSE PRACTITIONER

## 2019-01-01 PROCEDURE — 25010000002 METHYLPREDNISOLONE PER 125 MG: Performed by: NURSE PRACTITIONER

## 2019-01-01 PROCEDURE — 25010000002 ONDANSETRON PER 1 MG: Performed by: FAMILY MEDICINE

## 2019-01-01 PROCEDURE — 25010000002 FUROSEMIDE PER 20 MG: Performed by: NURSE PRACTITIONER

## 2019-01-01 RX ORDER — SODIUM CHLORIDE 0.9 % (FLUSH) 0.9 %
10 SYRINGE (ML) INJECTION EVERY 12 HOURS SCHEDULED
Status: DISCONTINUED | OUTPATIENT
Start: 2019-01-01 | End: 2019-11-15 | Stop reason: HOSPADM

## 2019-01-01 RX ORDER — LANOLIN ALCOHOL/MO/W.PET/CERES
6 CREAM (GRAM) TOPICAL NIGHTLY PRN
Status: DISCONTINUED | OUTPATIENT
Start: 2019-01-01 | End: 2019-11-15 | Stop reason: HOSPADM

## 2019-01-01 RX ORDER — LORAZEPAM 2 MG/ML
1 CONCENTRATE ORAL
Status: DISCONTINUED | OUTPATIENT
Start: 2019-01-01 | End: 2019-11-15 | Stop reason: HOSPADM

## 2019-01-01 RX ORDER — PANTOPRAZOLE SODIUM 40 MG/1
40 TABLET, DELAYED RELEASE ORAL
Status: DISCONTINUED | OUTPATIENT
Start: 2019-01-01 | End: 2019-01-01

## 2019-01-01 RX ORDER — GUAIFENESIN 600 MG/1
1200 TABLET, EXTENDED RELEASE ORAL EVERY 12 HOURS SCHEDULED
Status: DISCONTINUED | OUTPATIENT
Start: 2019-01-01 | End: 2019-01-01

## 2019-01-01 RX ORDER — LISINOPRIL 10 MG/1
10 TABLET ORAL DAILY
COMMUNITY

## 2019-01-01 RX ORDER — LORAZEPAM 2 MG/ML
1 INJECTION INTRAMUSCULAR
Status: DISCONTINUED | OUTPATIENT
Start: 2019-01-01 | End: 2019-11-15 | Stop reason: HOSPADM

## 2019-01-01 RX ORDER — BISACODYL 5 MG/1
5 TABLET, DELAYED RELEASE ORAL DAILY PRN
Status: DISCONTINUED | OUTPATIENT
Start: 2019-01-01 | End: 2019-11-15 | Stop reason: HOSPADM

## 2019-01-01 RX ORDER — OMEPRAZOLE 40 MG/1
40 CAPSULE, DELAYED RELEASE ORAL DAILY
Status: ON HOLD | COMMUNITY
End: 2019-01-01

## 2019-01-01 RX ORDER — LORAZEPAM 2 MG/ML
0.5 INJECTION INTRAMUSCULAR EVERY 6 HOURS
Status: DISCONTINUED | OUTPATIENT
Start: 2019-01-01 | End: 2019-11-15 | Stop reason: HOSPADM

## 2019-01-01 RX ORDER — MORPHINE SULFATE 1 MG/ML
1-5 INJECTION, SOLUTION INTRAVENOUS
Status: DISCONTINUED | OUTPATIENT
Start: 2019-01-01 | End: 2019-11-15 | Stop reason: HOSPADM

## 2019-01-01 RX ORDER — ACETAMINOPHEN 650 MG/1
650 SUPPOSITORY RECTAL EVERY 4 HOURS PRN
Status: DISCONTINUED | OUTPATIENT
Start: 2019-01-01 | End: 2019-11-15 | Stop reason: HOSPADM

## 2019-01-01 RX ORDER — ALUMINA, MAGNESIA, AND SIMETHICONE 2400; 2400; 240 MG/30ML; MG/30ML; MG/30ML
15 SUSPENSION ORAL EVERY 6 HOURS PRN
Status: DISCONTINUED | OUTPATIENT
Start: 2019-01-01 | End: 2019-11-15 | Stop reason: HOSPADM

## 2019-01-01 RX ORDER — LORAZEPAM 2 MG/ML
2 CONCENTRATE ORAL
Status: DISCONTINUED | OUTPATIENT
Start: 2019-01-01 | End: 2019-01-01 | Stop reason: SDUPTHER

## 2019-01-01 RX ORDER — LORAZEPAM 2 MG/ML
2 CONCENTRATE ORAL
Status: DISCONTINUED | OUTPATIENT
Start: 2019-01-01 | End: 2019-11-15 | Stop reason: HOSPADM

## 2019-01-01 RX ORDER — SODIUM CHLORIDE 0.9 % (FLUSH) 0.9 %
3-10 SYRINGE (ML) INJECTION AS NEEDED
Status: DISCONTINUED | OUTPATIENT
Start: 2019-01-01 | End: 2019-01-01 | Stop reason: HOSPADM

## 2019-01-01 RX ORDER — MORPHINE SULFATE 20 MG/ML
5 SOLUTION ORAL EVERY 4 HOURS PRN
Status: DISCONTINUED | OUTPATIENT
Start: 2019-01-01 | End: 2019-11-15 | Stop reason: HOSPADM

## 2019-01-01 RX ORDER — PROMETHAZINE HYDROCHLORIDE 12.5 MG/1
6.25 SUPPOSITORY RECTAL EVERY 4 HOURS PRN
Status: DISCONTINUED | OUTPATIENT
Start: 2019-01-01 | End: 2019-11-15 | Stop reason: HOSPADM

## 2019-01-01 RX ORDER — METHYLPREDNISOLONE SODIUM SUCCINATE 40 MG/ML
40 INJECTION, POWDER, LYOPHILIZED, FOR SOLUTION INTRAMUSCULAR; INTRAVENOUS EVERY 12 HOURS
Status: DISCONTINUED | OUTPATIENT
Start: 2019-01-01 | End: 2019-01-01 | Stop reason: HOSPADM

## 2019-01-01 RX ORDER — PROMETHAZINE HYDROCHLORIDE 25 MG/ML
6.25 INJECTION, SOLUTION INTRAMUSCULAR; INTRAVENOUS EVERY 4 HOURS PRN
Status: DISCONTINUED | OUTPATIENT
Start: 2019-01-01 | End: 2019-11-15 | Stop reason: HOSPADM

## 2019-01-01 RX ORDER — FERROUS SULFATE 325(65) MG
325 TABLET ORAL
COMMUNITY

## 2019-01-01 RX ORDER — PROMETHAZINE HYDROCHLORIDE 25 MG/1
6.25 TABLET ORAL EVERY 4 HOURS PRN
Status: DISCONTINUED | OUTPATIENT
Start: 2019-01-01 | End: 2019-11-15 | Stop reason: HOSPADM

## 2019-01-01 RX ORDER — LORAZEPAM 2 MG/ML
1 CONCENTRATE ORAL
Status: DISCONTINUED | OUTPATIENT
Start: 2019-01-01 | End: 2019-01-01 | Stop reason: SDUPTHER

## 2019-01-01 RX ORDER — LORAZEPAM 1 MG/1
1 TABLET ORAL
Status: DISCONTINUED | OUTPATIENT
Start: 2019-01-01 | End: 2019-11-15 | Stop reason: HOSPADM

## 2019-01-01 RX ORDER — ALBUTEROL SULFATE 2.5 MG/3ML
2.5 SOLUTION RESPIRATORY (INHALATION) EVERY 4 HOURS PRN
COMMUNITY

## 2019-01-01 RX ORDER — SODIUM CHLORIDE 0.9 % (FLUSH) 0.9 %
10 SYRINGE (ML) INJECTION AS NEEDED
Status: DISCONTINUED | OUTPATIENT
Start: 2019-01-01 | End: 2019-01-01 | Stop reason: HOSPADM

## 2019-01-01 RX ORDER — FUROSEMIDE 10 MG/ML
20 INJECTION INTRAMUSCULAR; INTRAVENOUS ONCE
Status: COMPLETED | OUTPATIENT
Start: 2019-01-01 | End: 2019-01-01

## 2019-01-01 RX ORDER — ACETAMINOPHEN 160 MG/5ML
650 SOLUTION ORAL EVERY 4 HOURS PRN
Status: DISCONTINUED | OUTPATIENT
Start: 2019-01-01 | End: 2019-01-01 | Stop reason: SDUPTHER

## 2019-01-01 RX ORDER — GUAIFENESIN 600 MG/1
1200 TABLET, EXTENDED RELEASE ORAL 2 TIMES DAILY
COMMUNITY

## 2019-01-01 RX ORDER — ALBUTEROL SULFATE 2.5 MG/3ML
SOLUTION RESPIRATORY (INHALATION)
Refills: 5 | Status: ON HOLD | COMMUNITY
Start: 2019-01-01 | End: 2019-01-01

## 2019-01-01 RX ORDER — ATROPINE SULFATE 10 MG/ML
2 SOLUTION/ DROPS OPHTHALMIC 4 TIMES DAILY
Status: DISCONTINUED | OUTPATIENT
Start: 2019-01-01 | End: 2019-11-15 | Stop reason: HOSPADM

## 2019-01-01 RX ORDER — ACETAMINOPHEN 325 MG/1
650 TABLET ORAL EVERY 4 HOURS PRN
Status: DISCONTINUED | OUTPATIENT
Start: 2019-01-01 | End: 2019-01-01 | Stop reason: SDUPTHER

## 2019-01-01 RX ORDER — FUROSEMIDE 20 MG/1
20 TABLET ORAL DAILY
Status: DISCONTINUED | OUTPATIENT
Start: 2019-01-01 | End: 2019-01-01

## 2019-01-01 RX ORDER — PAROXETINE 10 MG/1
10 TABLET, FILM COATED ORAL EVERY MORNING
Status: DISCONTINUED | OUTPATIENT
Start: 2019-01-01 | End: 2019-01-01 | Stop reason: HOSPADM

## 2019-01-01 RX ORDER — FUROSEMIDE 10 MG/ML
40 INJECTION INTRAMUSCULAR; INTRAVENOUS DAILY
Status: DISCONTINUED | OUTPATIENT
Start: 2019-01-01 | End: 2019-01-01

## 2019-01-01 RX ORDER — IPRATROPIUM BROMIDE AND ALBUTEROL SULFATE 2.5; .5 MG/3ML; MG/3ML
3 SOLUTION RESPIRATORY (INHALATION)
Status: DISCONTINUED | OUTPATIENT
Start: 2019-01-01 | End: 2019-01-01

## 2019-01-01 RX ORDER — ACETAMINOPHEN 325 MG/1
650 TABLET ORAL EVERY 4 HOURS PRN
Status: DISCONTINUED | OUTPATIENT
Start: 2019-01-01 | End: 2019-01-01 | Stop reason: HOSPADM

## 2019-01-01 RX ORDER — LORAZEPAM 2 MG/ML
1 INJECTION INTRAMUSCULAR
Status: DISCONTINUED | OUTPATIENT
Start: 2019-01-01 | End: 2019-01-01 | Stop reason: SDUPTHER

## 2019-01-01 RX ORDER — LORAZEPAM 2 MG/ML
2 INJECTION INTRAMUSCULAR
Status: DISCONTINUED | OUTPATIENT
Start: 2019-01-01 | End: 2019-11-15 | Stop reason: HOSPADM

## 2019-01-01 RX ORDER — GUAIFENESIN 600 MG/1
1200 TABLET, EXTENDED RELEASE ORAL EVERY 12 HOURS SCHEDULED
Status: DISCONTINUED | OUTPATIENT
Start: 2019-01-01 | End: 2019-01-01 | Stop reason: HOSPADM

## 2019-01-01 RX ORDER — ESOMEPRAZOLE MAGNESIUM 40 MG/1
40 CAPSULE, DELAYED RELEASE ORAL
COMMUNITY

## 2019-01-01 RX ORDER — LORAZEPAM 1 MG/1
2 TABLET ORAL
Status: DISCONTINUED | OUTPATIENT
Start: 2019-01-01 | End: 2019-11-15 | Stop reason: HOSPADM

## 2019-01-01 RX ORDER — METHYLPREDNISOLONE SODIUM SUCCINATE 125 MG/2ML
125 INJECTION, POWDER, LYOPHILIZED, FOR SOLUTION INTRAMUSCULAR; INTRAVENOUS ONCE
Status: COMPLETED | OUTPATIENT
Start: 2019-01-01 | End: 2019-01-01

## 2019-01-01 RX ORDER — PAROXETINE 10 MG/1
10 TABLET, FILM COATED ORAL DAILY
Status: DISCONTINUED | OUTPATIENT
Start: 2019-01-01 | End: 2019-01-01

## 2019-01-01 RX ORDER — PANTOPRAZOLE SODIUM 40 MG/10ML
40 INJECTION, POWDER, LYOPHILIZED, FOR SOLUTION INTRAVENOUS
Status: DISCONTINUED | OUTPATIENT
Start: 2019-01-01 | End: 2019-01-01 | Stop reason: CLARIF

## 2019-01-01 RX ORDER — PROMETHAZINE HYDROCHLORIDE 6.25 MG/5ML
6.25 SYRUP ORAL EVERY 4 HOURS PRN
Status: DISCONTINUED | OUTPATIENT
Start: 2019-01-01 | End: 2019-11-15 | Stop reason: HOSPADM

## 2019-01-01 RX ORDER — ONDANSETRON 2 MG/ML
4 INJECTION INTRAMUSCULAR; INTRAVENOUS EVERY 6 HOURS PRN
Status: DISCONTINUED | OUTPATIENT
Start: 2019-01-01 | End: 2019-11-15 | Stop reason: HOSPADM

## 2019-01-01 RX ORDER — SCOLOPAMINE TRANSDERMAL SYSTEM 1 MG/1
1 PATCH, EXTENDED RELEASE TRANSDERMAL
Status: DISCONTINUED | OUTPATIENT
Start: 2019-01-01 | End: 2019-11-15 | Stop reason: HOSPADM

## 2019-01-01 RX ORDER — LORAZEPAM 2 MG/ML
0.5 CONCENTRATE ORAL
Status: DISCONTINUED | OUTPATIENT
Start: 2019-01-01 | End: 2019-11-15 | Stop reason: HOSPADM

## 2019-01-01 RX ORDER — SALIVA STIMULANT COMB. NO.3
1 SPRAY, NON-AEROSOL (ML) MUCOUS MEMBRANE
Status: DISCONTINUED | OUTPATIENT
Start: 2019-01-01 | End: 2019-01-01

## 2019-01-01 RX ORDER — LORAZEPAM 2 MG/ML
0.5 INJECTION INTRAMUSCULAR
Status: DISCONTINUED | OUTPATIENT
Start: 2019-01-01 | End: 2019-01-01 | Stop reason: SDUPTHER

## 2019-01-01 RX ORDER — ACETAMINOPHEN 650 MG/1
650 SUPPOSITORY RECTAL EVERY 4 HOURS PRN
Status: DISCONTINUED | OUTPATIENT
Start: 2019-01-01 | End: 2019-01-01 | Stop reason: SDUPTHER

## 2019-01-01 RX ORDER — ONDANSETRON 4 MG/1
4 TABLET, FILM COATED ORAL EVERY 6 HOURS PRN
Status: DISCONTINUED | OUTPATIENT
Start: 2019-01-01 | End: 2019-01-01 | Stop reason: HOSPADM

## 2019-01-01 RX ORDER — DABIGATRAN ETEXILATE 150 MG/1
150 CAPSULE ORAL EVERY 12 HOURS SCHEDULED
Status: DISCONTINUED | OUTPATIENT
Start: 2019-01-01 | End: 2019-01-01

## 2019-01-01 RX ORDER — LANOLIN ALCOHOL/MO/W.PET/CERES
1000 CREAM (GRAM) TOPICAL DAILY
COMMUNITY

## 2019-01-01 RX ORDER — ALBUTEROL SULFATE 90 UG/1
2 AEROSOL, METERED RESPIRATORY (INHALATION) EVERY 6 HOURS PRN
Qty: 1 INHALER | Refills: 11 | Status: ON HOLD | OUTPATIENT
Start: 2019-01-01 | End: 2019-01-01

## 2019-01-01 RX ORDER — IPRATROPIUM BROMIDE AND ALBUTEROL SULFATE 2.5; .5 MG/3ML; MG/3ML
3 SOLUTION RESPIRATORY (INHALATION) EVERY 4 HOURS PRN
Status: DISCONTINUED | OUTPATIENT
Start: 2019-01-01 | End: 2019-01-01

## 2019-01-01 RX ORDER — PREDNISONE 20 MG/1
20 TABLET ORAL DAILY
Qty: 5 TABLET | Refills: 0 | Status: SHIPPED | OUTPATIENT
Start: 2019-01-01 | End: 2019-01-01

## 2019-01-01 RX ORDER — PANTOPRAZOLE SODIUM 40 MG/1
40 TABLET, DELAYED RELEASE ORAL EVERY MORNING
Status: DISCONTINUED | OUTPATIENT
Start: 2019-01-01 | End: 2019-01-01 | Stop reason: HOSPADM

## 2019-01-01 RX ORDER — LORAZEPAM 2 MG/ML
2 INJECTION INTRAMUSCULAR
Status: DISCONTINUED | OUTPATIENT
Start: 2019-01-01 | End: 2019-01-01 | Stop reason: SDUPTHER

## 2019-01-01 RX ORDER — ERGOCALCIFEROL 1.25 MG/1
50000 CAPSULE ORAL WEEKLY
COMMUNITY

## 2019-01-01 RX ORDER — FUROSEMIDE 10 MG/ML
40 INJECTION INTRAMUSCULAR; INTRAVENOUS EVERY 12 HOURS
Status: DISCONTINUED | OUTPATIENT
Start: 2019-01-01 | End: 2019-01-01

## 2019-01-01 RX ORDER — LISINOPRIL 5 MG/1
5 TABLET ORAL NIGHTLY
COMMUNITY
End: 2019-01-01 | Stop reason: ALTCHOICE

## 2019-01-01 RX ORDER — BUDESONIDE AND FORMOTEROL FUMARATE DIHYDRATE 80; 4.5 UG/1; UG/1
2 AEROSOL RESPIRATORY (INHALATION)
Status: DISCONTINUED | OUTPATIENT
Start: 2019-01-01 | End: 2019-01-01 | Stop reason: HOSPADM

## 2019-01-01 RX ORDER — ACETAMINOPHEN 325 MG/1
650 TABLET ORAL EVERY 4 HOURS PRN
Status: DISCONTINUED | OUTPATIENT
Start: 2019-01-01 | End: 2019-11-15 | Stop reason: HOSPADM

## 2019-01-01 RX ORDER — DABIGATRAN ETEXILATE 150 MG/1
150 CAPSULE ORAL EVERY 12 HOURS SCHEDULED
Status: DISCONTINUED | OUTPATIENT
Start: 2019-01-01 | End: 2019-01-01 | Stop reason: HOSPADM

## 2019-01-01 RX ORDER — LORAZEPAM 0.5 MG/1
0.5 TABLET ORAL
Status: DISCONTINUED | OUTPATIENT
Start: 2019-01-01 | End: 2019-01-01 | Stop reason: SDUPTHER

## 2019-01-01 RX ORDER — DIPHENOXYLATE HYDROCHLORIDE AND ATROPINE SULFATE 2.5; .025 MG/1; MG/1
1 TABLET ORAL
Status: DISCONTINUED | OUTPATIENT
Start: 2019-01-01 | End: 2019-11-15 | Stop reason: HOSPADM

## 2019-01-01 RX ORDER — LORAZEPAM 1 MG/1
2 TABLET ORAL
Status: DISCONTINUED | OUTPATIENT
Start: 2019-01-01 | End: 2019-01-01 | Stop reason: SDUPTHER

## 2019-01-01 RX ORDER — FUROSEMIDE 10 MG/ML
INJECTION INTRAMUSCULAR; INTRAVENOUS
Status: COMPLETED
Start: 2019-01-01 | End: 2019-01-01

## 2019-01-01 RX ORDER — LORAZEPAM 2 MG/ML
0.25 CONCENTRATE ORAL EVERY 4 HOURS PRN
Status: DISCONTINUED | OUTPATIENT
Start: 2019-01-01 | End: 2019-01-01

## 2019-01-01 RX ORDER — LORAZEPAM 1 MG/1
1 TABLET ORAL
Status: DISCONTINUED | OUTPATIENT
Start: 2019-01-01 | End: 2019-01-01 | Stop reason: SDUPTHER

## 2019-01-01 RX ORDER — IPRATROPIUM BROMIDE AND ALBUTEROL SULFATE 2.5; .5 MG/3ML; MG/3ML
3 SOLUTION RESPIRATORY (INHALATION)
Status: DISCONTINUED | OUTPATIENT
Start: 2019-01-01 | End: 2019-01-01 | Stop reason: HOSPADM

## 2019-01-01 RX ORDER — FUROSEMIDE 10 MG/ML
10 INJECTION INTRAMUSCULAR; INTRAVENOUS ONCE
Status: COMPLETED | OUTPATIENT
Start: 2019-01-01 | End: 2019-01-01

## 2019-01-01 RX ORDER — LORAZEPAM 2 MG/ML
0.5 CONCENTRATE ORAL
Status: DISCONTINUED | OUTPATIENT
Start: 2019-01-01 | End: 2019-01-01 | Stop reason: SDUPTHER

## 2019-01-01 RX ORDER — DIGOXIN 0.25 MG/ML
500 INJECTION INTRAMUSCULAR; INTRAVENOUS ONCE
Status: COMPLETED | OUTPATIENT
Start: 2019-01-01 | End: 2019-01-01

## 2019-01-01 RX ORDER — SODIUM CHLORIDE 0.9 % (FLUSH) 0.9 %
3 SYRINGE (ML) INJECTION EVERY 12 HOURS SCHEDULED
Status: DISCONTINUED | OUTPATIENT
Start: 2019-01-01 | End: 2019-01-01 | Stop reason: HOSPADM

## 2019-01-01 RX ORDER — DIPHENOXYLATE HYDROCHLORIDE AND ATROPINE SULFATE 2.5; .025 MG/1; MG/1
1 TABLET ORAL
Status: DISCONTINUED | OUTPATIENT
Start: 2019-01-01 | End: 2019-01-01 | Stop reason: SDUPTHER

## 2019-01-01 RX ORDER — FUROSEMIDE 20 MG/1
20 TABLET ORAL DAILY
Status: DISCONTINUED | OUTPATIENT
Start: 2019-01-01 | End: 2019-01-01 | Stop reason: HOSPADM

## 2019-01-01 RX ORDER — LORAZEPAM 0.5 MG/1
0.5 TABLET ORAL
Status: DISCONTINUED | OUTPATIENT
Start: 2019-01-01 | End: 2019-11-15 | Stop reason: HOSPADM

## 2019-01-01 RX ORDER — LORAZEPAM 2 MG/ML
0.5 INJECTION INTRAMUSCULAR
Status: DISCONTINUED | OUTPATIENT
Start: 2019-01-01 | End: 2019-11-15 | Stop reason: HOSPADM

## 2019-01-01 RX ORDER — SODIUM CHLORIDE 9 MG/ML
50 INJECTION, SOLUTION INTRAVENOUS CONTINUOUS
Status: DISCONTINUED | OUTPATIENT
Start: 2019-01-01 | End: 2019-01-01

## 2019-01-01 RX ORDER — LISINOPRIL 10 MG/1
10 TABLET ORAL DAILY
Status: DISCONTINUED | OUTPATIENT
Start: 2019-01-01 | End: 2019-01-01 | Stop reason: HOSPADM

## 2019-01-01 RX ORDER — ACETAMINOPHEN 160 MG/5ML
650 SOLUTION ORAL EVERY 4 HOURS PRN
Status: DISCONTINUED | OUTPATIENT
Start: 2019-01-01 | End: 2019-11-15 | Stop reason: HOSPADM

## 2019-01-01 RX ORDER — ATORVASTATIN CALCIUM 40 MG/1
40 TABLET, FILM COATED ORAL NIGHTLY
Status: DISCONTINUED | OUTPATIENT
Start: 2019-01-01 | End: 2019-01-01 | Stop reason: HOSPADM

## 2019-01-01 RX ORDER — SCOLOPAMINE TRANSDERMAL SYSTEM 1 MG/1
1 PATCH, EXTENDED RELEASE TRANSDERMAL
Status: DISCONTINUED | OUTPATIENT
Start: 2019-01-01 | End: 2019-01-01 | Stop reason: SDUPTHER

## 2019-01-01 RX ORDER — SODIUM CHLORIDE 0.9 % (FLUSH) 0.9 %
10 SYRINGE (ML) INJECTION AS NEEDED
Status: DISCONTINUED | OUTPATIENT
Start: 2019-01-01 | End: 2019-11-15 | Stop reason: HOSPADM

## 2019-01-01 RX ORDER — ALBUTEROL SULFATE 2.5 MG/3ML
2.5 SOLUTION RESPIRATORY (INHALATION) ONCE AS NEEDED
Status: DISCONTINUED | OUTPATIENT
Start: 2019-01-01 | End: 2019-01-01

## 2019-01-01 RX ORDER — CETIRIZINE HYDROCHLORIDE 10 MG/1
10 TABLET ORAL DAILY
Status: DISCONTINUED | OUTPATIENT
Start: 2019-01-01 | End: 2019-01-01 | Stop reason: HOSPADM

## 2019-01-01 RX ORDER — IPRATROPIUM BROMIDE AND ALBUTEROL SULFATE 2.5; .5 MG/3ML; MG/3ML
3 SOLUTION RESPIRATORY (INHALATION) EVERY 6 HOURS PRN
Status: DISCONTINUED | OUTPATIENT
Start: 2019-01-01 | End: 2019-01-01

## 2019-01-01 RX ORDER — IPRATROPIUM BROMIDE AND ALBUTEROL SULFATE 2.5; .5 MG/3ML; MG/3ML
3 SOLUTION RESPIRATORY (INHALATION) ONCE
Status: COMPLETED | OUTPATIENT
Start: 2019-01-01 | End: 2019-01-01

## 2019-01-01 RX ORDER — CETIRIZINE HYDROCHLORIDE 10 MG/1
10 TABLET ORAL DAILY
Status: DISCONTINUED | OUTPATIENT
Start: 2019-01-01 | End: 2019-01-01

## 2019-01-01 RX ADMIN — SODIUM CHLORIDE 1000 ML: 9 INJECTION, SOLUTION INTRAVENOUS at 12:23

## 2019-01-01 RX ADMIN — IPRATROPIUM BROMIDE AND ALBUTEROL SULFATE 3 ML: 2.5; .5 SOLUTION RESPIRATORY (INHALATION) at 20:12

## 2019-01-01 RX ADMIN — GUAIFENESIN 1200 MG: 600 TABLET, EXTENDED RELEASE ORAL at 09:57

## 2019-01-01 RX ADMIN — BUDESONIDE AND FORMOTEROL FUMARATE DIHYDRATE 2 PUFF: 80; 4.5 AEROSOL RESPIRATORY (INHALATION) at 21:47

## 2019-01-01 RX ADMIN — DESMOPRESSIN ACETATE 40 MG: 0.2 TABLET ORAL at 20:44

## 2019-01-01 RX ADMIN — Medication 1 SPRAY: at 17:18

## 2019-01-01 RX ADMIN — MORPHINE SULFATE 4 MG/HR: 10 INJECTION, SOLUTION INTRAMUSCULAR; INTRAVENOUS at 02:40

## 2019-01-01 RX ADMIN — DOXYCYCLINE 100 MG: 100 INJECTION, POWDER, LYOPHILIZED, FOR SOLUTION INTRAVENOUS at 04:33

## 2019-01-01 RX ADMIN — CETIRIZINE HYDROCHLORIDE 10 MG: 10 TABLET, FILM COATED ORAL at 08:37

## 2019-01-01 RX ADMIN — LORAZEPAM 0.5 MG: 2 INJECTION INTRAMUSCULAR; INTRAVENOUS at 11:53

## 2019-01-01 RX ADMIN — IPRATROPIUM BROMIDE AND ALBUTEROL SULFATE 3 ML: 2.5; .5 SOLUTION RESPIRATORY (INHALATION) at 13:05

## 2019-01-01 RX ADMIN — SODIUM CHLORIDE, PRESERVATIVE FREE 3 ML: 5 INJECTION INTRAVENOUS at 23:52

## 2019-01-01 RX ADMIN — MORPHINE SULFATE 5 MG: 100 SOLUTION ORAL at 21:29

## 2019-01-01 RX ADMIN — SODIUM CHLORIDE 500 ML: 9 INJECTION, SOLUTION INTRAVENOUS at 21:51

## 2019-01-01 RX ADMIN — IPRATROPIUM BROMIDE AND ALBUTEROL SULFATE 3 ML: 2.5; .5 SOLUTION RESPIRATORY (INHALATION) at 14:54

## 2019-01-01 RX ADMIN — PAROXETINE 10 MG: 10 TABLET, FILM COATED ORAL at 06:36

## 2019-01-01 RX ADMIN — GUAIFENESIN 1200 MG: 600 TABLET, EXTENDED RELEASE ORAL at 08:36

## 2019-01-01 RX ADMIN — METOPROLOL TARTRATE 25 MG: 25 TABLET, FILM COATED ORAL at 08:36

## 2019-01-01 RX ADMIN — MORPHINE SULFATE 4 MG/HR: 10 INJECTION, SOLUTION INTRAMUSCULAR; INTRAVENOUS at 17:35

## 2019-01-01 RX ADMIN — MORPHINE SULFATE 5 MG: 100 SOLUTION ORAL at 17:18

## 2019-01-01 RX ADMIN — IPRATROPIUM BROMIDE AND ALBUTEROL SULFATE 3 ML: 2.5; .5 SOLUTION RESPIRATORY (INHALATION) at 07:00

## 2019-01-01 RX ADMIN — LISINOPRIL 10 MG: 10 TABLET ORAL at 08:37

## 2019-01-01 RX ADMIN — SODIUM CHLORIDE, PRESERVATIVE FREE 10 ML: 5 INJECTION INTRAVENOUS at 09:58

## 2019-01-01 RX ADMIN — IPRATROPIUM BROMIDE 0.5 MG: 0.5 SOLUTION RESPIRATORY (INHALATION) at 19:40

## 2019-01-01 RX ADMIN — ATROPINE SULFATE 2 DROP: 10 SOLUTION/ DROPS OPHTHALMIC at 11:52

## 2019-01-01 RX ADMIN — SODIUM CHLORIDE 100 ML/HR: 9 INJECTION, SOLUTION INTRAVENOUS at 02:26

## 2019-01-01 RX ADMIN — DOXYCYCLINE 100 MG: 100 INJECTION, POWDER, LYOPHILIZED, FOR SOLUTION INTRAVENOUS at 15:19

## 2019-01-01 RX ADMIN — FUROSEMIDE 40 MG: 10 INJECTION, SOLUTION INTRAVENOUS at 11:55

## 2019-01-01 RX ADMIN — CEFTRIAXONE SODIUM 1 G: 1 INJECTION, POWDER, FOR SOLUTION INTRAMUSCULAR; INTRAVENOUS at 15:19

## 2019-01-01 RX ADMIN — PROPOFOL 50 MCG/KG/MIN: 10 INJECTION, EMULSION INTRAVENOUS at 09:40

## 2019-01-01 RX ADMIN — METHYLPREDNISOLONE SODIUM SUCCINATE 125 MG: 125 INJECTION, POWDER, FOR SOLUTION INTRAMUSCULAR; INTRAVENOUS at 12:23

## 2019-01-01 RX ADMIN — DOXYCYCLINE 100 MG: 100 INJECTION, POWDER, LYOPHILIZED, FOR SOLUTION INTRAVENOUS at 16:41

## 2019-01-01 RX ADMIN — METOPROLOL TARTRATE 12.5 MG: 25 TABLET, FILM COATED ORAL at 09:57

## 2019-01-01 RX ADMIN — DABIGATRAN ETEXILATE MESYLATE 150 MG: 150 CAPSULE ORAL at 08:37

## 2019-01-01 RX ADMIN — FUROSEMIDE 40 MG: 10 INJECTION, SOLUTION INTRAVENOUS at 09:57

## 2019-01-01 RX ADMIN — LISINOPRIL 10 MG: 10 TABLET ORAL at 09:25

## 2019-01-01 RX ADMIN — DOXYCYCLINE 100 MG: 100 INJECTION, POWDER, LYOPHILIZED, FOR SOLUTION INTRAVENOUS at 04:46

## 2019-01-01 RX ADMIN — Medication 1 SPRAY: at 15:22

## 2019-01-01 RX ADMIN — GUAIFENESIN 1200 MG: 600 TABLET, EXTENDED RELEASE ORAL at 23:51

## 2019-01-01 RX ADMIN — IPRATROPIUM BROMIDE AND ALBUTEROL SULFATE 3 ML: 2.5; .5 SOLUTION RESPIRATORY (INHALATION) at 07:21

## 2019-01-01 RX ADMIN — METOPROLOL TARTRATE 25 MG: 25 TABLET, FILM COATED ORAL at 09:25

## 2019-01-01 RX ADMIN — PAROXETINE 10 MG: 10 TABLET, FILM COATED ORAL at 06:32

## 2019-01-01 RX ADMIN — IPRATROPIUM BROMIDE AND ALBUTEROL SULFATE 3 ML: 2.5; .5 SOLUTION RESPIRATORY (INHALATION) at 11:32

## 2019-01-01 RX ADMIN — METOPROLOL TARTRATE 25 MG: 25 TABLET, FILM COATED ORAL at 20:44

## 2019-01-01 RX ADMIN — BUDESONIDE AND FORMOTEROL FUMARATE DIHYDRATE 2 PUFF: 80; 4.5 AEROSOL RESPIRATORY (INHALATION) at 11:03

## 2019-01-01 RX ADMIN — PROPOFOL 55 MCG/KG/MIN: 10 INJECTION, EMULSION INTRAVENOUS at 02:37

## 2019-01-01 RX ADMIN — LORAZEPAM 0.26 MG: 2 SOLUTION, CONCENTRATE ORAL at 17:50

## 2019-01-01 RX ADMIN — SODIUM CHLORIDE, PRESERVATIVE FREE 10 ML: 5 INJECTION INTRAVENOUS at 20:58

## 2019-01-01 RX ADMIN — DIGOXIN 500 MCG: 0.25 INJECTION INTRAMUSCULAR; INTRAVENOUS at 22:39

## 2019-01-01 RX ADMIN — CETIRIZINE HYDROCHLORIDE 10 MG: 10 TABLET, FILM COATED ORAL at 09:25

## 2019-01-01 RX ADMIN — LORAZEPAM 0.5 MG: 2 INJECTION INTRAMUSCULAR; INTRAVENOUS at 17:35

## 2019-01-01 RX ADMIN — DESMOPRESSIN ACETATE 40 MG: 0.2 TABLET ORAL at 23:51

## 2019-01-01 RX ADMIN — METHYLPREDNISOLONE SODIUM SUCCINATE 40 MG: 40 INJECTION, POWDER, FOR SOLUTION INTRAMUSCULAR; INTRAVENOUS at 00:16

## 2019-01-01 RX ADMIN — SODIUM CHLORIDE, PRESERVATIVE FREE 3 ML: 5 INJECTION INTRAVENOUS at 20:44

## 2019-01-01 RX ADMIN — DOXYCYCLINE 100 MG: 100 INJECTION, POWDER, LYOPHILIZED, FOR SOLUTION INTRAVENOUS at 17:38

## 2019-01-01 RX ADMIN — METOPROLOL TARTRATE 25 MG: 25 TABLET, FILM COATED ORAL at 23:51

## 2019-01-01 RX ADMIN — GUAIFENESIN 1200 MG: 600 TABLET, EXTENDED RELEASE ORAL at 09:24

## 2019-01-01 RX ADMIN — PANTOPRAZOLE SODIUM 40 MG: 40 TABLET, DELAYED RELEASE ORAL at 06:32

## 2019-01-01 RX ADMIN — IPRATROPIUM BROMIDE AND ALBUTEROL SULFATE 3 ML: 2.5; .5 SOLUTION RESPIRATORY (INHALATION) at 16:12

## 2019-01-01 RX ADMIN — DABIGATRAN ETEXILATE MESYLATE 150 MG: 150 CAPSULE ORAL at 09:25

## 2019-01-01 RX ADMIN — IPRATROPIUM BROMIDE AND ALBUTEROL SULFATE 3 ML: 2.5; .5 SOLUTION RESPIRATORY (INHALATION) at 21:47

## 2019-01-01 RX ADMIN — FUROSEMIDE 20 MG: 20 TABLET ORAL at 17:58

## 2019-01-01 RX ADMIN — GUAIFENESIN 1200 MG: 600 TABLET, EXTENDED RELEASE ORAL at 20:44

## 2019-01-01 RX ADMIN — PANTOPRAZOLE SODIUM 40 MG: 40 TABLET, DELAYED RELEASE ORAL at 06:36

## 2019-01-01 RX ADMIN — SODIUM CHLORIDE, PRESERVATIVE FREE 3 ML: 5 INJECTION INTRAVENOUS at 09:26

## 2019-01-01 RX ADMIN — DABIGATRAN ETEXILATE MESYLATE 150 MG: 150 CAPSULE ORAL at 20:44

## 2019-01-01 RX ADMIN — BUDESONIDE AND FORMOTEROL FUMARATE DIHYDRATE 2 PUFF: 80; 4.5 AEROSOL RESPIRATORY (INHALATION) at 07:38

## 2019-01-01 RX ADMIN — CEFTRIAXONE SODIUM 1 G: 1 INJECTION, POWDER, FOR SOLUTION INTRAMUSCULAR; INTRAVENOUS at 11:54

## 2019-01-01 RX ADMIN — IPRATROPIUM BROMIDE AND ALBUTEROL SULFATE 3 ML: 2.5; .5 SOLUTION RESPIRATORY (INHALATION) at 16:02

## 2019-01-01 RX ADMIN — IPRATROPIUM BROMIDE AND ALBUTEROL SULFATE 3 ML: 2.5; .5 SOLUTION RESPIRATORY (INHALATION) at 15:11

## 2019-01-01 RX ADMIN — PROPOFOL 5 MCG/KG/MIN: 10 INJECTION, EMULSION INTRAVENOUS at 20:03

## 2019-01-01 RX ADMIN — ONDANSETRON HYDROCHLORIDE 4 MG: 2 SOLUTION INTRAMUSCULAR; INTRAVENOUS at 05:52

## 2019-01-01 RX ADMIN — IPRATROPIUM BROMIDE AND ALBUTEROL SULFATE 3 ML: 2.5; .5 SOLUTION RESPIRATORY (INHALATION) at 10:46

## 2019-01-01 RX ADMIN — FUROSEMIDE 20 MG: 20 TABLET ORAL at 09:24

## 2019-01-01 RX ADMIN — IPRATROPIUM BROMIDE AND ALBUTEROL SULFATE 3 ML: 2.5; .5 SOLUTION RESPIRATORY (INHALATION) at 04:46

## 2019-01-01 RX ADMIN — IPRATROPIUM BROMIDE AND ALBUTEROL SULFATE 3 ML: 2.5; .5 SOLUTION RESPIRATORY (INHALATION) at 10:54

## 2019-01-01 RX ADMIN — FUROSEMIDE 20 MG: 20 TABLET ORAL at 08:36

## 2019-01-01 RX ADMIN — SODIUM CHLORIDE, PRESERVATIVE FREE 10 ML: 5 INJECTION INTRAVENOUS at 11:53

## 2019-01-01 RX ADMIN — CEFTRIAXONE SODIUM 1 G: 1 INJECTION, POWDER, FOR SOLUTION INTRAMUSCULAR; INTRAVENOUS at 12:23

## 2019-01-01 RX ADMIN — PANTOPRAZOLE SODIUM 40 MG: 40 INJECTION, POWDER, FOR SOLUTION INTRAVENOUS at 11:55

## 2019-01-01 RX ADMIN — PANTOPRAZOLE SODIUM 40 MG: 40 INJECTION, POWDER, FOR SOLUTION INTRAVENOUS at 05:58

## 2019-01-01 RX ADMIN — FUROSEMIDE 40 MG: 10 INJECTION, SOLUTION INTRAVENOUS at 19:43

## 2019-01-01 RX ADMIN — FUROSEMIDE 10 MG: 10 INJECTION, SOLUTION INTRAVENOUS at 23:52

## 2019-01-01 RX ADMIN — DABIGATRAN ETEXILATE MESYLATE 150 MG: 150 CAPSULE ORAL at 23:51

## 2019-01-01 RX ADMIN — MORPHINE SULFATE 4 MG/HR: 10 INJECTION, SOLUTION INTRAMUSCULAR; INTRAVENOUS at 10:08

## 2019-01-01 RX ADMIN — IPRATROPIUM BROMIDE AND ALBUTEROL SULFATE 3 ML: 2.5; .5 SOLUTION RESPIRATORY (INHALATION) at 19:17

## 2019-01-01 RX ADMIN — IPRATROPIUM BROMIDE AND ALBUTEROL SULFATE 3 ML: 2.5; .5 SOLUTION RESPIRATORY (INHALATION) at 07:34

## 2019-01-01 RX ADMIN — MORPHINE SULFATE 1 MG/HR: 10 INJECTION, SOLUTION INTRAMUSCULAR; INTRAVENOUS at 18:19

## 2019-01-01 RX ADMIN — LORAZEPAM 0.5 MG: 2 INJECTION INTRAMUSCULAR; INTRAVENOUS at 21:29

## 2019-01-01 RX ADMIN — METHYLPREDNISOLONE SODIUM SUCCINATE 40 MG: 40 INJECTION, POWDER, FOR SOLUTION INTRAMUSCULAR; INTRAVENOUS at 12:33

## 2019-01-01 RX ADMIN — SODIUM CHLORIDE, PRESERVATIVE FREE 3 ML: 5 INJECTION INTRAVENOUS at 08:38

## 2019-01-01 RX ADMIN — PAROXETINE 10 MG: 10 TABLET, FILM COATED ORAL at 09:57

## 2019-01-01 RX ADMIN — FUROSEMIDE 20 MG: 10 INJECTION, SOLUTION INTRAVENOUS at 12:33

## 2019-01-01 RX ADMIN — SODIUM CHLORIDE 50 ML/HR: 9 INJECTION, SOLUTION INTRAVENOUS at 04:55

## 2019-01-01 RX ADMIN — SODIUM CHLORIDE, PRESERVATIVE FREE 10 ML: 5 INJECTION INTRAVENOUS at 21:19

## 2019-01-07 ENCOUNTER — OFFICE VISIT (OUTPATIENT)
Dept: INTERNAL MEDICINE | Age: 84
End: 2019-01-07
Payer: MEDICARE

## 2019-01-07 VITALS
DIASTOLIC BLOOD PRESSURE: 76 MMHG | HEART RATE: 101 BPM | OXYGEN SATURATION: 96 % | BODY MASS INDEX: 21.4 KG/M2 | SYSTOLIC BLOOD PRESSURE: 124 MMHG | WEIGHT: 117 LBS

## 2019-01-07 DIAGNOSIS — R79.89 LOW VITAMIN D LEVEL: ICD-10-CM

## 2019-01-07 DIAGNOSIS — R05.9 COUGH: ICD-10-CM

## 2019-01-07 DIAGNOSIS — R09.89 CHEST CONGESTION: ICD-10-CM

## 2019-01-07 DIAGNOSIS — F32.A DEPRESSION, UNSPECIFIED DEPRESSION TYPE: ICD-10-CM

## 2019-01-07 DIAGNOSIS — H61.20 IMPACTED CERUMEN, UNSPECIFIED LATERALITY: ICD-10-CM

## 2019-01-07 DIAGNOSIS — K21.9 GASTROESOPHAGEAL REFLUX DISEASE WITHOUT ESOPHAGITIS: ICD-10-CM

## 2019-01-07 DIAGNOSIS — D50.9 IRON DEFICIENCY ANEMIA, UNSPECIFIED IRON DEFICIENCY ANEMIA TYPE: Primary | ICD-10-CM

## 2019-01-07 DIAGNOSIS — R73.9 HYPERGLYCEMIA: ICD-10-CM

## 2019-01-07 DIAGNOSIS — E78.5 HYPERLIPIDEMIA, UNSPECIFIED HYPERLIPIDEMIA TYPE: ICD-10-CM

## 2019-01-07 DIAGNOSIS — D50.9 IRON DEFICIENCY ANEMIA, UNSPECIFIED IRON DEFICIENCY ANEMIA TYPE: ICD-10-CM

## 2019-01-07 LAB
ANION GAP SERPL CALCULATED.3IONS-SCNC: 22 MMOL/L (ref 7–19)
BASOPHILS ABSOLUTE: 0 K/UL (ref 0–0.2)
BASOPHILS RELATIVE PERCENT: 0.5 % (ref 0–1)
BUN BLDV-MCNC: 11 MG/DL (ref 8–23)
CALCIUM SERPL-MCNC: 9.3 MG/DL (ref 8.2–9.6)
CHLORIDE BLD-SCNC: 101 MMOL/L (ref 98–111)
CO2: 20 MMOL/L (ref 22–29)
CREAT SERPL-MCNC: 0.5 MG/DL (ref 0.5–0.9)
EOSINOPHILS ABSOLUTE: 0 K/UL (ref 0–0.6)
EOSINOPHILS RELATIVE PERCENT: 0.4 % (ref 0–5)
FERRITIN: 19.6 NG/ML (ref 13–150)
GFR NON-AFRICAN AMERICAN: >60
GLUCOSE BLD-MCNC: 102 MG/DL (ref 74–109)
HBA1C MFR BLD: 5.9 % (ref 4–6)
HCT VFR BLD CALC: 29.1 % (ref 37–47)
HEMOGLOBIN: 8.3 G/DL (ref 12–16)
HYPOCHROMIA: ABNORMAL
IRON SATURATION: 7 % (ref 14–50)
IRON: 27 UG/DL (ref 37–145)
LYMPHOCYTES ABSOLUTE: 0.9 K/UL (ref 1.1–4.5)
LYMPHOCYTES RELATIVE PERCENT: 11.2 % (ref 20–40)
MCH RBC QN AUTO: 24.4 PG (ref 27–31)
MCHC RBC AUTO-ENTMCNC: 28.5 G/DL (ref 33–37)
MCV RBC AUTO: 85.6 FL (ref 81–99)
MONOCYTES ABSOLUTE: 0.9 K/UL (ref 0–0.9)
MONOCYTES RELATIVE PERCENT: 10.5 % (ref 0–10)
NEUTROPHILS ABSOLUTE: 6.3 K/UL (ref 1.5–7.5)
NEUTROPHILS RELATIVE PERCENT: 76.7 % (ref 50–65)
PDW BLD-RTO: 18.5 % (ref 11.5–14.5)
PLATELET # BLD: 406 K/UL (ref 130–400)
PMV BLD AUTO: 8.8 FL (ref 9.4–12.3)
POIKILOCYTES: ABNORMAL
POTASSIUM SERPL-SCNC: 3.7 MMOL/L (ref 3.5–5)
RBC # BLD: 3.4 M/UL (ref 4.2–5.4)
SODIUM BLD-SCNC: 143 MMOL/L (ref 136–145)
TOTAL IRON BINDING CAPACITY: 374 UG/DL (ref 250–400)
WBC # BLD: 8.2 K/UL (ref 4.8–10.8)

## 2019-01-07 PROCEDURE — G8420 CALC BMI NORM PARAMETERS: HCPCS | Performed by: INTERNAL MEDICINE

## 2019-01-07 PROCEDURE — G8598 ASA/ANTIPLAT THER USED: HCPCS | Performed by: INTERNAL MEDICINE

## 2019-01-07 PROCEDURE — 1123F ACP DISCUSS/DSCN MKR DOCD: CPT | Performed by: INTERNAL MEDICINE

## 2019-01-07 PROCEDURE — 1090F PRES/ABSN URINE INCON ASSESS: CPT | Performed by: INTERNAL MEDICINE

## 2019-01-07 PROCEDURE — 1036F TOBACCO NON-USER: CPT | Performed by: INTERNAL MEDICINE

## 2019-01-07 PROCEDURE — 4040F PNEUMOC VAC/ADMIN/RCVD: CPT | Performed by: INTERNAL MEDICINE

## 2019-01-07 PROCEDURE — 99214 OFFICE O/P EST MOD 30 MIN: CPT | Performed by: INTERNAL MEDICINE

## 2019-01-07 PROCEDURE — G8427 DOCREV CUR MEDS BY ELIG CLIN: HCPCS | Performed by: INTERNAL MEDICINE

## 2019-01-07 PROCEDURE — G8484 FLU IMMUNIZE NO ADMIN: HCPCS | Performed by: INTERNAL MEDICINE

## 2019-01-07 PROCEDURE — 1101F PT FALLS ASSESS-DOCD LE1/YR: CPT | Performed by: INTERNAL MEDICINE

## 2019-01-07 RX ORDER — CEFDINIR 300 MG/1
300 CAPSULE ORAL 2 TIMES DAILY
Qty: 20 CAPSULE | Refills: 0 | Status: SHIPPED | OUTPATIENT
Start: 2019-01-07 | End: 2019-02-26 | Stop reason: CLARIF

## 2019-01-08 ASSESSMENT — ENCOUNTER SYMPTOMS
BLOOD IN STOOL: 0
EYE ITCHING: 0
VOICE CHANGE: 0
RECTAL PAIN: 0
BACK PAIN: 0
PHOTOPHOBIA: 0
VOMITING: 0
CONSTIPATION: 0
SORE THROAT: 0
ANAL BLEEDING: 0
EYE REDNESS: 0
WHEEZING: 0
SINUS PAIN: 0
ABDOMINAL PAIN: 0
COLOR CHANGE: 0
COUGH: 1
TROUBLE SWALLOWING: 0
ABDOMINAL DISTENTION: 0
EYE DISCHARGE: 0
SINUS PRESSURE: 0
RHINORRHEA: 0
NAUSEA: 0
EYE PAIN: 0
SHORTNESS OF BREATH: 0
DIARRHEA: 0
CHEST TIGHTNESS: 0

## 2019-01-09 ENCOUNTER — TELEPHONE (OUTPATIENT)
Dept: INTERNAL MEDICINE | Age: 84
End: 2019-01-09

## 2019-01-10 ENCOUNTER — TELEPHONE (OUTPATIENT)
Dept: INTERNAL MEDICINE | Age: 84
End: 2019-01-10

## 2019-01-11 LAB — VITAMIN D 1,25-DIHYDROXY: 28.6 PG/ML (ref 19.9–79.3)

## 2019-02-01 ENCOUNTER — TELEPHONE (OUTPATIENT)
Dept: INTERNAL MEDICINE | Age: 84
End: 2019-02-01

## 2019-02-01 DIAGNOSIS — N39.0 URINARY TRACT INFECTION WITHOUT HEMATURIA, SITE UNSPECIFIED: Primary | ICD-10-CM

## 2019-02-01 DIAGNOSIS — N39.0 URINARY TRACT INFECTION WITHOUT HEMATURIA, SITE UNSPECIFIED: ICD-10-CM

## 2019-02-01 LAB
BILIRUBIN URINE: NEGATIVE
BLOOD, URINE: NEGATIVE
CLARITY: CLEAR
COLOR: YELLOW
GLUCOSE URINE: NEGATIVE MG/DL
KETONES, URINE: NEGATIVE MG/DL
LEUKOCYTE ESTERASE, URINE: NEGATIVE
NITRITE, URINE: NEGATIVE
PH UA: 6.5
PROTEIN UA: NEGATIVE MG/DL
SPECIFIC GRAVITY UA: 1.01
URINE REFLEX TO CULTURE: NORMAL
UROBILINOGEN, URINE: 0.2 E.U./DL

## 2019-02-04 ENCOUNTER — TELEPHONE (OUTPATIENT)
Dept: INTERNAL MEDICINE | Age: 84
End: 2019-02-04

## 2019-02-05 ENCOUNTER — TELEPHONE (OUTPATIENT)
Dept: INTERNAL MEDICINE | Age: 84
End: 2019-02-05

## 2019-02-12 PROBLEM — Z86.73 HISTORY OF CEREBELLAR STROKE: Status: ACTIVE | Noted: 2018-03-28

## 2019-02-12 PROBLEM — Z79.01 CURRENT USE OF LONG TERM ANTICOAGULATION: Status: ACTIVE | Noted: 2019-01-01

## 2019-02-12 NOTE — PROGRESS NOTES
Subjective:     Encounter Date:02/12/2019      Patient ID: Heather Burgos is a 89 y.o. female. She presents today for routine follow up. She has a history of chronic atrial fibrillation on chronic anticoagulation, cerebellar stroke, moderate to severe aortic valve stenosis, hypertension and COPD. She complains of chronic dyspnea with exertion. She denies chest pain, shortness of breath, palpitations, dizziness, syncope, orthopnea, PND, swelling or decreased stamina. She denies any signs of bleeding.     Chief Complaint: routine follow up  Atrial Fibrillation   Presents for follow-up visit. Symptoms include chest pain and shortness of breath. Symptoms are negative for an AICD problem, bradycardia, dizziness, hemodynamic instability, hypertension, hypotension, pacemaker problem, palpitations, syncope, tachycardia and weakness. The symptoms have been stable. Past medical history includes atrial fibrillation.   Hypertension   This is a chronic problem. The current episode started more than 1 year ago. The problem is controlled. Associated symptoms include chest pain and shortness of breath. Pertinent negatives include no anxiety, blurred vision, headaches, malaise/fatigue, neck pain, orthopnea, palpitations, peripheral edema, PND or sweats. Risk factors for coronary artery disease include post-menopausal state. Current antihypertension treatment includes diuretics, ACE inhibitors and beta blockers. Hypertensive end-organ damage includes CAD/MI.       The following portions of the patient's history were reviewed and updated as appropriate: allergies, current medications, past family history, past medical history, past social history, past surgical history and problem list.     Allergies   Allergen Reactions   • Erythromycin Hives and Swelling   • Demerol [Meperidine] Nausea And Vomiting and GI Intolerance   • Morphine And Related Nausea And Vomiting       Current Outpatient Medications:   •  albuterol (PROVENTIL  HFA;VENTOLIN HFA) 108 (90 BASE) MCG/ACT inhaler, Inhale 2 puffs Every 6 (Six) Hours As Needed for Wheezing or shortness of air., Disp: , Rfl:   •  atorvastatin (LIPITOR) 40 MG tablet, Take 1 tablet by mouth Every Night., Disp: 30 tablet, Rfl: 0  •  budesonide-formoterol (SYMBICORT) 80-4.5 MCG/ACT inhaler, Inhale 2 puffs 2 (Two) Times a Day., Disp: , Rfl:   •  Cranberry 400 MG capsule, Take 1 capsule by mouth Daily., Disp: , Rfl:   •  dabigatran etexilate (PRADAXA) 150 MG capsu, Take 1 capsule by mouth Every 12 (Twelve) Hours., Disp: 60 capsule, Rfl: 0  •  furosemide (LASIX) 20 MG tablet, Take 20 mg by mouth Daily., Disp: , Rfl:   •  guaiFENesin (MUCINEX) 600 MG 12 hr tablet, Take 1,200 mg by mouth 2 (Two) Times a Day., Disp: , Rfl:   •  ipratropium-albuterol (DUO-NEB) 0.5-2.5 mg/mL nebulizer, Take 3 mL by nebulization Every 6 (Six) Hours As Needed for Wheezing or shortness of air., Disp: , Rfl:   •  lisinopril (PRINIVIL,ZESTRIL) 10 MG tablet, Take 10 mg by mouth Daily., Disp: , Rfl:   •  loratadine (CLARITIN) 10 MG tablet, Take 10 mg by mouth Daily., Disp: , Rfl:   •  metoprolol tartrate (LOPRESSOR) 25 MG tablet, Take 25 mg by mouth 2 (Two) Times a Day., Disp: , Rfl:   •  omeprazole (priLOSEC) 40 MG capsule, Take 40 mg by mouth Daily., Disp: , Rfl:   •  PARoxetine (PAXIL) 10 MG tablet, Take 10 mg by mouth Every Morning., Disp: , Rfl:   Past Medical History:   Diagnosis Date   • Atrial fibrillation with rapid ventricular response (CMS/HCC)    • Degenerative joint disease    • Diastolic murmur of aorta    • Dysphagia    • Essential hypertension    • Pneumonia      Past Surgical History:   Procedure Laterality Date   • APPENDECTOMY     • HYSTERECTOMY     • TONSILLECTOMY     • TUBAL ABDOMINAL LIGATION       Family History   Problem Relation Age of Onset   • Stroke Mother         Age 90   • Emphysema Father    • Cancer Sister    • Cancer Brother      Social History     Socioeconomic History   • Marital status:       Spouse name: Not on file   • Number of children: Not on file   • Years of education: Not on file   • Highest education level: Not on file   Social Needs   • Financial resource strain: Not on file   • Food insecurity - worry: Not on file   • Food insecurity - inability: Not on file   • Transportation needs - medical: Not on file   • Transportation needs - non-medical: Not on file   Occupational History   • Not on file   Tobacco Use   • Smoking status: Never Smoker   • Smokeless tobacco: Never Used   Substance and Sexual Activity   • Alcohol use: No   • Drug use: No   • Sexual activity: Defer   Other Topics Concern   • Not on file   Social History Narrative   • Not on file         Review of Systems   Constitution: Negative for chills, decreased appetite, fever, weakness, malaise/fatigue, night sweats, weight gain and weight loss.   HENT: Negative for nosebleeds.    Eyes: Negative for blurred vision and visual disturbance.   Cardiovascular: Positive for chest pain. Negative for dyspnea on exertion, leg swelling, near-syncope, orthopnea, palpitations, paroxysmal nocturnal dyspnea and syncope.   Respiratory: Positive for shortness of breath. Negative for cough, hemoptysis, snoring and wheezing.    Endocrine: Negative for cold intolerance and heat intolerance.   Hematologic/Lymphatic: Does not bruise/bleed easily.   Skin: Negative for rash.   Musculoskeletal: Negative for back pain, falls and neck pain.   Gastrointestinal: Negative for abdominal pain, change in bowel habit, constipation, diarrhea, dysphagia, heartburn, nausea and vomiting.   Genitourinary: Negative for hematuria.   Neurological: Negative for dizziness, headaches and light-headedness.   Psychiatric/Behavioral: Negative for altered mental status.   Allergic/Immunologic: Negative for persistent infections.         ECG 12 Lead  Date/Time: 2/12/2019 4:09 PM  Performed by: Elli Anderson APRN  Authorized by: Elli Anderson APRN   Comparison: compared with  "previous ECG from 6/13/2018  Similar to previous ECG  Rhythm: atrial fibrillation  Rate: normal  BPM: 80    Clinical impression: abnormal EKG        /60   Pulse 80   Ht 157.5 cm (62\")   Wt 55.8 kg (123 lb)   BMI 22.50 kg/m²          Objective:     Physical Exam   Constitutional: She is oriented to person, place, and time. Vital signs are normal. She appears well-developed and well-nourished. No distress.   HENT:   Head: Normocephalic and atraumatic.   Right Ear: External ear normal.   Left Ear: External ear normal.   Nose: Nose normal.   Eyes: Conjunctivae are normal. Pupils are equal, round, and reactive to light. Right eye exhibits no discharge. Left eye exhibits no discharge.   Neck: Normal range of motion. Neck supple. No JVD present. Carotid bruit is not present. No tracheal deviation present. No thyromegaly present.   Cardiovascular: Normal rate, intact distal pulses and normal pulses. An irregularly irregular rhythm present. PMI is not displaced. Exam reveals no gallop and no friction rub.   Murmur heard.   Harsh midsystolic murmur is present with a grade of 2/6 at the upper right sternal border radiating to the neck.  Pulses:       Radial pulses are 2+ on the right side, and 2+ on the left side.        Dorsalis pedis pulses are 2+ on the right side, and 2+ on the left side.        Posterior tibial pulses are 2+ on the right side, and 2+ on the left side.   Pulmonary/Chest: Accessory muscle usage present. No respiratory distress. She has decreased breath sounds in the right upper field, the right middle field, the right lower field, the left upper field, the left middle field and the left lower field. She has no wheezes. She has no rhonchi. She has no rales. She exhibits no tenderness.   Abdominal: Soft. She exhibits no distension. There is no tenderness.   Musculoskeletal: Normal range of motion. She exhibits no edema, tenderness or deformity.   Neurological: She is alert and oriented to person, " place, and time.   Skin: Skin is warm and dry. No rash noted. She is not diaphoretic. No erythema. No pallor.   Psychiatric: She has a normal mood and affect. Her behavior is normal. Judgment and thought content normal.   Vitals reviewed.      Lab Review:   Lab Results   Component Value Date    WBC 7.44 05/29/2018    HGB 8.3 (L) 05/29/2018    HCT 25.9 (L) 05/29/2018    MCV 90.6 05/29/2018     05/29/2018     Lab Results   Component Value Date    GLUCOSE 100 05/29/2018    BUN 10 05/29/2018    CREATININE 0.47 (L) 05/29/2018    EGFRIFNONA 124 05/29/2018    BCR 21.3 05/29/2018    K 3.6 05/29/2018    CO2 32.0 (H) 05/29/2018    CALCIUM 8.9 05/29/2018    ALBUMIN 2.60 (L) 05/29/2018    AST 11 05/29/2018    ALT 23 05/29/2018     Lab Results   Component Value Date    CHOL 148 03/29/2018     Lab Results   Component Value Date    TRIG 36 03/29/2018     Lab Results   Component Value Date    HDL 52 03/29/2018     Lab Results   Component Value Date    LDL 79 03/29/2018           Assessment:          Diagnosis Plan   1. Chronic atrial fibrillation (CMS/[facility])  Rate controlled. Anticoagulated.    2. Current use of long term anticoagulation  On Pradaxa. Denies bleeding.    3. History of cerebellar stroke  3/18. Changed from Xarelto to Pradaxa.    4. Nonrheumatic aortic valve stenosis  Moderate to severe on echo 3/29/18. Repeat 2D echo.    5. Essential hypertension  Well controlled.    6. COPD, group B, by GOLD 2017 classification (CMS/[facility]) Shortness of breath not felt to be cardiac in nature. Will recheck 2D echo for potential worsening of aortic stenosis. Pt denies having walking oximetry test in past. Recommend sooner follow up with pulmonology to evaluate need for home oxygen and/or addition of  anticholinergic inhaler as discussed at last visit.           Plan:       1. Continue medications as previously prescribed.  2. Report any worsening symptoms.  3. Report any signs of bleeding.  4. Continue heart healthy diet and  regular exercise as tolerated.   5. Follow up with PCP for blood pressure and cholesterol management and routine lab work.  6. Follow up with Dr. Smith in 6 months, or sooner if needed.   7. Repeat 2D echo.   8. Follow up with pulmonology to evaluate need for home oxygen and/or addition of  anticholinergic inhaler as discussed at last visit.

## 2019-02-19 PROBLEM — D64.9 SYMPTOMATIC ANEMIA: Status: ACTIVE | Noted: 2019-01-01

## 2019-02-19 PROBLEM — J96.01 ACUTE RESPIRATORY FAILURE WITH HYPOXIA (HCC): Status: ACTIVE | Noted: 2019-01-01

## 2019-02-19 PROBLEM — R06.02 SHORTNESS OF BREATH: Status: ACTIVE | Noted: 2019-01-01

## 2019-02-19 NOTE — ED PROVIDER NOTES
Subjective   History of Present Illness    89-year-old female presenting with shortness of breath.    Patient notes chronic shortness of breath, acute worsening over the past several days.  Dyspnea is worse with exertion, improves with rest.  Patient was at her pulmonologist office today, where there was concern for some hypoxia.  Report pulse ox reading 69% temporarily.  Per family report they were never able to get a consistent pulse oximetry reading.    Patient denies any associated fevers, chills, cough, vomiting, chest pain.    Review of Systems   Constitutional: Positive for activity change and fatigue. Negative for chills and fever.   HENT: Negative for sinus pain.    Eyes: Negative for pain.   Respiratory: Positive for shortness of breath.    Cardiovascular: Negative for chest pain.   Gastrointestinal: Negative for abdominal pain, nausea and vomiting.   Genitourinary: Negative for flank pain.   Musculoskeletal: Negative for back pain.   Skin: Negative for wound.   Neurological: Negative for syncope and weakness.   Psychiatric/Behavioral: The patient is not hyperactive.        Past Medical History:   Diagnosis Date   • Atrial fibrillation with rapid ventricular response (CMS/HCC)    • Degenerative joint disease    • Diastolic murmur of aorta    • Dysphagia    • Essential hypertension    • Pneumonia        Allergies   Allergen Reactions   • Erythromycin Hives and Swelling   • Demerol [Meperidine] Nausea And Vomiting and GI Intolerance   • Morphine And Related Nausea And Vomiting       Past Surgical History:   Procedure Laterality Date   • APPENDECTOMY     • HYSTERECTOMY     • TONSILLECTOMY     • TUBAL ABDOMINAL LIGATION         Family History   Problem Relation Age of Onset   • Stroke Mother         Age 90   • Emphysema Father    • Cancer Sister    • Cancer Brother        Social History     Socioeconomic History   • Marital status:      Spouse name: Not on file   • Number of children: Not on file   •  Years of education: Not on file   • Highest education level: Not on file   Tobacco Use   • Smoking status: Never Smoker   • Smokeless tobacco: Never Used   Substance and Sexual Activity   • Alcohol use: No   • Drug use: No   • Sexual activity: Defer           Objective   Physical Exam   Constitutional: She is oriented to person, place, and time. She appears well-developed and well-nourished.   HENT:   Head: Normocephalic and atraumatic.   Eyes: Conjunctivae are normal.   Neck: Normal range of motion.   Cardiovascular: Normal rate, regular rhythm and intact distal pulses.   Pulmonary/Chest: Effort normal and breath sounds normal. No respiratory distress.   Abdominal: Soft. She exhibits no distension. There is no tenderness.   Musculoskeletal: She exhibits no edema.   Neurological: She is alert and oriented to person, place, and time. No cranial nerve deficit.   Skin: Skin is warm and dry. Capillary refill takes less than 2 seconds.   Psychiatric: She has a normal mood and affect.   Nursing note and vitals reviewed.      Procedures           ED Course  ED Course as of Feb 19 2305   Tue Feb 19, 2019 1926 Rectal exam done with Jorge RN as chaperone.  Light brown stool in the vault, no tenderness, Hemoccult negative  [NR]      ED Course User Index  [NR] Kirk Pritchard MD                  MDM  Number of Diagnoses or Management Options  Symptomatic anemia:   Diagnosis management comments: 89-year-old female presenting with shortness of breath.  Blood work reveals a anemia 6.8.  This can explain all of the patient's symptoms.  Patient will transfuse 1 unit packed red blood cells.  Patient will be admitted to the hospitalist service for further evaluation and management.       Amount and/or Complexity of Data Reviewed  Clinical lab tests: reviewed  Tests in the radiology section of CPT®: reviewed  Tests in the medicine section of CPT®: reviewed          Final diagnoses:   Symptomatic anemia            Kirk Pritchard  MD [place]  02/19/19 8498

## 2019-02-20 PROBLEM — I27.20 PULMONARY HYPERTENSION (HCC): Status: ACTIVE | Noted: 2019-01-01

## 2019-02-20 NOTE — PROGRESS NOTES
[facility] Medicine Services  INPATIENT PROGRESS NOTE    Length of Stay: 1  Date of Admission: 2/19/2019  Primary Care Physician: Lupe Ham MD    Subjective   Chief Complaint: Short of breath, follow-up hypoxia and anemia  HPI   History of severe aortic stenosis and chronic atrial fibrillation on anticoagulation with Pradaxa.  Reports worsening shortness of breath over the last 3-4 weeks.  She is only been able to take a few steps without having to sit down and rest.  She denies any history of bright red bleeding per rectum or dark tarry stools.  She was recently evaluated by cardiology and had echocardiogram 2/19 that showed severe aortic stenosis.  She was seen by pulmonary medicine 2/19 to evaluate for home oxygen.  Saturation noted to be 69% and pulmonary's office.  She was placed on 2 L and saturation improved to 88-92%.  She was sent to the emergency room for evaluation where hemoglobin was noted to be 6.4.  She was transfused 1 unit packed red blood cells.  Hemoglobin 8.    Sitting up in bed.  Oxygen at 2 L.  Daughter in room.  She complains of shortness of breath and is noted to have conversational shortness of breath.  She does not normally require oxygen.  Remains in atrial fibrillation.  Discussed with daughter that shortness of breath likely multifactorial secondary to severe aortic stenosis, anemia, atrial fibrillation, COPD.  Plan to transfuse 1 unit packed red blood cells today.  Continue Pradaxa as no evidence of bleeding.    Review of Systems   Constitutional: Positive for activity change and fatigue.   HENT: Negative for congestion and trouble swallowing.    Eyes: Negative for photophobia and visual disturbance.   Respiratory: Positive for shortness of breath.    Cardiovascular: Negative for chest pain, palpitations and leg swelling.   Gastrointestinal: Negative for blood in stool, constipation, diarrhea, nausea and vomiting.   Endocrine: Negative  for cold intolerance, heat intolerance and polyuria.   Genitourinary: Negative for dysuria and urgency.   Musculoskeletal: Positive for gait problem.   Skin: Negative for wound.   Allergic/Immunologic: Negative for immunocompromised state.   Neurological: Positive for weakness.   Hematological: Negative for adenopathy. Does not bruise/bleed easily.   Psychiatric/Behavioral: Negative for agitation, behavioral problems and confusion.      All pertinent negatives and positives are as above. All other systems have been reviewed and are negative unless otherwise stated.     Objective    Temp:  [97.3 °F (36.3 °C)-98.6 °F (37 °C)] 97.6 °F (36.4 °C)  Heart Rate:  [62-93] 84  Resp:  [18-20] 19  BP: (110-153)/(56-84) 136/84  Physical Exam   Constitutional: She is oriented to person, place, and time. She appears well-nourished.   HENT:   Head: Normocephalic and atraumatic.   Eyes: EOM are normal. Pupils are equal, round, and reactive to light.   Neck: Normal range of motion. Neck supple.   Cardiovascular: Normal rate and intact distal pulses. Exam reveals no gallop and no friction rub.   Murmur heard.  Irregular rhythm.  Atrial fibrillation  on telemetry   Pulmonary/Chest: She has wheezes (Faint expiratory wheezes right posterior field.).   Oxygen at 2 L.  Conversational shortness of breath.   Abdominal: Soft. Bowel sounds are normal.   Genitourinary:   Genitourinary Comments: Voiding.   Musculoskeletal: Normal range of motion. She exhibits no edema.   Neurological: She is alert and oriented to person, place, and time.   Skin: Skin is warm and dry.   Psychiatric: She has a normal mood and affect. Her behavior is normal. Judgment and thought content normal.     Results Review:  I have reviewed the labs, radiology results, and diagnostic studies.    Laboratory Data:   Results from last 7 days   Lab Units 02/20/19  0613 02/19/19  2258 02/19/19  1757   WBC 10*3/mm3 7.29  --  7.34   HEMOGLOBIN g/dL 8.0* 8.1* 6.4*   HEMATOCRIT  % 26.1* 26.5* 21.8*   PLATELETS 10*3/mm3 347  --  340        Results from last 7 days   Lab Units 02/20/19  0613 02/19/19  1757   SODIUM mmol/L 136 133*   POTASSIUM mmol/L 3.5 3.7   CHLORIDE mmol/L 99 98   CO2 mmol/L 31.0 27.0   BUN mg/dL 11 14   CREATININE mg/dL 0.75 0.40*   CALCIUM mg/dL 8.7 8.4   BILIRUBIN mg/dL  --  0.6   ALK PHOS U/L  --  69   ALT (SGPT) U/L  --  25   AST (SGOT) U/L  --  23   GLUCOSE mg/dL 104* 95        Imaging Results (all)     Procedure Component Value Units Date/Time    XR Chest 2 View [[id number]] Collected:  02/19/19 1842     Updated:  02/19/19 1846    Narrative:        XR CHEST 2 VW- 2/19/2019 6:28 PM CST     HISTORY: CHF/COPD Protocol       COMPARISON: March 30, 2018.     FINDINGS:   Upright frontal and lateral radiographs of the chest were obtained.     Mild interstitial prominence is noted. Small bilateral posterior pleural  effusions noted. Trace of fluid is present in the minor fissure. These  findings may represent mild congestive failure.. Cardiac silhouettes  moderately enlarged.. Degenerative changes noted in the glenohumeral  joints bilaterally..       Impression:       1. Cardiomegaly with mild or early changes of pulmonary vascular  congestion superimposed on COPD.        This report was finalized on 02/19/2019 18:43 by Dr. Bjorn Francis MD.        Results for orders placed during the hospital encounter of 02/19/19   Adult Transthoracic Echo Complete W/ Cont if Necessary Per Protocol    Narrative · Left atrial cavity size is moderately dilated.  · There is moderate calcification of the aortic valve.  · Left ventricular wall thickness is consistent with mild concentric   hypertrophy.  · Mild tricuspid valve regurgitation is present.  · Left ventricular diastolic dysfunction.     SEVERE AORTIC VALVE STENOSIS  HYPERDYNAMIC LV AND NORMAL LV SYSTOLIC FUNCTION  RHYTHM IS ATRIAL FIBRILLATION WITH BI-ATRIAL ENLARGEMENT  SEVERE PULMONARY HTN  LV DIASTOLIC DYSFUNCTION AND ELEVATED LA  PRESSURE NOTED       Intake/Output    Intake/Output Summary (Last 24 hours) at 2/20/2019 1025  Last data filed at 2/19/2019 2240  Gross per 24 hour   Intake 300 ml   Output --   Net 300 ml       Scheduled Meds    atorvastatin 40 mg Oral Nightly   budesonide-formoterol 2 puff Inhalation BID - RT   cetirizine 10 mg Oral Daily   dabigatran etexilate 150 mg Oral Q12H   furosemide 20 mg Oral Daily   guaiFENesin 1,200 mg Oral Q12H   lisinopril 10 mg Oral Daily   metoprolol tartrate 25 mg Oral Q12H   pantoprazole 40 mg Oral QAM   PARoxetine 10 mg Oral QAM   sodium chloride 3 mL Intravenous Q12H       I have reviewed the patient current medications.     Assessment/Plan     Active Hospital Problems    Diagnosis   • **Symptomatic anemia   • Acute respiratory failure with hypoxia (CMS/HCC)   • Chronic atrial fibrillation (CMS/HCC)     CHADS-VASc score 4     • Pulmonary hypertension (CMS/HCC)   • COPD, group B, by GOLD 2017 classification (CMS/HCC)   • Aortic stenosis   • Essential hypertension     Plan:  1.  Hemoglobin 6.4 on admission.  Transfused 1 unit packed red blood cell.  Hemoglobin 8.  We will transfuse an additional 1 unit packed red blood cells as patient with conversational shortness of breath and symptomatic anemia.  2.  H/H posttransfusion  3.  Echocardiogram 2/19/19 severe aortic valve stenosis.  Severe pulmonary hypertension.  4.  Assess for home O2 prior to discharge.  5.  Continue Pradaxa 150 mg twice daily, home medication.  No evidence of bright red bleeding per rectum or dark tarry stools. No evidence of bleeding.  6.  Home medications reviewed and appropriate medications resumed.  7.  CBC, basic metabolic panel tomorrow  8.  Out of bed if tolerated.  Uses cane at home.  9.  Schedule duo nebs 4 times daily as she has wheezing right posterior base today.  10.  Solu medrol 40 mg IV every 12 hours    Her daughter Abeba Kelley is her surrogate decision maker.  The above documentation resulted from a  face-to-face encounter by me Li FERNANDEZ, [facility].    Discharge Planning: I expect patient to be discharged to home in 1-2 days.    JIM Terrazas   02/20/19   10:25 AM    I personally evaluated and examined the patient in conjunction with  JIM Jefferson and agree with the assessment, treatment plan, and disposition of the patient as recorded by her. My history, exam, and further recommendations are:     I verified above history with patient and daughter.  She was sob and been found with hgb of 6.4.  She received 1 unit of PRBC with hgb of 8. We are transfusing 1 more unit of PRBC for sympotomatic anemia  She has other reasons for sob which includes AS, COPD, PHTN.   She never had c scope or endoscopy.  About 4 yrs ago, she was supposedly getting EGD when they found she was in Afib.    Daughter told me she had negative fobt.  Daughter felt at her age, she didn't think she could undergo any EGD/c scope.   Hemodynamically stable  Pleasant no distress  + murmur  No obvious crackles on my exam  No edema  Agree with above.    David Li MD  02/20/19  11:32 AM

## 2019-02-20 NOTE — PROGRESS NOTES
Discharge Planning Assessment  [facility]     Patient Name: Heather Burgos  MRN: [id number]  Today's Date: 2/20/2019    Admit Date: 2/19/2019    Discharge Needs Assessment     Row Name 02/20/19 1210       Living Environment    Lives With  child(tara), adult      Name(s) of Who Lives With Patient  Janeth     Current Living Arrangements  home/apartment/condo      Primary Care Provided by  child(tara)      Provides Primary Care For  no one      Family Caregiver if Needed  child(tara), adult      Quality of Family Relationships  helpful;involved;supportive     Able to Return to Prior Arrangements  yes         Resource/Environmental Concerns    Resource/Environmental Concerns  none     Transportation Concerns  car, none         Transition Planning    Patient/Family Anticipates Transition to  home with help/services      Patient/Family Anticipated Services at Transition  home health care      Transportation Anticipated  family or friend will provide        Discharge Needs Assessment    Readmission Within the Last 30 Days  no previous admission in last 30 days      Concerns to be Addressed  denies needs/concerns at this time      Equipment Currently Used at Home  bath bench;commode;walker, rolling      Anticipated Changes Related to Illness  none    Equipment Needed After Discharge  none      Outpatient/Agency/Support Group Needs  homecare agency      Discharge Facility/Level of Care Needs  home with home health      Offered/Gave Vendor List  yes     Patient's Choice of Community Agency(s)  [facility]            Discharge Plan     Row Name 02/20/19 1211       Plan    Plan  Home with [facility]       Patient/Family in Agreement with Plan  yes     Plan Comments  Pt has PCP and RX coverage. Pt currently lives at home with daughter, Janeth, and plans on returning home at discharge. Pt states that she has all DME needed but might need o2 at discharge. Requested orders for HH from MD. Pt requests to use [facility] if o2 is needed at  discharge. SW will follow.          Destination      No service coordination in this encounter.      Durable Medical Equipment      No service coordination in this encounter.      Dialysis/Infusion      No service coordination in this encounter.      Home Medical Care      No service coordination in this encounter.      Community Resources      No service coordination in this encounter.          Demographic Summary    No documentation.       Functional Status    No documentation.       Psychosocial    No documentation.       Abuse/Neglect    No documentation.       Legal    No documentation.       Substance Abuse    No documentation.       Patient Forms    No documentation.           Keyana Rae

## 2019-02-20 NOTE — H&P
[facility] Medicine Services  HISTORY AND PHYSICAL    Date of Admission: 2/19/2019  Primary Care Physician: Lupe Ham MD    Subjective     Chief Complaint: sob, sent here from pulm for low 02    History of Present Illness  Is a 89-year-old white female with known past history of A. fib, hypertension, aortic stenosis on Pradaxa and known anemia who presents to the ER for ongoing shortness of breath progressive in nature sent here from pulmonology for hypoxia.  The patient states that she does have known anemia as well as ongoing dyspnea however this is been progressive over the past 3-4 weeks.  She can only take 5 or 6 steps without having to sit down.  Patient denies any issues with GI bleed in the past or dark tarry stools.  She has no abdominal pain.  She was Hemoccult negative in the ER.  She does not take aspirin or NSAIDs.  The patient has never had a colonoscopy.  She follows with cardiology and was seen there recently at which time they ordered a repeat echo to follow on her aortic stenosis.  This was previously read as moderate to severe with repeat just today being read as severe.  Patient also was seen by pulmonology today to see if she would qualify for home O2 at which time sats were documented at 60%.  On arrival here however with a good waveform patient has been oxygenating at 100%.  Patient does have a baseline hemoglobin around 8.3 per documentation on prior labs.  On arrival here her hemoglobin is 6.4.  She has been transfused 1 unit at this time.  Otherwise she has stable vital signs.        Review of Systems     Otherwise complete ROS reviewed and negative except as mentioned in the HPI.    Past Medical History:   Past Medical History:   Diagnosis Date   • Atrial fibrillation with rapid ventricular response (CMS/HCC)    • Degenerative joint disease    • Diastolic murmur of aorta    • Dysphagia    • Essential hypertension    • Pneumonia      Past  Surgical History:  Past Surgical History:   Procedure Laterality Date   • APPENDECTOMY     • HYSTERECTOMY     • TONSILLECTOMY     • TUBAL ABDOMINAL LIGATION       Social History:  reports that  has never smoked. she has never used smokeless tobacco. She reports that she does not drink alcohol or use drugs.    Family History: family history includes Cancer in her brother and sister; Emphysema in her father; Stroke in her mother.       Allergies:  Allergies   Allergen Reactions   • Erythromycin Hives and Swelling   • Demerol [Meperidine] Nausea And Vomiting and GI Intolerance   • Morphine And Related Nausea And Vomiting     Medications:  Prior to Admission medications    Medication Sig Start Date End Date Taking? Authorizing Provider   albuterol (PROVENTIL HFA;VENTOLIN HFA) 108 (90 BASE) MCG/ACT inhaler Inhale 2 puffs Every 6 (Six) Hours As Needed for Wheezing or shortness of air.    Caroline Zamarripa MD   atorvastatin (LIPITOR) 40 MG tablet Take 1 tablet by mouth Every Night. 3/31/18   All Singh MD   budesonide-formoterol (SYMBICORT) 80-4.5 MCG/ACT inhaler Inhale 2 puffs 2 (Two) Times a Day.    Caroline Zamarripa MD   Cranberry 400 MG capsule Take 1 capsule by mouth Daily.    Caroline Zamarripa MD   dabigatran etexilate (PRADAXA) 150 MG capsu Take 1 capsule by mouth Every 12 (Twelve) Hours. 3/31/18   All Singh MD   furosemide (LASIX) 20 MG tablet Take 20 mg by mouth Daily.    Caroline Zamarripa MD   guaiFENesin (MUCINEX) 600 MG 12 hr tablet Take 1,200 mg by mouth 2 (Two) Times a Day.    Caroline Zamarripa MD   ipratropium-albuterol (DUO-NEB) 0.5-2.5 mg/mL nebulizer Take 3 mL by nebulization Every 6 (Six) Hours As Needed for Wheezing or shortness of air.    Caroline Zamarripa MD   lisinopril (PRINIVIL,ZESTRIL) 10 MG tablet Take 10 mg by mouth Daily.    Caroline Zamarripa MD   loratadine (CLARITIN) 10 MG tablet Take 10 mg by mouth Daily.    Caroline Zamarripa MD  "  metoprolol tartrate (LOPRESSOR) 25 MG tablet Take 25 mg by mouth 2 (Two) Times a Day.    Provider, MD Caroline   omeprazole (priLOSEC) 40 MG capsule Take 40 mg by mouth Daily.    ProviderCaroline MD   PARoxetine (PAXIL) 10 MG tablet Take 10 mg by mouth Every Morning.    Provider, MD Caroline     Objective     Vital Signs: /78   Pulse 89   Temp 98.3 °F (36.8 °C)   Resp 20   Ht 157.5 cm (62.01\")   Wt 54.9 kg (120 lb 16 oz)   SpO2 100%   BMI 22.13 kg/m²   Physical Exam   Constitutional: She is oriented to person, place, and time. She appears well-developed and well-nourished.   HENT:   Head: Normocephalic and atraumatic.   Eyes: Conjunctivae and EOM are normal. Pupils are equal, round, and reactive to light.   Neck: Neck supple. No JVD present. No thyromegaly present.   Cardiovascular: Normal rate and intact distal pulses. Exam reveals no gallop and no friction rub.   Murmur heard.  Irregular, murmur   Pulmonary/Chest: Effort normal and breath sounds normal. No respiratory distress. She has no wheezes. She has no rales. She exhibits no tenderness.   Abdominal: Soft. Bowel sounds are normal. She exhibits no distension. There is no tenderness. There is no rebound and no guarding.   Musculoskeletal: Normal range of motion. She exhibits no edema, tenderness or deformity.   Lymphadenopathy:     She has no cervical adenopathy.   Neurological: She is alert and oriented to person, place, and time. She displays normal reflexes. No cranial nerve deficit. She exhibits normal muscle tone.   Skin: Skin is warm and dry. No rash noted.   Psychiatric: She has a normal mood and affect. Her behavior is normal. Judgment and thought content normal.           Results Reviewed:  Lab Results (last 24 hours)     Procedure Component Value Units Date/Time    POC Occult Blood Stool [[id number]]  (Normal) Collected:  02/19/19 1930    Specimen:  Stool Updated:  02/19/19 1931     Fecal Occult Blood Negative     Lot Number " [id number]     Expiration Date 11/30/2019     DEVELOPER LOT NUMBER [id number]     DEVELOPER EXPIRATION DATE 11/30/2019     Positive Control Positive     Negative Control Negative    [place] Draw [[id number]] Collected:  02/19/19 1757    Specimen:  Blood Updated:  02/19/19 1901    Narrative:       The following orders were created for panel order [place] Draw.  Procedure                               Abnormality         Status                     ---------                               -----------         ------                     Light Blue Top[[id number]]                                   Final result               Green Top (Gel)[[id number]]                                  Final result               Lavender Top[[id number]]                                     Final result               Red Top[[id number]]                                          Final result                 Please view results for these tests on the individual orders.    Light Blue Top [[id number]] Collected:  02/19/19 1757    Specimen:  Blood Updated:  02/19/19 1901     Extra Tube hold for add-on     Comment: Auto resulted       Red Top [[id number]] Collected:  02/19/19 1757    Specimen:  Blood Updated:  02/19/19 1901     Extra Tube Hold for add-ons.     Comment: Auto resulted.       Green Top (Gel) [[id number]] Collected:  02/19/19 1757    Specimen:  Blood Updated:  02/19/19 1901     Extra Tube Hold for add-ons.     Comment: Auto resulted.       Lavender Top [[id number]] Collected:  02/19/19 1757    Specimen:  Blood Updated:  02/19/19 1901     Extra Tube hold for add-on     Comment: Auto resulted       Protime-INR [[id number]]  (Abnormal) Collected:  02/19/19 1757    Specimen:  Blood Updated:  02/19/19 1851     Protime 21.6 Seconds      INR 1.81    aPTT [[id number]]  (Abnormal) Collected:  02/19/19 1757    Specimen:  Blood Updated:  02/19/19 1851     PTT 53.9 seconds     BNP [[id number]]  (Abnormal) Collected:  02/19/19 1757    Specimen:  Blood Updated:  02/19/19 1831      proBNP 2,920.0 pg/mL     Troponin [[id number]]  (Normal) Collected:  02/19/19 1757    Specimen:  Blood Updated:  02/19/19 1831     Troponin I <0.012 ng/mL     CBC & Differential [[id number]] Collected:  02/19/19 1757    Specimen:  Blood Updated:  02/19/19 1821    Narrative:       The following orders were created for panel order CBC & Differential.  Procedure                               Abnormality         Status                     ---------                               -----------         ------                     CBC Auto Differential[[id number]]        Abnormal            Final result                 Please view results for these tests on the individual orders.    CBC Auto Differential [[id number]]  (Abnormal) Collected:  02/19/19 1757    Specimen:  Blood Updated:  02/19/19 1821     WBC 7.34 10*3/mm3      RBC 2.77 10*6/mm3      Hemoglobin 6.4 g/dL      Hematocrit 21.8 %      MCV 78.7 fL      MCH 23.1 pg      MCHC 29.4 g/dL      RDW 18.7 %      RDW-SD 53.5 fl      MPV 9.2 fL      Platelets 340 10*3/mm3      Neutrophil % 75.0 %      Lymphocyte % 12.1 %      Monocyte % 11.2 %      Eosinophil % 1.0 %      Basophil % 0.4 %      Immature Grans % 0.3 %      Neutrophils, Absolute 5.51 10*3/mm3      Lymphocytes, Absolute 0.89 10*3/mm3      Monocytes, Absolute 0.82 10*3/mm3      Eosinophils, Absolute 0.07 10*3/mm3      Basophils, Absolute 0.03 10*3/mm3      Immature Grans, Absolute 0.02 10*3/mm3      nRBC 0.0 /100 WBC     Comprehensive Metabolic Panel [[id number]]  (Abnormal) Collected:  02/19/19 1757    Specimen:  Blood Updated:  02/19/19 1820     Glucose 95 mg/dL      BUN 14 mg/dL      Creatinine 0.40 mg/dL      Sodium 133 mmol/L      Potassium 3.7 mmol/L      Chloride 98 mmol/L      CO2 27.0 mmol/L      Calcium 8.4 mg/dL      Total Protein 6.1 g/dL      Albumin 3.40 g/dL      ALT (SGPT) 25 U/L      AST (SGOT) 23 U/L      Alkaline Phosphatase 69 U/L      Total Bilirubin 0.6 mg/dL      eGFR Non  Amer 149  mL/min/1.73      Globulin 2.7 gm/dL      A/G Ratio 1.3 g/dL      BUN/Creatinine Ratio 35.0     Anion Gap 8.0 mmol/L     Narrative:       The MDRD GFR formula is only valid for adults with stable renal function between ages 18 and 70.    Blood Gas, Venous [[id number]]  (Abnormal) Collected:  02/19/19 1803    Specimen:  Venous Blood Updated:  02/19/19 1803     Site OTHER     pH, Venous 7.417 pH Units      pCO2, Venous 47.1 mm Hg      pO2, Venous 24.2 mm Hg      Comment: 84 Value below reference range        HCO3, Venous 30.3 mmol/L      Comment: 83 Value above reference range        Base Excess, Venous 5.2 mmol/L      Comment: 83 Value above reference range        O2 Saturation, Venous 34.9 %      Comment: 84 Value below reference range        Temperature 37.0 C      Barometric Pressure for Blood Gas 757 mmHg      Modality Room Air     Ventilator Mode NA     Collected by [id number]     Comment: Meter: [id number]     :  [id number]           Imaging Results (last 24 hours)     Procedure Component Value Units Date/Time    XR Chest 2 View [[id number]] Collected:  02/19/19 1842     Updated:  02/19/19 1846    Narrative:        XR CHEST 2 VW- 2/19/2019 6:28 PM CST     HISTORY: CHF/COPD Protocol       COMPARISON: March 30, 2018.     FINDINGS:   Upright frontal and lateral radiographs of the chest were obtained.     Mild interstitial prominence is noted. Small bilateral posterior pleural  effusions noted. Trace of fluid is present in the minor fissure. These  findings may represent mild congestive failure.. Cardiac silhouettes  moderately enlarged.. Degenerative changes noted in the glenohumeral  joints bilaterally..       Impression:       1. Cardiomegaly with mild or early changes of pulmonary vascular  congestion superimposed on COPD.        This report was finalized on 02/19/2019 18:43 by Dr. Bjorn Francis MD.        I have personally reviewed and interpreted the radiology studies and ECG obtained at time of  admission.     Assessment / Plan     Assessment:   Active Hospital Problems    Diagnosis   • Symptomatic anemia     1. CONTRERAS  2. Acute on chronic anemia with last few Hg around 8.3, today 6.4  3. Symptomatic anemia  4. Severe aortic stenosis  5. Chronic atrial fib on pradaxa and rate controlled  6. Hx of cerebellar stroke  7. Hx of HTN  8. COPD    Plan:    1. Transfuse 1 unit and repeat h/h  2. Monitor 02 and tele  3. Check an exercise o2 to see if she qualifies for home 02  4. Continue pradaxa for now, however this will need to be addresses by her cardiologist in the near future if her anemia continues to be severe or symptomatic        Code Status: full code     I discussed the patient's findings and my recommendations with the family at bedside and patient    Estimated length of stay 1-2 days    Lucille Lagos MD   02/19/19   8:36 PM

## 2019-02-20 NOTE — PLAN OF CARE
Problem: Patient Care Overview  Goal: Plan of Care Review  Outcome: Ongoing (interventions implemented as appropriate)   02/20/19 0553   Coping/Psychosocial   Plan of Care Reviewed With patient;daughter   Plan of Care Review   Progress no change   OTHER   Outcome Summary pt admitted with symptomatic anemia; pt was SOA and was in need of O2; hemoglobin was 6.4; one unit of blood given; hemoglobin recheck was 8.1; pt had no c/o pain; VSS; safety discussed and maintained; pt rested fair; will continue to monitor     Goal: Discharge Needs Assessment  Outcome: Ongoing (interventions implemented as appropriate)    Goal: Interprofessional Rounds/Family Conf  Outcome: Ongoing (interventions implemented as appropriate)   02/20/19 0553   Interdisciplinary Rounds/Family Conf   Participants nursing;patient;family       Problem: Fall Risk (Adult)  Goal: Identify Related Risk Factors and Signs and Symptoms  Outcome: Ongoing (interventions implemented as appropriate)   02/20/19 0553   Fall Risk (Adult)   Related Risk Factors (Fall Risk) fatigue/slow reaction;gait/mobility problems;environment unfamiliar   Signs and Symptoms (Fall Risk) presence of risk factors     Goal: Absence of Fall  Outcome: Ongoing (interventions implemented as appropriate)   02/20/19 0553   Fall Risk (Adult)   Absence of Fall making progress toward outcome       Problem: Anemia (Adult)  Goal: Identify Related Risk Factors and Signs and Symptoms  Outcome: Outcome(s) achieved Date Met: 02/20/19 02/20/19 0553   Anemia (Adult)   Related Risk Factors (Anemia) chronic illness   Signs and Symptoms (Anemia) dyspnea;arrhythmia     Goal: Symptom Improvement  Outcome: Ongoing (interventions implemented as appropriate)   02/20/19 0553   Anemia (Adult)   Symptom Improvement making progress toward outcome

## 2019-02-20 NOTE — PROGRESS NOTES
JIM Canales  [facility]   [place] [address]  Phone: [phone]  Fax: [phone]     Heather Burgos is a 89 y.o. female.   : 10/23/1926  CC:   Chief Complaint   Patient presents with   • Shortness of Breath   • Wheezing   • Cough      HPI: Heather Burgos is a pleasant 89 y.o. female. The patient is here today for complaints of increasing shortness of breath.  The patient was noted to have a RA O2 sat of 67%.  She was placed on O2 at 2L with improvement in O2 to 88%-92%.  Her heartrate was noted to be irregular 45-120s.  She has a known history of A-fib, severe valvular heart disease and diastolic dysfunction.  For COPD she uses symbicort, albuterol nebs, and albuterol HFA as needed. She has no fever, no chills.  The patient's PCP is Lupe Ham MD.    The following portions of the patient's history were reviewed and updated as appropriate: allergies, current medications, past family history, past medical history, past social history, past surgical history and problem list.  Past Medical History:   Diagnosis Date   • Atrial fibrillation with rapid ventricular response (CMS/HCC)    • Degenerative joint disease    • Diastolic murmur of aorta    • Dysphagia    • Essential hypertension    • Pneumonia      Family History   Problem Relation Age of Onset   • Stroke Mother         Age 90   • Emphysema Father    • Cancer Sister    • Cancer Brother      Social History     Socioeconomic History   • Marital status:      Spouse name: Not on file   • Number of children: Not on file   • Years of education: Not on file   • Highest education level: Not on file   Social Needs   • Financial resource strain: Not on file   • Food insecurity - worry: Not on file   • Food insecurity - inability: Not on file   • Transportation needs - medical: Not on file   • Transportation needs - non-medical: Not on file   Occupational History   • Not on  "file   Tobacco Use   • Smoking status: Never Smoker   • Smokeless tobacco: Never Used   Substance and Sexual Activity   • Alcohol use: No   • Drug use: No   • Sexual activity: Defer   Other Topics Concern   • Not on file   Social History Narrative   • Not on file     Review of Systems   Constitutional: Positive for diaphoresis and fatigue. Negative for chills and fever.   HENT: Negative for congestion.    Eyes: Negative for blurred vision.   Respiratory: Positive for shortness of breath. Negative for cough.    Cardiovascular: Negative for chest pain.   Gastrointestinal: Negative for diarrhea, nausea and vomiting.   Endocrine: Negative for cold intolerance and heat intolerance.   Genitourinary: Negative for dysuria.   Musculoskeletal: Negative for arthralgias.   Skin: Negative for rash.   Neurological: Positive for weakness. Negative for dizziness and light-headedness.   Hematological: Does not bruise/bleed easily.   Psychiatric/Behavioral: Negative for agitation. The patient is not nervous/anxious.      /80   Pulse 62   Ht 157.5 cm (62\")   Wt 54.9 kg (121 lb)   SpO2 (!) 67% Comment: RA  Breastfeeding? No   BMI 22.13 kg/m²   Physical Exam   Constitutional: She is oriented to person, place, and time. She appears well-developed and well-nourished. She has a sickly appearance.   HENT:   Head: Normocephalic and atraumatic.   Eyes: Conjunctivae and EOM are normal. Pupils are equal, round, and reactive to light. No scleral icterus.   Neck: Normal range of motion. Neck supple.   Cardiovascular: An irregular rhythm present. Exam reveals no friction rub.   Murmur heard.  Pulmonary/Chest: She has decreased breath sounds. She has no wheezes. She has rhonchi in the right middle field and the right lower field. She has rales.   Abdominal: Soft. Bowel sounds are normal. She exhibits no distension. There is no tenderness.   Musculoskeletal: Normal range of motion. She exhibits edema (mild ).   Neurological: She is alert " and oriented to person, place, and time.   Skin: Skin is warm and dry.   Psychiatric: She has a normal mood and affect. Her behavior is normal. Judgment and thought content normal.   Nursing note and vitals reviewed.    Pulmonary Functions Testing Results:  FEV1   Date Value Ref Range Status   10/31/2018 51% liters Final     FVC   Date Value Ref Range Status   10/31/2018 71% liters Final     FEV1/FVC   Date Value Ref Range Status   10/31/2018 52.19% % Final     DLCO   Date Value Ref Range Status   10/31/2018 57% ml/mmHg sec Final     My PFT Interpretation: none today   Imaging: none today     Assessment and Plan:   Heather was seen today for shortness of breath, wheezing and cough.    Diagnoses and all orders for this visit:    Shortness of breath    Chronic atrial fibrillation (CMS/[facility])    COPD, group B, by GOLD 2017 classification (CMS/[facility])    Seasonal allergic rhinitis due to other allergic trigger    Gastroesophageal reflux disease, esophagitis presence not specified    Acute respiratory failure with hypoxia (CMS/[facility])    Personal history of nicotine dependence    The patient is c/o increasing SOB for the past several days.  She was noted to have acute respiratory failure with O2 sat 67% on RA.  She was placed on O2 in the office with improvement in O2 sat 88%-92%.  She does not normally require O2. HR noted to be irregular 40s-120s.  The patient has known A-fib, severe valvular heart disease, and diastolic dysfunction.  Physical exam reveals rales and rhonchi in bilateral bases. Mild lower extremity edema.  I am concerned the patient may have acute heart failure or pneumonia in the setting of acute respiratory failure.  I feel that this is life threatening and I recommend she present to the emergency department for further evaluation and treatment.  I offered to call EMS for transport 2' hypoxemia, patient and family member declined.  Daughter states she will take her the ER.  Report was called to [facility] charge RN,  Melita Foreman.     Health maintenance:   Influenza vaccine: yes   Pneumovax 23:yes   Prevnar 13:yes   Patient's Body mass index is 22.13 kg/m². BMI is below normal parameters. Recommendations include: treating the underlying disease process.    Follow up: 4 weeks after hospitalization   Donna Macias, APRN  2/19/2019  8:45 PM

## 2019-02-21 ENCOUNTER — TELEPHONE (OUTPATIENT)
Dept: INTERNAL MEDICINE | Age: 84
End: 2019-02-21

## 2019-02-21 NOTE — PROGRESS NOTES
Continued Stay Note  [facility]     Patient Name: Heather Burgos  MRN: [id number]  Today's Date: 2/21/2019    Admit Date: 2/19/2019    Discharge Plan     Row Name 02/21/19 1339       Plan    Plan  [facility]     Patient/Family in Agreement with Plan  yes    Final Discharge Disposition Code  06 - home with home health care    Final Note  Pt is being discharged home today.  Pt has order for  care and has requested [facility] . Informed Anneliese Nagel from [facility] of d/c today and [id number].    Row Name 02/21/19 1210       Plan    Final Discharge Disposition Code  01 - home or self-care        Discharge Codes    No documentation.       Expected Discharge Date and Time     Expected Discharge Date Expected Discharge Time    Feb 21, 2019             OLYA Alcala

## 2019-02-21 NOTE — DISCHARGE SUMMARY
[facility] Medicine Services  DISCHARGE SUMMARY       Date of Admission: 2/19/2019  Date of Discharge:  2/21/2019  Primary Care Physician: Lupe Ham MD    Presenting Problem/History of Present Illness:  Symptomatic anemia [D64.9]  Symptomatic anemia [D64.9]     Final Discharge Diagnoses:  Active Hospital Problems    Diagnosis   • **Symptomatic anemia   • Pulmonary hypertension (CMS/HCC)   • Acute respiratory failure with hypoxia (CMS/HCC)   • COPD, group B, by GOLD 2017 classification (CMS/HCC)   • Aortic stenosis   • Essential hypertension   • Chronic atrial fibrillation (CMS/HCC)     CHADS-VASc score 4       Consults: None    Procedures Performed: None    Pertinent Test Results:   Lab Results (last 7 days)     Procedure Component Value Units Date/Time    Basic Metabolic Panel [[id number]]  (Abnormal) Collected:  02/21/19 0445    Specimen:  Blood Updated:  02/21/19 0539     Glucose 146 mg/dL      BUN 11 mg/dL      Creatinine 0.48 mg/dL      Sodium 136 mmol/L      Potassium 4.0 mmol/L      Chloride 97 mmol/L      CO2 33.0 mmol/L      Calcium 9.1 mg/dL      eGFR Non African Amer 121 mL/min/1.73      BUN/Creatinine Ratio 22.9     Anion Gap 6.0 mmol/L     Narrative:       The MDRD GFR formula is only valid for adults with stable renal function between ages 18 and 70.    CBC (No Diff) [[id number]]  (Abnormal) Collected:  02/21/19 0445    Specimen:  Blood Updated:  02/21/19 0526     WBC 6.14 10*3/mm3      RBC 3.80 10*6/mm3      Hemoglobin 9.7 g/dL      Hematocrit 31.0 %      MCV 81.6 fL      MCH 25.5 pg      MCHC 31.3 g/dL      RDW 17.2 %      RDW-SD 49.9 fl      MPV 9.1 fL      Platelets 324 10*3/mm3     Hemoglobin & Hematocrit, Blood [[id number]]  (Abnormal) Collected:  02/20/19 1819    Specimen:  Blood Updated:  02/20/19 1844     Hemoglobin 10.4 g/dL      Hematocrit 33.0 %     Basic Metabolic Panel [[id number]]  (Abnormal) Collected:  02/20/19 0613    Specimen:  Blood  Updated:  02/20/19 0720     Glucose 104 mg/dL      BUN 11 mg/dL      Creatinine 0.75 mg/dL      Sodium 136 mmol/L      Potassium 3.5 mmol/L      Chloride 99 mmol/L      CO2 31.0 mmol/L      Calcium 8.7 mg/dL      eGFR Non African Amer 72 mL/min/1.73      BUN/Creatinine Ratio 14.7     Anion Gap 6.0 mmol/L     Narrative:       The MDRD GFR formula is only valid for adults with stable renal function between ages 18 and 70.    CBC Auto Differential [[id number]]  (Abnormal) Collected:  02/20/19 0613    Specimen:  Blood Updated:  02/20/19 0706     WBC 7.29 10*3/mm3      RBC 3.28 10*6/mm3      Hemoglobin 8.0 g/dL      Hematocrit 26.1 %      MCV 79.6 fL      MCH 24.4 pg      MCHC 30.7 g/dL      RDW 18.0 %      RDW-SD 51.5 fl      MPV 9.1 fL      Platelets 347 10*3/mm3      Neutrophil % 78.6 %      Lymphocyte % 8.1 %      Monocyte % 11.2 %      Eosinophil % 1.1 %      Basophil % 0.5 %      Immature Grans % 0.5 %      Neutrophils, Absolute 5.72 10*3/mm3      Lymphocytes, Absolute 0.59 10*3/mm3      Monocytes, Absolute 0.82 10*3/mm3      Eosinophils, Absolute 0.08 10*3/mm3      Basophils, Absolute 0.04 10*3/mm3      Immature Grans, Absolute 0.04 10*3/mm3      nRBC 0.0 /100 WBC     Hemoglobin & Hematocrit, Blood [[id number]]  (Abnormal) Collected:  02/19/19 2258    Specimen:  Blood Updated:  02/19/19 2330     Hemoglobin 8.1 g/dL      Hematocrit 26.5 %     POC Occult Blood Stool [[id number]]  (Normal) Collected:  02/19/19 1930    Specimen:  Stool Updated:  02/19/19 1931     Fecal Occult Blood Negative     Lot Number [id number]     Expiration Date 11/30/2019     DEVELOPER LOT NUMBER [id number]     DEVELOPER EXPIRATION DATE 11/30/2019     Positive Control Positive     Negative Control Negative    Libby Draw [[id number]] Collected:  02/19/19 1757    Specimen:  Blood Updated:  02/19/19 1901    Narrative:       The following orders were created for panel order Libby Draw.  Procedure                               Abnormality         Status                      ---------                               -----------         ------                     Light Blue Top[[id number]]                                   Final result               Green Top (Gel)[[id number]]                                  Final result               Lavender Top[[id number]]                                     Final result               Red Top[[id number]]                                          Final result                 Please view results for these tests on the individual orders.    Light Blue Top [[id number]] Collected:  02/19/19 1757    Specimen:  Blood Updated:  02/19/19 1901     Extra Tube hold for add-on     Comment: Auto resulted       Red Top [[id number]] Collected:  02/19/19 1757    Specimen:  Blood Updated:  02/19/19 1901     Extra Tube Hold for add-ons.     Comment: Auto resulted.       Green Top (Gel) [[id number]] Collected:  02/19/19 1757    Specimen:  Blood Updated:  02/19/19 1901     Extra Tube Hold for add-ons.     Comment: Auto resulted.       Lavender Top [[id number]] Collected:  02/19/19 1757    Specimen:  Blood Updated:  02/19/19 1901     Extra Tube hold for add-on     Comment: Auto resulted       Protime-INR [[id number]]  (Abnormal) Collected:  02/19/19 1757    Specimen:  Blood Updated:  02/19/19 1851     Protime 21.6 Seconds      INR 1.81    aPTT [[id number]]  (Abnormal) Collected:  02/19/19 1757    Specimen:  Blood Updated:  02/19/19 1851     PTT 53.9 seconds     BNP [[id number]]  (Abnormal) Collected:  02/19/19 1757    Specimen:  Blood Updated:  02/19/19 1831     proBNP 2,920.0 pg/mL     Troponin [[id number]]  (Normal) Collected:  02/19/19 1757    Specimen:  Blood Updated:  02/19/19 1831     Troponin I <0.012 ng/mL     CBC & Differential [[id number]] Collected:  02/19/19 1757    Specimen:  Blood Updated:  02/19/19 1821    Narrative:       The following orders were created for panel order CBC & Differential.  Procedure                               Abnormality         Status                      ---------                               -----------         ------                     CBC Auto Differential[[id number]]        Abnormal            Final result                 Please view results for these tests on the individual orders.    CBC Auto Differential [[id number]]  (Abnormal) Collected:  02/19/19 1757    Specimen:  Blood Updated:  02/19/19 1821     WBC 7.34 10*3/mm3      RBC 2.77 10*6/mm3      Hemoglobin 6.4 g/dL      Hematocrit 21.8 %      MCV 78.7 fL      MCH 23.1 pg      MCHC 29.4 g/dL      RDW 18.7 %      RDW-SD 53.5 fl      MPV 9.2 fL      Platelets 340 10*3/mm3      Neutrophil % 75.0 %      Lymphocyte % 12.1 %      Monocyte % 11.2 %      Eosinophil % 1.0 %      Basophil % 0.4 %      Immature Grans % 0.3 %      Neutrophils, Absolute 5.51 10*3/mm3      Lymphocytes, Absolute 0.89 10*3/mm3      Monocytes, Absolute 0.82 10*3/mm3      Eosinophils, Absolute 0.07 10*3/mm3      Basophils, Absolute 0.03 10*3/mm3      Immature Grans, Absolute 0.02 10*3/mm3      nRBC 0.0 /100 WBC     Comprehensive Metabolic Panel [[id number]]  (Abnormal) Collected:  02/19/19 1757    Specimen:  Blood Updated:  02/19/19 1820     Glucose 95 mg/dL      BUN 14 mg/dL      Creatinine 0.40 mg/dL      Sodium 133 mmol/L      Potassium 3.7 mmol/L      Chloride 98 mmol/L      CO2 27.0 mmol/L      Calcium 8.4 mg/dL      Total Protein 6.1 g/dL      Albumin 3.40 g/dL      ALT (SGPT) 25 U/L      AST (SGOT) 23 U/L      Alkaline Phosphatase 69 U/L      Total Bilirubin 0.6 mg/dL      eGFR Non African Amer 149 mL/min/1.73      Globulin 2.7 gm/dL      A/G Ratio 1.3 g/dL      BUN/Creatinine Ratio 35.0     Anion Gap 8.0 mmol/L     Narrative:       The MDRD GFR formula is only valid for adults with stable renal function between ages 18 and 70.    Blood Gas, Venous [[id number]]  (Abnormal) Collected:  02/19/19 1803    Specimen:  Venous Blood Updated:  02/19/19 1803     Site OTHER     pH, Venous 7.417 pH Units      pCO2, Venous 47.1 mm Hg       pO2, Venous 24.2 mm Hg      Comment: 84 Value below reference range        HCO3, Venous 30.3 mmol/L      Comment: 83 Value above reference range        Base Excess, Venous 5.2 mmol/L      Comment: 83 Value above reference range        O2 Saturation, Venous 34.9 %      Comment: 84 Value below reference range        Temperature 37.0 C      Barometric Pressure for Blood Gas 757 mmHg      Modality Room Air     Ventilator Mode NA     Collected by [id number]     Comment: Meter: [id number]     :  [id number]           Imaging Results (last 7 days)     Procedure Component Value Units Date/Time    XR Chest 2 View [[id number]] Collected:  02/19/19 1842     Updated:  02/19/19 1846    Narrative:        XR CHEST 2 VW- 2/19/2019 6:28 PM CST     HISTORY: CHF/COPD Protocol       COMPARISON: March 30, 2018.     FINDINGS:   Upright frontal and lateral radiographs of the chest were obtained.     Mild interstitial prominence is noted. Small bilateral posterior pleural  effusions noted. Trace of fluid is present in the minor fissure. These  findings may represent mild congestive failure.. Cardiac silhouettes  moderately enlarged.. Degenerative changes noted in the glenohumeral  joints bilaterally..       Impression:       1. Cardiomegaly with mild or early changes of pulmonary vascular  congestion superimposed on COPD.        This report was finalized on 02/19/2019 18:43 by Dr. Bjorn Francis MD.        History of Present Illness on Day of Discharge:   The patient is currently sitting up in bed and feels back to her baseline.  She is doing well today.  She states that she is ready to go home.  She denies any further shortness of breath.  No family currently present.       Hospital Course:  The patient is a 89 y.o. female who presented to [facility] with shortness of breath.  She has a past medical history significant for atrial fibrillation, hypertension, aortic stenosis on Pradaxa and known anemia.  She was sent  "to the emergency department by Pulmonology due to hypoxia.  She stated on admission that she has well known anemia as well as ongoing dyspnea due to her severe aortic stenosis.  However this has been progressively getting worse over the past 3-4 weeks.  She was only able to take 5 or 6 steps without having to sit down.  She denied any black tarry stools or bright red blood per rectum.  She was hemoccult negative.  She was found to have a hemoglobin of 6.4 in the emergency department with her baseline running around 8.3.  She was admitted for further work up and treatment.  She was transfused one unit of packed red blood cells.  That made her hemoglobin go up to 8 and she was transfused another unit of blood yesterday due to symptomatic anemia with conversational dyspnea.  She tolerated that well.  She was weaned off her oxygen and is now saturating fine.  She ambulated on room air and remained at 99% saturation.  She is stable and in good condition to be discharged home today.  She feels back to her baseline and wants to go home.  She will follow up with her PCP in one week.       Condition on Discharge:  Stable     Physical Exam on Discharge:  /80 (BP Location: Right arm, Patient Position: Sitting)   Pulse 77   Temp 98.3 °F (36.8 °C) (Oral)   Resp 16   Ht 157.5 cm (62.01\")   Wt 54.8 kg (120 lb 12.8 oz)   SpO2 97%   BMI 22.09 kg/m²      Physical Exam   Constitutional: She is oriented to person, place, and time. She appears well-developed and well-nourished.   Sitting up in bed.  NAD.     HENT:   Head: Normocephalic and atraumatic.   Eyes: Conjunctivae and EOM are normal. Pupils are equal, round, and reactive to light.   Neck: Neck supple. No JVD present. No thyromegaly present.   Cardiovascular: Normal rate and intact distal pulses. An irregular rhythm present. Exam reveals no gallop and no friction rub.   Murmur heard.   Systolic murmur is present.  Atrial fibrillation 74-79   Pulmonary/Chest: Effort " normal and breath sounds normal. No respiratory distress. She has no wheezes. She has no rales. She exhibits no tenderness.   Abdominal: Soft. Bowel sounds are normal. She exhibits no distension. There is no tenderness. There is no rebound and no guarding.   Genitourinary:   Genitourinary Comments: Voiding   Musculoskeletal: Normal range of motion. She exhibits no edema, tenderness or deformity.   Lymphadenopathy:     She has no cervical adenopathy.   Neurological: She is alert and oriented to person, place, and time.   Skin: Skin is warm and dry. No rash noted.   Psychiatric: She has a normal mood and affect. Her behavior is normal. Judgment and thought content normal.   Nursing note and vitals reviewed.    Discharge Disposition:  Home or Self Care    Discharge Medications:     Discharge Medications      New Medications      Instructions Start Date   predniSONE 20 MG tablet  Commonly known as:  DELTASONE   20 mg, Oral, Daily, Take 20 mg daily for 3 days then decrease to 10 mg daily for 3 days then stop         Continue These Medications      Instructions Start Date   albuterol sulfate  (90 Base) MCG/ACT inhaler  Commonly known as:  PROVENTIL HFA;VENTOLIN HFA;PROAIR HFA   2 puffs, Inhalation, Every 6 Hours PRN      atorvastatin 40 MG tablet  Commonly known as:  LIPITOR   40 mg, Oral, Nightly      budesonide-formoterol 80-4.5 MCG/ACT inhaler  Commonly known as:  SYMBICORT   2 puffs, Inhalation, 2 Times Daily - RT      Cranberry 400 MG capsule   2 capsules, Oral, Daily, Takes 1 in morning and 1 at night      dabigatran etexilate 150 MG capsu  Commonly known as:  PRADAXA   150 mg, Oral, Every 12 Hours Scheduled      furosemide 20 MG tablet  Commonly known as:  LASIX   20 mg, Oral, Daily      guaiFENesin 600 MG 12 hr tablet  Commonly known as:  MUCINEX   1,200 mg, Oral, 2 Times Daily      ipratropium-albuterol 0.5-2.5 mg/3 ml nebulizer  Commonly known as:  DUO-NEB   3 mL, Nebulization, Every 6 Hours PRN       lisinopril 10 MG tablet  Commonly known as:  PRINIVIL,ZESTRIL   10 mg, Oral, Daily      loratadine 10 MG tablet  Commonly known as:  CLARITIN   10 mg, Oral, Daily      metoprolol tartrate 25 MG tablet  Commonly known as:  LOPRESSOR   25 mg, Oral, 2 Times Daily      omeprazole 40 MG capsule  Commonly known as:  priLOSEC   40 mg, Oral, Daily      PARoxetine 10 MG tablet  Commonly known as:  PAXIL   10 mg, Oral, Every Morning      vitamin D 36831 units capsule capsule  Commonly known as:  ERGOCALCIFEROL   50,000 Units, Oral, Weekly           Discharge Diet:   Diet Instructions     Diet: Regular, Cardiac      Discharge Diet:   Regular  Cardiac           Activity at Discharge:   Activity Instructions     Activity as Tolerated          Discharge Care Plan/Instructions:   1.  Home health     Follow-up Appointments:   1.  Follow up with PCP in one week  Future Appointments   Date Time Provider Department Center   4/30/2019  1:30 PM Donna Macias APRN [facility] None   8/13/2019 11:30 AM Arpit Smith MD [facility]     Test Results Pending at Discharge: None    JIM Meade  02/21/19  11:57 AM    Time: 35 minutes   I personally evaluated and examined the patient in conjunction with JIM Farrar and agree with the assessment, treatment plan, and disposition of the patient as recorded by her. My history, exam, and further recommendations are:     Hemodynamically stable and medically appropriate for discharge.  Patient received PRBC.  Post transfusion hgb 9.7    David Li MD  02/21/19  10:29 PM

## 2019-02-21 NOTE — PLAN OF CARE
Problem: Patient Care Overview  Goal: Plan of Care Review  Outcome: Ongoing (interventions implemented as appropriate)   02/21/19 4936   Coping/Psychosocial   Plan of Care Reviewed With patient   Plan of Care Review   Progress improving   OTHER   Outcome Summary pt had no c/o pain this shift; pt rested well; VSS; safety maintained; will continue to monitor

## 2019-02-22 NOTE — OUTREACH NOTE
Prep Survey      Responses   Facility patient discharged from?  [facility]   Is patient eligible?  Yes   Discharge diagnosis  Symptomatic anemia, pulmonary HTN, acute resp. failure with hypoxia, COPD, aortic stenosis, ess. HTN, chronic Afib.   Does the patient have one of the following disease processes/diagnoses(primary or secondary)?  Other   Does the patient have Home health ordered?  Yes   What is the Home health agency?   [facility]   Is there a DME ordered?  No   Comments regarding appointments  See AVS   Prep survey completed?  Yes          Melita Quiroz RN

## 2019-02-25 ENCOUNTER — TELEPHONE (OUTPATIENT)
Dept: INTERNAL MEDICINE | Age: 84
End: 2019-02-25

## 2019-02-25 NOTE — OUTREACH NOTE
Medical Week 1 Survey      Responses   Facility patient discharged from?  [facility]   Does the patient have one of the following disease processes/diagnoses(primary or secondary)?  Other   Is there a successful TCM telephone encounter documented?  No   Week 1 attempt successful?  Yes   Call start time  1356   Call end time  1359   Discharge diagnosis  Symptomatic anemia, pulmonary HTN, acute resp. failure with hypoxia, COPD, aortic stenosis, ess. HTN, chronic Afib.   Is patient permission given to speak with other caregiver?  Yes   Person spoke with today (if not patient) and relationship  Abeba Dtnicole   Meds reviewed with patient/caregiver?  Yes   Is the patient having any side effects they believe may be caused by any medication additions or changes?  No   Does the patient have all medications ordered at discharge?  Yes   Is the patient taking all medications as directed (includes completed medication regime)?  Yes   Does the patient have a primary care provider?   Yes   Does the patient have an appointment with their PCP within 7 days of discharge?  Yes   Has the patient kept scheduled appointments due by today?  N/A   What is the Home health agency?   [facility]   Has home health visited the patient within 72 hours of discharge?  Yes   Psychosocial issues?  No   Did the patient receive a copy of their discharge instructions?  Yes   Nursing interventions  Reviewed instructions with patient   What is the patient's perception of their health status since discharge?  Improving   Is the patient/caregiver able to teach back signs and symptoms related to disease process for when to call PCP?  Yes   Is the patient/caregiver able to teach back the hierarchy of who to call/visit for symptoms/problems? PCP, Specialist, Home health nurse, Urgent Care, ED, 911  Yes   Additional teach back comments  Dtr says she is doing well and has a f/u appt this week.   Week 1 call completed?  Yes          Sarah Gibbons RN

## 2019-02-25 NOTE — TELEPHONE ENCOUNTER
----- Message from JIM Rodarte sent at 2/20/2019 11:34 AM CST -----  Please let this pt know that I have discussed her worsening aortic valve stenosis with Dr. Smith. He recommends referral for TAVR, if the pt is interested in this. We need to schedule her for an office visit to talk about results and possible referral.

## 2019-02-25 NOTE — TELEPHONE ENCOUNTER
Spoke with Magy, pts daughter. She did not feel at this time TAVR was a good option. I will have scheduling move her appt up to discuss 2D Echo and future plans.

## 2019-02-26 ENCOUNTER — TELEPHONE (OUTPATIENT)
Dept: INTERNAL MEDICINE | Age: 84
End: 2019-02-26

## 2019-02-26 ENCOUNTER — OFFICE VISIT (OUTPATIENT)
Dept: INTERNAL MEDICINE | Age: 84
End: 2019-02-26
Payer: MEDICARE

## 2019-02-26 VITALS
HEIGHT: 62 IN | BODY MASS INDEX: 21.53 KG/M2 | SYSTOLIC BLOOD PRESSURE: 108 MMHG | OXYGEN SATURATION: 98 % | DIASTOLIC BLOOD PRESSURE: 62 MMHG | HEART RATE: 86 BPM | WEIGHT: 117 LBS

## 2019-02-26 DIAGNOSIS — R06.02 SOB (SHORTNESS OF BREATH): ICD-10-CM

## 2019-02-26 DIAGNOSIS — I10 ESSENTIAL HYPERTENSION: ICD-10-CM

## 2019-02-26 DIAGNOSIS — D50.9 IRON DEFICIENCY ANEMIA, UNSPECIFIED IRON DEFICIENCY ANEMIA TYPE: ICD-10-CM

## 2019-02-26 DIAGNOSIS — D50.9 IRON DEFICIENCY ANEMIA, UNSPECIFIED IRON DEFICIENCY ANEMIA TYPE: Primary | ICD-10-CM

## 2019-02-26 DIAGNOSIS — Z23 NEEDS FLU SHOT: ICD-10-CM

## 2019-02-26 LAB
ALBUMIN SERPL-MCNC: 3.9 G/DL (ref 3.5–5.2)
ALP BLD-CCNC: 78 U/L (ref 35–104)
ALT SERPL-CCNC: 12 U/L (ref 5–33)
ANION GAP SERPL CALCULATED.3IONS-SCNC: 18 MMOL/L (ref 7–19)
AST SERPL-CCNC: 14 U/L (ref 5–32)
BASOPHILS ABSOLUTE: 0 K/UL (ref 0–0.2)
BASOPHILS RELATIVE PERCENT: 0.4 % (ref 0–1)
BILIRUB SERPL-MCNC: 0.7 MG/DL (ref 0.2–1.2)
BUN BLDV-MCNC: 16 MG/DL (ref 8–23)
CALCIUM SERPL-MCNC: 9.3 MG/DL (ref 8.2–9.6)
CHLORIDE BLD-SCNC: 101 MMOL/L (ref 98–111)
CO2: 25 MMOL/L (ref 22–29)
CREAT SERPL-MCNC: 0.5 MG/DL (ref 0.5–0.9)
EOSINOPHILS ABSOLUTE: 0.1 K/UL (ref 0–0.6)
EOSINOPHILS RELATIVE PERCENT: 0.7 % (ref 0–5)
FERRITIN: 28.7 NG/ML (ref 13–150)
GFR NON-AFRICAN AMERICAN: >60
GLUCOSE BLD-MCNC: 80 MG/DL (ref 74–109)
HCT VFR BLD CALC: 37.6 % (ref 37–47)
HEMOGLOBIN: 11.1 G/DL (ref 12–16)
IRON SATURATION: 19 % (ref 14–50)
IRON: 74 UG/DL (ref 37–145)
LYMPHOCYTES ABSOLUTE: 1.4 K/UL (ref 1.1–4.5)
LYMPHOCYTES RELATIVE PERCENT: 15.8 % (ref 20–40)
MCH RBC QN AUTO: 25.5 PG (ref 27–31)
MCHC RBC AUTO-ENTMCNC: 29.5 G/DL (ref 33–37)
MCV RBC AUTO: 86.4 FL (ref 81–99)
MONOCYTES ABSOLUTE: 0.8 K/UL (ref 0–0.9)
MONOCYTES RELATIVE PERCENT: 9 % (ref 0–10)
NEUTROPHILS ABSOLUTE: 6.6 K/UL (ref 1.5–7.5)
NEUTROPHILS RELATIVE PERCENT: 73.7 % (ref 50–65)
PDW BLD-RTO: 19.2 % (ref 11.5–14.5)
PLATELET # BLD: 366 K/UL (ref 130–400)
PMV BLD AUTO: 9.1 FL (ref 9.4–12.3)
POTASSIUM SERPL-SCNC: 3.6 MMOL/L (ref 3.5–5)
RBC # BLD: 4.35 M/UL (ref 4.2–5.4)
SODIUM BLD-SCNC: 144 MMOL/L (ref 136–145)
TOTAL IRON BINDING CAPACITY: 382 UG/DL (ref 250–400)
TOTAL PROTEIN: 6.8 G/DL (ref 6.6–8.7)
WBC # BLD: 9 K/UL (ref 4.8–10.8)

## 2019-02-26 PROCEDURE — G0008 ADMIN INFLUENZA VIRUS VAC: HCPCS | Performed by: INTERNAL MEDICINE

## 2019-02-26 PROCEDURE — 1111F DSCHRG MED/CURRENT MED MERGE: CPT | Performed by: INTERNAL MEDICINE

## 2019-02-26 PROCEDURE — 99495 TRANSJ CARE MGMT MOD F2F 14D: CPT | Performed by: INTERNAL MEDICINE

## 2019-02-26 PROCEDURE — 90662 IIV NO PRSV INCREASED AG IM: CPT | Performed by: INTERNAL MEDICINE

## 2019-02-26 RX ORDER — GUAIFENESIN 600 MG/1
1200 TABLET, EXTENDED RELEASE ORAL 2 TIMES DAILY
COMMUNITY

## 2019-02-26 RX ORDER — LISINOPRIL 10 MG/1
10 TABLET ORAL DAILY
Qty: 90 TABLET | Refills: 3 | Status: SHIPPED | OUTPATIENT
Start: 2019-02-26 | End: 2019-05-14 | Stop reason: ALTCHOICE

## 2019-02-27 ENCOUNTER — TELEPHONE (OUTPATIENT)
Dept: INTERNAL MEDICINE | Age: 84
End: 2019-02-27

## 2019-02-27 ASSESSMENT — ENCOUNTER SYMPTOMS
NAUSEA: 0
EYE ITCHING: 0
CHEST TIGHTNESS: 0
PHOTOPHOBIA: 0
COUGH: 0
ANAL BLEEDING: 0
SINUS PRESSURE: 0
RHINORRHEA: 0
ABDOMINAL PAIN: 0
BLOOD IN STOOL: 0
SINUS PAIN: 0
EYE PAIN: 0
EYE REDNESS: 0
WHEEZING: 0
DIARRHEA: 0
VOMITING: 0
TROUBLE SWALLOWING: 0
VOICE CHANGE: 0
COLOR CHANGE: 0
RECTAL PAIN: 0
EYE DISCHARGE: 0
BACK PAIN: 0
ABDOMINAL DISTENTION: 0
SHORTNESS OF BREATH: 0
CONSTIPATION: 0
SORE THROAT: 0

## 2019-02-28 ENCOUNTER — TELEPHONE (OUTPATIENT)
Dept: INTERNAL MEDICINE | Age: 84
End: 2019-02-28

## 2019-03-04 ENCOUNTER — OFFICE VISIT (OUTPATIENT)
Dept: INTERNAL MEDICINE | Age: 84
End: 2019-03-04
Payer: MEDICARE

## 2019-03-04 VITALS
HEIGHT: 62 IN | HEART RATE: 87 BPM | RESPIRATION RATE: 22 BRPM | BODY MASS INDEX: 21.99 KG/M2 | OXYGEN SATURATION: 99 % | SYSTOLIC BLOOD PRESSURE: 86 MMHG | DIASTOLIC BLOOD PRESSURE: 58 MMHG | WEIGHT: 119.5 LBS

## 2019-03-04 DIAGNOSIS — E55.9 VITAMIN D DEFICIENCY: ICD-10-CM

## 2019-03-04 DIAGNOSIS — I50.9 CONGESTIVE HEART FAILURE, UNSPECIFIED HF CHRONICITY, UNSPECIFIED HEART FAILURE TYPE (HCC): ICD-10-CM

## 2019-03-04 DIAGNOSIS — I48.91 ATRIAL FIBRILLATION, UNSPECIFIED TYPE (HCC): ICD-10-CM

## 2019-03-04 DIAGNOSIS — F32.89 OTHER DEPRESSION: ICD-10-CM

## 2019-03-04 DIAGNOSIS — I95.9 HYPOTENSION, UNSPECIFIED HYPOTENSION TYPE: ICD-10-CM

## 2019-03-04 DIAGNOSIS — J44.9 CHRONIC OBSTRUCTIVE PULMONARY DISEASE, UNSPECIFIED COPD TYPE (HCC): ICD-10-CM

## 2019-03-04 DIAGNOSIS — Z00.00 MEDICARE ANNUAL WELLNESS VISIT, SUBSEQUENT: Primary | ICD-10-CM

## 2019-03-04 DIAGNOSIS — D50.9 IRON DEFICIENCY ANEMIA, UNSPECIFIED IRON DEFICIENCY ANEMIA TYPE: ICD-10-CM

## 2019-03-04 DIAGNOSIS — R53.83 OTHER FATIGUE: ICD-10-CM

## 2019-03-04 DIAGNOSIS — R04.0 EPISTAXIS: ICD-10-CM

## 2019-03-04 DIAGNOSIS — K21.9 GASTROESOPHAGEAL REFLUX DISEASE WITHOUT ESOPHAGITIS: ICD-10-CM

## 2019-03-04 DIAGNOSIS — E78.5 HYPERLIPIDEMIA, UNSPECIFIED HYPERLIPIDEMIA TYPE: ICD-10-CM

## 2019-03-04 LAB
BASOPHILS ABSOLUTE: 0 K/UL (ref 0–0.2)
BASOPHILS RELATIVE PERCENT: 0.3 % (ref 0–1)
EOSINOPHILS ABSOLUTE: 0.1 K/UL (ref 0–0.6)
EOSINOPHILS RELATIVE PERCENT: 0.7 % (ref 0–5)
HCT VFR BLD CALC: 32.1 % (ref 37–47)
HEMOGLOBIN: 9.3 G/DL (ref 12–16)
LYMPHOCYTES ABSOLUTE: 1.1 K/UL (ref 1.1–4.5)
LYMPHOCYTES RELATIVE PERCENT: 9.2 % (ref 20–40)
MCH RBC QN AUTO: 25.3 PG (ref 27–31)
MCHC RBC AUTO-ENTMCNC: 29 G/DL (ref 33–37)
MCV RBC AUTO: 87.2 FL (ref 81–99)
MONOCYTES ABSOLUTE: 1.1 K/UL (ref 0–0.9)
MONOCYTES RELATIVE PERCENT: 9.3 % (ref 0–10)
NEUTROPHILS ABSOLUTE: 9.5 K/UL (ref 1.5–7.5)
NEUTROPHILS RELATIVE PERCENT: 80 % (ref 50–65)
PDW BLD-RTO: 20.9 % (ref 11.5–14.5)
PLATELET # BLD: 289 K/UL (ref 130–400)
PMV BLD AUTO: 9.5 FL (ref 9.4–12.3)
RBC # BLD: 3.68 M/UL (ref 4.2–5.4)
WBC # BLD: 11.8 K/UL (ref 4.8–10.8)

## 2019-03-04 PROCEDURE — 4040F PNEUMOC VAC/ADMIN/RCVD: CPT | Performed by: INTERNAL MEDICINE

## 2019-03-04 PROCEDURE — G8427 DOCREV CUR MEDS BY ELIG CLIN: HCPCS | Performed by: INTERNAL MEDICINE

## 2019-03-04 PROCEDURE — G8420 CALC BMI NORM PARAMETERS: HCPCS | Performed by: INTERNAL MEDICINE

## 2019-03-04 PROCEDURE — 1123F ACP DISCUSS/DSCN MKR DOCD: CPT | Performed by: INTERNAL MEDICINE

## 2019-03-04 PROCEDURE — 1036F TOBACCO NON-USER: CPT | Performed by: INTERNAL MEDICINE

## 2019-03-04 PROCEDURE — 1101F PT FALLS ASSESS-DOCD LE1/YR: CPT | Performed by: INTERNAL MEDICINE

## 2019-03-04 PROCEDURE — G8926 SPIRO NO PERF OR DOC: HCPCS | Performed by: INTERNAL MEDICINE

## 2019-03-04 PROCEDURE — G8482 FLU IMMUNIZE ORDER/ADMIN: HCPCS | Performed by: INTERNAL MEDICINE

## 2019-03-04 PROCEDURE — 3023F SPIROM DOC REV: CPT | Performed by: INTERNAL MEDICINE

## 2019-03-04 PROCEDURE — 1090F PRES/ABSN URINE INCON ASSESS: CPT | Performed by: INTERNAL MEDICINE

## 2019-03-04 PROCEDURE — G0439 PPPS, SUBSEQ VISIT: HCPCS | Performed by: INTERNAL MEDICINE

## 2019-03-04 PROCEDURE — G8598 ASA/ANTIPLAT THER USED: HCPCS | Performed by: INTERNAL MEDICINE

## 2019-03-04 PROCEDURE — 99214 OFFICE O/P EST MOD 30 MIN: CPT | Performed by: INTERNAL MEDICINE

## 2019-03-04 ASSESSMENT — ENCOUNTER SYMPTOMS
NAUSEA: 0
VOICE CHANGE: 0
RHINORRHEA: 0
CHEST TIGHTNESS: 0
SINUS PRESSURE: 0
ABDOMINAL DISTENTION: 0
SORE THROAT: 0
SINUS PAIN: 0
CONSTIPATION: 0
DIARRHEA: 0
COUGH: 0
BLOOD IN STOOL: 0
SHORTNESS OF BREATH: 1
ANAL BLEEDING: 0
BACK PAIN: 0
COLOR CHANGE: 0
VOMITING: 0
PHOTOPHOBIA: 0
EYE DISCHARGE: 0
EYE ITCHING: 0
ABDOMINAL PAIN: 0
EYE REDNESS: 0
RECTAL PAIN: 0
TROUBLE SWALLOWING: 0
EYE PAIN: 0
WHEEZING: 0

## 2019-03-04 ASSESSMENT — PATIENT HEALTH QUESTIONNAIRE - PHQ9
SUM OF ALL RESPONSES TO PHQ QUESTIONS 1-9: 0
SUM OF ALL RESPONSES TO PHQ9 QUESTIONS 1 & 2: 0
SUM OF ALL RESPONSES TO PHQ QUESTIONS 1-9: 0
1. LITTLE INTEREST OR PLEASURE IN DOING THINGS: 0
2. FEELING DOWN, DEPRESSED OR HOPELESS: 0

## 2019-03-05 ENCOUNTER — TELEPHONE (OUTPATIENT)
Dept: INTERNAL MEDICINE | Age: 84
End: 2019-03-05

## 2019-03-05 DIAGNOSIS — D64.9 ANEMIA, UNSPECIFIED TYPE: Primary | ICD-10-CM

## 2019-03-05 NOTE — OUTREACH NOTE
Medical Week 2 Survey      Responses   Facility patient discharged from?  [facility]   Does the patient have one of the following disease processes/diagnoses(primary or secondary)?  Other   Week 2 attempt successful?  Yes   Call start time  1811   Discharge diagnosis  Symptomatic anemia, pulmonary HTN, acute resp. failure with hypoxia, COPD, aortic stenosis, ess. HTN, chronic Afib.   Call end time  1813   Is patient permission given to speak with other caregiver?  Yes   Person spoke with today (if not patient) and relationship  Abeba Dailey   Meds reviewed with patient/caregiver?  Yes   Is the patient having any side effects they believe may be caused by any medication additions or changes?  No   Does the patient have all medications ordered at discharge?  Yes   Is the patient taking all medications as directed (includes completed medication regime)?  Yes   Medication comments  no changes,  finished prednisone   Does the patient have a primary care provider?   Yes   Does the patient have an appointment with their PCP within 7 days of discharge?  Yes   Comments regarding PCP  Dr. Ham   Has the patient kept scheduled appointments due by today?  Yes   What is the Home health agency?   [facility]   Has home health visited the patient within 72 hours of discharge?  Yes   Psychosocial issues?  No   Did the patient receive a copy of their discharge instructions?  Yes   Nursing interventions  Reviewed instructions with patient   What is the patient's perception of their health status since discharge?  Improving   Is the patient/caregiver able to teach back signs and symptoms related to disease process for when to call PCP?  Yes   Is the patient/caregiver able to teach back signs and symptoms related to disease process for when to call 911?  Yes   Is the patient/caregiver able to teach back the hierarchy of who to call/visit for symptoms/problems? PCP, Specialist, Home health nurse, Urgent Care, ED, 911  Yes   Additional teach back  comments  Daughter states she is doing better good day.    Week 2 Call Completed?  Yes          Alicia Moody RN

## 2019-03-05 NOTE — TELEPHONE ENCOUNTER
Patient's daughter stated she would be fine with her seeing a hematologistpur, referral dropped. And julia, can we get a CBC on Thursday please?

## 2019-03-07 ENCOUNTER — TELEPHONE (OUTPATIENT)
Dept: INTERNAL MEDICINE | Age: 84
End: 2019-03-07

## 2019-04-09 ENCOUNTER — TELEPHONE (OUTPATIENT)
Dept: INTERNAL MEDICINE | Age: 84
End: 2019-04-09

## 2019-04-09 DIAGNOSIS — R06.02 SHORTNESS OF BREATH: Primary | ICD-10-CM

## 2019-04-09 DIAGNOSIS — D64.9 LOW HEMOGLOBIN: ICD-10-CM

## 2019-04-09 NOTE — TELEPHONE ENCOUNTER
Spoke with pt's sister, pt's hemoglobin has been low recently and has been running out of breath.  They want to know if a lab order can be put in so that they can come in tomorrow to get her hemoglobin levels checked

## 2019-04-10 DIAGNOSIS — D64.9 LOW HEMOGLOBIN: ICD-10-CM

## 2019-04-10 DIAGNOSIS — R06.02 SHORTNESS OF BREATH: ICD-10-CM

## 2019-04-10 DIAGNOSIS — E78.5 HYPERLIPIDEMIA, UNSPECIFIED HYPERLIPIDEMIA TYPE: ICD-10-CM

## 2019-04-10 LAB
ALBUMIN SERPL-MCNC: 3.4 G/DL (ref 3.5–5.2)
ALP BLD-CCNC: 82 U/L (ref 35–104)
ALT SERPL-CCNC: 6 U/L (ref 5–33)
ANION GAP SERPL CALCULATED.3IONS-SCNC: 13 MMOL/L (ref 7–19)
AST SERPL-CCNC: 12 U/L (ref 5–32)
BILIRUB SERPL-MCNC: 0.4 MG/DL (ref 0.2–1.2)
BUN BLDV-MCNC: 13 MG/DL (ref 8–23)
CALCIUM SERPL-MCNC: 9.1 MG/DL (ref 8.2–9.6)
CHLORIDE BLD-SCNC: 103 MMOL/L (ref 98–111)
CHOLESTEROL, TOTAL: 132 MG/DL (ref 160–199)
CO2: 24 MMOL/L (ref 22–29)
CREAT SERPL-MCNC: 0.5 MG/DL (ref 0.5–0.9)
FERRITIN: 24.7 NG/ML (ref 13–150)
GFR NON-AFRICAN AMERICAN: >60
GLUCOSE BLD-MCNC: 114 MG/DL (ref 74–109)
HCT VFR BLD CALC: 29.5 % (ref 37–47)
HDLC SERPL-MCNC: 53 MG/DL (ref 65–121)
HEMOGLOBIN: 8.8 G/DL (ref 12–16)
IRON: 35 UG/DL (ref 37–145)
LDL CHOLESTEROL CALCULATED: 71 MG/DL
MCH RBC QN AUTO: 27 PG (ref 27–31)
MCHC RBC AUTO-ENTMCNC: 29.8 G/DL (ref 33–37)
MCV RBC AUTO: 90.5 FL (ref 81–99)
PDW BLD-RTO: 22 % (ref 11.5–14.5)
PLATELET # BLD: 341 K/UL (ref 130–400)
PMV BLD AUTO: 9.2 FL (ref 9.4–12.3)
POTASSIUM SERPL-SCNC: 4.2 MMOL/L (ref 3.5–5)
RBC # BLD: 3.26 M/UL (ref 4.2–5.4)
SODIUM BLD-SCNC: 140 MMOL/L (ref 136–145)
TOTAL IRON BINDING CAPACITY: 343 UG/DL (ref 250–400)
TOTAL PROTEIN: 6.5 G/DL (ref 6.6–8.7)
TRIGL SERPL-MCNC: 42 MG/DL (ref 0–149)
TSH SERPL DL<=0.05 MIU/L-ACNC: 2.73 UIU/ML (ref 0.27–4.2)
VITAMIN B-12: 246 PG/ML (ref 211–946)
WBC # BLD: 8.2 K/UL (ref 4.8–10.8)

## 2019-04-15 ENCOUNTER — TELEPHONE (OUTPATIENT)
Dept: INTERNAL MEDICINE | Age: 84
End: 2019-04-15

## 2019-04-15 RX ORDER — LANOLIN ALCOHOL/MO/W.PET/CERES
1000 CREAM (GRAM) TOPICAL DAILY
COMMUNITY

## 2019-04-15 RX ORDER — FERROUS SULFATE 325(65) MG
325 TABLET ORAL
COMMUNITY

## 2019-04-15 NOTE — TELEPHONE ENCOUNTER
----- Message from Sara Leija MD sent at 4/15/2019  6:30 AM CDT -----  I don't see a follow up appt. Iron is low. Make sure that she is taking an iron supplement daily. B12 at lower end of normal. B12 1000 mcg po daily. CMP okay, Cholesterol 132. TSH okay. Hemoglobin low at 8.8.  Make sure that she follows up with hematology

## 2019-04-30 NOTE — PATIENT INSTRUCTIONS
Chronic Obstructive Pulmonary Disease  Chronic obstructive pulmonary disease (COPD) is a long-term (chronic) lung problem. When you have COPD, it is hard for air to get in and out of your lungs. Usually the condition gets worse over time, and your lungs will never return to normal. There are things you can do to keep yourself as healthy as possible.  · Your doctor may treat your condition with:  ? Medicines.  ? Oxygen.  ? Lung surgery.  · Your doctor may also recommend:  ? Rehabilitation. This includes steps to make your body work better. It may involve a team of specialists.  ? Quitting smoking, if you smoke.  ? Exercise and changes to your diet.  ? Comfort measures (palliative care).    Follow these instructions at home:  Medicines  · Take over-the-counter and prescription medicines only as told by your doctor.  · Talk to your doctor before taking any cough or allergy medicines. You may need to avoid medicines that cause your lungs to be dry.  Lifestyle  · If you smoke, stop. Smoking makes the problem worse. If you need help quitting, ask your doctor.  · Avoid being around things that make your breathing worse. This may include smoke, chemicals, and fumes.  · Stay active, but remember to rest as well.  · Learn and use tips on how to relax.  · Make sure you get enough sleep. Most adults need at least 7 hours of sleep every night.  · Eat healthy foods. Eat smaller meals more often. Rest before meals.  Controlled breathing  Learn and use tips on how to control your breathing as told by your doctor. Try:  · Breathing in (inhaling) through your nose for 1 second. Then, pucker your lips and breath out (exhale) through your lips for 2 seconds.  · Putting one hand on your belly (abdomen). Breathe in slowly through your nose for 1 second. Your hand on your belly should move out. Pucker your lips and breathe out slowly through your lips. Your hand on your belly should move in as you breathe out.    Controlled  coughing  Learn and use controlled coughing to clear mucus from your lungs. Follow these steps:  1. Lean your head a little forward.  2. Breathe in deeply.  3. Try to hold your breath for 3 seconds.  4. Keep your mouth slightly open while coughing 2 times.  5. Spit any mucus out into a tissue.  6. Rest and do the steps again 1 or 2 times as needed.    General instructions  · Make sure you get all the shots (vaccines) that your doctor recommends. Ask your doctor about a flu shot and a pneumonia shot.  · Use oxygen therapy and pulmonary rehabilitation if told by your doctor. If you need home oxygen therapy, ask your doctor if you should buy a tool to measure your oxygen level (oximeter).  · Make a COPD action plan with your doctor. This helps you to know what to do if you feel worse than usual.  · Manage any other conditions you have as told by your doctor.  · Avoid going outside when it is very hot, cold, or humid.  · Avoid people who have a sickness you can catch (contagious).  · Keep all follow-up visits as told by your doctor. This is important.  Contact a doctor if:  · You cough up more mucus than usual.  · There is a change in the color or thickness of the mucus.  · It is harder to breathe than usual.  · Your breathing is faster than usual.  · You have trouble sleeping.  · You need to use your medicines more often than usual.  · You have trouble doing your normal activities such as getting dressed or walking around the house.  Get help right away if:  · You have shortness of breath while resting.  · You have shortness of breath that stops you from:  ? Being able to talk.  ? Doing normal activities.  · Your chest hurts for longer than 5 minutes.  · Your skin color is more blue than usual.  · Your pulse oximeter shows that you have low oxygen for longer than 5 minutes.  · You have a fever.  · You feel too tired to breathe normally.  Summary  · Chronic obstructive pulmonary disease (COPD) is a long-term lung  problem.  · The way your lungs work will never return to normal. Usually the condition gets worse over time. There are things you can do to keep yourself as healthy as possible.  · Take over-the-counter and prescription medicines only as told by your doctor.  · If you smoke, stop. Smoking makes the problem worse.  This information is not intended to replace advice given to you by your health care provider. Make sure you discuss any questions you have with your health care provider.  Document Released: 06/05/2009 Document Revised: 01/22/2018 Document Reviewed: 01/22/2018  [employer] Interactive Patient Education © 2019 [employer]  High-Protein and High-Calorie Diet  Eating high-protein and high-calorie foods can help you to gain weight, heal after an injury, and recover after an illness or surgery.  What is my plan?  The specific amount of daily protein and calories you need depends on:  · Your body weight.  · The reason this diet is recommended for you.    Generally, a high-protein, high-calorie diet involves:  · Eating 250-500 extra calories each day.  · Making sure that 10-35% of your daily calories come from protein.    Talk to your health care provider about how much protein and how many calories you need each day. Follow the diet as directed by your health care provider.  What do I need to know about this diet?  · Ask your health care provider if you should take a nutritional supplement.  · Try to eat six small meals each day instead of three large meals.  · Eat a balanced diet, including one food that is high in protein at each meal.  · Keep nutritious snacks handy, such as nuts, trail mixes, dried fruit, and yogurt.  · If you have kidney disease or diabetes, eating too much protein may put extra stress on your kidneys. Talk to your health care provider if you have either of those conditions.  What are some high-protein foods?  Grains  Quinoa. Bulgur wheat.  Vegetables  Soybeans. Peas.  Meats and Other  Protein Sources  Beef, pork, and poultry. Fish and seafood. Eggs. Tofu. Textured vegetable protein (TVP). Peanut butter. Nuts and seeds. Dried beans. Protein powders.  Dairy  Whole milk. Whole-milk yogurt. Powdered milk. Cheese. Cottage Cheese. Eggnog.  Beverages  High-protein supplement drinks. Soy milk.  Other  Protein bars.  The items listed above may not be a complete list of recommended foods or beverages. Contact your dietitian for more options.  What are some high-calorie foods?  Grains  Pasta. Quick breads. Muffins. Pancakes. Ready-to-eat cereal.  Vegetables  Vegetables cooked in oil or butter. Fried potatoes.  Fruits  Dried fruit. Fruit leather. Canned fruit in syrup. Fruit juice. Avocados.  Meats and Other Protein Sources  Peanut butter. Nuts and seeds.  Dairy  Heavy cream. Whipped cream. Cream cheese. Sour cream. Ice cream. Custard. Pudding.  Beverages  Meal-replacement beverages. Nutrition shakes. Fruit juice. Sugar-sweetened soft drinks.  Condiments  Salad dressing. Mayonnaise. Beto sauce. Fruit preserves or jelly. Honey. Syrup.  Sweets/Desserts  Cake. Cookies. Pie. Pastries. Candy bars. Chocolate.  Fats and Oils  Butter or margarine. Oil. Gravy.  Other  Meal-replacement bars.  The items listed above may not be a complete list of recommended foods or beverages. Contact your dietitian for more options.  What are some tips for including high-protein and high-calorie foods in my diet?  · Add whole milk, half-and-half, or heavy cream to cereal, pudding, soup, or hot cocoa.  · Add whole milk to instant breakfast drinks.  · Add peanut butter to oatmeal or smoothies.  · Add powdered milk to baked goods, smoothies, or milkshakes.  · Add powdered milk, cream, or butter to mashed potatoes.  · Add cheese to cooked vegetables.  · Make whole-milk yogurt parfaits. Top them with granola, fruit, or nuts.  · Add cottage cheese to your fruit.  · Add avocados, cheese, or both to sandwiches or salads.  · Add meat,  poultry, or seafood to rice, pasta, casseroles, salads, and soups.  · Use mayonnaise when making egg salad, chicken salad, or tuna salad.  · Use peanut butter as a topping for pretzels, celery, or crackers.  · Add beans to casseroles, dips, and spreads.  · Add pureed beans to sauces and soups.  · Replace calorie-free drinks with calorie-containing drinks, such as milk and fruit juice.  This information is not intended to replace advice given to you by your health care provider. Make sure you discuss any questions you have with your health care provider.  Document Released: 12/18/2006 Document Revised: 05/25/2017 Document Reviewed: 06/02/2015  [employer] Interactive Patient Education © 2018 [employer]

## 2019-04-30 NOTE — PROGRESS NOTES
JIM Canales  [facility]   [place] [address]  Phone: [phone]  Fax: [phone]     Heather Burgos is a 89 y.o. female.   : 10/23/1926  CC:   Chief Complaint   Patient presents with   • Shortness of Breath      HPI: Heather Burgos is a pleasant 89 y.o. female. The patient is here today for follow up of shortness of breath.  The patient has known COPD, atrial fib, allergic rhinitis, GERD, and personal history of nicotine dependence.  At her last office visit in February the patient was found to be hypoxemic in the office and was sent to the emergency room for further evaluation and treatment.  The patient was then hospitalized at [facility] from 2019 through 2019 for anemia and acute respiratory failure.  She was found to have a hemoglobin of 6.4 in the emergency room.  Her daughter states that she received 2 units of blood while hospitalized.  Today, the patient reports that she is doing well from a pulmonary standpoint.  She is using Symbicort, albuterol rescue inhaler, and albuterol nebs.  No increasing shortness of breath, no fever, no chills, no cough with purulent sputum.   The patient's PCP is Lupe Ham MD.    The following portions of the patient's history were reviewed and updated as appropriate: allergies, current medications, past family history, past medical history, past social history, past surgical history and problem list.  Past Medical History:   Diagnosis Date   • Atrial fibrillation with rapid ventricular response (CMS/HCC)    • Degenerative joint disease    • Diastolic murmur of aorta    • Dysphagia    • Essential hypertension    • Pneumonia      Family History   Problem Relation Age of Onset   • Stroke Mother         Age 90   • Emphysema Father    • Cancer Sister    • Cancer Brother      Social History     Socioeconomic History   • Marital status:      Spouse  "name: Not on file   • Number of children: Not on file   • Years of education: Not on file   • Highest education level: Not on file   Tobacco Use   • Smoking status: Never Smoker   • Smokeless tobacco: Never Used   Substance and Sexual Activity   • Alcohol use: No   • Drug use: No   • Sexual activity: Defer     Review of Systems   Constitutional: Negative for chills and fever.   HENT: Negative for congestion.    Eyes: Negative for blurred vision.   Respiratory: Negative for cough and shortness of breath.    Cardiovascular: Negative for chest pain.   Gastrointestinal: Negative for diarrhea, nausea and vomiting.   Endocrine: Negative for cold intolerance and heat intolerance.   Genitourinary: Negative for dysuria.   Musculoskeletal: Negative for arthralgias.   Skin: Negative for rash.   Neurological: Negative for dizziness, weakness and light-headedness.   Hematological: Does not bruise/bleed easily.   Psychiatric/Behavioral: Negative for agitation. The patient is not nervous/anxious.      /74   Pulse 76   Ht 157.5 cm (62\")   Wt 55.3 kg (122 lb)   SpO2 96% Comment: RA  Breastfeeding? No   BMI 22.31 kg/m²   Physical Exam   Constitutional: She is oriented to person, place, and time. She appears well-developed and well-nourished. No distress.   Elderly and underweight   HENT:   Head: Normocephalic and atraumatic.   Eyes: Conjunctivae and EOM are normal. Pupils are equal, round, and reactive to light. No scleral icterus.   Neck: Normal range of motion. Neck supple.   Cardiovascular: Normal rate and regular rhythm. Exam reveals no friction rub.   Murmur heard.  Pulmonary/Chest: Effort normal and breath sounds normal. No respiratory distress. She has no wheezes. She has no rales.   Abdominal: Soft. Bowel sounds are normal. She exhibits no distension. There is no tenderness.   Musculoskeletal: Normal range of motion. She exhibits no edema.   Kyphosis    Neurological: She is alert and oriented to person, place, and " time.   Skin: Skin is warm and dry.   Psychiatric: She has a normal mood and affect. Her behavior is normal. Judgment and thought content normal.   Nursing note and vitals reviewed.    Pulmonary Functions Testing Results:  FEV1   Date Value Ref Range Status   10/31/2018 51% liters Final     FVC   Date Value Ref Range Status   10/31/2018 71% liters Final     FEV1/FVC   Date Value Ref Range Status   10/31/2018 52.19% % Final     DLCO   Date Value Ref Range Status   10/31/2018 57% ml/mmHg sec Final     My PFT Interpretation: None to review today  Imaging: Chest x-ray February 19, 2019  IMPRESSION:  1. Cardiomegaly with mild or early changes of pulmonary vascular  congestion superimposed on COPD.    2d echo 2/19/19  · Left atrial cavity size is moderately dilated.  · There is moderate calcification of the aortic valve.  · Left ventricular wall thickness is consistent with mild concentric hypertrophy.  · Mild tricuspid valve regurgitation is present.  · Left ventricular diastolic dysfunction.     SEVERE AORTIC VALVE STENOSIS  HYPERDYNAMIC LV AND NORMAL LV SYSTOLIC FUNCTION  RHYTHM IS ATRIAL FIBRILLATION WITH BI-ATRIAL ENLARGEMENT  SEVERE PULMONARY HTN  LV DIASTOLIC DYSFUNCTION AND ELEVATED LA PRESSURE NOTED  Assessment and Plan:   Heather was seen today for shortness of breath.    Diagnoses and all orders for this visit:    COPD, group B, by GOLD 2017 classification (CMS/[facility])  -     albuterol sulfate  (90 Base) MCG/ACT inhaler; Inhale 2 puffs Every 6 (Six) Hours As Needed for Wheezing or Shortness of Air.  Continue current treatment regimen of Symbicort, albuterol rescue inhaler, and albuterol nebs as needed.  She can also continue Mucinex as needed.  Pulmonary hypertension (CMS/HCC)  Stable at this time.  Has no complaints of increasing shortness of breath, edema, fatigue, chest pain, syncope, or palpitations.  Seasonal allergic rhinitis due to other allergic trigger  Continue Claritin  Gastroesophageal reflux  disease, esophagitis presence not specified  Continue omeprazole  Personal history of nicotine dependence  Former smoker    Health maintenance:   Influenza vaccine: yes   Pneumovax 23:yes   Prevnar 13: yes   Patient's Body mass index is 22.31 kg/m². BMI is below normal parameters. Recommendations include: diet education provided .    Follow up: 6 months  Donna Macias, APRN  4/30/2019  3:10 PM    Please note that portions of this note were completed with a voice recognition program.

## 2019-05-14 ENCOUNTER — HOSPITAL ENCOUNTER (OUTPATIENT)
Dept: GENERAL RADIOLOGY | Age: 84
Discharge: HOME | End: 2019-05-14
Payer: MEDICARE

## 2019-05-14 ENCOUNTER — OFFICE VISIT (OUTPATIENT)
Dept: INTERNAL MEDICINE | Age: 84
End: 2019-05-14
Payer: MEDICARE

## 2019-05-14 VITALS
BODY MASS INDEX: 21.9 KG/M2 | HEART RATE: 60 BPM | OXYGEN SATURATION: 96 % | WEIGHT: 119 LBS | RESPIRATION RATE: 18 BRPM | HEIGHT: 62 IN | DIASTOLIC BLOOD PRESSURE: 60 MMHG | SYSTOLIC BLOOD PRESSURE: 98 MMHG

## 2019-05-14 DIAGNOSIS — I51.7 BIATRIAL ENLARGEMENT: ICD-10-CM

## 2019-05-14 DIAGNOSIS — I48.20 CHRONIC ATRIAL FIBRILLATION (HCC): ICD-10-CM

## 2019-05-14 DIAGNOSIS — I35.0 NONRHEUMATIC AORTIC VALVE STENOSIS: ICD-10-CM

## 2019-05-14 DIAGNOSIS — I95.9 HYPOTENSION, UNSPECIFIED HYPOTENSION TYPE: ICD-10-CM

## 2019-05-14 DIAGNOSIS — I10 ESSENTIAL (PRIMARY) HYPERTENSION: Primary | ICD-10-CM

## 2019-05-14 DIAGNOSIS — R05.9 COUGH: ICD-10-CM

## 2019-05-14 DIAGNOSIS — I27.21 SEVERE PULMONARY ARTERIAL SYSTOLIC HYPERTENSION (HCC): ICD-10-CM

## 2019-05-14 DIAGNOSIS — I63.9 ACUTE ISCHEMIC STROKE (HCC): ICD-10-CM

## 2019-05-14 DIAGNOSIS — I51.9 LEFT VENTRICULAR DIASTOLIC DYSFUNCTION: ICD-10-CM

## 2019-05-14 DIAGNOSIS — J41.0 SIMPLE CHRONIC BRONCHITIS (HCC): ICD-10-CM

## 2019-05-14 PROBLEM — I48.91 ATRIAL FIBRILLATION (HCC): Status: ACTIVE | Noted: 2017-01-25

## 2019-05-14 PROBLEM — J42 CHRONIC BRONCHITIS (HCC): Status: ACTIVE | Noted: 2019-05-14

## 2019-05-14 PROCEDURE — G8427 DOCREV CUR MEDS BY ELIG CLIN: HCPCS | Performed by: NURSE PRACTITIONER

## 2019-05-14 PROCEDURE — 1036F TOBACCO NON-USER: CPT | Performed by: NURSE PRACTITIONER

## 2019-05-14 PROCEDURE — 71046 X-RAY EXAM CHEST 2 VIEWS: CPT

## 2019-05-14 PROCEDURE — G8598 ASA/ANTIPLAT THER USED: HCPCS | Performed by: NURSE PRACTITIONER

## 2019-05-14 PROCEDURE — G8420 CALC BMI NORM PARAMETERS: HCPCS | Performed by: NURSE PRACTITIONER

## 2019-05-14 PROCEDURE — 3023F SPIROM DOC REV: CPT | Performed by: NURSE PRACTITIONER

## 2019-05-14 PROCEDURE — 99214 OFFICE O/P EST MOD 30 MIN: CPT | Performed by: NURSE PRACTITIONER

## 2019-05-14 PROCEDURE — G8926 SPIRO NO PERF OR DOC: HCPCS | Performed by: NURSE PRACTITIONER

## 2019-05-14 PROCEDURE — 1123F ACP DISCUSS/DSCN MKR DOCD: CPT | Performed by: NURSE PRACTITIONER

## 2019-05-14 PROCEDURE — 1090F PRES/ABSN URINE INCON ASSESS: CPT | Performed by: NURSE PRACTITIONER

## 2019-05-14 PROCEDURE — 4040F PNEUMOC VAC/ADMIN/RCVD: CPT | Performed by: NURSE PRACTITIONER

## 2019-05-14 RX ORDER — LISINOPRIL 5 MG/1
2.5 TABLET ORAL DAILY
Qty: 15 TABLET | Refills: 5 | Status: SHIPPED | OUTPATIENT
Start: 2019-05-14 | End: 2019-06-11 | Stop reason: SINTOL

## 2019-05-14 RX ORDER — CEFDINIR 300 MG/1
300 CAPSULE ORAL 2 TIMES DAILY
Qty: 14 CAPSULE | Refills: 0 | Status: SHIPPED | OUTPATIENT
Start: 2019-05-14 | End: 2019-05-21

## 2019-05-14 ASSESSMENT — ENCOUNTER SYMPTOMS
WHEEZING: 0
TROUBLE SWALLOWING: 0
DIARRHEA: 0
SORE THROAT: 0
EYE DISCHARGE: 0
EYE ITCHING: 0
BLOOD IN STOOL: 0
CONSTIPATION: 0
STRIDOR: 0
NAUSEA: 0
CHOKING: 0
COUGH: 1
VOMITING: 0
ABDOMINAL DISTENTION: 0
ABDOMINAL PAIN: 0
COLOR CHANGE: 0
SHORTNESS OF BREATH: 1

## 2019-05-14 NOTE — PROGRESS NOTES
Arm - Right Upper Arm Left Arm   Position - Sitting Sitting - Sitting Sitting   Cuff Size - - Medium Adult - - Medium Adult   Pulse 60 87 86 101 101 66   Temp - - - - 98 -   Resp 18 22 - - - -   SpO2 96 99 98 96 90 99   Weight 119 lb 119 lb 8 oz 117 lb 117 lb - 122 lb   Height 5' 2\" 5' 2\" 5' 2\" - - 5' 2\"   BMI (wt*703/ht~2) 21.76 kg/m2 21.85 kg/m2 21.4 kg/m2 - - 22.31 kg/m2   Pain Level - - - - - -   Some recent data might be hidden       Family History   Problem Relation Age of Onset    Stroke Mother     Cancer Sister         skin cancer---melanoma    Cancer Brother         Colon    Emphysema Father     Diabetes Other         daughter    Other Other         daughter with multiple sclerosis       Social History     Tobacco Use    Smoking status: Never Smoker    Smokeless tobacco: Never Used   Substance Use Topics    Alcohol use: No      Current Outpatient Medications   Medication Sig Dispense Refill    lisinopril (PRINIVIL;ZESTRIL) 5 MG tablet Take 0.5 tablets by mouth daily 15 tablet 5    cefdinir (OMNICEF) 300 MG capsule Take 1 capsule by mouth 2 times daily for 7 days 14 capsule 0    ferrous sulfate 325 (65 Fe) MG tablet Take 325 mg by mouth daily (with breakfast)      vitamin B-12 (CYANOCOBALAMIN) 1000 MCG tablet Take 1,000 mcg by mouth daily      guaiFENesin (MUCINEX) 600 MG extended release tablet Take 1,200 mg by mouth 2 times daily      metoprolol tartrate (LOPRESSOR) 25 MG tablet TAKE 1 TABLET BY MOUTH TWICE A DAY 60 tablet 3    furosemide (LASIX) 20 MG tablet TAKE 1 TABLET BY MOUTH EVERY DAY 90 tablet 3    vitamin D (ERGOCALCIFEROL) 34033 units CAPS capsule Take 1 capsule by mouth once a week 12 capsule 1    albuterol (PROVENTIL) (2.5 MG/3ML) 0.083% nebulizer solution Take 3 mLs by nebulization every 4 hours as needed for Wheezing 120 each 5    esomeprazole (NEXIUM) 40 MG delayed release capsule Take 1 capsule by mouth daily 90 capsule 3    PARoxetine (PAXIL) 10 MG tablet TAKE 1 TABLET BY MOUTH EVERY DAY 90 tablet 3    albuterol sulfate  (90 Base) MCG/ACT inhaler Inhale 2 puffs into the lungs every 6 hours as needed for Wheezing or Shortness of Breath 1 Inhaler 3    atorvastatin (LIPITOR) 40 MG tablet Take 1 tablet by mouth nightly 90 tablet 3    dabigatran (PRADAXA) 150 MG capsule Take 1 capsule by mouth 2 times daily 180 capsule 3    budesonide-formoterol (SYMBICORT) 80-4.5 MCG/ACT AERO Inhale 2 puffs into the lungs 2 times daily 1 Inhaler 1    loratadine (CLARITIN) 10 MG capsule Take 10 mg by mouth daily      Cranberry 400 MG CAPS Take 1 capsule by mouth 2 times daily        No current facility-administered medications for this visit.       Allergies   Allergen Reactions    Erythromycin Hives and Swelling    Meperidine Nausea And Vomiting     Other reaction(s): GI Intolerance    Morphine And Related      Other reaction(s): Nausea And Vomiting       Health Maintenance   Topic Date Due    DTaP/Tdap/Td vaccine (1 - Tdap) 10/23/1945    Shingles Vaccine (1 of 2) 10/23/1976    Potassium monitoring  04/10/2020    Creatinine monitoring  04/10/2020    Flu vaccine  Completed    Pneumococcal 65+ years Vaccine  Completed       Lab Results   Component Value Date    LABA1C 5.9 01/07/2019     No results found for: PSA, PSADIA  TSH   Date Value Ref Range Status   04/10/2019 2.730 0.270 - 4.200 uIU/mL Final   ]  Lab Results   Component Value Date     04/10/2019    K 4.2 04/10/2019     04/10/2019    CO2 24 04/10/2019    BUN 13 04/10/2019    CREATININE 0.5 04/10/2019    GLUCOSE 114 (H) 04/10/2019    CALCIUM 9.1 04/10/2019    PROT 6.5 (L) 04/10/2019    LABALBU 3.4 (L) 04/10/2019    BILITOT 0.4 04/10/2019    ALKPHOS 82 04/10/2019    AST 12 04/10/2019    ALT 6 04/10/2019    LABGLOM >60 04/10/2019     Lab Results   Component Value Date    CHOL 132 (L) 04/10/2019    CHOL 107 (L) 12/11/2018    CHOL 156 (L) 10/16/2017     Lab Results   Component Value Date    TRIG 42 04/10/2019    TRIG 62 12/11/2018    TRIG 64 10/16/2017     Lab Results   Component Value Date    HDL 53 (L) 04/10/2019    HDL 61 (L) 12/11/2018    HDL 64 (L) 10/16/2017     Lab Results   Component Value Date    LDLCALC 71 04/10/2019    LDLCALC 34 12/11/2018    LDLCALC 79 10/16/2017     Lab Results   Component Value Date     04/10/2019    K 4.2 04/10/2019    K 3.8 04/02/2018     04/10/2019    CO2 24 04/10/2019    BUN 13 04/10/2019    CREATININE 0.5 04/10/2019    GLUCOSE 114 04/10/2019    CALCIUM 9.1 04/10/2019      Lab Results   Component Value Date    WBC 8.2 04/10/2019    HGB 8.8 (L) 04/10/2019    HCT 29.5 (L) 04/10/2019    MCV 90.5 04/10/2019     04/10/2019    LYMPHOPCT 9.2 (L) 03/04/2019    RBC 3.26 (L) 04/10/2019    MCH 27.0 04/10/2019    MCHC 29.8 (L) 04/10/2019    RDW 22.0 (H) 04/10/2019     Lab Results   Component Value Date    VITD25 15.7 (L) 10/16/2017       Subjective:      Review of Systems   Constitutional: Negative for fatigue, fever and unexpected weight change. HENT: Negative for ear discharge, ear pain, mouth sores, sore throat and trouble swallowing. Eyes: Negative for discharge, itching and visual disturbance. Respiratory: Positive for cough and shortness of breath. Negative for choking, wheezing and stridor. Cardiovascular: Negative for chest pain, palpitations and leg swelling. Gastrointestinal: Negative for abdominal distention, abdominal pain, blood in stool, constipation, diarrhea, nausea and vomiting. Endocrine: Negative for cold intolerance, polydipsia and polyuria. Genitourinary: Negative for difficulty urinating, dysuria, frequency and urgency. Musculoskeletal: Negative for arthralgias and gait problem. Skin: Negative for color change and rash. Allergic/Immunologic: Negative for food allergies and immunocompromised state. Neurological: Negative for dizziness, tremors, syncope, speech difficulty, weakness and headaches. Hematological: Negative for adenopathy.  Does not bruise/bleed easily. Psychiatric/Behavioral: Negative for confusion and hallucinations. Objective:     Physical Exam   Constitutional: She is oriented to person, place, and time. She appears well-developed and well-nourished. No distress. HENT:   Head: Normocephalic and atraumatic. Eyes: Pupils are equal, round, and reactive to light. Right eye exhibits no discharge. Left eye exhibits no discharge. No scleral icterus. Neck: Normal range of motion. Neck supple. No JVD present. No thyromegaly present. Cardiovascular: Normal rate and regular rhythm. Murmur (4/6 LSB;  ) heard. afib rate 90's  On BB     Pulmonary/Chest: Effort normal. No respiratory distress. She has no wheezes. She has rales. Use of accessory muscles to breathe  Markedly decreased breath sounds bilaterally   With crackles bilaterally ; Worse on the right    Abdominal: Soft. Bowel sounds are normal. She exhibits no distension and no mass. There is no tenderness. There is no rebound and no guarding. Musculoskeletal: Normal range of motion. She exhibits no edema or tenderness. Neurological: She is alert and oriented to person, place, and time. She has normal reflexes. She displays normal reflexes. No cranial nerve deficit. Coordination normal.   Skin: Skin is warm and dry. No rash noted. No erythema. Psychiatric: Her behavior is normal. Judgment and thought content normal. She does not exhibit a depressed mood. CXR chronic bilateral effusion; no pulmonary congestion   BP 98/60   Pulse 60   Resp 18   Ht 5' 2\" (1.575 m)   Wt 119 lb (54 kg)   SpO2 96%   BMI 21.77 kg/m²     Assessment:       Diagnosis Orders   1. Essential (primary) hypertension     2. Acute ischemic stroke ([address]     3. Chronic atrial fibrillation ([address]     4. Simple chronic bronchitis ([address]     5. Nonrheumatic aortic valve stenosis     6. Severe pulmonary arterial systolic hypertension (HCC)     7. Hypotension, unspecified hypotension type     8.  Cough XR CHEST STANDARD (2 VW)   9. Biatrial enlargement     10. Left ventricular diastolic dysfunction       Labs reviewed from today   Diagnostics reviewed from  cxr today   Plan:        Patient given educational materials - see patient instructions. Discussed use, benefit, and side effects of prescribed medications. Allpatient questions answered. Pt voiced understanding. Reviewed health maintenance. Instructed to continue current medications, diet and exercise. Patient agreed with treatment plan. Follow up as directed. MEDICATIONS:  Orders Placed This Encounter   Medications    lisinopril (PRINIVIL;ZESTRIL) 5 MG tablet     Sig: Take 0.5 tablets by mouth daily     Dispense:  15 tablet     Refill:  5    cefdinir (OMNICEF) 300 MG capsule     Sig: Take 1 capsule by mouth 2 times daily for 7 days     Dispense:  14 capsule     Refill:  0         ORDERS:  Orders Placed This Encounter   Procedures    XR CHEST STANDARD (2 VW)       Follow-up:  Return in about 1 week (around 5/21/2019). PATIENT INSTRUCTIONS:  Patient Instructions   1. HTN low today; off the lisinopril  Orthostatic as well;  Cut metoprolol in 1/2 for now   Start the 2.5 of lisinopril at bedtime   2. Ischemic stroke in the past;  Stable   3. Chronic a fib; Stable with controlled rate  4. Severe aortic stenosis; Stable for now  5. Chronic bronchitis with acute exacerbation  6. Orthostatic hypotension ;    7.  LV diastolic dysfunction; restarting the lisinopril       We are getting cxr today        Electronically signed by SUSANNE Wood on 5/14/2019 at 1:24 PM    EMRDragon/transcription disclaimer:  Much of this encounter note is electronic transcription/translation of spoken language to printed texts. The electronic translation of spoken language may be erroneous, or at times,nonsensical words or phrases may be inadvertently transcribed.   Although I have reviewed the note for such errors, some may still exist.

## 2019-05-20 RX ORDER — ERGOCALCIFEROL 1.25 MG/1
CAPSULE ORAL
Qty: 12 CAPSULE | Refills: 1 | Status: SHIPPED | OUTPATIENT
Start: 2019-05-20

## 2019-05-22 ENCOUNTER — OFFICE VISIT (OUTPATIENT)
Dept: INTERNAL MEDICINE | Age: 84
End: 2019-05-22
Payer: MEDICARE

## 2019-05-22 ENCOUNTER — TELEPHONE (OUTPATIENT)
Dept: INTERNAL MEDICINE | Age: 84
End: 2019-05-22

## 2019-05-22 VITALS
WEIGHT: 116 LBS | HEART RATE: 100 BPM | DIASTOLIC BLOOD PRESSURE: 60 MMHG | HEIGHT: 62 IN | BODY MASS INDEX: 21.35 KG/M2 | SYSTOLIC BLOOD PRESSURE: 90 MMHG

## 2019-05-22 DIAGNOSIS — I27.21 SEVERE PULMONARY ARTERIAL SYSTOLIC HYPERTENSION (HCC): ICD-10-CM

## 2019-05-22 DIAGNOSIS — J90 PLEURAL EFFUSION: ICD-10-CM

## 2019-05-22 DIAGNOSIS — I48.20 CHRONIC ATRIAL FIBRILLATION (HCC): ICD-10-CM

## 2019-05-22 DIAGNOSIS — I10 ESSENTIAL (PRIMARY) HYPERTENSION: ICD-10-CM

## 2019-05-22 DIAGNOSIS — J41.0 SIMPLE CHRONIC BRONCHITIS (HCC): Primary | ICD-10-CM

## 2019-05-22 PROCEDURE — 4040F PNEUMOC VAC/ADMIN/RCVD: CPT | Performed by: NURSE PRACTITIONER

## 2019-05-22 PROCEDURE — G8420 CALC BMI NORM PARAMETERS: HCPCS | Performed by: NURSE PRACTITIONER

## 2019-05-22 PROCEDURE — 1036F TOBACCO NON-USER: CPT | Performed by: NURSE PRACTITIONER

## 2019-05-22 PROCEDURE — 3023F SPIROM DOC REV: CPT | Performed by: NURSE PRACTITIONER

## 2019-05-22 PROCEDURE — 1123F ACP DISCUSS/DSCN MKR DOCD: CPT | Performed by: NURSE PRACTITIONER

## 2019-05-22 PROCEDURE — G8598 ASA/ANTIPLAT THER USED: HCPCS | Performed by: NURSE PRACTITIONER

## 2019-05-22 PROCEDURE — G8427 DOCREV CUR MEDS BY ELIG CLIN: HCPCS | Performed by: NURSE PRACTITIONER

## 2019-05-22 PROCEDURE — G8926 SPIRO NO PERF OR DOC: HCPCS | Performed by: NURSE PRACTITIONER

## 2019-05-22 PROCEDURE — 99214 OFFICE O/P EST MOD 30 MIN: CPT | Performed by: NURSE PRACTITIONER

## 2019-05-22 PROCEDURE — 1090F PRES/ABSN URINE INCON ASSESS: CPT | Performed by: NURSE PRACTITIONER

## 2019-05-22 ASSESSMENT — ENCOUNTER SYMPTOMS
ABDOMINAL DISTENTION: 0
VOMITING: 0
EYE DISCHARGE: 0
COLOR CHANGE: 0
WHEEZING: 0
SORE THROAT: 0
SHORTNESS OF BREATH: 1
CONSTIPATION: 0
NAUSEA: 0
COUGH: 1
DIARRHEA: 0
TROUBLE SWALLOWING: 0
EYE ITCHING: 0
ABDOMINAL PAIN: 0
CHOKING: 0
BLOOD IN STOOL: 0
STRIDOR: 0

## 2019-05-22 NOTE — PROGRESS NOTES
[facility]  [address]  [address] [place] [address]  Dept: [phone]  Dept Fax: [phone]  Loc: [phone]    Ursula Hugo is a 80 y.o. female who presents today for her medical conditions/complaints as noted below. Ursula Hugo is c/derek Follow-up (1 week follow up with chest x-ray)        HPI:     HPI   1.  htn ; Today 124/85 at homes; we had cut the b/p meds by half last week due to hypotension; She is doing much better;  Feel stronger and not light headed any longer   2.. PAF  She has chronic afib;    3.  Pleural effusion; She is breathing better; Her endurance is better she is still coughing but now she is coughing stuff up   4. Pulmonary htn  ;  Has sob exertional dyspnea but is a little better today   5. Bronchitis;   Improved with meds;    Chief Complaint   Patient presents with    Follow-up     1 week follow up with chest x-ray       Past Medical History:   Diagnosis Date    Abnormal chest x-ray     Anemia     Atrial fibrillation ([address]     CHF (congestive heart failure) (HCC)     Chronic bronchitis (HCC)     Colles' fracture of left radius     Colles' fracture of right radius     Dysphagia     GI bleeding     Hemoptysis     Hyperlipidemia     Hypertension     Hypokalemia     Influenza A (H1N1)     Knee pain     Lobar pneumonia (HCC)     Lobar pneumonia (HCC)     Lung nodules     Osteopenia     Pleural effusion, right       Past Surgical History:   Procedure Laterality Date    APPENDECTOMY      HERNIA REPAIR      HIP SURGERY Right 2014    HYSTERECTOMY         Vitals 5/22/2019 5/14/2019 3/4/2019 2/26/2019 1/7/2019 15/1/5349   SYSTOLIC 90 98 86 [phone]   DIASTOLIC 60 60 58 62 76 64   Site - - Right Upper Arm Left Upper Arm - Right Upper Arm   Position - - Sitting Sitting - Sitting   Cuff Size - - - Medium Adult - -   Pulse 100 60 87 86 101 101   Temp - - - - - 98   Resp - 18 22 - - -   SpO2 - 96 99 98 96 90 Weight 116 lb 119 lb 119 lb 8 oz 117 lb 117 lb -   Height 5' 2\" 5' 2\" 5' 2\" 5' 2\" - -   BMI (wt*703/ht~2) 21.21 kg/m2 21.76 kg/m2 21.85 kg/m2 21.4 kg/m2 - -   Pain Level - - - - - -   Some recent data might be hidden       Family History   Problem Relation Age of Onset    Stroke Mother     Cancer Sister         skin cancer---melanoma    Cancer Brother         Colon    Emphysema Father     Diabetes Other         daughter    Other Other         daughter with multiple sclerosis       Social History     Tobacco Use    Smoking status: Never Smoker    Smokeless tobacco: Never Used   Substance Use Topics    Alcohol use: No      Current Outpatient Medications   Medication Sig Dispense Refill    metoprolol tartrate (LOPRESSOR) 25 MG tablet TAKE 1/2 TABLET BY MOUTH TWICE A DAY 60 tablet 3    vitamin D (ERGOCALCIFEROL) 73488 units CAPS capsule TAKE ONE CAPSULE BY MOUTH ONE TIME PER WEEK 12 capsule 1    lisinopril (PRINIVIL;ZESTRIL) 5 MG tablet Take 0.5 tablets by mouth daily 15 tablet 5    ferrous sulfate 325 (65 Fe) MG tablet Take 325 mg by mouth daily (with breakfast)      vitamin B-12 (CYANOCOBALAMIN) 1000 MCG tablet Take 1,000 mcg by mouth daily      guaiFENesin (MUCINEX) 600 MG extended release tablet Take 1,200 mg by mouth 2 times daily      furosemide (LASIX) 20 MG tablet TAKE 1 TABLET BY MOUTH EVERY DAY 90 tablet 3    albuterol (PROVENTIL) (2.5 MG/3ML) 0.083% nebulizer solution Take 3 mLs by nebulization every 4 hours as needed for Wheezing 120 each 5    esomeprazole (NEXIUM) 40 MG delayed release capsule Take 1 capsule by mouth daily 90 capsule 3    PARoxetine (PAXIL) 10 MG tablet TAKE 1 TABLET BY MOUTH EVERY DAY 90 tablet 3    albuterol sulfate  (90 Base) MCG/ACT inhaler Inhale 2 puffs into the lungs every 6 hours as needed for Wheezing or Shortness of Breath 1 Inhaler 3    atorvastatin (LIPITOR) 40 MG tablet Take 1 tablet by mouth nightly 90 tablet 3    dabigatran (PRADAXA) 150 MG  04/10/2019    K 4.2 04/10/2019    K 3.8 04/02/2018     04/10/2019    CO2 24 04/10/2019    BUN 13 04/10/2019    CREATININE 0.5 04/10/2019    GLUCOSE 114 04/10/2019    CALCIUM 9.1 04/10/2019      Lab Results   Component Value Date    WBC 8.2 04/10/2019    HGB 8.8 (L) 04/10/2019    HCT 29.5 (L) 04/10/2019    MCV 90.5 04/10/2019     04/10/2019    LYMPHOPCT 9.2 (L) 03/04/2019    RBC 3.26 (L) 04/10/2019    MCH 27.0 04/10/2019    MCHC 29.8 (L) 04/10/2019    RDW 22.0 (H) 04/10/2019     Lab Results   Component Value Date    VITD25 15.7 (L) 10/16/2017       Subjective:      Review of Systems   Constitutional: Negative for fatigue, fever and unexpected weight change. HENT: Negative for ear discharge, ear pain, mouth sores, sore throat and trouble swallowing. Eyes: Negative for discharge, itching and visual disturbance. Respiratory: Positive for cough and shortness of breath. Negative for choking, wheezing and stridor. Cardiovascular: Positive for leg swelling. Negative for chest pain and palpitations. Gastrointestinal: Negative for abdominal distention, abdominal pain, blood in stool, constipation, diarrhea, nausea and vomiting. Endocrine: Negative for cold intolerance, polydipsia and polyuria. Genitourinary: Negative for difficulty urinating, dysuria, frequency and urgency. Musculoskeletal: Negative for arthralgias and gait problem. Skin: Negative for color change and rash. Allergic/Immunologic: Negative for food allergies and immunocompromised state. Neurological: Negative for dizziness, tremors, syncope, speech difficulty, weakness and headaches. Hematological: Negative for adenopathy. Does not bruise/bleed easily. Psychiatric/Behavioral: Negative for confusion and hallucinations. Objective:     Physical Exam   Constitutional: She is oriented to person, place, and time. She appears well-developed and well-nourished. No distress.    HENT:   Head: Normocephalic and atraumatic. Eyes: Pupils are equal, round, and reactive to light. Right eye exhibits no discharge. Left eye exhibits no discharge. No scleral icterus. Neck: Normal range of motion. Neck supple. No JVD present. No thyromegaly present. Cardiovascular: Normal rate, regular rhythm and normal heart sounds. No murmur heard. Pulmonary/Chest: Breath sounds normal. No respiratory distress. She has no wheezes. She has no rales. Uses accessory muscles to breath   Decreased bilaterally   Some scattered rhonchi    Abdominal: Soft. Bowel sounds are normal. She exhibits no distension and no mass. There is no tenderness. There is no rebound and no guarding. Musculoskeletal: Normal range of motion. She exhibits no edema or tenderness. Neurological: She is alert and oriented to person, place, and time. She has normal reflexes. She displays normal reflexes. No cranial nerve deficit. Coordination normal.   Skin: Skin is warm and dry. No rash noted. No erythema. Psychiatric: Her behavior is normal. Judgment and thought content normal. She does not exhibit a depressed mood. BP 90/60   Pulse 100   Ht 5' 2\" (1.575 m)   Wt 116 lb (52.6 kg)   BMI 21.22 kg/m²     Assessment:       Diagnosis Orders   1. Simple chronic bronchitis ([address]     2. Severe pulmonary arterial systolic hypertension ([address]  Internal Referral to Home Health   3. Essential (primary) hypertension     4. Chronic atrial fibrillation (HCC)     5. Pleural effusion         Diagnostics reviewed from 5/17/19  Plan:        Patient given educational materials - see patient instructions. Discussed use, benefit, and side effects of prescribed medications. Allpatient questions answered. Pt voiced understanding. Reviewed health maintenance. Instructed to continue current medications, diet and exercise. Patient agreed with treatment plan. Follow up as directed.    MEDICATIONS:  Orders Placed This Encounter   Medications    metoprolol tartrate (LOPRESSOR) 25 MG tablet     Sig: TAKE 1/2 TABLET BY MOUTH TWICE A DAY     Dispense:  60 tablet     Refill:  3         ORDERS:  Orders Placed This Encounter   Procedures    Internal Referral to Home Health       Follow-up:  No follow-ups on file. PATIENT INSTRUCTIONS:  Patient Instructions   1. Htn;  Now stable  Stay on the same dose of meds   2. PAF;  Rate is controlled; Stable   3. Pleural effusion; Improving with resp status  No changes  4. Pulmonary HTN;  Should do better with Bp up some    5. Simple bronchitis  Improved; The current medical regimen is effective;  continue present plan and medications. We will get [facility] [place] for PT   Keep fu with Dr Nahun Scott     Electronically signed by SUSANNE Muniz on 2019 at 12:24 PM    EMRDragon/transcription disclaimer:  Much of this encounter note is electronic transcription/translation of spoken language to printed texts. The electronic translation of spoken language may be erroneous, or at times,nonsensical words or phrases may be inadvertently transcribed.   Although I have reviewed the note for such errors, some may still exist.

## 2019-05-22 NOTE — TELEPHONE ENCOUNTER
Paulo Damon will send to [facility] - but looking at media tab there are things in there from [facility] so she may still be current with them ?  HH will check on that - [facility] ay have dc her already

## 2019-05-22 NOTE — PATIENT INSTRUCTIONS
1.  Htn;  Now stable  Stay on the same dose of meds   2. PAF;  Rate is controlled; Stable   3. Pleural effusion; Improving with resp status  No changes  4. Pulmonary HTN;  Should do better with Bp up some    5. Simple bronchitis  Improved; The current medical regimen is effective;  continue present plan and medications.     We will get [facility] for PT   Keep fu with Dr Stanley Mary

## 2019-05-24 ENCOUNTER — TELEPHONE (OUTPATIENT)
Dept: INTERNAL MEDICINE CLINIC | Age: 84
End: 2019-05-24

## 2019-05-24 NOTE — TELEPHONE ENCOUNTER
[facility] nurse reporting that pt was admitted to [facility] today and nursing plans to see her 2xweek for three weeks then I xweek for cp assessments, monitoring wt, blood pressure    Please advise if approved

## 2019-06-03 NOTE — PROGRESS NOTES
Subjective:     Encounter Date:06/03/2019      Patient ID: Heather Burgos is a 89 y.o. female.    Chief Complaint:  Atrial Fibrillation   Presents for follow-up visit. Symptoms include shortness of breath. Symptoms are negative for bradycardia, chest pain, dizziness, hypertension, hypotension, palpitations, syncope, tachycardia and weakness. The symptoms have been stable. Past medical history includes atrial fibrillation. There are no medication compliance problems.   Hypertension   This is a chronic problem. The current episode started more than 1 year ago. The problem is controlled. Associated symptoms include malaise/fatigue and shortness of breath. Pertinent negatives include no chest pain, headaches, orthopnea, palpitations or PND. Past treatments include ACE inhibitors and beta blockers. The current treatment provides significant improvement.   Shortness of Breath   This is a chronic problem. The current episode started more than 1 year ago. The problem occurs intermittently. The problem has been unchanged. Pertinent negatives include no abdominal pain, chest pain, fever, headaches, hemoptysis, leg swelling, orthopnea, PND, rash, syncope or vomiting. The symptoms are aggravated by any activity and exercise. She has tried steroid inhalers and ipratropium inhalers for the symptoms. The treatment provided significant relief.     Patient presents today for follow up for severe aortic stenosis. Patient was seen for worsening of shortness of breath and dyspnea on exertion. She had an echo checked to evaluate for worsening of her aortic valve stenosis- which has continued to progress. It was recommended for patient to be referred to TAVR but she did not want at that time. Her daughter is with her today to discuss TAVR. Patient also has a history of chronic atrial fibrillation, hypertension, hyperlipidemia, cerebellar stroke, COPD and pulmonary hypertension. Patient has chronic shortness of breath and dyspnea on  exertion but this has progressed. She has to rest frequently when completing minimal exertion. But states overall she is able to do what she wants. She denies palpitations, chest pain, swelling on legs, syncope or near syncope.    The following portions of the patient's history were reviewed and updated as appropriate: allergies, current medications, past family history, past medical history, past social history, past surgical history and problem list.   Prior to Admission medications    Medication Sig Start Date End Date Taking? Authorizing Provider   albuterol sulfate  (90 Base) MCG/ACT inhaler Inhale 2 puffs Every 6 (Six) Hours As Needed for Wheezing or Shortness of Air. 4/30/19  Yes ColleenDonna APRN   atorvastatin (LIPITOR) 40 MG tablet Take 1 tablet by mouth Every Night. 3/31/18  Yes All Singh MD   budesonide-formoterol (SYMBICORT) 80-4.5 MCG/ACT inhaler Inhale 2 puffs 2 (Two) Times a Day.   Yes Caroline Zamarripa MD   Cranberry 400 MG capsule Take 2 capsules by mouth Daily. Takes 1 in morning and 1 at night   Yes Caroline Zamarripa MD   dabigatran etexilate (PRADAXA) 150 MG capsu Take 1 capsule by mouth Every 12 (Twelve) Hours. 3/31/18  Yes All Singh MD   ferrous sulfate 325 (65 FE) MG tablet Take 325 mg by mouth.   Yes Caroline Zamarripa MD   furosemide (LASIX) 20 MG tablet Take 20 mg by mouth Daily.   Yes Caroline Zamarripa MD   guaiFENesin (MUCINEX) 600 MG 12 hr tablet Take 1,200 mg by mouth 2 (Two) Times a Day.   Yes Caroline Zamarripa MD   ipratropium-albuterol (DUO-NEB) 0.5-2.5 mg/mL nebulizer Take 3 mL by nebulization Every 6 (Six) Hours As Needed for Wheezing or shortness of air.   Yes Caroline Zamarripa MD   lisinopril (PRINIVIL,ZESTRIL) 5 MG tablet Take 5 mg by mouth Every Night.   Yes Caroline Zamarripa MD   loratadine (CLARITIN) 10 MG tablet Take 10 mg by mouth Every Night.   Yes Caroline Zamarripa MD   metoprolol tartrate  (LOPRESSOR) 25 MG tablet Take 12.5 mg by mouth 2 (Two) Times a Day. Take 1/2 tablet in the morning and 1/2 tablet in the evening   Yes Caroline Zamarripa MD   omeprazole (priLOSEC) 40 MG capsule Take 40 mg by mouth Daily.   Yes Caroline Zamarripa MD   PARoxetine (PAXIL) 10 MG tablet Take 10 mg by mouth Every Morning.   Yes Caroline Zamarripa MD   vitamin B-12 (CYANOCOBALAMIN) 1000 MCG tablet Take 1,000 mcg by mouth.   Yes Caroline Zamarripa MD   vitamin D (ERGOCALCIFEROL) 62131 units capsule capsule Take 50,000 Units by mouth 1 (One) Time Per Week.   Yes Caroline Zamarripa MD   CRANBERRY PO Take 1 capsule by mouth.  6/3/19  Caroline Zamarripa MD   lisinopril (PRINIVIL,ZESTRIL) 10 MG tablet Take 10 mg by mouth Daily.  6/3/19  Caroline Zamarripa MD   predniSONE (DELTASONE) 20 MG tablet Take 1 tablet by mouth Daily. Take 20 mg daily for 3 days then decrease to 10 mg daily for 3 days then stop 2/21/19 6/3/19  Charan Wharton APRN     Past Medical History:   Diagnosis Date   • Atrial fibrillation with rapid ventricular response (CMS/HCC)    • Degenerative joint disease    • Diastolic murmur of aorta    • Dysphagia    • Essential hypertension    • Pneumonia      Review of Systems   Constitution: Positive for malaise/fatigue. Negative for chills, decreased appetite, fever, weakness, weight gain and weight loss.   HENT: Negative for nosebleeds.    Eyes: Negative for visual disturbance.   Cardiovascular: Positive for dyspnea on exertion. Negative for chest pain, leg swelling, near-syncope, orthopnea, palpitations, paroxysmal nocturnal dyspnea and syncope.   Respiratory: Positive for shortness of breath. Negative for cough, hemoptysis and snoring.    Endocrine: Negative for cold intolerance and heat intolerance.   Hematologic/Lymphatic: Negative for bleeding problem. Does not bruise/bleed easily.   Skin: Negative for rash.   Musculoskeletal: Negative for back pain and falls.   Gastrointestinal:  "Negative for abdominal pain, constipation, diarrhea, heartburn, melena, nausea and vomiting.   Genitourinary: Negative for hematuria.   Neurological: Negative for dizziness, headaches and light-headedness.   Psychiatric/Behavioral: Negative for altered mental status.   Allergic/Immunologic: Negative for persistent infections.         ECG 12 Lead  Date/Time: 6/3/2019 11:17 AM  Performed by: Damon Jeff APRN  Authorized by: Damon Jeff APRN   Comparison: compared with previous ECG from 2/19/2019  Similar to previous ECG  Rhythm: atrial fibrillation               Objective:     Physical Exam   Constitutional: She is oriented to person, place, and time. She appears well-developed and well-nourished.   HENT:   Head: Normocephalic and atraumatic.   Eyes: Pupils are equal, round, and reactive to light.   Neck: Normal range of motion. Neck supple. No JVD present. Carotid bruit is not present.   Cardiovascular: Normal rate, regular rhythm, normal heart sounds and intact distal pulses.   Pulmonary/Chest: Effort normal and breath sounds normal.   Abdominal: Soft. Bowel sounds are normal.   Musculoskeletal: Normal range of motion.   Neurological: She is alert and oriented to person, place, and time. She has normal reflexes.   Skin: Skin is warm and dry.   Psychiatric: She has a normal mood and affect. Her behavior is normal. Judgment and thought content normal.     Blood pressure 119/56, pulse 100, height 157.5 cm (62\"), weight 53.1 kg (117 lb)      Lab Review:       Assessment:          Diagnosis Plan   1. Aortic stenosis, severe     2. Chronic atrial fibrillation (CMS/[facility])     3. Essential hypertension     4. Pulmonary hypertension (CMS/[facility])     5. COPD, group B, by GOLD 2017 classification (CMS/HCC)     6. History of cerebellar stroke            Plan:       1. Severe Aortic Stenosis- valve has progressed significantly from December 2017. DOC now 0.72, pk gordon 388 mean gradient 39. Shortness of breath on exertion- " ongoing problem. Discussed referral for TAVR- daughter and patient want to discuss with family and will let us know.  2. Chronic atrial fibrillation- anticoagulated with Pradaxa. Rate controlled- though it is 100- asymptomatic. Lopressor was reduced by PCP for hypotension.  3. BLood Pressure well controlled  4. Pulmonary Hypertension- follows with Pulmonary  5. COPD- follows with Pulmonary. Shortness of breath likely multifactorial  6. History of stroke- on Pradaxa

## 2019-06-10 ENCOUNTER — TELEPHONE (OUTPATIENT)
Dept: INTERNAL MEDICINE CLINIC | Age: 84
End: 2019-06-10

## 2019-06-10 NOTE — TELEPHONE ENCOUNTER
[facility] nurse reporting that pt has  New onset of inspiratory and expiratory wheezing noted in all lung fields, O2 sat ranging from 88%-90% on RA, productive cough with small amount of thick clear sputum.      Please advise

## 2019-06-11 ENCOUNTER — OFFICE VISIT (OUTPATIENT)
Dept: INTERNAL MEDICINE | Age: 84
End: 2019-06-11
Payer: MEDICARE

## 2019-06-11 VITALS
RESPIRATION RATE: 18 BRPM | HEART RATE: 96 BPM | OXYGEN SATURATION: 100 % | SYSTOLIC BLOOD PRESSURE: 94 MMHG | DIASTOLIC BLOOD PRESSURE: 64 MMHG

## 2019-06-11 DIAGNOSIS — I10 ESSENTIAL (PRIMARY) HYPERTENSION: ICD-10-CM

## 2019-06-11 DIAGNOSIS — I35.0 SEVERE AORTIC VALVE STENOSIS: ICD-10-CM

## 2019-06-11 DIAGNOSIS — I48.20 CHRONIC ATRIAL FIBRILLATION (HCC): ICD-10-CM

## 2019-06-11 DIAGNOSIS — R41.0 CONFUSION: Primary | ICD-10-CM

## 2019-06-11 DIAGNOSIS — I50.9 CONGESTIVE HEART FAILURE, UNSPECIFIED HF CHRONICITY, UNSPECIFIED HEART FAILURE TYPE (HCC): ICD-10-CM

## 2019-06-11 PROCEDURE — 1090F PRES/ABSN URINE INCON ASSESS: CPT | Performed by: NURSE PRACTITIONER

## 2019-06-11 PROCEDURE — G8427 DOCREV CUR MEDS BY ELIG CLIN: HCPCS | Performed by: NURSE PRACTITIONER

## 2019-06-11 PROCEDURE — 1123F ACP DISCUSS/DSCN MKR DOCD: CPT | Performed by: NURSE PRACTITIONER

## 2019-06-11 PROCEDURE — G8598 ASA/ANTIPLAT THER USED: HCPCS | Performed by: NURSE PRACTITIONER

## 2019-06-11 PROCEDURE — G8420 CALC BMI NORM PARAMETERS: HCPCS | Performed by: NURSE PRACTITIONER

## 2019-06-11 PROCEDURE — 1036F TOBACCO NON-USER: CPT | Performed by: NURSE PRACTITIONER

## 2019-06-11 PROCEDURE — 99214 OFFICE O/P EST MOD 30 MIN: CPT | Performed by: NURSE PRACTITIONER

## 2019-06-11 PROCEDURE — 4040F PNEUMOC VAC/ADMIN/RCVD: CPT | Performed by: NURSE PRACTITIONER

## 2019-06-11 ASSESSMENT — ENCOUNTER SYMPTOMS
STRIDOR: 0
EYE DISCHARGE: 0
CHOKING: 0
CONSTIPATION: 0
DIARRHEA: 0
BLOOD IN STOOL: 0
COUGH: 0
VOMITING: 0
WHEEZING: 0
SORE THROAT: 0
SHORTNESS OF BREATH: 1
TROUBLE SWALLOWING: 0
ABDOMINAL DISTENTION: 0
NAUSEA: 0
ABDOMINAL PAIN: 0
COLOR CHANGE: 0
EYE ITCHING: 0

## 2019-06-11 NOTE — PATIENT INSTRUCTIONS
1. Shortness of breath; Stable with no changes, low pulse ox likely related to afib  2. Cough; nothing new to add, continue current regimen  3. Atrial fibrillation; increase Metoprolol to 25 mg by mouth twice a day due to increased heart rate in office. Continue Pradaxa as directed  4. CHF; stable with Lasix 20 mg daily. 5. Confusion; obtain urine in office  6. Hypertension; due to all factors we will stop Lisinopril for now as blood pressure has been on the low side. With increased heart rate, felt that it is more important to get her heart rate down than to continue Lisinopril at this time. Do not discard Lisinopril, as it will be restarted at a later date if possible. 7. Severe aortic valve stenosis; our concern is blood pressure being low and heart rate being high, but hopefully we have addressed this with the d/c of the Lisinopril and increase in Metoprolol. We will forward this to home health. They will need to continue to monitor her weight, blood pressure, pulse, and pulse ox. It is ok to continue therapy at this time.

## 2019-06-11 NOTE — PROGRESS NOTES
[facility] INTERNAL MEDICINE  [facility] [place] [address]  Dept: [phone]  Dept Fax: [phone]  Loc: [phone]    Lisette Viera is a 80 y.o. female who presents today for her medical conditions/complaints as noted below. Lisette Viera is c/o of Cough (Patient is having cough, congestion, and SOB. )        HPI:     HPI     1. Shortness of breath/HH called with pulse ox of 87, wanted the patient to be seen. Now on exam in the office, patient is stable. Pulse ox is 100 on RA. Reports shortness of breath only with exertion. 2. Cough; Reports coughing up clear sputum, worse with exertion. 3. Atrial fibrillation; stable on current medications, currently on Pradaxa and Metoprolol. 4. CHF; stable at this time. 5. Confusion; family member reports increased confusion recently. 6. Hypertension; meds have been regulated recently due to low blood pressures and shortness of breath  7. Severe aortic valve stenosis; has just recently seen cardiology and the family is discussing options soon. Chief Complaint   Patient presents with    Cough     Patient is having cough, congestion, and SOB.         Past Medical History:   Diagnosis Date    Abnormal chest x-ray     Anemia     Atrial fibrillation (HCC)     CHF (congestive heart failure) (HCC)     Chronic bronchitis (HCC)     Colles' fracture of left radius     Colles' fracture of right radius     Dysphagia     GI bleeding     Hemoptysis     Hyperlipidemia     Hypertension     Hypokalemia     Influenza A (H1N1)     Knee pain     Lobar pneumonia (HCC)     Lobar pneumonia (HCC)     Lung nodules     Osteopenia     Pleural effusion, right       Past Surgical History:   Procedure Laterality Date    APPENDECTOMY      HERNIA REPAIR      HIP SURGERY Right 2014    HYSTERECTOMY         Vitals 6/11/2019 5/22/2019 5/14/2019 3/4/2019 2/26/2019 4/7/3118   SYSTOLIC 94 90 98 86 114 030   DIASTOLIC 64 60 60 Inhaler 3    atorvastatin (LIPITOR) 40 MG tablet Take 1 tablet by mouth nightly 90 tablet 3    dabigatran (PRADAXA) 150 MG capsule Take 1 capsule by mouth 2 times daily 180 capsule 3    budesonide-formoterol (SYMBICORT) 80-4.5 MCG/ACT AERO Inhale 2 puffs into the lungs 2 times daily 1 Inhaler 1    loratadine (CLARITIN) 10 MG capsule Take 10 mg by mouth daily      Cranberry 400 MG CAPS Take 1 capsule by mouth 2 times daily        No current facility-administered medications for this visit.       Allergies   Allergen Reactions    Erythromycin Hives and Swelling    Meperidine Nausea And Vomiting     Other reaction(s): GI Intolerance    Morphine And Related      Other reaction(s): Nausea And Vomiting       Health Maintenance   Topic Date Due    DTaP/Tdap/Td vaccine (1 - Tdap) 10/23/1945    Shingles Vaccine (1 of 2) 10/23/1976    Potassium monitoring  04/10/2020    Creatinine monitoring  04/10/2020    Flu vaccine  Completed    Pneumococcal 65+ years Vaccine  Completed       Lab Results   Component Value Date    LABA1C 5.9 01/07/2019     No results found for: PSA, PSADIA  TSH   Date Value Ref Range Status   04/10/2019 2.730 0.270 - 4.200 uIU/mL Final   ]  Lab Results   Component Value Date     04/10/2019    K 4.2 04/10/2019     04/10/2019    CO2 24 04/10/2019    BUN 13 04/10/2019    CREATININE 0.5 04/10/2019    GLUCOSE 114 (H) 04/10/2019    CALCIUM 9.1 04/10/2019    PROT 6.5 (L) 04/10/2019    LABALBU 3.4 (L) 04/10/2019    BILITOT 0.4 04/10/2019    ALKPHOS 82 04/10/2019    AST 12 04/10/2019    ALT 6 04/10/2019    LABGLOM >60 04/10/2019     Lab Results   Component Value Date    CHOL 132 (L) 04/10/2019    CHOL 107 (L) 12/11/2018    CHOL 156 (L) 10/16/2017     Lab Results   Component Value Date    TRIG 42 04/10/2019    TRIG 62 12/11/2018    TRIG 64 10/16/2017     Lab Results   Component Value Date    HDL 53 (L) 04/10/2019    HDL 61 (L) 12/11/2018    HDL 64 (L) 10/16/2017     Lab Results   Component Value Date    LDLCALC 71 04/10/2019    LDLCALC 34 12/11/2018    LDLCALC 79 10/16/2017     Lab Results   Component Value Date     04/10/2019    K 4.2 04/10/2019    K 3.8 04/02/2018     04/10/2019    CO2 24 04/10/2019    BUN 13 04/10/2019    CREATININE 0.5 04/10/2019    GLUCOSE 114 04/10/2019    CALCIUM 9.1 04/10/2019      Lab Results   Component Value Date    WBC 8.2 04/10/2019    HGB 8.8 (L) 04/10/2019    HCT 29.5 (L) 04/10/2019    MCV 90.5 04/10/2019     04/10/2019    LYMPHOPCT 9.2 (L) 03/04/2019    RBC 3.26 (L) 04/10/2019    MCH 27.0 04/10/2019    MCHC 29.8 (L) 04/10/2019    RDW 22.0 (H) 04/10/2019     Lab Results   Component Value Date    VITD25 15.7 (L) 10/16/2017       Subjective:      Review of Systems   Constitutional: Negative for fatigue, fever and unexpected weight change. HENT: Negative for ear discharge, ear pain, mouth sores, sore throat and trouble swallowing. Eyes: Negative for discharge, itching and visual disturbance. Respiratory: Positive for shortness of breath. Negative for cough, choking, wheezing and stridor. Cardiovascular: Negative for chest pain, palpitations and leg swelling. Gastrointestinal: Negative for abdominal distention, abdominal pain, blood in stool, constipation, diarrhea, nausea and vomiting. Endocrine: Negative for cold intolerance, polydipsia and polyuria. Genitourinary: Negative for difficulty urinating, dysuria, frequency and urgency. Musculoskeletal: Negative for arthralgias and gait problem. Skin: Negative for color change and rash. Allergic/Immunologic: Negative for food allergies and immunocompromised state. Neurological: Negative for dizziness, tremors, syncope, speech difficulty, weakness and headaches. Hematological: Negative for adenopathy. Does not bruise/bleed easily. Psychiatric/Behavioral: Positive for confusion. Negative for hallucinations.        Objective:     Physical Exam   Constitutional: She is oriented to

## 2019-06-17 ENCOUNTER — TELEPHONE (OUTPATIENT)
Dept: INTERNAL MEDICINE | Age: 84
End: 2019-06-17

## 2019-06-20 ENCOUNTER — TELEPHONE (OUTPATIENT)
Dept: INTERNAL MEDICINE CLINIC | Age: 84
End: 2019-06-20

## 2019-06-20 NOTE — TELEPHONE ENCOUNTER
[address] [address] nurse reporting that pt has no more skilled needs for nursing with [facility]  will continue to see the pt

## 2019-07-03 ENCOUNTER — TELEPHONE (OUTPATIENT)
Dept: INTERNAL MEDICINE | Age: 84
End: 2019-07-03

## 2019-07-03 DIAGNOSIS — R41.0 CONFUSION: ICD-10-CM

## 2019-07-03 DIAGNOSIS — R41.0 CONFUSION: Primary | ICD-10-CM

## 2019-07-03 LAB
BACTERIA: ABNORMAL /HPF
BILIRUBIN URINE: NEGATIVE
BLOOD, URINE: NEGATIVE
CLARITY: ABNORMAL
COLOR: YELLOW
EPITHELIAL CELLS, UA: 0 /HPF (ref 0–5)
GLUCOSE URINE: NEGATIVE MG/DL
HYALINE CASTS: 0 /HPF (ref 0–8)
KETONES, URINE: NEGATIVE MG/DL
LEUKOCYTE ESTERASE, URINE: ABNORMAL
NITRITE, URINE: POSITIVE
PH UA: 7 (ref 5–8)
PROTEIN UA: NEGATIVE MG/DL
RBC UA: 1 /HPF (ref 0–4)
SPECIFIC GRAVITY UA: 1.01 (ref 1–1.03)
URINE REFLEX TO CULTURE: YES
UROBILINOGEN, URINE: 1 E.U./DL
WBC UA: 42 /HPF (ref 0–5)

## 2019-07-03 RX ORDER — NITROFURANTOIN 25; 75 MG/1; MG/1
100 CAPSULE ORAL 2 TIMES DAILY
Qty: 14 CAPSULE | Refills: 0 | Status: SHIPPED | OUTPATIENT
Start: 2019-07-03 | End: 2019-07-10

## 2019-07-03 NOTE — TELEPHONE ENCOUNTER
Patient has a UTI and the daughter would like to bring in a sample. I have ordered this lab. I have spoken with Vannessa Gomez and she states that the patient has been more confused lately so she believes this is due to a UTI. She will bring the sample by the lab today.

## 2019-07-03 NOTE — TELEPHONE ENCOUNTER
I spoke with Bridget Dela Cruz and she is pretty sure this is just due to the UTI. I am sending in the [address]. We will call her back if we need to change this after we get the culture back.

## 2019-07-05 LAB
ORGANISM: ABNORMAL
URINE CULTURE, ROUTINE: ABNORMAL
URINE CULTURE, ROUTINE: ABNORMAL

## 2019-07-15 ENCOUNTER — OFFICE VISIT (OUTPATIENT)
Dept: INTERNAL MEDICINE | Age: 84
End: 2019-07-15
Payer: MEDICARE

## 2019-07-15 VITALS — SYSTOLIC BLOOD PRESSURE: 110 MMHG | DIASTOLIC BLOOD PRESSURE: 60 MMHG | HEART RATE: 76 BPM

## 2019-07-15 DIAGNOSIS — F32.A DEPRESSION, UNSPECIFIED DEPRESSION TYPE: ICD-10-CM

## 2019-07-15 DIAGNOSIS — B96.89 UTI DUE TO KLEBSIELLA SPECIES: ICD-10-CM

## 2019-07-15 DIAGNOSIS — E78.5 HYPERLIPIDEMIA, UNSPECIFIED HYPERLIPIDEMIA TYPE: ICD-10-CM

## 2019-07-15 DIAGNOSIS — E55.9 VITAMIN D DEFICIENCY: ICD-10-CM

## 2019-07-15 DIAGNOSIS — D50.9 IRON DEFICIENCY ANEMIA, UNSPECIFIED IRON DEFICIENCY ANEMIA TYPE: ICD-10-CM

## 2019-07-15 DIAGNOSIS — K21.9 GASTROESOPHAGEAL REFLUX DISEASE WITHOUT ESOPHAGITIS: ICD-10-CM

## 2019-07-15 DIAGNOSIS — N39.0 UTI DUE TO KLEBSIELLA SPECIES: ICD-10-CM

## 2019-07-15 DIAGNOSIS — I10 ESSENTIAL HYPERTENSION: Primary | ICD-10-CM

## 2019-07-15 DIAGNOSIS — I48.91 ATRIAL FIBRILLATION, UNSPECIFIED TYPE (HCC): ICD-10-CM

## 2019-07-15 DIAGNOSIS — R53.83 OTHER FATIGUE: ICD-10-CM

## 2019-07-15 LAB
ALBUMIN SERPL-MCNC: 3.4 G/DL (ref 3.5–5.2)
ALP BLD-CCNC: 67 U/L (ref 35–104)
ALT SERPL-CCNC: 8 U/L (ref 5–33)
ANION GAP SERPL CALCULATED.3IONS-SCNC: 13 MMOL/L (ref 7–19)
AST SERPL-CCNC: 12 U/L (ref 5–32)
BASOPHILS ABSOLUTE: 0.1 K/UL (ref 0–0.2)
BASOPHILS RELATIVE PERCENT: 0.6 % (ref 0–1)
BILIRUB SERPL-MCNC: 0.3 MG/DL (ref 0.2–1.2)
BUN BLDV-MCNC: 13 MG/DL (ref 8–23)
CALCIUM SERPL-MCNC: 8.6 MG/DL (ref 8.2–9.6)
CHLORIDE BLD-SCNC: 96 MMOL/L (ref 98–111)
CHOLESTEROL, TOTAL: 122 MG/DL (ref 160–199)
CO2: 26 MMOL/L (ref 22–29)
CREAT SERPL-MCNC: <0.5 MG/DL (ref 0.5–0.9)
EOSINOPHILS ABSOLUTE: 0.1 K/UL (ref 0–0.6)
EOSINOPHILS RELATIVE PERCENT: 1 % (ref 0–5)
GFR NON-AFRICAN AMERICAN: >60
GLUCOSE BLD-MCNC: 107 MG/DL (ref 74–109)
HCT VFR BLD CALC: 25.2 % (ref 37–47)
HDLC SERPL-MCNC: 50 MG/DL (ref 65–121)
HEMOGLOBIN: 7.3 G/DL (ref 12–16)
LDL CHOLESTEROL CALCULATED: 57 MG/DL
LYMPHOCYTES ABSOLUTE: 0.7 K/UL (ref 1.1–4.5)
LYMPHOCYTES RELATIVE PERCENT: 8.9 % (ref 20–40)
MCH RBC QN AUTO: 24.4 PG (ref 27–31)
MCHC RBC AUTO-ENTMCNC: 29 G/DL (ref 33–37)
MCV RBC AUTO: 84.3 FL (ref 81–99)
MONOCYTES ABSOLUTE: 0.8 K/UL (ref 0–0.9)
MONOCYTES RELATIVE PERCENT: 10 % (ref 0–10)
NEUTROPHILS ABSOLUTE: 6.4 K/UL (ref 1.5–7.5)
NEUTROPHILS RELATIVE PERCENT: 79 % (ref 50–65)
PDW BLD-RTO: 17.5 % (ref 11.5–14.5)
PLATELET # BLD: 334 K/UL (ref 130–400)
PMV BLD AUTO: 9.1 FL (ref 9.4–12.3)
POTASSIUM SERPL-SCNC: 4.1 MMOL/L (ref 3.5–5)
RBC # BLD: 2.99 M/UL (ref 4.2–5.4)
SODIUM BLD-SCNC: 135 MMOL/L (ref 136–145)
TOTAL PROTEIN: 6.5 G/DL (ref 6.6–8.7)
TRIGL SERPL-MCNC: 75 MG/DL (ref 0–149)
TSH SERPL DL<=0.05 MIU/L-ACNC: 3.07 UIU/ML (ref 0.27–4.2)
VITAMIN D 25-HYDROXY: 37.9 NG/ML
WBC # BLD: 8.1 K/UL (ref 4.8–10.8)

## 2019-07-15 PROCEDURE — 4040F PNEUMOC VAC/ADMIN/RCVD: CPT | Performed by: INTERNAL MEDICINE

## 2019-07-15 PROCEDURE — G8420 CALC BMI NORM PARAMETERS: HCPCS | Performed by: INTERNAL MEDICINE

## 2019-07-15 PROCEDURE — G8598 ASA/ANTIPLAT THER USED: HCPCS | Performed by: INTERNAL MEDICINE

## 2019-07-15 PROCEDURE — 1123F ACP DISCUSS/DSCN MKR DOCD: CPT | Performed by: INTERNAL MEDICINE

## 2019-07-15 PROCEDURE — 1090F PRES/ABSN URINE INCON ASSESS: CPT | Performed by: INTERNAL MEDICINE

## 2019-07-15 PROCEDURE — 1036F TOBACCO NON-USER: CPT | Performed by: INTERNAL MEDICINE

## 2019-07-15 PROCEDURE — 99214 OFFICE O/P EST MOD 30 MIN: CPT | Performed by: INTERNAL MEDICINE

## 2019-07-15 PROCEDURE — G8427 DOCREV CUR MEDS BY ELIG CLIN: HCPCS | Performed by: INTERNAL MEDICINE

## 2019-07-16 ASSESSMENT — ENCOUNTER SYMPTOMS
ABDOMINAL DISTENTION: 0
ABDOMINAL PAIN: 0
NAUSEA: 0
COLOR CHANGE: 0
DIARRHEA: 0
SORE THROAT: 0
CONSTIPATION: 0
BLOOD IN STOOL: 0
COUGH: 0
WHEEZING: 1
EYE ITCHING: 0
TROUBLE SWALLOWING: 0
VOMITING: 0
CHOKING: 0
EYE DISCHARGE: 0
STRIDOR: 0
SHORTNESS OF BREATH: 0

## 2019-07-16 NOTE — PROGRESS NOTES
Chief Complaint   Patient presents with    3 Month Follow-Up     wheezing       HPI: Karis Bean is a 80 y.o. female is here for up of hypertension. Her blood pressure is well controlled. She denies any complaints of chest pain, chest pressure or shortness of breath. Occasionally, she will have some wheezing, but she has not had any recently. When she does have wheezing, Mucinex and albuterol nebulizations tend to help. She did not get her labs checked before this appointment. Her acid reflux is well controlled. She does have a history of atrial fibrillation. She is on Pradaxa for prophylaxis. Currently, her atrial fibrillation is rate controlled. She feels that Paxil works well for depression. Lipid panel is currently pending. She states that she feels well overall. Past Medical History:   Diagnosis Date    Abnormal chest x-ray     Anemia     Atrial fibrillation (HCC)     CHF (congestive heart failure) (HCC)     Chronic bronchitis (HCC)     Colles' fracture of left radius     Colles' fracture of right radius     Dysphagia     GI bleeding     Hemoptysis     Hyperlipidemia     Hypertension     Hypokalemia     Influenza A (H1N1)     Knee pain     Lobar pneumonia (HCC)     Lobar pneumonia (HCC)     Lung nodules     Osteopenia     Pleural effusion, right       Past Surgical History:   Procedure Laterality Date    APPENDECTOMY      HERNIA REPAIR      HIP SURGERY Right 2014    HYSTERECTOMY        Social History     Socioeconomic History    Marital status:       Spouse name: None    Number of children: None    Years of education: None    Highest education level: None   Occupational History    None   Social Needs    Financial resource strain: None    Food insecurity:     Worry: None     Inability: None    Transportation needs:     Medical: None     Non-medical: None   Tobacco Use    Smoking status: Never Smoker    Smokeless tobacco: Never Used   Substance and Sexual

## 2019-07-18 ENCOUNTER — TELEPHONE (OUTPATIENT)
Dept: INTERNAL MEDICINE | Age: 84
End: 2019-07-18

## 2019-07-18 DIAGNOSIS — D50.9 IRON DEFICIENCY ANEMIA, UNSPECIFIED IRON DEFICIENCY ANEMIA TYPE: Primary | ICD-10-CM

## 2019-07-19 NOTE — TELEPHONE ENCOUNTER
Quentin Otoole can not get her in until Monday. I have notified Trino and will schedule this for Monday at [facility] called and scheduled the patient for 8 am on Monday. Trino has been notified.

## 2019-07-22 ENCOUNTER — HOSPITAL ENCOUNTER (OUTPATIENT)
Dept: INFUSION THERAPY | Age: 84
Setting detail: INFUSION SERIES
Discharge: HOME | End: 2019-07-22
Payer: MEDICARE

## 2019-07-22 VITALS
SYSTOLIC BLOOD PRESSURE: 159 MMHG | TEMPERATURE: 98.1 F | HEART RATE: 72 BPM | DIASTOLIC BLOOD PRESSURE: 100 MMHG | RESPIRATION RATE: 17 BRPM

## 2019-07-22 LAB
ABO/RH: NORMAL
ANISOCYTOSIS: ABNORMAL
ANTIBODY SCREEN: NORMAL
BASOPHILS ABSOLUTE: 0.1 K/UL (ref 0–0.2)
BASOPHILS RELATIVE PERCENT: 0.6 % (ref 0–1)
BLOOD BANK DISPENSE STATUS: NORMAL
BLOOD BANK PRODUCT CODE: NORMAL
BPU ID: NORMAL
DESCRIPTION BLOOD BANK: NORMAL
EOSINOPHILS ABSOLUTE: 0.1 K/UL (ref 0–0.6)
EOSINOPHILS RELATIVE PERCENT: 1 % (ref 0–5)
HCT VFR BLD CALC: 27.3 % (ref 37–47)
HCT VFR BLD CALC: 30.1 % (ref 37–47)
HEMOGLOBIN: 7.8 G/DL (ref 12–16)
HEMOGLOBIN: 8.6 G/DL (ref 12–16)
HYPOCHROMIA: ABNORMAL
LYMPHOCYTES ABSOLUTE: 0.9 K/UL (ref 1.1–4.5)
LYMPHOCYTES RELATIVE PERCENT: 11.2 % (ref 20–40)
MCH RBC QN AUTO: 24.2 PG (ref 27–31)
MCHC RBC AUTO-ENTMCNC: 28.6 G/DL (ref 33–37)
MCV RBC AUTO: 84.6 FL (ref 81–99)
MONOCYTES ABSOLUTE: 0.8 K/UL (ref 0–0.9)
MONOCYTES RELATIVE PERCENT: 10.3 % (ref 0–10)
NEUTROPHILS ABSOLUTE: 6.3 K/UL (ref 1.5–7.5)
NEUTROPHILS RELATIVE PERCENT: 76.5 % (ref 50–65)
OVALOCYTES: ABNORMAL
PDW BLD-RTO: 17.1 % (ref 11.5–14.5)
PLATELET # BLD: 345 K/UL (ref 130–400)
PLATELET SLIDE REVIEW: ADEQUATE
PMV BLD AUTO: 9.1 FL (ref 9.4–12.3)
RBC # BLD: 3.56 M/UL (ref 4.2–5.4)
WBC # BLD: 8.2 K/UL (ref 4.8–10.8)

## 2019-07-22 PROCEDURE — 85025 COMPLETE CBC W/AUTO DIFF WBC: CPT

## 2019-07-22 PROCEDURE — 2580000003 HC RX 258: Performed by: INTERNAL MEDICINE

## 2019-07-22 PROCEDURE — 86850 RBC ANTIBODY SCREEN: CPT

## 2019-07-22 PROCEDURE — 86920 COMPATIBILITY TEST SPIN: CPT

## 2019-07-22 PROCEDURE — P9040 RBC LEUKOREDUCED IRRADIATED: HCPCS

## 2019-07-22 PROCEDURE — 86900 BLOOD TYPING SEROLOGIC ABO: CPT

## 2019-07-22 PROCEDURE — 36430 TRANSFUSION BLD/BLD COMPNT: CPT

## 2019-07-22 PROCEDURE — 85014 HEMATOCRIT: CPT

## 2019-07-22 PROCEDURE — 86901 BLOOD TYPING SEROLOGIC RH(D): CPT

## 2019-07-22 PROCEDURE — 85018 HEMOGLOBIN: CPT

## 2019-07-22 RX ORDER — 0.9 % SODIUM CHLORIDE 0.9 %
250 INTRAVENOUS SOLUTION INTRAVENOUS ONCE
Status: COMPLETED | OUTPATIENT
Start: 2019-07-22 | End: 2019-07-22

## 2019-07-22 RX ADMIN — SODIUM CHLORIDE 250 ML: 9 INJECTION, SOLUTION INTRAVENOUS at 10:00

## 2019-07-24 RX ORDER — ATENOLOL 100 MG/1
TABLET ORAL
Qty: 180 CAPSULE | Refills: 3 | Status: SHIPPED | OUTPATIENT
Start: 2019-07-24

## 2019-08-13 DIAGNOSIS — R82.90 FOUL SMELLING URINE: Primary | ICD-10-CM

## 2019-08-13 DIAGNOSIS — R41.0 CONFUSION: ICD-10-CM

## 2019-08-13 RX ORDER — NITROFURANTOIN 25; 75 MG/1; MG/1
100 CAPSULE ORAL 2 TIMES DAILY
Qty: 14 CAPSULE | Refills: 0 | Status: SHIPPED | OUTPATIENT
Start: 2019-08-13 | End: 2019-08-20

## 2019-08-14 DIAGNOSIS — R82.90 FOUL SMELLING URINE: ICD-10-CM

## 2019-08-14 DIAGNOSIS — R41.0 CONFUSION: ICD-10-CM

## 2019-08-14 LAB
BILIRUBIN URINE: NEGATIVE
BLOOD, URINE: NEGATIVE
CLARITY: ABNORMAL
COLOR: YELLOW
GLUCOSE URINE: NEGATIVE MG/DL
KETONES, URINE: NEGATIVE MG/DL
LEUKOCYTE ESTERASE, URINE: NEGATIVE
NITRITE, URINE: NEGATIVE
PH UA: 6 (ref 5–8)
PROTEIN UA: NEGATIVE MG/DL
SPECIFIC GRAVITY UA: 1.02 (ref 1–1.03)
URINE REFLEX TO CULTURE: ABNORMAL
UROBILINOGEN, URINE: 1 E.U./DL

## 2019-08-16 ENCOUNTER — TELEPHONE (OUTPATIENT)
Dept: INTERNAL MEDICINE | Age: 84
End: 2019-08-16

## 2019-08-19 ENCOUNTER — TELEPHONE (OUTPATIENT)
Dept: INTERNAL MEDICINE | Age: 84
End: 2019-08-19

## 2019-08-19 DIAGNOSIS — R44.3 HALLUCINATIONS: ICD-10-CM

## 2019-08-19 DIAGNOSIS — R06.02 SOB (SHORTNESS OF BREATH): Primary | ICD-10-CM

## 2019-08-19 DIAGNOSIS — R53.83 OTHER FATIGUE: ICD-10-CM

## 2019-08-20 DIAGNOSIS — D50.9 IRON DEFICIENCY ANEMIA, UNSPECIFIED IRON DEFICIENCY ANEMIA TYPE: ICD-10-CM

## 2019-08-20 DIAGNOSIS — R44.3 HALLUCINATIONS: ICD-10-CM

## 2019-08-20 DIAGNOSIS — R06.02 SOB (SHORTNESS OF BREATH): ICD-10-CM

## 2019-08-20 DIAGNOSIS — R53.83 OTHER FATIGUE: ICD-10-CM

## 2019-08-20 LAB
ALBUMIN SERPL-MCNC: 3.6 G/DL (ref 3.5–5.2)
ALP BLD-CCNC: 82 U/L (ref 35–104)
ALT SERPL-CCNC: 10 U/L (ref 5–33)
ANION GAP SERPL CALCULATED.3IONS-SCNC: 14 MMOL/L (ref 7–19)
AST SERPL-CCNC: 12 U/L (ref 5–32)
BASOPHILS ABSOLUTE: 0.1 K/UL (ref 0–0.2)
BASOPHILS RELATIVE PERCENT: 0.9 % (ref 0–1)
BILIRUB SERPL-MCNC: 0.4 MG/DL (ref 0.2–1.2)
BUN BLDV-MCNC: 13 MG/DL (ref 8–23)
CALCIUM SERPL-MCNC: 9.2 MG/DL (ref 8.2–9.6)
CHLORIDE BLD-SCNC: 99 MMOL/L (ref 98–111)
CO2: 26 MMOL/L (ref 22–29)
CREAT SERPL-MCNC: 0.5 MG/DL (ref 0.5–0.9)
EOSINOPHILS ABSOLUTE: 0.2 K/UL (ref 0–0.6)
EOSINOPHILS RELATIVE PERCENT: 3.1 % (ref 0–5)
FERRITIN: 40.5 NG/ML (ref 13–150)
GFR NON-AFRICAN AMERICAN: >60
GLUCOSE BLD-MCNC: 100 MG/DL (ref 74–109)
HCT VFR BLD CALC: 30.5 % (ref 37–47)
HEMOGLOBIN: 8.9 G/DL (ref 12–16)
IMMATURE GRANULOCYTES #: 0 K/UL
IRON: 34 UG/DL (ref 37–145)
LYMPHOCYTES ABSOLUTE: 0.7 K/UL (ref 1.1–4.5)
LYMPHOCYTES RELATIVE PERCENT: 12.2 % (ref 20–40)
MCH RBC QN AUTO: 24.7 PG (ref 27–31)
MCHC RBC AUTO-ENTMCNC: 29.2 G/DL (ref 33–37)
MCV RBC AUTO: 84.5 FL (ref 81–99)
MONOCYTES ABSOLUTE: 0.5 K/UL (ref 0–0.9)
MONOCYTES RELATIVE PERCENT: 8 % (ref 0–10)
NEUTROPHILS ABSOLUTE: 4.4 K/UL (ref 1.5–7.5)
NEUTROPHILS RELATIVE PERCENT: 75.5 % (ref 50–65)
PDW BLD-RTO: 19.9 % (ref 11.5–14.5)
PLATELET # BLD: 365 K/UL (ref 130–400)
PMV BLD AUTO: 9.1 FL (ref 9.4–12.3)
POTASSIUM SERPL-SCNC: 4.3 MMOL/L (ref 3.5–5)
RBC # BLD: 3.61 M/UL (ref 4.2–5.4)
SODIUM BLD-SCNC: 139 MMOL/L (ref 136–145)
TOTAL IRON BINDING CAPACITY: 308 UG/DL (ref 250–400)
TOTAL PROTEIN: 7.2 G/DL (ref 6.6–8.7)
TSH SERPL DL<=0.05 MIU/L-ACNC: 3.5 UIU/ML (ref 0.27–4.2)
WBC # BLD: 5.8 K/UL (ref 4.8–10.8)

## 2019-09-18 ENCOUNTER — CARE COORDINATION (OUTPATIENT)
Dept: CARE COORDINATION | Age: 84
End: 2019-09-18

## 2019-09-20 NOTE — CARE COORDINATION
drugs, diet, exercise) that are impacting on physical or mental well-being?:  No identified areas of concern   Do you have any other concerns about your patients mental well-being? How would you rate their severity and impact on the patient?:  No identified areas of concern   How would you rate their home environment in terms of safety and stability (including domestic violence, insecure housing, neighbor harassment)?:  Consistently safe, supportive, stable, no identified problems   How do daily activities impact on the patient's well-being? (include current or anticipated unemployment, work, caregiving, access to transportation or other):  No identified problems or perceived positive benefits   How would you rate their social network (family, work, friends)?:  Adequate participation with social networks   How would you rate their financial resources (including ability to afford all required medical care)?:  Financially secure, resources adequate, no identified problems   How wells does the patient now understand their health and well-being (symptoms, signs or risk factors) and what they need to do to manage their health?:  Reasonable to good understanding and already engages in managing health or is willing to undertake better management   How well do you think your patient can engage in healthcare discussions? (Barriers include language, deafness, aphasia, alcohol or drug problems, learning difficulties, concentration): Adequate communication, with or without minor barriers   Do other services need to be involved to help this patient?:  Other care/services in place and adequate   Are current services involved with this patient well-coordinated?  (Include coordination with other services you are now recommendation):  Required care/services in place and adequately coordinated   Suggested Interventions and Community Resources   Other Services or Interventions:  9/23/19: Pts dtr c/o UTI that they cannot seem to get rid of. Contacted Dr. Bertrand Garcia MA for same day appt   Zone Management Tools: In Process         Set up/Review Goals, Set up/Review an Education Plan, Schedule an appointment with the patient's PCP              Prior to Admission medications    Medication Sig Start Date End Date Taking?  Authorizing Provider   PRADAXA 150 MG capsule TAKE 1 CAPSULE BY MOUTH TWICE A DAY 7/24/19   Brandy Almanza MD   metoprolol tartrate (LOPRESSOR) 25 MG tablet Take 1 tablet by mouth 2 times daily 6/11/19   SUSANNE Herrera   vitamin D (ERGOCALCIFEROL) 58088 units CAPS capsule TAKE ONE CAPSULE BY MOUTH ONE TIME PER WEEK 5/20/19   Brandy Almanza MD   ferrous sulfate 325 (65 Fe) MG tablet Take 325 mg by mouth daily (with breakfast)    Historical Provider, MD   vitamin B-12 (CYANOCOBALAMIN) 1000 MCG tablet Take 1,000 mcg by mouth daily    Historical Provider, MD   guaiFENesin (MUCINEX) 600 MG extended release tablet Take 1,200 mg by mouth 2 times daily    Historical Provider, MD   furosemide (LASIX) 20 MG tablet TAKE 1 TABLET BY MOUTH EVERY DAY 12/17/18   Brandy Almanza MD   albuterol (PROVENTIL) (2.5 MG/3ML) 0.083% nebulizer solution Take 3 mLs by nebulization every 4 hours as needed for Wheezing 12/10/18   Brandy Almanza MD   esomeprazole (NEXIUM) 40 MG delayed release capsule Take 1 capsule by mouth daily 12/10/18   Brandy Almanza MD   PARoxetine (PAXIL) 10 MG tablet TAKE 1 TABLET BY MOUTH EVERY DAY 11/9/18   Brandy Almanza MD   albuterol sulfate  (90 Base) MCG/ACT inhaler Inhale 2 puffs into the lungs every 6 hours as needed for Wheezing or Shortness of Breath 10/10/18   Brandy Almanza MD   atorvastatin (LIPITOR) 40 MG tablet Take 1 tablet by mouth nightly 7/23/18   Brandy Almanza MD   budesonide-formoterol (SYMBICORT) 80-4.5 MCG/ACT AERO Inhale 2 puffs into the lungs 2 times daily 83/87/14   SUSANNE Burton   loratadine (CLARITIN) 10 MG capsule Take 10 mg by mouth daily    Historical Provider,

## 2019-09-23 ENCOUNTER — OFFICE VISIT (OUTPATIENT)
Dept: INTERNAL MEDICINE | Age: 84
End: 2019-09-23
Payer: MEDICARE

## 2019-09-23 ENCOUNTER — CARE COORDINATION (OUTPATIENT)
Dept: CARE COORDINATION | Age: 84
End: 2019-09-23

## 2019-09-23 VITALS — SYSTOLIC BLOOD PRESSURE: 102 MMHG | HEART RATE: 86 BPM | DIASTOLIC BLOOD PRESSURE: 64 MMHG | OXYGEN SATURATION: 99 %

## 2019-09-23 DIAGNOSIS — I48.91 ATRIAL FIBRILLATION, UNSPECIFIED TYPE (HCC): ICD-10-CM

## 2019-09-23 DIAGNOSIS — R41.0 CONFUSION: Primary | ICD-10-CM

## 2019-09-23 DIAGNOSIS — J41.0 SIMPLE CHRONIC BRONCHITIS (HCC): ICD-10-CM

## 2019-09-23 LAB
APPEARANCE FLUID: CLEAR
BILIRUBIN, POC: NEGATIVE
BLOOD URINE, POC: NORMAL
CLARITY, POC: CLEAR
COLOR, POC: YELLOW
GLUCOSE URINE, POC: NEGATIVE
KETONES, POC: NEGATIVE
LEUKOCYTE EST, POC: NEGATIVE
NITRITE, POC: NEGATIVE
PH, POC: 5.5
PROTEIN, POC: NEGATIVE
SPECIFIC GRAVITY, POC: 1.02
UROBILINOGEN, POC: 0.2

## 2019-09-23 PROCEDURE — G8598 ASA/ANTIPLAT THER USED: HCPCS | Performed by: NURSE PRACTITIONER

## 2019-09-23 PROCEDURE — G8427 DOCREV CUR MEDS BY ELIG CLIN: HCPCS | Performed by: NURSE PRACTITIONER

## 2019-09-23 PROCEDURE — G8926 SPIRO NO PERF OR DOC: HCPCS | Performed by: NURSE PRACTITIONER

## 2019-09-23 PROCEDURE — 3023F SPIROM DOC REV: CPT | Performed by: NURSE PRACTITIONER

## 2019-09-23 PROCEDURE — G8420 CALC BMI NORM PARAMETERS: HCPCS | Performed by: NURSE PRACTITIONER

## 2019-09-23 PROCEDURE — 81002 URINALYSIS NONAUTO W/O SCOPE: CPT | Performed by: NURSE PRACTITIONER

## 2019-09-23 PROCEDURE — 1090F PRES/ABSN URINE INCON ASSESS: CPT | Performed by: NURSE PRACTITIONER

## 2019-09-23 PROCEDURE — 4040F PNEUMOC VAC/ADMIN/RCVD: CPT | Performed by: NURSE PRACTITIONER

## 2019-09-23 PROCEDURE — 1036F TOBACCO NON-USER: CPT | Performed by: NURSE PRACTITIONER

## 2019-09-23 PROCEDURE — 99213 OFFICE O/P EST LOW 20 MIN: CPT | Performed by: NURSE PRACTITIONER

## 2019-09-23 PROCEDURE — 1123F ACP DISCUSS/DSCN MKR DOCD: CPT | Performed by: NURSE PRACTITIONER

## 2019-09-23 RX ORDER — CEFDINIR 300 MG/1
300 CAPSULE ORAL 2 TIMES DAILY
Qty: 14 CAPSULE | Refills: 0 | Status: SHIPPED | OUTPATIENT
Start: 2019-09-23 | End: 2019-09-30

## 2019-09-23 ASSESSMENT — ENCOUNTER SYMPTOMS
EYE DISCHARGE: 0
COUGH: 1
CHOKING: 0
SORE THROAT: 0
VOMITING: 0
CONSTIPATION: 0
COLOR CHANGE: 0
BLOOD IN STOOL: 0
WHEEZING: 0
ABDOMINAL PAIN: 0
ABDOMINAL DISTENTION: 0
DIARRHEA: 0
NAUSEA: 0
SHORTNESS OF BREATH: 0
STRIDOR: 0
TROUBLE SWALLOWING: 0
EYE ITCHING: 0

## 2019-10-01 ENCOUNTER — CARE COORDINATION (OUTPATIENT)
Dept: CARE COORDINATION | Age: 84
End: 2019-10-01

## 2019-10-03 ENCOUNTER — CARE COORDINATION (OUTPATIENT)
Dept: CARE COORDINATION | Age: 84
End: 2019-10-03

## 2019-10-07 DIAGNOSIS — J44.9 CHRONIC OBSTRUCTIVE PULMONARY DISEASE, UNSPECIFIED COPD TYPE (HCC): ICD-10-CM

## 2019-10-07 RX ORDER — ALBUTEROL SULFATE 2.5 MG/3ML
2.5 SOLUTION RESPIRATORY (INHALATION) EVERY 4 HOURS PRN
Qty: 120 EACH | Refills: 5 | Status: SHIPPED | OUTPATIENT
Start: 2019-10-07

## 2019-10-16 ENCOUNTER — CARE COORDINATION (OUTPATIENT)
Dept: CARE COORDINATION | Age: 84
End: 2019-10-16

## 2019-10-16 ASSESSMENT — ENCOUNTER SYMPTOMS: DYSPNEA ASSOCIATED WITH: EXERTION

## 2019-10-28 PROBLEM — Z23 NEED FOR IMMUNIZATION AGAINST INFLUENZA: Status: ACTIVE | Noted: 2019-01-01

## 2019-10-28 PROBLEM — R63.6 UNDERWEIGHT: Status: ACTIVE | Noted: 2019-01-01

## 2019-10-28 PROBLEM — J96.11 CHRONIC RESPIRATORY FAILURE WITH HYPOXIA (HCC): Status: ACTIVE | Noted: 2019-01-01

## 2019-10-28 PROBLEM — R06.00 DYSPNEA: Status: ACTIVE | Noted: 2019-01-01

## 2019-10-28 NOTE — PROGRESS NOTES
JIM Canales  [facility]   [place] [address]  Phone: [phone]  Fax: [phone]     Heather Burgos is a 89 y.o. female.   : 10/23/1926  CC:   Chief Complaint   Patient presents with   • F/u of Copd      HPI: Heather Burgos is a pleasant 89 y.o. female. The patient is here today for follow up of COPD. The patient has known COPD, atrial fib, allergic rhinitis, GERD, and personal history of nicotine dependence.  The patient utilizes Symbicort 80, albuterol rescue inhaler, and albuterol nebs.  She reports increasing shortness of breath but no fever, no chills, no cough with purulent sputum.  Rest and exercise sats were obtained today in the office and the patient does qualify for portable oxygen.  The patient's PCP is Lupe Ham MD.    The following portions of the patient's history were reviewed and updated as appropriate: allergies, current medications, past family history, past medical history, past social history, past surgical history and problem list.  Past Medical History:   Diagnosis Date   • Atrial fibrillation with rapid ventricular response (CMS/HCC)    • Degenerative joint disease    • Diastolic murmur of aorta    • Dysphagia    • Essential hypertension    • Pneumonia      Family History   Problem Relation Age of Onset   • Stroke Mother         Age 90   • Emphysema Father    • Cancer Sister    • Cancer Brother      Social History     Socioeconomic History   • Marital status:      Spouse name: Not on file   • Number of children: Not on file   • Years of education: Not on file   • Highest education level: Not on file   Tobacco Use   • Smoking status: Never Smoker   • Smokeless tobacco: Never Used   Substance and Sexual Activity   • Alcohol use: No   • Drug use: No   • Sexual activity: Defer     Review of Systems   Constitutional: Negative for chills and fever.   HENT: Negative for congestion.    Eyes: Negative  "for blurred vision.   Respiratory: Positive for shortness of breath ( With exertion). Negative for cough.    Cardiovascular: Negative for chest pain.   Gastrointestinal: Negative for diarrhea, nausea and vomiting.   Endocrine: Negative for cold intolerance and heat intolerance.   Genitourinary: Negative for dysuria.   Musculoskeletal: Negative for arthralgias.   Skin: Negative for rash.   Neurological: Negative for dizziness, weakness and light-headedness.   Hematological: Does not bruise/bleed easily.   Psychiatric/Behavioral: Negative for agitation. The patient is not nervous/anxious.      /76   Pulse 67   Ht 157.5 cm (62\")   Wt 53.1 kg (117 lb)   SpO2 94% Comment: Ra  Breastfeeding? No   BMI 21.40 kg/m²   Physical Exam   Constitutional: She is oriented to person, place, and time. She appears well-developed and well-nourished. No distress.   HENT:   Head: Normocephalic and atraumatic.   Eyes: Conjunctivae and EOM are normal. Pupils are equal, round, and reactive to light. No scleral icterus.   Neck: Normal range of motion. Neck supple.   Cardiovascular: Normal rate, regular rhythm and normal heart sounds. Exam reveals no friction rub.   No murmur heard.  Pulmonary/Chest: Effort normal. No respiratory distress. She has decreased breath sounds. She has no wheezes. She has no rales.   Abdominal: Soft. Bowel sounds are normal. She exhibits no distension. There is no tenderness.   Musculoskeletal: Normal range of motion. She exhibits no edema.   Neurological: She is alert and oriented to person, place, and time.   Skin: Skin is warm and dry.   Psychiatric: She has a normal mood and affect. Her behavior is normal. Judgment and thought content normal.   Nursing note and vitals reviewed.    Pulmonary Functions Testing Results:  Walking Oximetry  Performed by: Donna Macias APRN  Authorized by: Donna Macias APRN     Rest room air SAT %:  94  Exercise room air SAT %:  81  Exercise on O2 @ " Liters:  2  SAT %:  98              FEV1   Date Value Ref Range Status   10/31/2018 51% liters Final     FVC   Date Value Ref Range Status   10/31/2018 71% liters Final     FEV1/FVC   Date Value Ref Range Status   10/31/2018 52.19% % Final     DLCO   Date Value Ref Range Status   10/31/2018 57% ml/mmHg sec Final     My PFT Interpretation: None to review today  Imaging: None to review today    Assessment and Plan:   Heather was seen today for f/u of copd.    Diagnoses and all orders for this visit:    Stage 2 moderate COPD by GOLD classification (CMS/[facility])  -     Fluticasone-Umeclidin-Vilant (TRELEGY ELLIPTA) 100-62.5-25 MCG/INH aerosol powder ; Inhale 1 each Daily for 30 days.  -     Oxygen Therapy  The patient will stop Symbicort.  She is going to trial Trelegy to see if she has any further benefit in her shortness of breath with exertion versus Symbicort 80.  Can continue albuterol rescue inhaler and albuterol nebs as needed.  Inhaler demonstration was provided.  Need for immunization against influenza  -     Fluad Tri 65yr+  The patient was given a flu shot today from the office.  Dyspnea, unspecified type  -     Walking Oximetry; Future  -     Walking Oximetry  Rest and exercise sats were obtained today.  The patient qualifies for portable oxygen.  Chronic respiratory failure with hypoxia (CMS/[facility])  The patient qualified for portable oxygen today.  Order was given to the patient.  Gastroesophageal reflux disease, esophagitis presence not specified  Continue Prilosec.  Underweight  Defer to PCP  Anemia, unspecified type  The patient has known anemia that likely contributes to her shortness of breath.  Defer to PCP.    Health maintenance:   Influenza vaccine: yes  Pneumovax 23: yes  Prevnar 13: yes  Patient's Body mass index is 21.4 kg/m². BMI is below normal parameters. Recommendations include: pcp .    Follow up: 6 months  JIM Canales  10/28/2019  6:10 PM    Please note that portions of this note were  completed with a voice recognition program.

## 2019-10-28 NOTE — PATIENT INSTRUCTIONS
Chronic Obstructive Pulmonary Disease  Chronic obstructive pulmonary disease (COPD) is a long-term (chronic) lung problem. When you have COPD, it is hard for air to get in and out of your lungs. Usually the condition gets worse over time, and your lungs will never return to normal. There are things you can do to keep yourself as healthy as possible.  · Your doctor may treat your condition with:  ? Medicines.  ? Oxygen.  ? Lung surgery.  · Your doctor may also recommend:  ? Rehabilitation. This includes steps to make your body work better. It may involve a team of specialists.  ? Quitting smoking, if you smoke.  ? Exercise and changes to your diet.  ? Comfort measures (palliative care).  Follow these instructions at home:  Medicines  · Take over-the-counter and prescription medicines only as told by your doctor.  · Talk to your doctor before taking any cough or allergy medicines. You may need to avoid medicines that cause your lungs to be dry.  Lifestyle  · If you smoke, stop. Smoking makes the problem worse. If you need help quitting, ask your doctor.  · Avoid being around things that make your breathing worse. This may include smoke, chemicals, and fumes.  · Stay active, but remember to rest as well.  · Learn and use tips on how to relax.  · Make sure you get enough sleep. Most adults need at least 7 hours of sleep every night.  · Eat healthy foods. Eat smaller meals more often. Rest before meals.  Controlled breathing  Learn and use tips on how to control your breathing as told by your doctor. Try:  · Breathing in (inhaling) through your nose for 1 second. Then, pucker your lips and breath out (exhale) through your lips for 2 seconds.  · Putting one hand on your belly (abdomen). Breathe in slowly through your nose for 1 second. Your hand on your belly should move out. Pucker your lips and breathe out slowly through your lips. Your hand on your belly should move in as you breathe out.    Controlled coughing  Learn  and use controlled coughing to clear mucus from your lungs. Follow these steps:  1. Lean your head a little forward.  2. Breathe in deeply.  3. Try to hold your breath for 3 seconds.  4. Keep your mouth slightly open while coughing 2 times.  5. Spit any mucus out into a tissue.  6. Rest and do the steps again 1 or 2 times as needed.  General instructions  · Make sure you get all the shots (vaccines) that your doctor recommends. Ask your doctor about a flu shot and a pneumonia shot.  · Use oxygen therapy and pulmonary rehabilitation if told by your doctor. If you need home oxygen therapy, ask your doctor if you should buy a tool to measure your oxygen level (oximeter).  · Make a COPD action plan with your doctor. This helps you to know what to do if you feel worse than usual.  · Manage any other conditions you have as told by your doctor.  · Avoid going outside when it is very hot, cold, or humid.  · Avoid people who have a sickness you can catch (contagious).  · Keep all follow-up visits as told by your doctor. This is important.  Contact a doctor if:  · You cough up more mucus than usual.  · There is a change in the color or thickness of the mucus.  · It is harder to breathe than usual.  · Your breathing is faster than usual.  · You have trouble sleeping.  · You need to use your medicines more often than usual.  · You have trouble doing your normal activities such as getting dressed or walking around the house.  Get help right away if:  · You have shortness of breath while resting.  · You have shortness of breath that stops you from:  ? Being able to talk.  ? Doing normal activities.  · Your chest hurts for longer than 5 minutes.  · Your skin color is more blue than usual.  · Your pulse oximeter shows that you have low oxygen for longer than 5 minutes.  · You have a fever.  · You feel too tired to breathe normally.  Summary  · Chronic obstructive pulmonary disease (COPD) is a long-term lung problem.  · The way your  lungs work will never return to normal. Usually the condition gets worse over time. There are things you can do to keep yourself as healthy as possible.  · Take over-the-counter and prescription medicines only as told by your doctor.  · If you smoke, stop. Smoking makes the problem worse.  This information is not intended to replace advice given to you by your health care provider. Make sure you discuss any questions you have with your health care provider.  Document Released: 06/05/2009 Document Revised: 01/22/2018 Document Reviewed: 01/22/2018  [employer] Patient Education © 2019 [employer]

## 2019-10-28 NOTE — PROCEDURES
Walking Oximetry  Performed by: Donna Macias APRN  Authorized by: Donna Macias APRN     Rest room air SAT %:  94  Exercise room air SAT %:  81  Exercise on O2 @ Liters:  2  SAT %:  98

## 2019-11-08 ENCOUNTER — TELEPHONE (OUTPATIENT)
Dept: INTERNAL MEDICINE | Age: 84
End: 2019-11-08

## 2019-11-08 ENCOUNTER — CARE COORDINATION (OUTPATIENT)
Dept: CARE COORDINATION | Age: 84
End: 2019-11-08

## 2019-11-08 ASSESSMENT — ENCOUNTER SYMPTOMS: DYSPNEA ASSOCIATED WITH: MINIMAL EXERTION

## 2019-11-11 ENCOUNTER — CARE COORDINATION (OUTPATIENT)
Dept: CARE COORDINATION | Age: 84
End: 2019-11-11

## 2019-11-11 PROBLEM — J18.9 PNEUMONIA OF RIGHT UPPER LOBE DUE TO INFECTIOUS ORGANISM: Status: RESOLVED | Noted: 2017-03-12 | Resolved: 2019-01-01

## 2019-11-11 PROBLEM — R64 CACHEXIA (HCC): Status: ACTIVE | Noted: 2019-01-01

## 2019-11-11 PROBLEM — J18.9 RML PNEUMONIA: Status: ACTIVE | Noted: 2019-01-01

## 2019-11-11 PROBLEM — A41.9 SEPSIS (HCC): Status: ACTIVE | Noted: 2019-01-01

## 2019-11-11 PROBLEM — J18.9 PNEUMONIA: Status: ACTIVE | Noted: 2019-01-01

## 2019-11-11 PROBLEM — J96.21 ACUTE ON CHRONIC RESPIRATORY FAILURE WITH HYPOXIA (HCC): Status: ACTIVE | Noted: 2019-01-01

## 2019-11-11 PROBLEM — S02.2XXA NASAL SEPTUM FRACTURE, CLOSED, INITIAL ENCOUNTER: Status: RESOLVED | Noted: 2017-12-20 | Resolved: 2019-01-01

## 2019-11-11 ASSESSMENT — ENCOUNTER SYMPTOMS: DYSPNEA ASSOCIATED WITH: MINIMAL EXERTION

## 2019-11-11 NOTE — ED PROVIDER NOTES
Subjective   Pt is a 89-year-old white female presents emergency department per private vehicle with her daughter with complaints of shortness of breath and cough.  Her daughter states that she has had congestion and cough for the past 3 days and states that today she became much more short of breath and the congestion had been worse.  Patient does have a history of COPD and is followed by the pulmonary disease clinic.  The daughter states she has had a decreased appetite over the past 2 to 3 days.  She denies any nausea or vomiting.  Patient denies any chest pain.  Patient does wear oxygen at 2L and the daughter advises that the patient had a O2 sat of 87% after she received a breathing treatment and was on her oxygen a few hours prior to arrival.  Patient denies nausea or vomiting.  Daughter states that the patient received her influenza vaccine about 2 weeks ago as well.        History provided by:  Patient and relative   used: No        Review of Systems   Constitutional: Negative.    HENT: Negative.    Eyes: Negative.    Respiratory:        Pt is a 89-year-old white female presents emergency department per private vehicle with her daughter with complaints of shortness of breath and cough.  Her daughter states that she has had congestion and cough for the past 3 days and states that today she became much more short of breath and the congestion had been worse.  Patient does have a history of COPD and is followed by the pulmonary disease clinic.  The daughter states she has had a decreased appetite over the past 2 to 3 days.  She denies any nausea or vomiting.  Patient denies any chest pain.  Patient does wear oxygen at 2L and the daughter advises that the patient had a O2 sat of 87% after she received a breathing treatment and was on her oxygen a few hours prior to arrival.  Patient denies nausea or vomiting.  Daughter states that the patient received her influenza vaccine about 2 weeks ago as  well.     Cardiovascular: Negative.    Gastrointestinal: Negative.    Endocrine: Negative.    Genitourinary: Negative.    Musculoskeletal: Negative.    Skin: Negative.    Allergic/Immunologic: Negative.    Neurological: Negative.    Hematological: Negative.    Psychiatric/Behavioral: Negative.    All other systems reviewed and are negative.      Past Medical History:   Diagnosis Date   • Atrial fibrillation with rapid ventricular response (CMS/HCC)    • Chronic respiratory failure with hypoxia (CMS/HCC) 10/28/2019   • Degenerative joint disease    • Diastolic murmur of aorta    • Dysphagia    • Essential hypertension    • Gastroesophageal reflux disease 10/31/2018   • History of cerebellar stroke 3/28/2018   • Personal history of nicotine dependence 10/31/2018   • Pneumonia        Allergies   Allergen Reactions   • Erythromycin Hives and Swelling   • Demerol [Meperidine] Nausea And Vomiting and GI Intolerance   • Morphine And Related Nausea And Vomiting       Past Surgical History:   Procedure Laterality Date   • APPENDECTOMY     • HERNIA REPAIR     • HYSTERECTOMY     • TONSILLECTOMY     • TUBAL ABDOMINAL LIGATION         Family History   Problem Relation Age of Onset   • Stroke Mother         Age 90   • Emphysema Father    • Cancer Sister    • Cancer Brother        Social History     Socioeconomic History   • Marital status:      Spouse name: Not on file   • Number of children: Not on file   • Years of education: Not on file   • Highest education level: Not on file   Tobacco Use   • Smoking status: Never Smoker   • Smokeless tobacco: Never Used   Substance and Sexual Activity   • Alcohol use: No   • Drug use: No   • Sexual activity: Defer       Prior to Admission medications    Medication Sig Start Date End Date Taking? Authorizing Provider   albuterol (PROVENTIL) (2.5 MG/3ML) 0.083% nebulizer solution INHALE 1 RESPULE VIA NEBULIZER EVERY 4 HOURS AS NEEDED FOR WHEEZING. 10/7/19   Provider, MD Caroline  "  albuterol sulfate  (90 Base) MCG/ACT inhaler Inhale 2 puffs Every 6 (Six) Hours As Needed for Wheezing or Shortness of Air. 4/30/19   Donna Macias APRN   Cranberry 400 MG capsule Take 2 capsules by mouth Daily. Takes 1 in morning and 1 at night    Caroline Zamarripa MD   dabigatran etexilate (PRADAXA) 150 MG capsu Take 1 capsule by mouth Every 12 (Twelve) Hours. 3/31/18   All Singh MD   ferrous sulfate 325 (65 FE) MG tablet Take 325 mg by mouth.    Caroline Zamarripa MD   Fluticasone-Umeclidin-Vilant (TRELEGY ELLIPTA) 100-62.5-25 MCG/INH aerosol powder  Inhale 1 each Daily for 30 days. 10/28/19 11/27/19  Donna Macias APRN   furosemide (LASIX) 20 MG tablet Take 20 mg by mouth Daily.    Caroline Zamarripa MD   guaiFENesin (MUCINEX) 600 MG 12 hr tablet Take 1,200 mg by mouth 2 (Two) Times a Day.    Caroline Zamarripa MD   loratadine (CLARITIN) 10 MG tablet Take 10 mg by mouth Every Night.    Caroline Zamarripa MD   metoprolol tartrate (LOPRESSOR) 25 MG tablet Take 12.5 mg by mouth 2 (Two) Times a Day. Take 1/2 tablet in the morning and 1/2 tablet in the evening    Caroline Zamarripa MD   omeprazole (priLOSEC) 40 MG capsule Take 40 mg by mouth Daily.    Caroline Zamarripa MD   PARoxetine (PAXIL) 10 MG tablet Take 10 mg by mouth Every Morning.    Caroline Zamarripa MD   vitamin B-12 (CYANOCOBALAMIN) 1000 MCG tablet Take 1,000 mcg by mouth.    Caroline Zamarripa MD   vitamin D (ERGOCALCIFEROL) 46394 units capsule capsule Take 50,000 Units by mouth 1 (One) Time Per Week.    Caroline Zamarripa MD       /80   Pulse 90   Temp 97.7 °F (36.5 °C) (Oral)   Resp 25   Ht 157.5 cm (62.01\")   Wt 48.8 kg (107 lb 9.4 oz)   SpO2 99%   Breastfeeding? No   BMI 19.67 kg/m²     Objective   Physical Exam   Constitutional: She is oriented to person, place, and time. She appears well-developed and well-nourished.   Chronically ill appearing. Pt very sob at this " time. Audible wheezing noted. Pt alert and oriented   HENT:   Head: Normocephalic and atraumatic.   Eyes: Conjunctivae and EOM are normal. Pupils are equal, round, and reactive to light.   Neck: Normal range of motion. Neck supple. No tracheal deviation present. No thyromegaly present.   Cardiovascular: Normal rate and intact distal pulses.   Murmur heard.  Irregular rhythm- 114. Pt is in afib   Pulmonary/Chest: Breath sounds normal. Tachypnea noted. She is in respiratory distress. She has no wheezes. She has no rales. She exhibits no tenderness.   Mild resp distress. Audible wheezing noted throughout. Diminished lung sounds bilat bases. Rales noted as well    Abdominal: Soft. Bowel sounds are normal.   Musculoskeletal: Normal range of motion.   Neurological: She is alert and oriented to person, place, and time. She has normal reflexes. No cranial nerve deficit.   Skin: Skin is warm and dry.   Mild pallor noted   Psychiatric: She has a normal mood and affect. Her behavior is normal. Judgment and thought content normal.   Nursing note and vitals reviewed.      Procedures         Lab Results (last 24 hours)     Procedure Component Value Units Date/Time    Hemoglobin & Hematocrit, Blood [[id number]]  (Abnormal) Collected:  11/12/19 1802    Specimen:  Blood Updated:  11/12/19 1807     Hemoglobin 9.1 g/dL      Hematocrit 29.6 %     Hemoglobin & Hematocrit, Blood [[id number]]  (Abnormal) Collected:  11/12/19 2332    Specimen:  Blood Updated:  11/12/19 2350     Hemoglobin 8.7 g/dL      Hematocrit 29.1 %           XR Chest 1 View   Final Result   1. Similar right basilar opacity and effusion.   2. Endotracheal tube tip projects in mid trachea.   This report was finalized on 11/12/2019 08:31 by Dr Ange Romero MD.      XR Chest 1 View   Final Result   1. Status post intubation. Endotracheal tube is in good position.   2. Reidentified layering right pleural effusion, appearing stable.   3. Underlying cardiomegaly stable.            This report was finalized on 11/11/2019 20:26 by Dr Maciej Richardson, .      XR Chest 1 View   Final Result   1. Right basilar opacity and effusion. Left lung clear.   2. Large hiatal hernia.   3. Severe degenerative changes of the bilateral shoulders.   This report was finalized on 11/11/2019 13:50 by Dr Ange Romero MD.          ED Course  ED Course as of Nov 13 0743   Mon Nov 11, 2019   1320 Reeval pt- doing better after breathing treatment and steroids. Repeat vitals 11/61; hr 74; resp 26; 02 sat 93% on vapotherm. Reviewed results with pt and pt daughter. Call placed to hospitalist at this time for further. Pt dimer is elevated but do not think that she has pulmonary embolus. Pt has pneumonia and it is indicated on xray. She also has hx afib and is currently taking pradaxa  [CW]   1341 Spoke with dr england - hospitalist on call- has accepted for admission- advised to admit to dr richards. Family advised of care plan  [CW]      ED Course User Index  [CW] Lexie Peterson, APRLURDES          MDM  Number of Diagnoses or Management Options  Chronic obstructive pulmonary disease, unspecified COPD type (CMS/HCC): established and worsening  Chronic respiratory failure with hypoxia (CMS/HCC):   Pneumonia due to infectious organism, unspecified laterality, unspecified part of lung: minor     Amount and/or Complexity of Data Reviewed  Clinical lab tests: ordered and reviewed  Tests in the radiology section of CPT®: ordered and reviewed  Decide to obtain previous medical records or to obtain history from someone other than the patient: yes    Patient Progress  Patient progress: stable      Final diagnoses:   Pneumonia due to infectious organism, unspecified laterality, unspecified part of lung   Chronic respiratory failure with hypoxia (CMS/HCC)   Chronic obstructive pulmonary disease, unspecified COPD type (CMS/HCC)          Lexie Peterson, JIM  11/13/19 0743       Lexie Peterson, JIM  11/13/19  0725

## 2019-11-11 NOTE — H&P
[facility] Medicine Services  HISTORY AND PHYSICAL    Date of Admission: 11/11/2019  Primary Care Physician: Lupe Ham MD    Subjective     Chief Complaint:   Shortness of breath, cough    History of Present Illness    This 89-year-old white female is admitted with a chief complaint of progressive shortness of breath and cough.  She was brought to the emergency department by her daughter.  The patient resides with her daughter.  The patient states that her symptoms have been present for about 3 days and that today her symptoms became much worse with worsening shortness of breath noted.  The patient's daughter also notes that sputum production has been scant but is clear to cream color.  The patient has a history of chronic respiratory failure and uses 2 L per nasal cannula at home.  The patient's daughter states that she has given her a breathing treatment at home and she was still saturating only 87% at home per their oximeter.  The patient has a history of COPD and is followed by pulmonology locally.    Work-up in the emergency department shows a chest x-ray with a right basilar opacity and large hiatal hernia.  ABGs show a pH of 7.469, PCO2 within normal limits and a PO2 low at 70.2 on 2 L per nasal cannula.  Influenza AB studies are negative.  CMP is unremarkable.  proBNP slightly elevated at 1933 likely secondary to pulmonary hypertension.  White blood cell count is slightly elevated at 11,300 with a hemoglobin of 7.3.    Review of Systems   Constitutional: Positive for activity change. Negative for fever.   HENT: Negative.    Eyes: Negative.    Respiratory: Positive for cough and shortness of breath.    Cardiovascular: Negative.    Gastrointestinal: Negative.    Endocrine: Negative.    Genitourinary: Negative.  Negative for urgency.   Musculoskeletal: Positive for arthralgias. Negative for gait problem.   Skin: Negative.    Allergic/Immunologic: Negative.     Neurological: Negative.    Hematological: Negative.    Psychiatric/Behavioral: Negative.  Negative for behavioral problems and confusion.        Otherwise complete ROS reviewed and negative except as mentioned in the HPI.      Past Medical History:     Past Medical History:   Diagnosis Date   • Atrial fibrillation with rapid ventricular response (CMS/HCC)    • Chronic respiratory failure with hypoxia (CMS/HCC) 10/28/2019   • Degenerative joint disease    • Diastolic murmur of aorta    • Dysphagia    • Essential hypertension    • Gastroesophageal reflux disease 10/31/2018   • History of cerebellar stroke 3/28/2018   • Personal history of nicotine dependence 10/31/2018   • Pneumonia        Past Surgical History:  Past Surgical History:   Procedure Laterality Date   • APPENDECTOMY     • HYSTERECTOMY     • TONSILLECTOMY     • TUBAL ABDOMINAL LIGATION         Family History:   family history includes Cancer in her brother and sister; Emphysema in her father; Stroke in her mother.    Social History:    reports that she has never smoked. She has never used smokeless tobacco. She reports that she does not drink alcohol or use drugs.    Medications:  Prior to Admission medications    Medication Sig Start Date End Date Taking? Authorizing Provider   albuterol (PROVENTIL) (2.5 MG/3ML) 0.083% nebulizer solution INHALE 1 RESPULE VIA NEBULIZER EVERY 4 HOURS AS NEEDED FOR WHEEZING. 10/7/19  Yes Caroline Zamarripa MD   albuterol sulfate  (90 Base) MCG/ACT inhaler Inhale 2 puffs Every 6 (Six) Hours As Needed for Wheezing or Shortness of Air. 4/30/19  Yes Donna Macias APRN   Cranberry 400 MG capsule Take 2 capsules by mouth Daily. Takes 1 in morning and 1 at night   Yes Caroline Zamarripa MD   dabigatran etexilate (PRADAXA) 150 MG capsu Take 1 capsule by mouth Every 12 (Twelve) Hours. 3/31/18  Yes All Singh MD   ferrous sulfate 325 (65 FE) MG tablet Take 325 mg by mouth.   Yes Provider  "MD Caroline   Fluticasone-Umeclidin-Vilant (TRELEGY ELLIPTA) 100-62.5-25 MCG/INH aerosol powder  Inhale 1 each Daily for 30 days. 10/28/19 11/27/19 Yes Colleen, JIM Mcadams   furosemide (LASIX) 20 MG tablet Take 20 mg by mouth Daily.   Yes Caroline Zamarripa MD   guaiFENesin (MUCINEX) 600 MG 12 hr tablet Take 1,200 mg by mouth 2 (Two) Times a Day.   Yes Caroline Zamarripa MD   loratadine (CLARITIN) 10 MG tablet Take 10 mg by mouth Every Night.   Yes Caroline Zamarripa MD   metoprolol tartrate (LOPRESSOR) 25 MG tablet Take 12.5 mg by mouth 2 (Two) Times a Day. Take 1/2 tablet in the morning and 1/2 tablet in the evening   Yes Caroline Zamarripa MD   omeprazole (priLOSEC) 40 MG capsule Take 40 mg by mouth Daily.   Yes Caroline Zamarripa MD   PARoxetine (PAXIL) 10 MG tablet Take 10 mg by mouth Every Morning.   Yes Caroline Zamarripa MD   vitamin B-12 (CYANOCOBALAMIN) 1000 MCG tablet Take 1,000 mcg by mouth.   Yes Caroline Zamarripa MD   vitamin D (ERGOCALCIFEROL) 00496 units capsule capsule Take 50,000 Units by mouth 1 (One) Time Per Week.   Yes ProviderCaroline MD       Allergies:  Allergies   Allergen Reactions   • Erythromycin Hives and Swelling   • Demerol [Meperidine] Nausea And Vomiting and GI Intolerance   • Morphine And Related Nausea And Vomiting       Objective     Vital Signs:   /82 (BP Location: Right arm, Patient Position: Lying)   Pulse 105   Temp 98 °F (36.7 °C) (Oral)   Resp 22   Ht 157.5 cm (62\")   Wt 53.8 kg (118 lb 9 oz)   SpO2 100%   Breastfeeding? No   BMI 21.69 kg/m²     Physical Exam   Constitutional: She is oriented to person, place, and time. She appears well-developed and well-nourished. She is cooperative. No distress. Nasal cannula in place.   The patient is lying in bed.  Her daughter is present in the room with her.   HENT:   Head: Normocephalic and atraumatic.   Right Ear: External ear normal.   Left Ear: External ear normal.   Nose: Nose " normal.   Mouth/Throat: Oropharynx is clear and moist.   Eyes: Conjunctivae and EOM are normal. Pupils are equal, round, and reactive to light.   Neck: Normal range of motion. Neck supple. No JVD present. No thyromegaly present.   Cardiovascular: Regular rhythm. Tachycardia present.   Murmur heard.   Systolic murmur is present with a grade of 4/6.  Pulmonary/Chest: Effort normal. No accessory muscle usage. Tachypnea noted. No respiratory distress. She has decreased breath sounds ( Bilaterally). She has rales in the right lower field.   Abdominal: Soft. Bowel sounds are normal. She exhibits no mass. There is no tenderness.   Musculoskeletal: Normal range of motion. She exhibits no edema or tenderness.   Neurological: She is alert and oriented to person, place, and time. She displays normal reflexes. No cranial nerve deficit.   Skin: Skin is warm and dry. No erythema. No pallor.   Psychiatric: She has a normal mood and affect.           Results Reviewed:  Lab Results (last 24 hours)     Procedure Component Value Units Date/Time    [place] Draw [[id number]] Collected:  11/11/19 1216    Specimen:  Blood Updated:  11/11/19 1330    Narrative:       The following orders were created for panel order [place] Draw.  Procedure                               Abnormality         Status                     ---------                               -----------         ------                     Light Blue Top[[id number]]                                   Final result               Green Top (Gel)[[id number]]                                  Final result               Lavender Top[[id number]]                                     Final result               Red Top[[id number]]                                          In process                   Please view results for these tests on the individual orders.    Light Blue Top [[id number]] Collected:  11/11/19 1216    Specimen:  Blood Updated:  11/11/19 1330     Extra Tube hold for add-on      Comment: Auto resulted       Green Top (Gel) [[id number]] Collected:  11/11/19 1216    Specimen:  Blood Updated:  11/11/19 1330     Extra Tube Hold for add-ons.     Comment: Auto resulted.       Lavender Top [[id number]] Collected:  11/11/19 1216    Specimen:  Blood Updated:  11/11/19 1330     Extra Tube hold for add-on     Comment: Auto resulted       Blood Gas, Arterial With Co-Ox [[id number]]  (Abnormal) Collected:  11/11/19 1250    Specimen:  Arterial Blood Updated:  11/11/19 1302     Site Right Brachial     Marcelo's Test N/A     pH, Arterial 7.469 pH units      Comment: 83 Value above reference range        pCO2, Arterial 38.1 mm Hg      pO2, Arterial 70.2 mm Hg      Comment: 84 Value below reference range        HCO3, Arterial 27.6 mmol/L      Comment: 83 Value above reference range        Base Excess, Arterial 3.7 mmol/L      Comment: 83 Value above reference range        O2 Saturation, Arterial 95.2 %      Hemoglobin, Blood Gas 7.1 g/dL      Comment: 84 Value below reference range        Hematocrit, Blood Gas 21.8 %      Comment: 84 Value below reference range        Oxyhemoglobin 92.3 %      Comment: 84 Value below reference range        Methemoglobin 1.10 %      Carboxyhemoglobin 2.0 %      A-a Gradiant 35.1 mmHg      Temperature 37.0 C      Sodium, Arterial 138 mmol/L      Potassium, Arterial 3.9 mmol/L      Barometric Pressure for Blood Gas 753 mmHg      Modality Nasal Cannula     Flow Rate 2.0 lpm      Ventilator Mode NA     Note --     Collected by [id number]     Comment: Meter: [id number]     :  [id number]        pH, Temp Corrected --     pCO2, Temperature Corrected --     pO2, Temperature Corrected --    Comprehensive Metabolic Panel [[id number]]  (Abnormal) Collected:  11/11/19 1216    Specimen:  Blood Updated:  11/11/19 1252     Glucose 111 mg/dL      BUN 11 mg/dL      Creatinine 0.50 mg/dL      Sodium 141 mmol/L      Potassium 4.3 mmol/L      Chloride 102 mmol/L      CO2 30.0 mmol/L       Calcium 9.0 mg/dL      Total Protein 6.8 g/dL      Albumin 3.50 g/dL      ALT (SGPT) 7 U/L      AST (SGOT) 13 U/L      Alkaline Phosphatase 77 U/L      Total Bilirubin 0.7 mg/dL      eGFR Non African Amer 115 mL/min/1.73      Globulin 3.3 gm/dL      A/G Ratio 1.1 g/dL      BUN/Creatinine Ratio 22.0     Anion Gap 9.0 mmol/L     Narrative:       GFR Normal >60  Chronic Kidney Disease <60  Kidney Failure <15    Troponin [[id number]]  (Normal) Collected:  11/11/19 1216    Specimen:  Blood Updated:  11/11/19 1252     Troponin T <0.010 ng/mL     Narrative:       Troponin T Reference Range:  <= 0.03 ng/mL-   Negative for AMI  >0.03 ng/mL-     Abnormal for myocardial necrosis.  Clinicians would have to utilize clinical acumen, EKG, Troponin and serial changes to determine if it is an Acute Myocardial Infarction or myocardial injury due to an underlying chronic condition.     BNP [[id number]]  (Abnormal) Collected:  11/11/19 1216    Specimen:  Blood Updated:  11/11/19 1252     proBNP 1,933.0 pg/mL     Narrative:       Among patients with dyspnea, NT-proBNP is highly sensitive for the detection of acute congestive heart failure. In addition NT-proBNP of <300 pg/ml effectively rules out acute congestive heart failure with 99% negative predictive value.    Lactic Acid, Plasma [[id number]]  (Normal) Collected:  11/11/19 1216    Specimen:  Blood Updated:  11/11/19 1249     Lactate 1.9 mmol/L     D-dimer, Quantitative [[id number]]  (Abnormal) Collected:  11/11/19 1216    Specimen:  Blood Updated:  11/11/19 1244     D-Dimer, Quantitative 2.37 mg/L (FEU)     Narrative:       Reference Range is 0-0.50 mg/L FEU. However, results <0.50 mg/L FEU tends to rule out DVT or PE. Results >0.50 mg/L FEU are not useful in predicting absence or presence of DVT or PE.    Protime-INR [[id number]]  (Abnormal) Collected:  11/11/19 1216    Specimen:  Blood Updated:  11/11/19 1244     Protime 15.8 Seconds      INR 1.22    Influenza Antigen, Rapid - Swab,  Nasopharynx [[id number]]  (Normal) Collected:  11/11/19 1218    Specimen:  Swab from Nasopharynx Updated:  11/11/19 1242     Influenza A Ag, EIA Negative     Influenza B Ag, EIA Negative    Narrative:       Recommend confirmation of negative results by viral culture or molecular assay.    Blood Culture - Blood, Arm, Left [[id number]] Collected:  11/11/19 1222    Specimen:  Blood from Arm, Left Updated:  11/11/19 1235    Blood Culture - Blood, Arm, Right [[id number]] Collected:  11/11/19 1216    Specimen:  Blood from Arm, Right Updated:  11/11/19 1234    CBC & Differential [[id number]] Collected:  11/11/19 1216    Specimen:  Blood Updated:  11/11/19 1233    Narrative:       The following orders were created for panel order CBC & Differential.  Procedure                               Abnormality         Status                     ---------                               -----------         ------                     CBC Auto Differential[[id number]]        Abnormal            Final result                 Please view results for these tests on the individual orders.    CBC Auto Differential [[id number]]  (Abnormal) Collected:  11/11/19 1216    Specimen:  Blood Updated:  11/11/19 1233     WBC 11.26 10*3/mm3      RBC 2.98 10*6/mm3      Hemoglobin 7.3 g/dL      Hematocrit 24.1 %      MCV 80.9 fL      MCH 24.5 pg      MCHC 30.3 g/dL      RDW 18.1 %      RDW-SD 53.1 fl      MPV 9.0 fL      Platelets 328 10*3/mm3      Neutrophil % 82.6 %      Lymphocyte % 6.9 %      Monocyte % 9.5 %      Eosinophil % 0.3 %      Basophil % 0.3 %      Immature Grans % 0.4 %      Neutrophils, Absolute 9.30 10*3/mm3      Lymphocytes, Absolute 0.78 10*3/mm3      Monocytes, Absolute 1.07 10*3/mm3      Eosinophils, Absolute 0.03 10*3/mm3      Basophils, Absolute 0.03 10*3/mm3      Immature Grans, Absolute 0.05 10*3/mm3      nRBC 0.0 /100 WBC     Red Top [[id number]] Collected:  11/11/19 1216    Specimen:  Blood Updated:  11/11/19 1231          Xr Chest 1  View    Result Date: 11/11/2019  Narrative: EXAM: XR CHEST 1 VW- - 11/11/2019 12:16 PM CST  HISTORY: cough/ congestion/ hypoxia   COMPARISON: 02/19/2019.  TECHNIQUE:  1 images.  Frontal view of the chest.  FINDINGS:  No pneumothorax. Right basilar opacity and effusion. Left lung clear. Cardiac and mediastinal silhouette similar compared to prior. Probable hiatal hernia. Calcified aortic atheromatous sclerosis. Severe degenerative changes of the bilateral shoulders with superior migration of the humeral heads.       Impression: 1. Right basilar opacity and effusion. Left lung clear. 2. Large hiatal hernia. 3. Severe degenerative changes of the bilateral shoulders. This report was finalized on 11/11/2019 13:50 by Dr Ange Romero MD.     I have personally reviewed and interpreted the radiology studies and ECG obtained at time of admission.     Assessment / Plan      Assessment & Plan  Active Hospital Problems    Diagnosis   • **RML pneumonia (CMS/HCC)   • Cachexia (CMS/HCC)   • Sepsis (CMS/HCC)   • Chronic respiratory failure with hypoxia (CMS/HCC)   • Pulmonary hypertension (CMS/HCC)   • Anemia   • Acute on chronic respiratory failure with hypoxia (CMS/HCC)   • Current use of long term anticoagulation   • Stage 2 moderate COPD by GOLD classification (CMS/HCC)   • Aortic stenosis, severe   • Chronic atrial fibrillation       PLAN:   Admit to the medical surgical floor  Sputum culture, urinary antigens  Rocephin 1 g IV every 24 hours  Doxycycline 100 mg IV every 12 hours  Resume home medications  Supplemental oxygen as needed  Type cross and infuse 1 unit PRBCs    Code Status:   Full code  Surrogate decision makers the patient's daughter     I discussed the patient's findings and my recommendations with: The patient and her daughter    Estimated length of stay: 4-5 days    Alen Montenegro DO   11/11/19   4:11 PM

## 2019-11-11 NOTE — PROGRESS NOTES
RT Nebulizer Protocol    Assessment tool to be used for patients with existing breathing treatments ordered by hospitalists                                                                  0  1  2  3  4      Respiratory History   No Smoking   x   Smoking History      1 Pack/Day      Pulmonary Disease      Exacerbation        Respiratory Rate   Normal      20-25   x   Dyspneic      Accessory Muscles      Severe Dyspnea        Breath Sounds   Clear      Crackles   x   Crackles/ Rhonchi      Wheezing      Absent/ Severe Wheezing        Chest   X-ray   Clear      1 Lobe Infiltration/ Consolidation/ PE   x   2 Lobe Same Lung Infiltration/ Consolidation/ PE       2 Lobe Infiltration/ Both Lungs/ Consolidation/ PE      Both Lungs/ More Than 1 Lobe/ Atelectasis/ Consolidation/  PE        Cough   Strong Non- Productive   x   Excessive Secretions/ Strong Cough      Excessive Secretions/ Weak Cough      Thick Bronchial Secretions/ Weak Cough      Thick Bronchial Secretions/ No Cough        Total Patient Score =  3  0-4=Q4 PRN  5-9=TID and Q4 PRN  10-14=QID and Q3 PRN  15-19=Q4 and Q2 PRN  20=Q3 and Q2 PRN    Bronchopulmonary Hygiene (CPT)   Q4 ATC Copious secretions, dyspnea, unable to sleep, mucus plug    QID & Q4 PRN Moderate secretions    TID Small amounts of secretions w/ poor cough and history of secretions    BID Unable to deep breathe and cough spontaneously       Lung Expansion Therapy (PEP)   Q4 & PRN at night Severe atelectasis, poor oxygenation    QID  High risk for persistent atelectasis, existence of same    TID At risk for developing atelectasis    BID Unable to deep breathe and cough spontaneously     Instruct, 1 follow up Patients able to perform well on their own      Patient takes nebs BID-QID at home per daughter.  Pt. Is on 2lpm at night and with exercise at home.  Patient scores Q4prn per protocol, but will continue home regimen at [facility] QID and Q4 prn per home regimen.

## 2019-11-12 PROBLEM — K92.0 COFFEE GROUND EMESIS: Status: ACTIVE | Noted: 2019-01-01

## 2019-11-12 NOTE — PROGRESS NOTES
[facility] Medicine Rapid Response Note       Date of Encounter: November 11, 2019    Attending Physician: Dr. Alen Montenegro    Description of Events: Called to see patient for respiratory distress.  Patient had noted decreased oxygen saturations and was not doing well.  Patient was in increased distress despite aggressive treatment.  Patient has been admitted with pneumonia and anemia.  She is to get a blood transfusion.  Discussion with the family specifically the daughter who is her POA wishes all aggressive measures to be performed including intubation.  Patient is in too much distress to continue as she is therefore will transfer to the intensive care unit and intubate.  Please see separate note for intubation.  Patient also after intubation was noted to be in A. fib with variable rate but up to 120.  Blood pressure is on the low side will give IV digoxin.          Modesto Henderson MD  11/12/2019  12:10 AM

## 2019-11-12 NOTE — CONSULTS
[facility]ART [facility] CONSULT NOTE    Referring Provider: Alen Montenegro DO    Reason for Consultation: elevated BNP    Chief complaint:   Chief Complaint   Patient presents with   • Shortness of Breath       Subjective .     History of present illness:  Heather Burgos is a 89 y.o. female with history of aortic stenosis, chronic atrial fibrillation, hypertension, pulmonary hypertension, COPD and stroke. She presented to the hospital with complaints of shortness of breath and a productive cough that started about 3 days ago. Her symptoms got worse with worsening shortness of breath. She had cream colored sputum. Her oxygen saturations were low at 87% on her prescribed 2L NC. Work up in the ER notes a CXR with presence of right basilar opacity and effusion with the left lung being clear. Her BNP was elevated at 1933. Her last 2D echo notes her aortic valve had worsened with her DOC at 0.72, pk gordon 388 and mean gradient 39. She had refused TAVR at that time. She received 1u PRBC for hgb 7.3. Her shortness of breath progressed and she had to be intubated and was transferred to ICU. Cardiology was consulted due to an elevated BNP and concerns about extubation.     History  Past Medical History:   Diagnosis Date   • Atrial fibrillation with rapid ventricular response (CMS/HCC)    • Chronic respiratory failure with hypoxia (CMS/HCC) 10/28/2019   • Degenerative joint disease    • Diastolic murmur of aorta    • Dysphagia    • Essential hypertension    • Gastroesophageal reflux disease 10/31/2018   • History of cerebellar stroke 3/28/2018   • Personal history of nicotine dependence 10/31/2018   • Pneumonia    ,   Past Surgical History:   Procedure Laterality Date   • APPENDECTOMY     • HERNIA REPAIR     • HYSTERECTOMY     • TONSILLECTOMY     • TUBAL ABDOMINAL LIGATION     ,   Family History   Problem Relation Age of Onset   • Stroke Mother         Age 90   • Emphysema Father    • Cancer Sister    • Cancer  Brother    ,   Social History     Tobacco Use   • Smoking status: Never Smoker   • Smokeless tobacco: Never Used   Substance Use Topics   • Alcohol use: No   • Drug use: No   ,     Medications    Prior to Admission medications    Medication Sig Start Date End Date Taking? Authorizing Provider   albuterol (PROVENTIL) (2.5 MG/3ML) 0.083% nebulizer solution INHALE 1 RESPULE VIA NEBULIZER EVERY 4 HOURS AS NEEDED FOR WHEEZING. 10/7/19  Yes Caroline Zamarripa MD   albuterol sulfate  (90 Base) MCG/ACT inhaler Inhale 2 puffs Every 6 (Six) Hours As Needed for Wheezing or Shortness of Air. 4/30/19  Yes Donna Macias APRN   Cranberry 400 MG capsule Take 2 capsules by mouth Daily. Takes 1 in morning and 1 at night   Yes Caroline Zamarripa MD   dabigatran etexilate (PRADAXA) 150 MG capsu Take 1 capsule by mouth Every 12 (Twelve) Hours. 3/31/18  Yes All Singh MD   ferrous sulfate 325 (65 FE) MG tablet Take 325 mg by mouth.   Yes Caroline Zamarripa MD   Fluticasone-Umeclidin-Vilant (TRELEGY ELLIPTA) 100-62.5-25 MCG/INH aerosol powder  Inhale 1 each Daily for 30 days. 10/28/19 11/27/19 Yes Donna Macias APRN   furosemide (LASIX) 20 MG tablet Take 20 mg by mouth Daily.   Yes Caroline Zamarripa MD   guaiFENesin (MUCINEX) 600 MG 12 hr tablet Take 1,200 mg by mouth 2 (Two) Times a Day.   Yes Caroline Zamarripa MD   loratadine (CLARITIN) 10 MG tablet Take 10 mg by mouth Every Night.   Yes Caroline Zamarripa MD   metoprolol tartrate (LOPRESSOR) 25 MG tablet Take 12.5 mg by mouth 2 (Two) Times a Day. Take 1/2 tablet in the morning and 1/2 tablet in the evening   Yes Caroline Zamarripa MD   omeprazole (priLOSEC) 40 MG capsule Take 40 mg by mouth Daily.   Yes Caroline Zamarripa MD   PARoxetine (PAXIL) 10 MG tablet Take 10 mg by mouth Every Morning.   Yes Caroline Zamarripa MD   vitamin B-12 (CYANOCOBALAMIN) 1000 MCG tablet Take 1,000 mcg by mouth.   Yes Caroline Zamarripa MD    vitamin D (ERGOCALCIFEROL) 78499 units capsule capsule Take 50,000 Units by mouth 1 (One) Time Per Week.   Yes Provider, MD Caroline       Current Facility-Administered Medications   Medication Dose Route Frequency Provider Last Rate Last Dose   • acetaminophen (TYLENOL) tablet 650 mg  650 mg Oral Q4H PRN Alen Montenegro DO        Or   • acetaminophen (TYLENOL) 160 MG/5ML solution 650 mg  650 mg Oral Q4H PRN Alen Montenegro DO        Or   • acetaminophen (TYLENOL) suppository 650 mg  650 mg Rectal Q4H PRN Alen Montenegro DO       • albuterol (PROVENTIL) nebulizer solution 0.083% 2.5 mg/3mL  2.5 mg Nebulization Once PRN Modesto Henderson MD       • aluminum-magnesium hydroxide-simethicone (MAALOX MAX) 400-400-40 MG/5ML suspension 15 mL  15 mL Oral Q6H PRN Alen Montenegro DO       • bisacodyl (DULCOLAX) EC tablet 5 mg  5 mg Oral Daily PRN Alen Montenegro DO       • cefTRIAXone (ROCEPHIN) 1 g/100 mL 0.9% NS (MBP)  1 g Intravenous Q24H Alen Montenegro DO   1 g at 11/12/19 1154   • doxycycline (VIBRAMYCIN) 100 mg/100 mL 0.9% NS MBP  100 mg Intravenous Q12H Alen Montenegro DO   100 mg at 11/12/19 0433   • furosemide (LASIX) injection 40 mg  40 mg Intravenous Daily Alen Montenegro DO   40 mg at 11/12/19 1155   • guaiFENesin (MUCINEX) 12 hr tablet 1,200 mg  1,200 mg Oral Q12H Alen Montenegro DO       • ipratropium-albuterol (DUO-NEB) nebulizer solution 3 mL  3 mL Nebulization Q4H PRN Alen Montenegro DO       • ipratropium-albuterol (DUO-NEB) nebulizer solution 3 mL  3 mL Nebulization 4x Daily - RT Alen Montenegro DO   3 mL at 11/12/19 1054   • melatonin tablet 6 mg  6 mg Oral Nightly PRN Alen Montenegro DO       • metoprolol tartrate (LOPRESSOR) tablet 12.5 mg  12.5 mg Oral Q12H Alen Montenegro DO       • ondansetron (ZOFRAN) injection 4 mg  4 mg Intravenous Q6H PRN Alen Montenegro DO   4 mg at 11/12/19 0552   • pantoprazole (PROTONIX) injection 40  "mg  40 mg Intravenous Q AM Alen Montenegro DO   40 mg at 11/12/19 1155   • PARoxetine (PAXIL) tablet 10 mg  10 mg Oral Daily Alen Montenegro DO       • propofol (DIPRIVAN) infusion 10 mg/mL 100 mL  5-75 mcg/kg/min Intravenous Titrated Modesto Henderson MD 16.14 mL/hr at 11/12/19 0940 50 mcg/kg/min at 11/12/19 0940   • sodium chloride 0.9 % flush 10 mL  10 mL Intravenous Q12H Alen Montenegro DO   10 mL at 11/11/19 2119   • sodium chloride 0.9 % flush 10 mL  10 mL Intravenous PRN Alen Montenegro DO       • sodium chloride 0.9 % infusion  50 mL/hr Intravenous Continuous Alen Montenegro DO 50 mL/hr at 11/12/19 1128 50 mL/hr at 11/12/19 1128       Allergies:  Erythromycin; Demerol [meperidine]; and Morphine and related    Review of Systems  Review of Systems   Unable to perform ROS: Intubated       Objective     Physical Exam:  Tele: Afib 70s  Patient Vitals for the past 24 hrs:   BP Temp Temp src Pulse Resp SpO2 Height Weight   11/12/19 1204 126/68 -- -- -- -- -- 157.5 cm (62.01\") 53.9 kg (118 lb 13.3 oz)   11/12/19 1200 136/79 98.9 °F (37.2 °C) Axillary 70 -- 100 % -- --   11/12/19 1106 -- -- -- 77 -- -- -- --   11/12/19 1100 126/68 -- -- 60 -- 100 % -- --   11/12/19 1054 -- -- -- 67 14 100 % -- --   11/12/19 1000 146/97 -- -- 66 -- 91 % -- --   11/12/19 0900 104/65 -- -- 55 -- -- -- --   11/12/19 0800 104/59 98.4 °F (36.9 °C) Axillary 61 -- 98 % -- --   11/12/19 0716 123/62 -- -- 60 -- 97 % -- --   11/12/19 0701 101/58 -- -- 61 -- 97 % -- --   11/12/19 0700 -- -- -- 63 -- 99 % -- --   11/12/19 0646 95/63 -- -- 62 -- 99 % -- --   11/12/19 0631 94/60 -- -- 60 -- 98 % -- --   11/12/19 0616 99/58 -- -- 66 -- 98 % -- --   11/12/19 0601 109/61 -- -- 63 -- 98 % -- 53.9 kg (118 lb 13.3 oz)   11/12/19 0546 117/99 -- -- 81 -- 92 % -- --   11/12/19 0531 97/57 -- -- 66 -- 99 % -- --   11/12/19 0516 110/58 -- -- 66 -- 99 % -- --   11/12/19 0501 124/74 -- -- 71 -- 96 % -- --   11/12/19 0446 120/69 -- -- 72 " -- 99 % -- --   11/12/19 0431 93/53 97.5 °F (36.4 °C) Axillary 68 -- 99 % -- --   11/12/19 0417 -- -- -- -- -- 100 % -- --   11/12/19 0346 97/63 -- -- 64 -- 100 % -- --   11/12/19 0331 107/53 -- -- 75 -- 100 % -- --   11/12/19 0316 126/71 -- -- 63 -- 100 % -- --   11/12/19 0301 130/67 -- -- 73 -- 100 % -- --   11/12/19 0246 125/71 -- -- 65 -- 100 % -- --   11/12/19 0231 119/80 -- -- 73 -- 100 % -- --   11/12/19 0216 111/55 -- -- 71 -- 100 % -- --   11/12/19 0201 103/58 -- -- 67 -- 100 % -- --   11/12/19 0146 109/63 -- -- 71 -- 100 % -- --   11/12/19 0131 129/69 -- -- 65 -- 100 % -- --   11/12/19 0116 118/92 -- -- -- -- 90 % -- --   11/12/19 0101 (!) 119/105 -- -- 80 -- 100 % -- --   11/12/19 0046 115/83 -- -- 72 -- 100 % -- --   11/12/19 0031 121/78 -- -- 71 -- -- -- --   11/12/19 0016 140/68 -- -- 72 -- -- -- --   11/12/19 0014 144/89 96.9 °F (36.1 °C) Axillary 70 15 100 % -- --   11/12/19 0001 (!) 123/106 -- -- 75 -- 100 % -- --   11/12/19 0000 -- -- -- -- -- 100 % -- --   11/11/19 2346 104/72 -- -- 75 -- 100 % -- --   11/11/19 2331 101/65 -- -- 76 -- 100 % -- --   11/11/19 2316 92/64 -- -- 80 -- 100 % -- --   11/11/19 2300 104/73 -- -- 78 -- 100 % -- --   11/11/19 2245 102/72 -- -- 85 -- 100 % -- --   11/11/19 2239 99/64 -- -- 92 -- -- -- --   11/11/19 2231 99/64 -- -- 92 -- 100 % -- --   11/11/19 2215 106/57 97 °F (36.1 °C) Axillary 94 18 100 % -- --   11/11/19 2200 113/79 97.1 °F (36.2 °C) Axillary 95 20 100 % -- --   11/11/19 2158 113/79 97.1 °F (36.2 °C) Axillary 104 23 100 % -- --   11/11/19 2145 100/76 -- -- 102 -- -- -- --   11/11/19 2143 100/76 96.9 °F (36.1 °C) Axillary 101 20 100 % -- --   11/11/19 2130 115/69 -- -- 96 -- -- -- --   11/11/19 2128 115/69 97 °F (36.1 °C) -- 99 23 100 % -- --   11/11/19 2116 98/67 -- -- 92 -- 100 % -- --   11/11/19 2105 (!) 84/58 97.5 °F (36.4 °C) Axillary 102 17 100 % -- --   11/11/19 2100 (!) 85/57 -- -- 100 -- 100 % -- --   11/11/19 2051 (!) 80/60 -- -- 100 -- 100 % --  "--   11/11/19 2045 (!) 86/61 -- -- 100 -- 100 % -- --   11/11/19 2030 119/78 -- -- (!) 121 -- 100 % -- --   11/11/19 2019 109/79 -- -- (!) 125 -- -- -- --   11/11/19 2016 -- -- -- (!) 132 -- 100 % -- --   11/11/19 2015 130/96 -- -- -- -- -- -- --   11/11/19 2010 (!) 134/107 97.5 °F (36.4 °C) Axillary (!) 124 -- 100 % 157.5 cm (62.01\") 53.9 kg (118 lb 12.8 oz)   11/11/19 1941 (!) 182/122 -- -- (!) 121 -- 96 % -- 48.4 kg (106 lb 11.2 oz)   11/11/19 1937 -- -- -- -- -- 100 % -- --   11/11/19 1936 (!) 186/130 -- -- (!) 149 (!) 32 (!) 89 % -- --   11/11/19 1621 -- -- -- 110 24 95 % -- --   11/11/19 1612 -- -- -- 107 26 95 % -- --   11/11/19 1535 124/82 98 °F (36.7 °C) Oral 105 22 100 % 157.5 cm (62\") 53.8 kg (118 lb 9 oz)   11/11/19 1510 -- -- -- -- -- 100 % -- --   11/11/19 1447 138/81 -- -- 111 -- 99 % -- --   11/11/19 1411 -- -- -- 106 -- 100 % -- --   11/11/19 1355 -- -- -- 117 28 100 % -- --   11/11/19 1347 -- -- -- 105 -- 91 % -- --   11/11/19 1310 -- -- -- 74 26 91 % -- --   11/11/19 1306 -- -- -- 103 28 95 % -- --   11/11/19 1259 -- -- -- 100 -- 95 % -- --     Physical Exam   Constitutional: She is oriented to person, place, and time. Vital signs are normal. She appears cachectic. She appears ill. No distress. She is sedated and intubated.   HENT:   Head: Normocephalic.   Mouth/Throat: No oropharyngeal exudate.   Eyes: Conjunctivae and EOM are normal. Pupils are equal, round, and reactive to light. No scleral icterus.   Neck: Normal range of motion. Neck supple.   Cardiovascular: Normal rate, normal heart sounds and intact distal pulses. An irregularly irregular rhythm present.   No murmur heard.  Pulmonary/Chest: Effort normal. She is intubated. No respiratory distress. She has wheezes. She has rhonchi. She has no rales. She exhibits no tenderness.   Crackles present   Abdominal: Soft. Bowel sounds are normal. She exhibits no distension. There is no tenderness.   Musculoskeletal: Normal range of motion. She " exhibits no edema.   Neurological: She is oriented to person, place, and time. She has normal reflexes.   Skin: Skin is warm and dry. No rash noted. She is not diaphoretic. No erythema. No pallor.   Psychiatric: She has a normal mood and affect. Her behavior is normal.   Vitals reviewed.      Results Review:   I reviewed the patient's new clinical results.    Lab Results (last 72 hours)     Procedure Component Value Units Date/Time    Blood Culture - Blood, Arm, Left [[id number]] Collected:  11/11/19 1222    Specimen:  Blood from Arm, Left Updated:  11/12/19 1246     Blood Culture No growth at 24 hours    Blood Culture - Blood, Arm, Right [[id number]] Collected:  11/11/19 1216    Specimen:  Blood from Arm, Right Updated:  11/12/19 1246     Blood Culture No growth at 24 hours    BNP [[id number]]  (Abnormal) Collected:  11/12/19 0402    Specimen:  Blood Updated:  11/12/19 1232     proBNP 2,853.0 pg/mL     Narrative:       Among patients with dyspnea, NT-proBNP is highly sensitive for the detection of acute congestive heart failure. In addition NT-proBNP of <300 pg/ml effectively rules out acute congestive heart failure with 99% negative predictive value.    Respiratory Culture - Sputum, ET Suction [[id number]] Collected:  11/11/19 2023    Specimen:  Sputum from ET Suction Updated:  11/12/19 0652     Gram Stain Greater than 25 WBCs per low power field      Few (2+) Mixed gram positive migel      Few (2+) Budding yeast      Few (2+) Epithelial cells per low power field    Basic Metabolic Panel [[id number]]  (Abnormal) Collected:  11/12/19 0402    Specimen:  Blood Updated:  11/12/19 0505     Glucose 170 mg/dL      BUN 14 mg/dL      Creatinine 0.43 mg/dL      Sodium 139 mmol/L      Potassium 4.7 mmol/L      Comment: Specimen hemolyzed.  Results may be affected.        Chloride 104 mmol/L      CO2 26.0 mmol/L      Calcium 8.3 mg/dL      eGFR Non African Amer 137 mL/min/1.73      BUN/Creatinine Ratio 32.6     Anion Gap 9.0  mmol/L     Narrative:       GFR Normal >60  Chronic Kidney Disease <60  Kidney Failure <15    CBC Auto Differential [[id number]]  (Abnormal) Collected:  11/12/19 0402    Specimen:  Blood Updated:  11/12/19 0426     WBC 8.66 10*3/mm3      RBC 3.28 10*6/mm3      Hemoglobin 8.1 g/dL      Hematocrit 26.4 %      MCV 80.5 fL      MCH 24.7 pg      MCHC 30.7 g/dL      RDW 17.6 %      RDW-SD 51.2 fl      MPV 9.0 fL      Platelets 251 10*3/mm3      Neutrophil % 83.5 %      Lymphocyte % 6.6 %      Monocyte % 9.2 %      Eosinophil % 0.0 %      Basophil % 0.1 %      Immature Grans % 0.6 %      Neutrophils, Absolute 7.23 10*3/mm3      Lymphocytes, Absolute 0.57 10*3/mm3      Monocytes, Absolute 0.80 10*3/mm3      Eosinophils, Absolute 0.00 10*3/mm3      Basophils, Absolute 0.01 10*3/mm3      Immature Grans, Absolute 0.05 10*3/mm3      nRBC 0.0 /100 WBC     Blood Gas, Arterial [[id number]]  (Abnormal) Collected:  11/12/19 0350    Specimen:  Arterial Blood Updated:  11/12/19 0412     Site Right Radial     Marcelo's Test Positive     pH, Arterial 7.461 pH units      Comment: 83 Value above reference range        pCO2, Arterial 38.9 mm Hg      pO2, Arterial 401.0 mm Hg      Comment: 83 Value above reference range        HCO3, Arterial 27.7 mmol/L      Comment: 83 Value above reference range        Base Excess, Arterial 3.6 mmol/L      Comment: 83 Value above reference range        O2 Saturation, Arterial >100.1 %      Comment: 93 Value above reportable range > 100.1        Temperature 37.0 C      Barometric Pressure for Blood Gas 763 mmHg      Modality Ventilator     FIO2 100 %      Ventilator Mode AC     Set Tidal Volume 400     Set Mech Resp Rate 14.0     PEEP 5.0     Collected by [id number]     Comment: Meter: [id number]     :  [id number]       Respiratory Panel, PCR - Swab, Nasopharynx [[id number]] Collected:  11/12/19 0228    Specimen:  Swab from Nasopharynx Updated:  11/12/19 0244    Legionella Antigen, Urine - Urine, Urine,  Catheter In/Out [[id number]]  (Normal) Collected:  11/12/19 0105    Specimen:  Urine, Catheter In/Out Updated:  11/12/19 0211     LEGIONELLA ANTIGEN, URINE Negative    S. Pneumo Ag Urine or CSF - Urine, Urine, Catheter In/Out [[id number]]  (Normal) Collected:  11/12/19 0105    Specimen:  Urine, Catheter In/Out Updated:  11/12/19 0211     Strep Pneumo Ag Negative    POC Glucose Once [[id number]]  (Abnormal) Collected:  11/11/19 2115    Specimen:  Blood Updated:  11/11/19 2127     Glucose 208 mg/dL      Comment: : [id number] Null JustinMeter ID: [id number]       [place] Draw [[id number]] Collected:  11/11/19 1216    Specimen:  Blood Updated:  11/11/19 1330    Narrative:       The following orders were created for panel order [place] Draw.  Procedure                               Abnormality         Status                     ---------                               -----------         ------                     Light Blue Top[[id number]]                                   Final result               Green Top (Gel)[[id number]]                                  Final result               Lavender Top[[id number]]                                     Final result               Red Top[[id number]]                                          In process                   Please view results for these tests on the individual orders.    Light Blue Top [[id number]] Collected:  11/11/19 1216    Specimen:  Blood Updated:  11/11/19 1330     Extra Tube hold for add-on     Comment: Auto resulted       Green Top (Gel) [[id number]] Collected:  11/11/19 1216    Specimen:  Blood Updated:  11/11/19 1330     Extra Tube Hold for add-ons.     Comment: Auto resulted.       Lavender Top [[id number]] Collected:  11/11/19 1216    Specimen:  Blood Updated:  11/11/19 1330     Extra Tube hold for add-on     Comment: Auto resulted       Blood Gas, Arterial With Co-Ox [[id number]]  (Abnormal) Collected:  11/11/19 1250    Specimen:  Arterial Blood Updated:   11/11/19 1302     Site Right Brachial     Marcelo's Test N/A     pH, Arterial 7.469 pH units      Comment: 83 Value above reference range        pCO2, Arterial 38.1 mm Hg      pO2, Arterial 70.2 mm Hg      Comment: 84 Value below reference range        HCO3, Arterial 27.6 mmol/L      Comment: 83 Value above reference range        Base Excess, Arterial 3.7 mmol/L      Comment: 83 Value above reference range        O2 Saturation, Arterial 95.2 %      Hemoglobin, Blood Gas 7.1 g/dL      Comment: 84 Value below reference range        Hematocrit, Blood Gas 21.8 %      Comment: 84 Value below reference range        Oxyhemoglobin 92.3 %      Comment: 84 Value below reference range        Methemoglobin 1.10 %      Carboxyhemoglobin 2.0 %      A-a Gradiant 35.1 mmHg      Temperature 37.0 C      Sodium, Arterial 138 mmol/L      Potassium, Arterial 3.9 mmol/L      Barometric Pressure for Blood Gas 753 mmHg      Modality Nasal Cannula     Flow Rate 2.0 lpm      Ventilator Mode NA     Note --     Collected by [id number]     Comment: Meter: [id number]     :  [id number]        pH, Temp Corrected --     pCO2, Temperature Corrected --     pO2, Temperature Corrected --    Comprehensive Metabolic Panel [[id number]]  (Abnormal) Collected:  11/11/19 1216    Specimen:  Blood Updated:  11/11/19 1252     Glucose 111 mg/dL      BUN 11 mg/dL      Creatinine 0.50 mg/dL      Sodium 141 mmol/L      Potassium 4.3 mmol/L      Chloride 102 mmol/L      CO2 30.0 mmol/L      Calcium 9.0 mg/dL      Total Protein 6.8 g/dL      Albumin 3.50 g/dL      ALT (SGPT) 7 U/L      AST (SGOT) 13 U/L      Alkaline Phosphatase 77 U/L      Total Bilirubin 0.7 mg/dL      eGFR Non African Amer 115 mL/min/1.73      Globulin 3.3 gm/dL      A/G Ratio 1.1 g/dL      BUN/Creatinine Ratio 22.0     Anion Gap 9.0 mmol/L     Narrative:       GFR Normal >60  Chronic Kidney Disease <60  Kidney Failure <15    Troponin [[id number]]  (Normal) Collected:  11/11/19 1216     Specimen:  Blood Updated:  11/11/19 1252     Troponin T <0.010 ng/mL     Narrative:       Troponin T Reference Range:  <= 0.03 ng/mL-   Negative for AMI  >0.03 ng/mL-     Abnormal for myocardial necrosis.  Clinicians would have to utilize clinical acumen, EKG, Troponin and serial changes to determine if it is an Acute Myocardial Infarction or myocardial injury due to an underlying chronic condition.     BNP [[id number]]  (Abnormal) Collected:  11/11/19 1216    Specimen:  Blood Updated:  11/11/19 1252     proBNP 1,933.0 pg/mL     Narrative:       Among patients with dyspnea, NT-proBNP is highly sensitive for the detection of acute congestive heart failure. In addition NT-proBNP of <300 pg/ml effectively rules out acute congestive heart failure with 99% negative predictive value.    Lactic Acid, Plasma [[id number]]  (Normal) Collected:  11/11/19 1216    Specimen:  Blood Updated:  11/11/19 1249     Lactate 1.9 mmol/L     D-dimer, Quantitative [[id number]]  (Abnormal) Collected:  11/11/19 1216    Specimen:  Blood Updated:  11/11/19 1244     D-Dimer, Quantitative 2.37 mg/L (FEU)     Narrative:       Reference Range is 0-0.50 mg/L FEU. However, results <0.50 mg/L FEU tends to rule out DVT or PE. Results >0.50 mg/L FEU are not useful in predicting absence or presence of DVT or PE.    Protime-INR [[id number]]  (Abnormal) Collected:  11/11/19 1216    Specimen:  Blood Updated:  11/11/19 1244     Protime 15.8 Seconds      INR 1.22    Influenza Antigen, Rapid - Swab, Nasopharynx [[id number]]  (Normal) Collected:  11/11/19 1218    Specimen:  Swab from Nasopharynx Updated:  11/11/19 1242     Influenza A Ag, EIA Negative     Influenza B Ag, EIA Negative    Narrative:       Recommend confirmation of negative results by viral culture or molecular assay.    CBC & Differential [[id number]] Collected:  11/11/19 1216    Specimen:  Blood Updated:  11/11/19 1233    Narrative:       The following orders were created for panel order CBC &  Differential.  Procedure                               Abnormality         Status                     ---------                               -----------         ------                     CBC Auto Differential[[id number]]        Abnormal            Final result                 Please view results for these tests on the individual orders.    CBC Auto Differential [[id number]]  (Abnormal) Collected:  11/11/19 1216    Specimen:  Blood Updated:  11/11/19 1233     WBC 11.26 10*3/mm3      RBC 2.98 10*6/mm3      Hemoglobin 7.3 g/dL      Hematocrit 24.1 %      MCV 80.9 fL      MCH 24.5 pg      MCHC 30.3 g/dL      RDW 18.1 %      RDW-SD 53.1 fl      MPV 9.0 fL      Platelets 328 10*3/mm3      Neutrophil % 82.6 %      Lymphocyte % 6.9 %      Monocyte % 9.5 %      Eosinophil % 0.3 %      Basophil % 0.3 %      Immature Grans % 0.4 %      Neutrophils, Absolute 9.30 10*3/mm3      Lymphocytes, Absolute 0.78 10*3/mm3      Monocytes, Absolute 1.07 10*3/mm3      Eosinophils, Absolute 0.03 10*3/mm3      Basophils, Absolute 0.03 10*3/mm3      Immature Grans, Absolute 0.05 10*3/mm3      nRBC 0.0 /100 WBC     Red Top [[id number]] Collected:  11/11/19 1216    Specimen:  Blood Updated:  11/11/19 1231          No results found for: ECHOEFEST    Imaging Results (Last 72 Hours)     Procedure Component Value Units Date/Time    XR Chest 1 View [[id number]] Collected:  11/12/19 0829     Updated:  11/12/19 0834    Narrative:       EXAM: XR CHEST 1 VW- - 11/12/2019 8:04 AM CST     HISTORY: vent; J18.9-Pneumonia, unspecified organism; J96.11-Chronic  respiratory failure with hypoxia; J44.9-Chronic obstructive pulmonary  disease, unspecified       COMPARISON: 11/11/2019.      TECHNIQUE:  1 images.  Frontal view of the chest.     FINDINGS:    Endotracheal tube tip projects at the mid trachea. The chin overlies the  right lung apex, which limits evaluation.     Redemonstration of right basilar opacity and effusion. Left lung appears  clear. No  pneumothorax demonstrated.     Borderline cardiac silhouette. Upper mediastinum within normal limits  considering rotation. Probable calcified aortic atherosclerosis.     Severe degenerative changes of the bilateral shoulders and spine, not  optimally assessed on this exam.          Impression:       1. Similar right basilar opacity and effusion.  2. Endotracheal tube tip projects in mid trachea.  This report was finalized on 11/12/2019 08:31 by Dr Ange Romero MD.    XR Chest 1 View [[id number]] Collected:  11/11/19 2025     Updated:  11/11/19 2029    Narrative:       XR CHEST 1 VW- 11/11/2019 8:10 PM CST     HISTORY: ET tube placement.; J18.9-Pneumonia, unspecified organism;  J96.11-Chronic respiratory failure with hypoxia; J44.9-Chronic  obstructive pulmonary disease, unspecified       COMPARISON: Chest exam dated 11/11/2019.     FINDINGS:   New endotracheal tube with tip in good position. Reidentified right  basilar opacification, the result of a moderate layering right pleural  effusion. This appears stable. No new or worsening infiltrate.  Underlying cardiomegaly is stable. Pulmonary vasculature are nondilated.  No pneumothorax.       Impression:       1. Status post intubation. Endotracheal tube is in good position.  2. Reidentified layering right pleural effusion, appearing stable.  3. Underlying cardiomegaly stable.        This report was finalized on 11/11/2019 20:26 by Dr Maciej Richardson, .    XR Chest 1 View [[id number]] Collected:  11/11/19 1348     Updated:  11/11/19 1353    Narrative:       EXAM: XR CHEST 1 VW- - 11/11/2019 12:16 PM CST     HISTORY: cough/ congestion/ hypoxia       COMPARISON: 02/19/2019.      TECHNIQUE:  1 images.  Frontal view of the chest.     FINDINGS:    No pneumothorax. Right basilar opacity and effusion. Left lung clear.  Cardiac and mediastinal silhouette similar compared to prior. Probable  hiatal hernia. Calcified aortic atheromatous sclerosis. Severe  degenerative changes of  the bilateral shoulders with superior migration  of the humeral heads.          Impression:       1. Right basilar opacity and effusion. Left lung clear.  2. Large hiatal hernia.  3. Severe degenerative changes of the bilateral shoulders.  This report was finalized on 11/11/2019 13:50 by Dr Ange Romero MD.        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 Her BNP elevation is chronic and does not indicate she is in active heart failure. Therefore timing of extubation will be left up to pulmonary.   -if patient requires further transfusion of blood, would recommend IV lasix to follow.   -agree with holding Pradaxa. Patient has required previous transfusion before. Current [id number] 7 (high risk) and HAS-BLED 5 (high risk). Discussion about long term need for anticoagulation will be deferred to Dr. Smith. Recommend transfusing as needed for symptomatic anemia.   -daily weights, strict I&O, monitor tele  -conversation needs to be had with patient, once extubated, and daughter about code status.  -cancel 2D echo.     Further orders per Dr. Smith    Thank you for asking us to follow this patient with you.     JIM Marquez  11/12/19  12:52 PM    Please note this cardiology consultation note is the result of a face to face consultation with the patient, in addition to reviewing medical records at length by myself, JIM Mckeon       Time: approximately 50 minutes

## 2019-11-12 NOTE — PLAN OF CARE
Problem: Patient Care Overview  Goal: Plan of Care Review  Outcome: Ongoing (interventions implemented as appropriate)      Problem: Fall Risk (Adult)  Goal: Identify Related Risk Factors and Signs and Symptoms  Outcome: Ongoing (interventions implemented as appropriate)    Goal: Absence of Fall  Outcome: Ongoing (interventions implemented as appropriate)      Problem: Pneumonia (Adult)  Goal: Signs and Symptoms of Listed Potential Problems Will be Absent, Minimized or Managed (Pneumonia)  Outcome: Ongoing (interventions implemented as appropriate)      Problem: Anemia (Adult)  Goal: Identify Related Risk Factors and Signs and Symptoms  Outcome: Ongoing (interventions implemented as appropriate)    Goal: Symptom Improvement  Outcome: Ongoing (interventions implemented as appropriate)

## 2019-11-12 NOTE — CONSULTS
"        [facility]  Inpatient Consult Note  Today's date:  11/12/19    Heather Burgos  10/23/1926       Referring Provider: Alen Montenegro DO  Primary Physician: Lupe Ham MD   Primary Gastroenterologist: Dr. Matt Singh    Date of Admission: 11/11/2019  Date of Service:  11/12/19    Reason for Consultation/Chief Complaint: Coffee-ground emesis    History of present illness: This is a 89-year-old female who presented to the ER yesterday with complaints of progressive shortness of air and cough.  The patient has a history of chronic respiratory failure and uses home O2.  The patient carries a history of chronic A. fib long-term anticoagulation on Pradaxa and has severe AS.  There is currently no one at bedside the patient is intubated and in restraints.  She does not arouse.  On diprovan drip.  I am gleaning my information from records and nursing.  Nursing tells me that it was reported last night status post intubation that when the patient was rolled she had \"what was thought to be coffee-ground emesis.\"  Nursing inserted OG this morning with very little output which is clear with some areas of what looks to be undigested food.  The patient is full code.  Nursing denies any melena or hematochezia.  Nursing reports no complaints of nausea vomiting or hematemesis prior to intubation.    The patient's last EGD was performed on 2/25/2015 for dysphagia and heartburn.  Findings of LA grade D mucosal breaks involving at least 75% of esophageal circumference esophagitis no bleeding.  Mild intrinsic stenosis dilation.  Diverticulum with a small opening found at the GE junction.    Labs and imaging: CMP reveals hyperglycemia otherwise unremarkable.  CBC with a hemoglobin of 8.1.    Past Medical History:   Diagnosis Date   • Atrial fibrillation with rapid ventricular response (CMS/HCC)    • Chronic respiratory failure with hypoxia (CMS/HCC) 10/28/2019   • Degenerative joint disease  "   • Diastolic murmur of aorta    • Dysphagia    • Essential hypertension    • Gastroesophageal reflux disease 10/31/2018   • History of cerebellar stroke 3/28/2018   • Personal history of nicotine dependence 10/31/2018   • Pneumonia        Past Surgical History:   Procedure Laterality Date   • APPENDECTOMY     • HERNIA REPAIR     • HYSTERECTOMY     • TONSILLECTOMY     • TUBAL ABDOMINAL LIGATION          Allergies   Allergen Reactions   • Erythromycin Hives and Swelling   • Demerol [Meperidine] Nausea And Vomiting and GI Intolerance   • Morphine And Related Nausea And Vomiting       Medications Prior to Admission   Medication Sig Dispense Refill Last Dose   • albuterol (PROVENTIL) (2.5 MG/3ML) 0.083% nebulizer solution INHALE 1 RESPULE VIA NEBULIZER EVERY 4 HOURS AS NEEDED FOR WHEEZING.  5 Taking   • albuterol sulfate  (90 Base) MCG/ACT inhaler Inhale 2 puffs Every 6 (Six) Hours As Needed for Wheezing or Shortness of Air. 1 inhaler 11 Taking   • Cranberry 400 MG capsule Take 2 capsules by mouth Daily. Takes 1 in morning and 1 at night   Taking   • dabigatran etexilate (PRADAXA) 150 MG capsu Take 1 capsule by mouth Every 12 (Twelve) Hours. 60 capsule 0 Taking   • ferrous sulfate 325 (65 FE) MG tablet Take 325 mg by mouth.   Taking   • Fluticasone-Umeclidin-Vilant (TRELEGY ELLIPTA) 100-62.5-25 MCG/INH aerosol powder  Inhale 1 each Daily for 30 days. 2 each 0    • furosemide (LASIX) 20 MG tablet Take 20 mg by mouth Daily.   Taking   • guaiFENesin (MUCINEX) 600 MG 12 hr tablet Take 1,200 mg by mouth 2 (Two) Times a Day.   Taking   • loratadine (CLARITIN) 10 MG tablet Take 10 mg by mouth Every Night.   Taking   • metoprolol tartrate (LOPRESSOR) 25 MG tablet Take 12.5 mg by mouth 2 (Two) Times a Day. Take 1/2 tablet in the morning and 1/2 tablet in the evening   Taking   • omeprazole (priLOSEC) 40 MG capsule Take 40 mg by mouth Daily.   Taking   • PARoxetine (PAXIL) 10 MG tablet Take 10 mg by mouth Every Morning.    "Taking   • vitamin B-12 (CYANOCOBALAMIN) 1000 MCG tablet Take 1,000 mcg by mouth.   Taking   • vitamin D (ERGOCALCIFEROL) 89584 units capsule capsule Take 50,000 Units by mouth 1 (One) Time Per Week.   Taking       Hospital Medications (active)       Dose Frequency Start End    acetaminophen (TYLENOL) 160 MG/5ML solution 650 mg 650 mg Every 4 Hours PRN 11/11/2019     Sig - Route: Take 20.3 mL by mouth Every 4 (Four) Hours As Needed for Mild Pain . - Oral    Linked Group 1:  \"Or\" Linked Group Details        acetaminophen (TYLENOL) suppository 650 mg 650 mg Every 4 Hours PRN 11/11/2019     Sig - Route: Insert 1 suppository into the rectum Every 4 (Four) Hours As Needed for Mild Pain . - Rectal    Linked Group 1:  \"Or\" Linked Group Details        acetaminophen (TYLENOL) tablet 650 mg 650 mg Every 4 Hours PRN 11/11/2019     Sig - Route: Take 2 tablets by mouth Every 4 (Four) Hours As Needed for Mild Pain . - Oral    Linked Group 1:  \"Or\" Linked Group Details        albuterol (PROVENTIL) nebulizer solution 0.083% 2.5 mg/3mL 2.5 mg Once As Needed 11/11/2019     Sig - Route: Take 2.5 mg by nebulization 1 (One) Time As Needed for Shortness of Air (Sputum Induction). - Nebulization    Cosign for Ordering: Required by Modesto Henderson MD    aluminum-magnesium hydroxide-simethicone (MAALOX MAX) 400-400-40 MG/5ML suspension 15 mL 15 mL Every 6 Hours PRN 11/11/2019     Sig - Route: Take 15 mL by mouth Every 6 (Six) Hours As Needed for Heartburn. - Oral    bisacodyl (DULCOLAX) EC tablet 5 mg 5 mg Daily PRN 11/11/2019     Sig - Route: Take 1 tablet by mouth Daily As Needed for Constipation. - Oral    cefTRIAXone (ROCEPHIN) 1 g/100 mL 0.9% NS (MBP) 1 g Once 11/11/2019 11/11/2019    Sig - Route: Infuse 100 mL into a venous catheter 1 (One) Time. - Intravenous    Cosign for Ordering: Accepted by Devyn Crocker MD on 11/11/2019  6:22 PM    cefTRIAXone (ROCEPHIN) 1 g/100 mL 0.9% NS (MBP) 1 g Every 24 Hours 11/12/2019 11/19/2019    " Sig - Route: Infuse 100 mL into a venous catheter Daily. - Intravenous    digoxin (LANOXIN) injection 500 mcg 500 mcg Once 11/11/2019 11/11/2019    Sig - Route: Infuse 2 mL into a venous catheter 1 (One) Time. - Intravenous    doxycycline (VIBRAMYCIN) 100 mg/100 mL 0.9% NS  mg Every 12 Hours 11/11/2019 11/18/2019    Sig - Route: Infuse 100 mL into a venous catheter Every 12 (Twelve) Hours. - Intravenous    furosemide (LASIX) 10 MG/ML injection  - ADS Override Pull   11/11/2019 11/11/2019    Notes to Pharmacy: Created by cabinet override    furosemide (LASIX) injection 40 mg 40 mg Daily 11/12/2019     Sig - Route: Infuse 4 mL into a venous catheter Daily. - Intravenous    guaiFENesin (MUCINEX) 12 hr tablet 1,200 mg 1,200 mg Every 12 Hours Scheduled 11/11/2019     Sig - Route: Take 2 tablets by mouth Every 12 (Twelve) Hours. - Oral    ipratropium (ATROVENT) nebulizer solution 0.5 mg 0.5 mg Once 11/11/2019 11/11/2019    Sig - Route: Take 2.5 mL by nebulization 1 (One) Time. - Nebulization    ipratropium-albuterol (DUO-NEB) nebulizer solution 3 mL 3 mL Once 11/11/2019 11/11/2019    Sig - Route: Take 3 mL by nebulization 1 (One) Time. - Nebulization    Cosign for Ordering: Accepted by Devyn Crcoker MD on 11/11/2019  6:22 PM    ipratropium-albuterol (DUO-NEB) nebulizer solution 3 mL 3 mL Every 4 Hours PRN 11/11/2019     Sig - Route: Take 3 mL by nebulization Every 4 (Four) Hours As Needed for Wheezing or Shortness of Air. - Nebulization    ipratropium-albuterol (DUO-NEB) nebulizer solution 3 mL 3 mL 4 Times Daily - RT 11/11/2019     Sig - Route: Take 3 mL by nebulization 4 (Four) Times a Day. - Nebulization    melatonin tablet 6 mg 6 mg Nightly PRN 11/11/2019     Sig - Route: Take 2 tablets by mouth At Night As Needed for Sleep. - Oral    methylPREDNISolone sodium succinate (SOLU-Medrol) injection 125 mg 125 mg Once 11/11/2019 11/11/2019    Sig - Route: Infuse 2 mL into a venous catheter 1 (One) Time. -  Intravenous    Cosign for Ordering: Accepted by Devyn Crocker MD on 11/11/2019  6:22 PM    metoprolol tartrate (LOPRESSOR) tablet 12.5 mg 12.5 mg Every 12 Hours Scheduled 11/11/2019     Sig - Route: Take 0.5 tablets by mouth Every 12 (Twelve) Hours. - Oral    ondansetron (ZOFRAN) injection 4 mg 4 mg Every 6 Hours PRN 11/11/2019     Sig - Route: Infuse 2 mL into a venous catheter Every 6 (Six) Hours As Needed for Nausea or Vomiting. - Intravenous    pantoprazole (PROTONIX) injection 40 mg 40 mg Every Early Morning 11/12/2019     Sig - Route: Infuse 10 mL into a venous catheter Every Morning. - Intravenous    PARoxetine (PAXIL) tablet 10 mg 10 mg Daily 11/12/2019     Sig - Route: Take 1 tablet by mouth Daily. - Oral    propofol (DIPRIVAN) infusion 10 mg/mL 100 mL 5-75 mcg/kg/min × 53.8 kg Titrated 11/11/2019     Sig - Route: Infuse 269-4,035 mcg/min into a venous catheter Dose Adjusted By Provider As Needed. - Intravenous    sodium chloride 0.9 % bolus 1,000 mL 1,000 mL Once 11/11/2019 11/11/2019    Sig - Route: Infuse 1,000 mL into a venous catheter 1 (One) Time. - Intravenous    Cosign for Ordering: Accepted by Devyn Crocker MD on 11/11/2019  6:22 PM    sodium chloride 0.9 % bolus 500 mL 500 mL Once 11/11/2019 11/11/2019    Sig - Route: Infuse 500 mL into a venous catheter 1 (One) Time. - Intravenous    sodium chloride 0.9 % flush 10 mL 10 mL Every 12 Hours Scheduled 11/11/2019     Sig - Route: Infuse 10 mL into a venous catheter Every 12 (Twelve) Hours. - Intravenous    sodium chloride 0.9 % flush 10 mL 10 mL As Needed 11/11/2019     Sig - Route: Infuse 10 mL into a venous catheter As Needed for Line Care. - Intravenous    sodium chloride 0.9 % infusion 50 mL/hr Continuous 11/11/2019     Sig - Route: Infuse 50 mL/hr into a venous catheter Continuous. - Intravenous    cetirizine (zyrTEC) tablet 10 mg (Discontinued) 10 mg Daily 11/12/2019 11/12/2019    Sig - Route: Take 1 tablet by mouth Daily. - Oral    dabigatran  etexilate (PRADAXA) capsule 150 mg (Discontinued) 150 mg Every 12 Hours Scheduled 11/11/2019 11/12/2019    Sig - Route: Take 1 capsule by mouth Every 12 (Twelve) Hours. - Oral    furosemide (LASIX) injection 40 mg (Discontinued) 40 mg Every 12 Hours 11/12/2019 11/12/2019    Sig - Route: Infuse 4 mL into a venous catheter Every 12 (Twelve) Hours. - Intravenous    furosemide (LASIX) tablet 20 mg (Discontinued) 20 mg Daily 11/11/2019 11/12/2019    Sig - Route: Take 1 tablet by mouth Daily. - Oral    pantoprazole (PROTONIX) EC tablet 40 mg (Discontinued) 40 mg Every Early Morning 11/12/2019 11/12/2019    Sig - Route: Take 1 tablet by mouth Every Morning. - Oral          Social History     Tobacco Use   • Smoking status: Never Smoker   • Smokeless tobacco: Never Used   Substance Use Topics   • Alcohol use: No        Past Family History:  Family History   Problem Relation Age of Onset   • Stroke Mother         Age 90   • Emphysema Father    • Cancer Sister    • Cancer Brother        Review of Systems:  Review of Systems   Unable to perform ROS: Intubated       Physical Exam:  Temp:  [96.9 °F (36.1 °C)-98.9 °F (37.2 °C)] 98.9 °F (37.2 °C)  Heart Rate:  [] 65  Resp:  [14-32] 14  BP: ()/() 109/69  FiO2 (%):  [30 %-100 %] 30 %  Body mass index is 21.73 kg/m².    Intake/Output Summary (Last 24 hours) at 11/12/2019 1518  Last data filed at 11/12/2019 1400  Gross per 24 hour   Intake 1583.93 ml   Output 850 ml   Net 733.93 ml     I/O this shift:  In: 371.4 [I.V.:371.4]  Out: 850 [Urine:850]  Physical Exam   Constitutional:   Cachectic   HENT:   Mouth/Throat: Oropharynx is clear and moist.   OG to low wall suction minimal output   Eyes: EOM are normal.   Cardiovascular: Normal rate and regular rhythm. Exam reveals no gallop and no friction rub.   Murmur heard.  Pulmonary/Chest: Effort normal. She has no wheezes. She has no rales.   Intubated and decreased bilateral   Abdominal: Soft. Bowel sounds are normal. She  exhibits no distension. There is no hepatosplenomegaly. There is no tenderness. There is no rigidity, no rebound and no guarding.   Musculoskeletal: Normal range of motion. She exhibits no edema, tenderness or deformity.   In restraints   Neurological:   Sedated   Skin: Skin is warm and dry. No rash noted.   Vitals reviewed.      Results Review:  Lab Results (last 24 hours)     Procedure Component Value Units Date/Time    Respiratory Panel, PCR - Swab, Nasopharynx [[id number]]  (Abnormal) Collected:  11/12/19 0228    Specimen:  Swab from Nasopharynx Updated:  11/12/19 1409     ADENOVIRUS, PCR Not Detected     Coronavirus 229E Not Detected     Coronavirus HKU1 Not Detected     Coronavirus NL63 Not Detected     Coronavirus OC43 Not Detected     Human Metapneumovirus Not Detected     Human Rhinovirus/Enterovirus Detected     Influenza B PCR Not Detected     Parainfluenza Virus 1 Not Detected     Parainfluenza Virus 2 Not Detected     Parainfluenza Virus 3 Not Detected     Parainfluenza Virus 4 Not Detected     Bordetella pertussis pcr Not Detected     Influenza A H1 2009 PCR Not Detected     Chlamydophila pneumoniae PCR Not Detected     Mycoplasma pneumo by PCR Not Detected     Influenza A PCR Not Detected     Influenza A H3 Not Detected     Influenza A H1 Not Detected     RSV, PCR Not Detected     Bordetella parapertussis PCR Not Detected    Blood Culture - Blood, Arm, Left [[id number]] Collected:  11/11/19 1222    Specimen:  Blood from Arm, Left Updated:  11/12/19 1246     Blood Culture No growth at 24 hours    Blood Culture - Blood, Arm, Right [[id number]] Collected:  11/11/19 1216    Specimen:  Blood from Arm, Right Updated:  11/12/19 1246     Blood Culture No growth at 24 hours    BNP [[id number]]  (Abnormal) Collected:  11/12/19 0402    Specimen:  Blood Updated:  11/12/19 1232     proBNP 2,853.0 pg/mL     Narrative:       Among patients with dyspnea, NT-proBNP is highly sensitive for the detection of acute congestive  heart failure. In addition NT-proBNP of <300 pg/ml effectively rules out acute congestive heart failure with 99% negative predictive value.    Respiratory Culture - Sputum, ET Suction [[id number]] Collected:  11/11/19 2023    Specimen:  Sputum from ET Suction Updated:  11/12/19 0652     Gram Stain Greater than 25 WBCs per low power field      Few (2+) Mixed gram positive migel      Few (2+) Budding yeast      Few (2+) Epithelial cells per low power field    Basic Metabolic Panel [[id number]]  (Abnormal) Collected:  11/12/19 0402    Specimen:  Blood Updated:  11/12/19 0505     Glucose 170 mg/dL      BUN 14 mg/dL      Creatinine 0.43 mg/dL      Sodium 139 mmol/L      Potassium 4.7 mmol/L      Comment: Specimen hemolyzed.  Results may be affected.        Chloride 104 mmol/L      CO2 26.0 mmol/L      Calcium 8.3 mg/dL      eGFR Non African Amer 137 mL/min/1.73      BUN/Creatinine Ratio 32.6     Anion Gap 9.0 mmol/L     Narrative:       GFR Normal >60  Chronic Kidney Disease <60  Kidney Failure <15    CBC Auto Differential [[id number]]  (Abnormal) Collected:  11/12/19 0402    Specimen:  Blood Updated:  11/12/19 0426     WBC 8.66 10*3/mm3      RBC 3.28 10*6/mm3      Hemoglobin 8.1 g/dL      Hematocrit 26.4 %      MCV 80.5 fL      MCH 24.7 pg      MCHC 30.7 g/dL      RDW 17.6 %      RDW-SD 51.2 fl      MPV 9.0 fL      Platelets 251 10*3/mm3      Neutrophil % 83.5 %      Lymphocyte % 6.6 %      Monocyte % 9.2 %      Eosinophil % 0.0 %      Basophil % 0.1 %      Immature Grans % 0.6 %      Neutrophils, Absolute 7.23 10*3/mm3      Lymphocytes, Absolute 0.57 10*3/mm3      Monocytes, Absolute 0.80 10*3/mm3      Eosinophils, Absolute 0.00 10*3/mm3      Basophils, Absolute 0.01 10*3/mm3      Immature Grans, Absolute 0.05 10*3/mm3      nRBC 0.0 /100 WBC     Blood Gas, Arterial [[id number]]  (Abnormal) Collected:  11/12/19 0350    Specimen:  Arterial Blood Updated:  11/12/19 0412     Site Right Radial     Marcelo's Test Positive     pH,  Arterial 7.461 pH units      Comment: 83 Value above reference range        pCO2, Arterial 38.9 mm Hg      pO2, Arterial 401.0 mm Hg      Comment: 83 Value above reference range        HCO3, Arterial 27.7 mmol/L      Comment: 83 Value above reference range        Base Excess, Arterial 3.6 mmol/L      Comment: 83 Value above reference range        O2 Saturation, Arterial >100.1 %      Comment: 93 Value above reportable range > 100.1        Temperature 37.0 C      Barometric Pressure for Blood Gas 763 mmHg      Modality Ventilator     FIO2 100 %      Ventilator Mode AC     Set Tidal Volume 400     Set Mech Resp Rate 14.0     PEEP 5.0     Collected by [id number]     Comment: Meter: [id number]     :  [id number]       Legionella Antigen, Urine - Urine, Urine, Catheter In/Out [[id number]]  (Normal) Collected:  11/12/19 0105    Specimen:  Urine, Catheter In/Out Updated:  11/12/19 0211     LEGIONELLA ANTIGEN, URINE Negative    S. Pneumo Ag Urine or CSF - Urine, Urine, Catheter In/Out [[id number]]  (Normal) Collected:  11/12/19 0105    Specimen:  Urine, Catheter In/Out Updated:  11/12/19 0211     Strep Pneumo Ag Negative    POC Glucose Once [[id number]]  (Abnormal) Collected:  11/11/19 2115    Specimen:  Blood Updated:  11/11/19 2127     Glucose 208 mg/dL      Comment: : [id number] Tennille JustinMeter ID: [id number]             Radiology Review:  Imaging Results (Last 72 Hours)     Procedure Component Value Units Date/Time    XR Chest 1 View [[id number]] Collected:  11/12/19 0829     Updated:  11/12/19 0834    Narrative:       EXAM: XR CHEST 1 VW- - 11/12/2019 8:04 AM CST     HISTORY: vent; J18.9-Pneumonia, unspecified organism; J96.11-Chronic  respiratory failure with hypoxia; J44.9-Chronic obstructive pulmonary  disease, unspecified       COMPARISON: 11/11/2019.      TECHNIQUE:  1 images.  Frontal view of the chest.     FINDINGS:    Endotracheal tube tip projects at the mid trachea. The chin overlies the  right  lung apex, which limits evaluation.     Redemonstration of right basilar opacity and effusion. Left lung appears  clear. No pneumothorax demonstrated.     Borderline cardiac silhouette. Upper mediastinum within normal limits  considering rotation. Probable calcified aortic atherosclerosis.     Severe degenerative changes of the bilateral shoulders and spine, not  optimally assessed on this exam.          Impression:       1. Similar right basilar opacity and effusion.  2. Endotracheal tube tip projects in mid trachea.  This report was finalized on 11/12/2019 08:31 by Dr Ange Romero MD.    XR Chest 1 View [[id number]] Collected:  11/11/19 2025     Updated:  11/11/19 2029    Narrative:       XR CHEST 1 VW- 11/11/2019 8:10 PM CST     HISTORY: ET tube placement.; J18.9-Pneumonia, unspecified organism;  J96.11-Chronic respiratory failure with hypoxia; J44.9-Chronic  obstructive pulmonary disease, unspecified       COMPARISON: Chest exam dated 11/11/2019.     FINDINGS:   New endotracheal tube with tip in good position. Reidentified right  basilar opacification, the result of a moderate layering right pleural  effusion. This appears stable. No new or worsening infiltrate.  Underlying cardiomegaly is stable. Pulmonary vasculature are nondilated.  No pneumothorax.       Impression:       1. Status post intubation. Endotracheal tube is in good position.  2. Reidentified layering right pleural effusion, appearing stable.  3. Underlying cardiomegaly stable.        This report was finalized on 11/11/2019 20:26 by Dr Maciej Richardson, .    XR Chest 1 View [[id number]] Collected:  11/11/19 1348     Updated:  11/11/19 1353    Narrative:       EXAM: XR CHEST 1 VW- - 11/11/2019 12:16 PM CST     HISTORY: cough/ congestion/ hypoxia       COMPARISON: 02/19/2019.      TECHNIQUE:  1 images.  Frontal view of the chest.     FINDINGS:    No pneumothorax. Right basilar opacity and effusion. Left lung clear.  Cardiac and mediastinal silhouette  similar compared to prior. Probable  hiatal hernia. Calcified aortic atheromatous sclerosis. Severe  degenerative changes of the bilateral shoulders with superior migration  of the humeral heads.          Impression:       1. Right basilar opacity and effusion. Left lung clear.  2. Large hiatal hernia.  3. Severe degenerative changes of the bilateral shoulders.  This report was finalized on 11/11/2019 13:50 by Dr Ange Romero MD.          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 (CMS/[facility]) J44.9   • Current use of long term anticoagulation Z79.01   • Anemia D64.9   • Acute on chronic respiratory failure with hypoxia (CMS/[facility]) J96.21   • Pulmonary hypertension (CMS/[facility]) I27.20   • Chronic respiratory failure with hypoxia (CMS/[facility]) J96.11   • Cachexia (CMS/[facility]) R64   • RML pneumonia (CMS/[facility]) J18.1   • Sepsis (CMS/[facility]) A41.9   • Coffee ground emesis K92.0       Hematemesis?  She is very high risk for any type of endoscopic procedure in light of her aortic stenosis, atrial fibrillation, advanced age and other comorbidities.  I do not get the sense that there is any type of volume GI blood loss.  She does have history of esophagitis it would make sense to empirically treat with PPI therapy.    Anticoagulated on Pradaxa unsure of last dose at this time    JIM Dunham  11/12/19   1:03 PM    Patient Seen, Chart, Consults, Notes, Labs, Radiology studies reviewed    I have seen and examined patient personally, performing a face-to-face diagnostic evaluation with plan of care reviewed and developed with JIM and nursing staff. I have addended and/or modified the above history of present illness, physical examination, and assessment and plan to reflect my findings and impressions. Essential elements of the care plan were discussed with APRN above.  Agree with findings and  assessment/plan as documented above    Charmaine Santana MD  [facility]  11/12/19  3:18 PM    Much of this encounter note is an electronic transcription/translation of spoken language to printed text. The electronic translation of spoken language may permit erroneous, or at times, nonsensical words or phrases to be inadvertently transcribed; although I have reviewed the note for such errors, some may still exist.

## 2019-11-12 NOTE — PROCEDURES
Procedure intubation  Indication respiratory failure with hypoxia due to pneumonia  Permission verbal consent was obtained from daughter who is power of   And rapid sequence fashion patient was medicated with succinylcholine and etomidate doses per nursing.  A 7 Korean ET tube was placed on first attempt without difficulty.  Cords were visualized with video endoscope using 3 MAC blade.  ET tube was placed successfully with no complications.  Color change was noted on CO2 monitor.  Equal breath sounds were noted bilaterally.  Tube was initially positioned at 24 cm at the lip however chest x-ray showed ET tube approaching the right mainstem bronchus so tube was withdrawn 4 cm and is stabilized at 20 cm at the lip.  Chest x-ray shows good placement of tube no complications noted.  Patient tolerated procedure well.    Modesto Henderson MD  11/12/19  12:17 AM

## 2019-11-12 NOTE — PROGRESS NOTES
[facility]  INPATIENT PROGRESS NOTE    Length of Stay: 1  Date of Admission: 11/11/2019  Primary Care Physician: Lupe Ham MD    Subjective     Chief Complaint:     The patient is intubated and sedated    HPI     The patient had progressive worsening of her respiratory status last evening and was subsequently intubated and transferred to ICU.  She received 1 unit of PRBCs and hemoglobin is 8.1 this a.m. BMP is unremarkable.  Chest x-ray continues to show a right basilar opacity with an effusion.  Pulmonology will be consulted for ventilator management.  Cardiology will be consulted regarding valvular disease and that the patient might be a TAVR candidate.     The patient had an episode of coffee-ground emesis today while on the ventilator.  Anticoagulant will be held and Protonix will be administered every 12 hours IV.  Gastroenterology will be consulted for further evaluation.    Review of Systems     All pertinent negatives and positives are as above. All other systems have been reviewed and are negative unless otherwise stated.     Objective    Temp:  [96.9 °F (36.1 °C)-98.4 °F (36.9 °C)] 98.4 °F (36.9 °C)  Heart Rate:  [] 77  Resp:  [14-32] 14  BP: ()/() 126/68  FiO2 (%):  [30 %-100 %] 30 %    Lab Results (last 24 hours)     Procedure Component Value Units Date/Time    Respiratory Culture - Sputum, ET Suction [[id number]] Collected:  11/11/19 2023    Specimen:  Sputum from ET Suction Updated:  11/12/19 0652     Gram Stain Greater than 25 WBCs per low power field      Few (2+) Mixed gram positive migel      Few (2+) Budding yeast      Few (2+) Epithelial cells per low power field    Basic Metabolic Panel [[id number]]  (Abnormal) Collected:  11/12/19 0402    Specimen:  Blood Updated:  11/12/19 0505     Glucose 170 mg/dL      BUN 14 mg/dL      Creatinine 0.43 mg/dL      Sodium 139 mmol/L      Potassium 4.7 mmol/L      Comment:  Specimen hemolyzed.  Results may be affected.        Chloride 104 mmol/L      CO2 26.0 mmol/L      Calcium 8.3 mg/dL      eGFR Non African Amer 137 mL/min/1.73      BUN/Creatinine Ratio 32.6     Anion Gap 9.0 mmol/L     Narrative:       GFR Normal >60  Chronic Kidney Disease <60  Kidney Failure <15    CBC Auto Differential [[id number]]  (Abnormal) Collected:  11/12/19 0402    Specimen:  Blood Updated:  11/12/19 0426     WBC 8.66 10*3/mm3      RBC 3.28 10*6/mm3      Hemoglobin 8.1 g/dL      Hematocrit 26.4 %      MCV 80.5 fL      MCH 24.7 pg      MCHC 30.7 g/dL      RDW 17.6 %      RDW-SD 51.2 fl      MPV 9.0 fL      Platelets 251 10*3/mm3      Neutrophil % 83.5 %      Lymphocyte % 6.6 %      Monocyte % 9.2 %      Eosinophil % 0.0 %      Basophil % 0.1 %      Immature Grans % 0.6 %      Neutrophils, Absolute 7.23 10*3/mm3      Lymphocytes, Absolute 0.57 10*3/mm3      Monocytes, Absolute 0.80 10*3/mm3      Eosinophils, Absolute 0.00 10*3/mm3      Basophils, Absolute 0.01 10*3/mm3      Immature Grans, Absolute 0.05 10*3/mm3      nRBC 0.0 /100 WBC     Blood Gas, Arterial [[id number]]  (Abnormal) Collected:  11/12/19 0350    Specimen:  Arterial Blood Updated:  11/12/19 0412     Site Right Radial     Marcelo's Test Positive     pH, Arterial 7.461 pH units      Comment: 83 Value above reference range        pCO2, Arterial 38.9 mm Hg      pO2, Arterial 401.0 mm Hg      Comment: 83 Value above reference range        HCO3, Arterial 27.7 mmol/L      Comment: 83 Value above reference range        Base Excess, Arterial 3.6 mmol/L      Comment: 83 Value above reference range        O2 Saturation, Arterial >100.1 %      Comment: 93 Value above reportable range > 100.1        Temperature 37.0 C      Barometric Pressure for Blood Gas 763 mmHg      Modality Ventilator     FIO2 100 %      Ventilator Mode AC     Set Tidal Volume 400     Set Mech Resp Rate 14.0     PEEP 5.0     Collected by [id number]     Comment: Meter: [id number]      :  [id number]       Respiratory Panel, PCR - Swab, Nasopharynx [[id number]] Collected:  11/12/19 0228    Specimen:  Swab from Nasopharynx Updated:  11/12/19 0244    Legionella Antigen, Urine - Urine, Urine, Catheter In/Out [[id number]]  (Normal) Collected:  11/12/19 0105    Specimen:  Urine, Catheter In/Out Updated:  11/12/19 0211     LEGIONELLA ANTIGEN, URINE Negative    S. Pneumo Ag Urine or CSF - Urine, Urine, Catheter In/Out [[id number]]  (Normal) Collected:  11/12/19 0105    Specimen:  Urine, Catheter In/Out Updated:  11/12/19 0211     Strep Pneumo Ag Negative    POC Glucose Once [[id number]]  (Abnormal) Collected:  11/11/19 2115    Specimen:  Blood Updated:  11/11/19 2127     Glucose 208 mg/dL      Comment: : [id number] [place] JustinMeter ID: [id number]       [place] Draw [[id number]] Collected:  11/11/19 1216    Specimen:  Blood Updated:  11/11/19 1330    Narrative:       The following orders were created for panel order [place] Draw.  Procedure                               Abnormality         Status                     ---------                               -----------         ------                     Light Blue Top[[id number]]                                   Final result               Green Top (Gel)[[id number]]                                  Final result               Lavender Top[[id number]]                                     Final result               Red Top[[id number]]                                          In process                   Please view results for these tests on the individual orders.    Light Blue Top [[id number]] Collected:  11/11/19 1216    Specimen:  Blood Updated:  11/11/19 1330     Extra Tube hold for add-on     Comment: Auto resulted       Green Top (Gel) [[id number]] Collected:  11/11/19 1216    Specimen:  Blood Updated:  11/11/19 1330     Extra Tube Hold for add-ons.     Comment: Auto resulted.       Lavender Top [[id number]] Collected:  11/11/19 1216    Specimen:  Blood  Updated:  11/11/19 1330     Extra Tube hold for add-on     Comment: Auto resulted       Blood Gas, Arterial With Co-Ox [[id number]]  (Abnormal) Collected:  11/11/19 1250    Specimen:  Arterial Blood Updated:  11/11/19 1302     Site Right Brachial     Marcelo's Test N/A     pH, Arterial 7.469 pH units      Comment: 83 Value above reference range        pCO2, Arterial 38.1 mm Hg      pO2, Arterial 70.2 mm Hg      Comment: 84 Value below reference range        HCO3, Arterial 27.6 mmol/L      Comment: 83 Value above reference range        Base Excess, Arterial 3.7 mmol/L      Comment: 83 Value above reference range        O2 Saturation, Arterial 95.2 %      Hemoglobin, Blood Gas 7.1 g/dL      Comment: 84 Value below reference range        Hematocrit, Blood Gas 21.8 %      Comment: 84 Value below reference range        Oxyhemoglobin 92.3 %      Comment: 84 Value below reference range        Methemoglobin 1.10 %      Carboxyhemoglobin 2.0 %      A-a Gradiant 35.1 mmHg      Temperature 37.0 C      Sodium, Arterial 138 mmol/L      Potassium, Arterial 3.9 mmol/L      Barometric Pressure for Blood Gas 753 mmHg      Modality Nasal Cannula     Flow Rate 2.0 lpm      Ventilator Mode NA     Note --     Collected by [id number]     Comment: Meter: [id number]     :  [id number]        pH, Temp Corrected --     pCO2, Temperature Corrected --     pO2, Temperature Corrected --    Comprehensive Metabolic Panel [[id number]]  (Abnormal) Collected:  11/11/19 1216    Specimen:  Blood Updated:  11/11/19 1252     Glucose 111 mg/dL      BUN 11 mg/dL      Creatinine 0.50 mg/dL      Sodium 141 mmol/L      Potassium 4.3 mmol/L      Chloride 102 mmol/L      CO2 30.0 mmol/L      Calcium 9.0 mg/dL      Total Protein 6.8 g/dL      Albumin 3.50 g/dL      ALT (SGPT) 7 U/L      AST (SGOT) 13 U/L      Alkaline Phosphatase 77 U/L      Total Bilirubin 0.7 mg/dL      eGFR Non African Amer 115 mL/min/1.73      Globulin 3.3 gm/dL      A/G Ratio 1.1 g/dL       BUN/Creatinine Ratio 22.0     Anion Gap 9.0 mmol/L     Narrative:       GFR Normal >60  Chronic Kidney Disease <60  Kidney Failure <15    Troponin [[id number]]  (Normal) Collected:  11/11/19 1216    Specimen:  Blood Updated:  11/11/19 1252     Troponin T <0.010 ng/mL     Narrative:       Troponin T Reference Range:  <= 0.03 ng/mL-   Negative for AMI  >0.03 ng/mL-     Abnormal for myocardial necrosis.  Clinicians would have to utilize clinical acumen, EKG, Troponin and serial changes to determine if it is an Acute Myocardial Infarction or myocardial injury due to an underlying chronic condition.     BNP [[id number]]  (Abnormal) Collected:  11/11/19 1216    Specimen:  Blood Updated:  11/11/19 1252     proBNP 1,933.0 pg/mL     Narrative:       Among patients with dyspnea, NT-proBNP is highly sensitive for the detection of acute congestive heart failure. In addition NT-proBNP of <300 pg/ml effectively rules out acute congestive heart failure with 99% negative predictive value.    Lactic Acid, Plasma [[id number]]  (Normal) Collected:  11/11/19 1216    Specimen:  Blood Updated:  11/11/19 1249     Lactate 1.9 mmol/L     D-dimer, Quantitative [[id number]]  (Abnormal) Collected:  11/11/19 1216    Specimen:  Blood Updated:  11/11/19 1244     D-Dimer, Quantitative 2.37 mg/L (FEU)     Narrative:       Reference Range is 0-0.50 mg/L FEU. However, results <0.50 mg/L FEU tends to rule out DVT or PE. Results >0.50 mg/L FEU are not useful in predicting absence or presence of DVT or PE.    Protime-INR [[id number]]  (Abnormal) Collected:  11/11/19 1216    Specimen:  Blood Updated:  11/11/19 1244     Protime 15.8 Seconds      INR 1.22    Influenza Antigen, Rapid - Swab, Nasopharynx [[id number]]  (Normal) Collected:  11/11/19 1218    Specimen:  Swab from Nasopharynx Updated:  11/11/19 1242     Influenza A Ag, EIA Negative     Influenza B Ag, EIA Negative    Narrative:       Recommend confirmation of negative results by viral culture or  molecular assay.    Blood Culture - Blood, Arm, Left [[id number]] Collected:  11/11/19 1222    Specimen:  Blood from Arm, Left Updated:  11/11/19 1235    Blood Culture - Blood, Arm, Right [[id number]] Collected:  11/11/19 1216    Specimen:  Blood from Arm, Right Updated:  11/11/19 1234    CBC & Differential [[id number]] Collected:  11/11/19 1216    Specimen:  Blood Updated:  11/11/19 1233    Narrative:       The following orders were created for panel order CBC & Differential.  Procedure                               Abnormality         Status                     ---------                               -----------         ------                     CBC Auto Differential[[id number]]        Abnormal            Final result                 Please view results for these tests on the individual orders.    CBC Auto Differential [[id number]]  (Abnormal) Collected:  11/11/19 1216    Specimen:  Blood Updated:  11/11/19 1233     WBC 11.26 10*3/mm3      RBC 2.98 10*6/mm3      Hemoglobin 7.3 g/dL      Hematocrit 24.1 %      MCV 80.9 fL      MCH 24.5 pg      MCHC 30.3 g/dL      RDW 18.1 %      RDW-SD 53.1 fl      MPV 9.0 fL      Platelets 328 10*3/mm3      Neutrophil % 82.6 %      Lymphocyte % 6.9 %      Monocyte % 9.5 %      Eosinophil % 0.3 %      Basophil % 0.3 %      Immature Grans % 0.4 %      Neutrophils, Absolute 9.30 10*3/mm3      Lymphocytes, Absolute 0.78 10*3/mm3      Monocytes, Absolute 1.07 10*3/mm3      Eosinophils, Absolute 0.03 10*3/mm3      Basophils, Absolute 0.03 10*3/mm3      Immature Grans, Absolute 0.05 10*3/mm3      nRBC 0.0 /100 WBC     Red Top [[id number]] Collected:  11/11/19 1216    Specimen:  Blood Updated:  11/11/19 1231          Imaging Results (Last 24 Hours)     Procedure Component Value Units Date/Time    XR Chest 1 View [[id number]] Collected:  11/12/19 0829     Updated:  11/12/19 0834    Narrative:       EXAM: XR CHEST 1 VW- - 11/12/2019 8:04 AM CST     HISTORY: vent; J18.9-Pneumonia, unspecified  organism; J96.11-Chronic  respiratory failure with hypoxia; J44.9-Chronic obstructive pulmonary  disease, unspecified       COMPARISON: 11/11/2019.      TECHNIQUE:  1 images.  Frontal view of the chest.     FINDINGS:    Endotracheal tube tip projects at the mid trachea. The chin overlies the  right lung apex, which limits evaluation.     Redemonstration of right basilar opacity and effusion. Left lung appears  clear. No pneumothorax demonstrated.     Borderline cardiac silhouette. Upper mediastinum within normal limits  considering rotation. Probable calcified aortic atherosclerosis.     Severe degenerative changes of the bilateral shoulders and spine, not  optimally assessed on this exam.          Impression:       1. Similar right basilar opacity and effusion.  2. Endotracheal tube tip projects in mid trachea.  This report was finalized on 11/12/2019 08:31 by Dr Ange Romero MD.    XR Chest 1 View [[id number]] Collected:  11/11/19 2025     Updated:  11/11/19 2029    Narrative:       XR CHEST 1 VW- 11/11/2019 8:10 PM CST     HISTORY: ET tube placement.; J18.9-Pneumonia, unspecified organism;  J96.11-Chronic respiratory failure with hypoxia; J44.9-Chronic  obstructive pulmonary disease, unspecified       COMPARISON: Chest exam dated 11/11/2019.     FINDINGS:   New endotracheal tube with tip in good position. Reidentified right  basilar opacification, the result of a moderate layering right pleural  effusion. This appears stable. No new or worsening infiltrate.  Underlying cardiomegaly is stable. Pulmonary vasculature are nondilated.  No pneumothorax.       Impression:       1. Status post intubation. Endotracheal tube is in good position.  2. Reidentified layering right pleural effusion, appearing stable.  3. Underlying cardiomegaly stable.        This report was finalized on 11/11/2019 20:26 by Dr Maciej Richardson, .    XR Chest 1 View [[id number]] Collected:  11/11/19 1348     Updated:  11/11/19 1353    Narrative:        EXAM: XR CHEST 1 VW- - 11/11/2019 12:16 PM CST     HISTORY: cough/ congestion/ hypoxia       COMPARISON: 02/19/2019.      TECHNIQUE:  1 images.  Frontal view of the chest.     FINDINGS:    No pneumothorax. Right basilar opacity and effusion. Left lung clear.  Cardiac and mediastinal silhouette similar compared to prior. Probable  hiatal hernia. Calcified aortic atheromatous sclerosis. Severe  degenerative changes of the bilateral shoulders with superior migration  of the humeral heads.          Impression:       1. Right basilar opacity and effusion. Left lung clear.  2. Large hiatal hernia.  3. Severe degenerative changes of the bilateral shoulders.  This report was finalized on 11/11/2019 13:50 by Dr Ange Romero MD.             Intake/Output Summary (Last 24 hours) at 11/12/2019 1126  Last data filed at 11/12/2019 0800  Gross per 24 hour   Intake 2559.93 ml   Output --   Net 2559.93 ml       Physical Exam   Constitutional: She appears well-developed and well-nourished. She is cooperative. No distress. She is sedated and intubated.   She was seen and evaluated with her nurse, Maria Elena.  Patient's daughter is present in the room.   HENT:   Head: Normocephalic and atraumatic.   Nose: Nose normal.   Mouth/Throat: Oropharynx is clear and moist.   Eyes: Conjunctivae are normal. No scleral icterus.   Neck: Neck supple. No JVD present.   Cardiovascular: Normal rate and regular rhythm.   Murmur heard.   Systolic murmur is present with a grade of 5/6.  Pulmonary/Chest: She is intubated. She has rales in the right middle field and the right lower field.   Abdominal: Soft. Bowel sounds are normal. She exhibits no mass.   Musculoskeletal: She exhibits no edema or deformity.   Skin: Skin is warm and dry. No rash noted.       Results Review:  I have reviewed the labs, radiology results, and diagnostic studies since my last progress note and made treatment changes reflective of the results.   I have reviewed the current  medications.    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

## 2019-11-12 NOTE — CONSULTS
PULMONARY AND CRITICAL CARE CONSULT - [facility]   MR# [id number]  Acct# [id number]  11/12/2019   9:18 AM    Referring Provider: Alen Montenegro DO    Chief Complaint: Mechanically ventilated    HPI: We are consulted by Alen Montenegro DO to see this 89 y.o. female born on 10/23/1926.   Information obtained from EMR as patient is unresponsive, intubated, and no family is at bedside.  The patient presented to [facility] emergency room on November 11, 2019 with chief complaint of progressive shortness of breath and cough.  Shortness of breath began 3 days prior to arrival.  Sputum production clear to cream in color.  O2 sat 87% on 2 L nasal cannula. The patient has known COPD, chronic respiratory failure with hypoxemia, atrial fib, severe aortic stenosis, allergic rhinitis, GERD, and personal history of nicotine dependence.  The patient utilizes Trelegy, albuterol rescue inhaler, and albuterol nebs.  The patient has home oxygen at 2 L.  Upon assessment the patient is sedated and intubated.  Propofol infusing.  Heart rate 54-atrial fibrillation, O2 sat 98% on 30% FiO2.  ABG stable.  No chest x-ray to review this AM- will order.  Nursing reports that the patient has received 1 unit of blood for hemoglobin of 7.3 on admission.  No other aggravating or alleviating factors.     Past Medical History   has a past medical history of Atrial fibrillation with rapid ventricular response (CMS/HCC), Chronic respiratory failure with hypoxia (CMS/HCC) (10/28/2019), Degenerative joint disease, Diastolic murmur of aorta, Dysphagia, Essential hypertension, Gastroesophageal reflux disease (10/31/2018), History of cerebellar stroke (3/28/2018), Personal history of nicotine dependence (10/31/2018), and Pneumonia.   has a past surgical history that includes Appendectomy; Tonsillectomy; Hysterectomy; Tubal ligation; and Hernia repair.  Allergies   Allergen Reactions   • Erythromycin Hives and  Swelling   • Demerol [Meperidine] Nausea And Vomiting and GI Intolerance   • Morphine And Related Nausea And Vomiting     Medications    cefTRIAXone 1 g Intravenous Q24H   cetirizine 10 mg Oral Daily   dabigatran etexilate 150 mg Oral Q12H   doxycycline 100 mg Intravenous Q12H   furosemide 20 mg Oral Daily   guaiFENesin 1,200 mg Oral Q12H   ipratropium-albuterol 3 mL Nebulization 4x Daily - RT   metoprolol tartrate 12.5 mg Oral Q12H   pantoprazole 40 mg Oral Q AM   PARoxetine 10 mg Oral Daily   sodium chloride 10 mL Intravenous Q12H       propofol 5-75 mcg/kg/min Last Rate: 50 mcg/kg/min (11/12/19 0438)   sodium chloride 100 mL/hr Last Rate: 100 mL/hr (11/12/19 0226)     Social History   reports that she has never smoked. She has never used smokeless tobacco. She reports that she does not drink alcohol or use drugs.  Family History  family history includes Cancer in her brother and sister; Emphysema in her father; Stroke in her mother.  Review of Systems:  Cannot obtain due to mechanical ventilation.  The patient notably is critically ill and connected to a ventilator.  As such patient cannot communicate and provide any history whatsoever, including any history of present illness or interval history since arrival or review of systems. The interested reviewer may note this fact, as an attempt has been made at collecting and documenting these portions of the patient history, but this information is unobtainable despite attempted review and therefore cannot be documented at this time.   Physical Exam:  Temp:  [96.9 °F (36.1 °C)-98 °F (36.7 °C)] 97.5 °F (36.4 °C)  Heart Rate:  [] 55  Resp:  [15-32] 15  BP: ()/() 104/65  FiO2 (%):  [30 %-100 %] 30 %    Intake/Output Summary (Last 24 hours) at 11/12/2019 0918  Last data filed at 11/12/2019 0431  Gross per 24 hour   Intake 2312.53 ml   Output --   Net 2312.53 ml         11/11/19 2010 11/12/19  0601   Weight: 53.9 kg (118 lb 12.8 oz) 53.9 kg (118 lb 13.3  oz)     SpO2 Percentage    11/12/19 0701 11/12/19 0716 11/12/19 0800   SpO2: 97% 97% 98%     Physical Exam   Constitutional: She appears well-developed and well-nourished. She is sedated and intubated.   Frail and elderly   HENT:   Head: Normocephalic and atraumatic.   Eyes: Conjunctivae and EOM are normal. Pupils are equal, round, and reactive to light. No scleral icterus.   Neck: Normal range of motion. Neck supple.   Cardiovascular: Normal heart sounds. An irregular rhythm present. Bradycardia present. Exam reveals no friction rub.   No murmur heard.  Pulmonary/Chest: Effort normal. She is intubated. No respiratory distress. She has decreased breath sounds in the right middle field and the right lower field. She has no wheezes. She has no rales.   Abdominal: Soft. Bowel sounds are normal. She exhibits no distension. There is no tenderness.   Musculoskeletal: Normal range of motion. She exhibits no edema.   Skin: Skin is warm and dry.   Psychiatric: She has a normal mood and affect. Her behavior is normal.   Nursing note and vitals reviewed.    Ventilator Settings:     Vt (Set, L): 0.4 L  Resp Rate (Set): 14  Pressure Support (cm H2O): 0 cm H20  FiO2 (%): 30 %  PEEP/CPAP (cm H2O): 5 cm H20  Minute Ventilation (L/min) (Obs): 5.66 L/min  Resp Rate (Observed) Vent: 14  I:E Ratio (Set): 1:3.90  I:E Ratio (Obs): 1:3.9  PIP Observed (cm H2O): 27 cm H2O  Plateau Pressure (cm H2O): 16 cm H2O     Results from last 7 days   Lab Units 11/12/19  0402 11/11/19  1216   WBC 10*3/mm3 8.66 11.26*   HEMOGLOBIN g/dL 8.1* 7.3*   PLATELETS 10*3/mm3 251 328     Results from last 7 days   Lab Units 11/12/19  0402 11/11/19  1250 11/11/19  1216   SODIUM mmol/L 139  --  141   SODIUM, ARTERIAL mmol/L  --  138  --    POTASSIUM mmol/L 4.7  --  4.3   CO2 mmol/L 26.0  --  30.0*   BUN mg/dL 14  --  11   CREATININE mg/dL 0.43*  --  0.50*   GLUCOSE mg/dL 170*  --  111*     Results from last 7 days   Lab Units 11/12/19  0350 11/11/19  1250   PH,  ARTERIAL pH units 7.461* 7.469*   PCO2, ARTERIAL mm Hg 38.9 38.1   PO2 ART mm Hg 401.0* 70.2*   FIO2 % 100  --         Lab Results   Component Value Date    PROBNP 1,933.0 (H) 11/11/2019     Recent radiology:   Imaging Results (Last 72 Hours)     Procedure Component Value Units Date/Time    XR Chest 1 View [[id number]] Collected:  11/12/19 0829     Updated:  11/12/19 0834    Narrative:       EXAM: XR CHEST 1 VW- - 11/12/2019 8:04 AM CST     HISTORY: vent; J18.9-Pneumonia, unspecified organism; J96.11-Chronic  respiratory failure with hypoxia; J44.9-Chronic obstructive pulmonary  disease, unspecified       COMPARISON: 11/11/2019.      TECHNIQUE:  1 images.  Frontal view of the chest.     FINDINGS:    Endotracheal tube tip projects at the mid trachea. The chin overlies the  right lung apex, which limits evaluation.     Redemonstration of right basilar opacity and effusion. Left lung appears  clear. No pneumothorax demonstrated.     Borderline cardiac silhouette. Upper mediastinum within normal limits  considering rotation. Probable calcified aortic atherosclerosis.     Severe degenerative changes of the bilateral shoulders and spine, not  optimally assessed on this exam.          Impression:       1. Similar right basilar opacity and effusion.  2. Endotracheal tube tip projects in mid trachea.  This report was finalized on 11/12/2019 08:31 by Dr Ange Romero MD.    XR Chest 1 View [[id number]] Collected:  11/11/19 2025     Updated:  11/11/19 2029    Narrative:       XR CHEST 1 VW- 11/11/2019 8:10 PM CST     HISTORY: ET tube placement.; J18.9-Pneumonia, unspecified organism;  J96.11-Chronic respiratory failure with hypoxia; J44.9-Chronic  obstructive pulmonary disease, unspecified       COMPARISON: Chest exam dated 11/11/2019.     FINDINGS:   New endotracheal tube with tip in good position. Reidentified right  basilar opacification, the result of a moderate layering right pleural  effusion. This appears stable. No new  or worsening infiltrate.  Underlying cardiomegaly is stable. Pulmonary vasculature are nondilated.  No pneumothorax.       Impression:       1. Status post intubation. Endotracheal tube is in good position.  2. Reidentified layering right pleural effusion, appearing stable.  3. Underlying cardiomegaly stable.        This report was finalized on 11/11/2019 20:26 by Dr Maciej Richardson, .    XR Chest 1 View [[id number]] Collected:  11/11/19 1348     Updated:  11/11/19 1353    Narrative:       EXAM: XR CHEST 1 VW- - 11/11/2019 12:16 PM CST     HISTORY: cough/ congestion/ hypoxia       COMPARISON: 02/19/2019.      TECHNIQUE:  1 images.  Frontal view of the chest.     FINDINGS:    No pneumothorax. Right basilar opacity and effusion. Left lung clear.  Cardiac and mediastinal silhouette similar compared to prior. Probable  hiatal hernia. Calcified aortic atheromatous sclerosis. Severe  degenerative changes of the bilateral shoulders with superior migration  of the humeral heads.          Impression:       1. Right basilar opacity and effusion. Left lung clear.  2. Large hiatal hernia.  3. Severe degenerative changes of the bilateral shoulders.  This report was finalized on 11/11/2019 13:50 by Dr Ange Romero MD.        My radiograph interpretation/independent review of imaging: ETT intact, Right basilar effusion  Other test results (not lab or imaging): Results for orders placed during the hospital encounter of 02/19/19   Adult Transthoracic Echo Complete W/ Cont if Necessary Per Protocol    Narrative · Left atrial cavity size is moderately dilated.  · There is moderate calcification of the aortic valve.  · Left ventricular wall thickness is consistent with mild concentric   hypertrophy.  · Mild tricuspid valve regurgitation is present.  · Left ventricular diastolic dysfunction.     SEVERE AORTIC VALVE STENOSIS  HYPERDYNAMIC LV AND NORMAL LV SYSTOLIC FUNCTION  RHYTHM IS ATRIAL FIBRILLATION WITH BI-ATRIAL ENLARGEMENT  SEVERE  PULMONARY HTN  LV DIASTOLIC DYSFUNCTION AND ELEVATED LA PRESSURE NOTED       Independent review of ekg: 2019, atrial fibrillation with , QTc 382    Problem List as identified by Epic (may contain historical, inactive problems)  Patient Active Problem List   Diagnosis   • Chronic atrial fibrillation   • Aortic stenosis, severe   • Stage 2 moderate COPD by GOLD classification (CMS/HCC)   • Current use of long term anticoagulation   • Anemia   • Acute on chronic respiratory failure with hypoxia (CMS/HCC)   • Pulmonary hypertension (CMS/HCC)   • Chronic respiratory failure with hypoxia (CMS/HCC)   • Cachexia (CMS/HCC)   • RML pneumonia (CMS/HCC)   • Sepsis (CMS/HCC)     Pulmonary Assessment:  SEVERE ACUTE RESPIRATORY FAILURE REQUIRING MECHANICAL VENTILATION  This is a threat to life or pulmonary function, high risk, due to acute on chronic respiratory failure with hypoxemia  New problem (to me), with additional workup planned: anemia   New problem (to me), no additional workup planned: chronic a-fib   Other problems either stable, failing to improve or worsenin. Stage 2 COPD  2. Right pleural effusion  3. Severe aortic stenosis  4. Pulmonary hypertension  5. Hematemesis?    Recommend/plan:   · Day #1 endotracheal tube.  Continue mechanical ventilation.  FiO2 has already been decreased to 30%.  · Continue bronchodilators  · Daily chest x-ray/ABGs while on mechanical ventilator  · Obtain 2D echo to re-evaluate aortic stenosis  · Consider cardiology consult as last 2D echo shows severe aortic stenosis. Also with elevated BNP and right pleural effusion.  · Decrease IV fluids to KVO  · Defer diuresis to attending    Thank you for this consult.  We will follow along.  Electronically signed by JIM Canales on 2019 at 9:18 AM  Physician substantive contribution:  Pertinent symptoms/interval history include: The patient remains on mechanical ventilatory support.  Plans are to wean the  patient to extubation and not reintubate should her condition deteriorate based on her cardiac status.  Respiratory exam shows pertinent findings of decreased breath sounds at the right base.  Plan includes: Again, if the decision is to extubate to comfort measures and no further reintubation I certainly think that is reasonable.  I have seen and examined patient personally, performing a face-to-face diagnostic evaluation with plan of care reviewed and developed with APRN and nursing staff. I have addended and/or modified the above history of present illness, physical examination, and assessment and plan to reflect my findings and impressions. Essential elements of the care plan were discussed with APRN above.  Agree with findings and assessment/plan as documented above.    Electronically signed by Ck Nguyen MD, on 11/12/2019, 3:57 PM

## 2019-11-12 NOTE — PROGRESS NOTES
Discharge Planning Assessment   [place]     Patient Name: Heather Burgos  MRN: [id number]  Today's Date: 11/12/2019    Admit Date: 11/11/2019    Discharge Needs Assessment     Row Name 11/12/19 1027       Living Environment    Lives With  child(tara), adult    Name(s) of Who Lives With Patient  daughter    Current Living Arrangements  home/apartment/condo    Primary Care Provided by  child(tara)    Provides Primary Care For  no one, unable/limited ability to care for self    Family Caregiver if Needed  child(tara), adult    Quality of Family Relationships  supportive;helpful;involved    Able to Return to Prior Arrangements  yes       Resource/Environmental Concerns    Resource/Environmental Concerns  none       Transition Planning    Patient/Family Anticipates Transition to  home with family;home with help/services    Patient/Family Anticipated Services at Transition  home health care;    Transportation Anticipated  family or friend will provide       Discharge Needs Assessment    Readmission Within the Last 30 Days  no previous admission in last 30 days    Concerns to be Addressed  discharge planning    Equipment Currently Used at Home  cane, quad;oxygen;respiratory supplies;commode;nebulizer    Outpatient/Agency/Support Group Needs  homecare agency    Discharge Facility/Level of Care Needs  home with home health    Current Discharge Risk  chronically ill    Discharge Coordination/Progress  SW spoke with patient's daughter at bedside regarding discharge plan/needs. Patient currently in ICU on vent. Patient resides with her daughter.  Patient utilizes DME, including home O2, supplied by [employer].  Patient has a PCP but daughter is unsure if patient has RX coverage.  Daughter plans for patient to return home upon discharge and is requesting  services.        Discharge Plan    No documentation.       Destination      No service coordination in this encounter.      Durable Medical Equipment      No service  coordination in this encounter.      Dialysis/Infusion      No service coordination in this encounter.      Home Medical Care      No service coordination in this encounter.      Therapy      No service coordination in this encounter.      Community Resources      No service coordination in this encounter.          Demographic Summary    No documentation.       Functional Status    No documentation.       Psychosocial    No documentation.       Abuse/Neglect    No documentation.       Legal    No documentation.       Substance Abuse    No documentation.       Patient Forms    No documentation.           JOCELINE Santana

## 2019-11-12 NOTE — PLAN OF CARE
Problem: Patient Care Overview  Goal: Plan of Care Review  Outcome: Ongoing (interventions implemented as appropriate)   11/12/19 4878   Plan of Care Review   Progress no change   OTHER   Outcome Summary Pt sedated on mechanical vent. Pt with unintentional wt loss and decreased oral intake per admission ntn screen. If unable to extubated may benefit from AMONS. Con to follow.       Problem: Nutrition, Imbalanced: Inadequate Oral Intake (Adult)  Goal: Identify Related Risk Factors and Signs and Symptoms  Outcome: Ongoing (interventions implemented as appropriate)    Goal: Improved Oral Intake  Outcome: Ongoing (interventions implemented as appropriate)    Goal: Prevent Further Weight Loss  Outcome: Ongoing (interventions implemented as appropriate)

## 2019-11-12 NOTE — SIGNIFICANT NOTE
Pt was a RRT on the floor. She was moved to ICU for intubation per Dr. Henderson. 20 of etomidate was administered, flushed, followed by 50 of succinylcholine, and flushed at 1959. Dr. Henderson placed the ETT at 2001 with color change at that time. [place] was 20 at the lip, confirmed by Dr. Henderson with chest xray.

## 2019-11-13 NOTE — PLAN OF CARE
Problem: Patient Care Overview  Goal: Plan of Care Review  Outcome: Ongoing (interventions implemented as appropriate)   11/13/19 1006   Coping/Psychosocial   Plan of Care Reviewed With patient   Plan of Care Review   Progress no change  (Initial eval)   OTHER   Outcome Summary Clinical bedside swallow evaluation complete. Pt SOA at times with mild difficulty communicating due to decreased respiratory support. Initially the pt's O2 sats were in the mid 80's; however, the pt was placed on 2 liters of O2 and her sats ivy to the mid to upper 90s throughout assessment and she was able to complete PO trials. She was presented a full range of PO consistencies except for mechanical soft solids. Mild diffuse oral residue noted post initial swallow of regular solids but she was able to clear a majority of residue with a thin liquid wash. Laryngeal elevation is decreased by a mild amount with all trials complete and a 2x audible swallow was noted with both nectar thick and thin liquids. 1x throat clear noted following nectar thick trials; however, no wet vocal quality or any other overt s/s of aspiration noted. Pt ok to begin a regular diet with thin liquids. Ok for meds to be administered whole with thin liquids as tolerated. RN to monitor for increased congestion and continue general aspiration precautions. SLP will follow to ensure diet tolerance.

## 2019-11-13 NOTE — CONSULTS
Palliative Care Initial Consult   Attending Physician: Alen Montenegro DO  Referring Provider: Alen Montenegro DO    Reason for Referral: assistance with clarification of goals of care, hospice referral or discussion and support for patient/family  Family/Support: Daughters x2 and son-in-law at bedside  Goals of Care: TBD.  Code Status and Medical Interventions:   Ordered at: 11/12/19 1844     Limited Support to NOT Include:    Intubation     Level Of Support Discussed With:    Next of Kin (If No Surrogate)     Code Status:    CPR     Medical Interventions (Level of Support Prior to Arrest):    Limited         HPI:   89 y.o. female with past medical history significant for COPD with chronic respiratory failure-uses 2 L nasal cannula at home, chronic atrial fibrillation, severe aortic stenosis-inoperable, pulmonary hypertension, and stroke. Patient presented to [facility] on 11/11/2019 related to shortness of breath.  She is admitted with right middle lobe pneumonia, sepsis, and acute on chronic respiratory failure with hypoxia.  Rapid response code was called and patient was transferred to critical care unit, intubated, and placed on ventilator support.  She was extubated yesterday.  She has known inoperable severe aortic stenosis.  Due to GI bleed her Pradaxa was stopped.  It is felt patient is high risk for any endoscopic procedure due to her multiple comorbidities. Palliative Care Spoke With: patient and family verbalize patient's quality of life and functional status have steadily declined over the last 2 months.  Patient becomes short of breath with minimal exertion. Due to the Palliative Care Topics Discussed: palliative care, goals of care, care options, resuscitation status, [facility] and discharge options we will establish an advance care plan.   Advance Care Planning   Advance Care Planning Discussion: Patient and family are are able to verbalize a very good understanding of the patient's  current health status, multiple comorbidities, and advanced age.  Patient is not a candidate for aortic valve replacement.  As such we discussed care goals and CODE STATUS.  Patient and family have elected no CPR and only the use of antibiotics as needed for pain and suffering.  They have elected to transition with focus of more quality of life and comfort.  Based on patient and family decisions we further reviewed and initiated medical orders for scope with treatment (MOST) form to further delineate care goals after discharge to include no CPR, comfort measures, and no feeding tube.  Attending to complete prior to discharge.  We support discharge options to include home with hospice versus skilled nursing facility with palliative versus skilled nursing facility with hospice.  Family verbalized patient has [employer] benefits through her spouse.  As such I have asked social work to also identify if patient would qualify for VA hospice benefits in skilled nursing facility.  We further discussed symptom management goals given patient's shortness of breath with minimal exertion.  Family agreeable to plan.    Review of Systems   Constitution: Positive for weakness and malaise/fatigue.   HENT:        Xerostomia   Cardiovascular: Positive for dyspnea on exertion. Negative for chest pain and leg swelling.   Respiratory: Positive for shortness of breath.    Hematologic/Lymphatic: Negative for bleeding problem.   Musculoskeletal: Positive for muscle weakness.   Gastrointestinal: Positive for constipation. Negative for nausea and vomiting.   Genitourinary: Negative for dysuria.   Neurological: Positive for difficulty with concentration.   Psychiatric/Behavioral: The patient is not nervous/anxious.        Past Medical History:   Diagnosis Date   • Atrial fibrillation with rapid ventricular response (CMS/HCC)    • Chronic respiratory failure with hypoxia (CMS/HCC) 10/28/2019   • Degenerative joint disease    • Diastolic murmur of aorta  "   • Dysphagia    • Essential hypertension    • Gastroesophageal reflux disease 10/31/2018   • History of cerebellar stroke 3/28/2018   • Personal history of nicotine dependence 10/31/2018   • Pneumonia      Past Surgical History:   Procedure Laterality Date   • APPENDECTOMY     • HERNIA REPAIR     • HYSTERECTOMY     • TONSILLECTOMY     • TUBAL ABDOMINAL LIGATION       Social History     Socioeconomic History   • Marital status:      Spouse name: Not on file   • Number of children: Not on file   • Years of education: Not on file   • Highest education level: Not on file   Tobacco Use   • Smoking status: Never Smoker   • Smokeless tobacco: Never Used   Substance and Sexual Activity   • Alcohol use: No   • Drug use: No   • Sexual activity: Defer       Allergies   Allergen Reactions   • Erythromycin Hives and Swelling   • Demerol [Meperidine] Nausea And Vomiting and GI Intolerance   • Morphine And Related Nausea And Vomiting       Current medication reviewed for route, type, dose and frequency and are current per MAR at time of dictation.      Diagnostics: Reviewed    Patient Active Problem List   Diagnosis   • Chronic atrial fibrillation   • Aortic stenosis, severe   • Stage 2 moderate COPD by GOLD classification (CMS/HCC)   • Current use of long term anticoagulation   • Anemia   • Acute on chronic respiratory failure with hypoxia (CMS/HCC)   • Pulmonary hypertension (CMS/HCC)   • Chronic respiratory failure with hypoxia (CMS/HCC)   • Cachexia (CMS/HCC)   • RML pneumonia (CMS/HCC)   • Sepsis (CMS/HCC)   • Coffee ground emesis       Physical Exam:    /92   Pulse 96   Temp 98.9 °F (37.2 °C) (Axillary)   Resp 24   Ht 157.5 cm (62.01\")   Wt 48.8 kg (107 lb 9.4 oz)   SpO2 97%   Breastfeeding? No   BMI 19.67 kg/m²     Physical Exam   Constitutional: She appears well-developed. She has a sickly appearance. She appears ill. No distress. Nasal cannula in place.   Frail   HENT:   Head: Normocephalic and " atraumatic.   Eyes: Pupils are equal, round, and reactive to light.   Neck: Normal range of motion.   Cardiovascular: Normal rate and intact distal pulses. An irregularly irregular rhythm present.   Murmur heard.  Pulmonary/Chest: Effort normal. No accessory muscle usage. No respiratory distress. She has decreased breath sounds. She has rhonchi.   Abdominal: Soft. Bowel sounds are normal.   Musculoskeletal: Normal range of motion.   Neurological: She is alert.   Skin: Skin is warm and dry.   Psychiatric: She has a normal mood and affect.     Patient status: Disease state: Controlled with current treatments.  Functional status: Palliative Performance Scale Score: Performance 40% based on the following measures: Ambulation: Mainly in bed, Activity and Evidence of Disease: Unable to do any work, extensive evidence of disease, Self-Care: Mainly assistance required,  Intake: Normal or reduced, LOC: Full, drowsy or confusion   Nutritional status: Albumin 3.50. Body mass index is 19.67 kg/m².         Family support: The patient receives support from her children..  POA/Healthcare surrogate-no advance directive on file    Impression/Problem List:    1.  Acute on chronic respiratory failure with hypoxia   2.  Right middle lobe pneumonia  3.  Sepsis  4.  Aortic stenosis, severe inoperable  5.  Pulmonary hypertension  6.  Upper GI bleed, anticoagulation has been stopped  7.  Chronic atrial fibrillation  8.  Advanced age       Recommendations/Plan:  1. plan: Goals of care include CODE STATUS no CPR\limited interventions.        2.  Palliative care encounter  -CODE STATUS changes as delineated per conversation above  -Plans to discharge to skilled nursing facility with either palliative or hospice.  Informed social work.  -Reviewed and initiated the MOST form to further delineate care goals after discharge.  Attending to complete.  -Plan to continue antibiotics at this juncture.  Patient and family do not wish any heroic or  overly aggressive care measures.    3.  Dyspnea  -Add morphine concentrated solution 5 mg sublingual every 4 hours as needed for air hunger, tachypnea, and pain    4.  Anxiety related to dyspnea  -Add Ativan concentrated solution 0.25 mg sublingual every 4 hours as needed for anxiety and sleep    5.  Xerostomia  -Add biotin spray     Thank you for this consult and allowing us to participate in patient's plan of care. Palliative Care Team will continue to follow patient.     Time spent: 90 minutes spent reviewing medical and medication records, assessing and examining patient, discussing with family, answering questions, providing some guidance about a plan and documentation of care, and coordinating care with other healthcare members, with > 50% time spent face to face.   60 minutes spent on advance care planning.    Aria Alegre, APRN  11/13/2019

## 2019-11-13 NOTE — PROGRESS NOTES
PULMONARY AND CRITICAL CARE PROGRESS NOTE - [facility]    Patient: Heather Burgos  10/23/1926   MR# [id number]   Acct# [id number]  11/13/19   8:09 AM  Referring Provider: Alen Montenegro DO    Chief Complaint: Acute respiratory failure requiring mechanical ventilation  Interval history: She has been extubated and is off the ventilator now.  She is also on respiratory isolation because of identification of rhinovirus.  The patient denies having a cold.  She denies additional complaints.  She is not short of breath.  Meds:    cefTRIAXone 1 g Intravenous Q24H   doxycycline 100 mg Intravenous Q12H   furosemide 40 mg Intravenous Daily   guaiFENesin 1,200 mg Oral Q12H   ipratropium-albuterol 3 mL Nebulization 4x Daily - RT   metoprolol tartrate 12.5 mg Oral Q12H   pantoprazole 40 mg Intravenous Q AM   PARoxetine 10 mg Oral Daily   sodium chloride 10 mL Intravenous Q12H       sodium chloride 50 mL/hr Last Rate: 50 mL/hr (11/13/19 0450)     Review of Systems:   Review of Systems   Constitutional: Negative for chills and fever.   Cardiovascular: Negative for chest pain.   Gastrointestinal: Negative for diarrhea, nausea and vomiting.     Physical Exam:  SpO2 Percentage    11/13/19 0721 11/13/19 0729 11/13/19 0730   SpO2: 94% 99% 98%     Temp:  [97.5 °F (36.4 °C)-98.9 °F (37.2 °C)] 98.9 °F (37.2 °C)  Heart Rate:  [] 91  Resp:  [14-27] 25  BP: ()/(51-97) 135/86  FiO2 (%):  [30 %] 30 %    Intake/Output Summary (Last 24 hours) at 11/13/2019 0809  Last data filed at 11/13/2019 0455  Gross per 24 hour   Intake 1902.79 ml   Output 2080 ml   Net -177.21 ml     Physical Exam   Constitutional: She appears well-developed.  Non-toxic appearance. She does not have a sickly appearance. No distress. Nasal cannula in place.   HENT:   Nose: Nose normal.   Eyes: EOM are normal. No scleral icterus.   Neck: No JVD present. No tracheal deviation present.   Cardiovascular: Normal rate and regular rhythm.   Murmur  heard.  Pulmonary/Chest: Effort normal and breath sounds normal. No respiratory distress.   Abdominal: Soft. There is no tenderness. There is no guarding.   Musculoskeletal: She exhibits no edema.   Skin: Skin is warm and dry. She is not diaphoretic.   Psychiatric: She has a normal mood and affect. Her behavior is normal.     Laboratory Data:  Results from last 7 days   Lab Units 11/12/19  2332 11/12/19  1802 11/12/19  0402 11/11/19  1216   WBC 10*3/mm3  --   --  8.66 11.26*   HEMOGLOBIN g/dL 8.7* 9.1* 8.1* 7.3*   PLATELETS 10*3/mm3  --   --  251 328     Results from last 7 days   Lab Units 11/12/19  0402 11/11/19  1250 11/11/19  1216   SODIUM mmol/L 139  --  141   SODIUM, ARTERIAL mmol/L  --  138  --    POTASSIUM mmol/L 4.7  --  4.3   BUN mg/dL 14  --  11   CREATININE mg/dL 0.43*  --  0.50*   INR   --   --  1.22*     Results from last 7 days   Lab Units 11/12/19  0350 11/11/19  1250   PH, ARTERIAL pH units 7.461* 7.469*   PCO2, ARTERIAL mm Hg 38.9 38.1   PO2 ART mm Hg 401.0* 70.2*   FIO2 % 100  --      Blood Culture   Date Value Ref Range Status   11/11/2019 No growth at 24 hours  Preliminary   11/11/2019 No growth at 24 hours  Preliminary     Recent films:  Xr Chest 1 View    Result Date: 11/12/2019  1. Similar right basilar opacity and effusion. 2. Endotracheal tube tip projects in mid trachea. This report was finalized on 11/12/2019 08:31 by Dr Ange Romero MD.    Xr Chest 1 View    Result Date: 11/11/2019  1. Status post intubation. Endotracheal tube is in good position. 2. Reidentified layering right pleural effusion, appearing stable. 3. Underlying cardiomegaly stable.   This report was finalized on 11/11/2019 20:26 by Dr Maciej Richardson, .    Xr Chest 1 View    Result Date: 11/11/2019  1. Right basilar opacity and effusion. Left lung clear. 2. Large hiatal hernia. 3. Severe degenerative changes of the bilateral shoulders. This report was finalized on 11/11/2019 13:50 by Dr Ange Romero MD.      Pulmonary  Assessment:  1. Status post acute respiratory failure, improved, extubated  2. Aortic stenosis, severe  3. Moderate COPD, gold stage 2b based on 2020 guidelines as well as 2017 guidelines  4. Right pleural effusion stable  5. Anemia stable    Recommend:   · Okay to the floor from a pulmonary standpoint  · Wean oxygen as tolerated  · Okay to continue antibiotics given leukocytosis on presentation with improvement since that time although she did not have increasing sputum or purulence to the sputum.  A 6-day course of antibiotics should be sufficient, today being day 3  · Continue treatment for aortic stenosis; avoid fluid overload    Electronically signed by Kyler Thompson MD, 11/13/19, 8:09 AM

## 2019-11-13 NOTE — PLAN OF CARE
"Problem: Patient Care Overview  Goal: Plan of Care Review  Outcome: Ongoing (interventions implemented as appropriate)   11/13/19 0812   Coping/Psychosocial   Plan of Care Reviewed With patient   OTHER   Outcome Summary PT eval completed. A&O x person, place only; Pt tried to explain situation but was speaking in incoherently and topic was not about reason for being here. Pt had constant plea for water stating her mouth was dry, it was reapeatedly explained to Pt that SLP needed to consult Pt. Performed supine to sit at Min A, needing assist with upper body. B LE ROM: WFL. B LE strength: L hip flex 4+/5, R hip flex 4-/5, L knee 4+/5, R knee flex 4/5, B DF 5/5. When asked if Pt felt weaker on one side, Pt responded with \"I'm not sure\". Performed sit to stand at CGA/Min A x2, using B hand-held assist. Pt ambulated from bed to chair w/ B hand-held assist at CGA x2. Pt had very stooped posture, took small, shuffled steps and abmulated at slow speed. No LOB, a little SOB present. Pt was fixated on her dry mouth and having water throughout session. Pt would benefit from skilled PT to address strength, safe gait mechanics and functional mobility. PT will continue seeing Pt. Recommended d/c to SNF for further therapy.          "

## 2019-11-13 NOTE — PLAN OF CARE
Problem: Patient Care Overview  Goal: Plan of Care Review  Outcome: Ongoing (interventions implemented as appropriate)   11/13/19 5750   Coping/Psychosocial   Plan of Care Reviewed With patient   Plan of Care Review   Progress no change   OTHER   Outcome Summary Patient transferred from ICU. Patient denies pain. Droplet Precautions continued. Patient C/O SOA, PRN morphine for air hunger x1. IV ABX continued. Safety Maintained. Will continue to monitor.        Problem: Fall Risk (Adult)  Goal: Absence of Fall  Outcome: Ongoing (interventions implemented as appropriate)      Problem: Pneumonia (Adult)  Goal: Signs and Symptoms of Listed Potential Problems Will be Absent, Minimized or Managed (Pneumonia)  Outcome: Ongoing (interventions implemented as appropriate)      Problem: Anemia (Adult)  Goal: Symptom Improvement  Outcome: Ongoing (interventions implemented as appropriate)      Problem: Skin Injury Risk (Adult)  Goal: Skin Health and Integrity  Outcome: Ongoing (interventions implemented as appropriate)      Problem: Nutrition, Imbalanced: Inadequate Oral Intake (Adult)  Goal: Improved Oral Intake  Outcome: Ongoing (interventions implemented as appropriate)    Goal: Prevent Further Weight Loss  Outcome: Ongoing (interventions implemented as appropriate)

## 2019-11-13 NOTE — THERAPY EVALUATION
Acute Care - Occupational Therapy Initial Evaluation  [facility]     Patient Name: Heather Burgos  : 10/23/1926  MRN: [id number]  Today's Date: 2019  Onset of Illness/Injury or Date of Surgery: 19  Date of Referral to OT: 19  Referring Physician: Dr. Montenegro    Admit Date: 2019       ICD-10-CM ICD-9-CM   1. Pneumonia due to infectious organism, unspecified laterality, unspecified part of lung J18.9 136.9     484.8   2. Chronic respiratory failure with hypoxia (CMS/HCC) J96.11 518.83     799.02   3. Chronic obstructive pulmonary disease, unspecified COPD type (CMS/HCC) J44.9 496   4. Decreased activities of daily living (ADL) Z78.9 V49.89     Patient Active Problem List   Diagnosis   • Chronic atrial fibrillation   • Aortic stenosis, severe   • Stage 2 moderate COPD by GOLD classification (CMS/HCC)   • Current use of long term anticoagulation   • Anemia   • Acute on chronic respiratory failure with hypoxia (CMS/HCC)   • Pulmonary hypertension (CMS/HCC)   • Chronic respiratory failure with hypoxia (CMS/HCC)   • Cachexia (CMS/HCC)   • RML pneumonia (CMS/HCC)   • Sepsis (CMS/HCC)   • Coffee ground emesis     Past Medical History:   Diagnosis Date   • Atrial fibrillation with rapid ventricular response (CMS/HCC)    • Chronic respiratory failure with hypoxia (CMS/HCC) 10/28/2019   • Degenerative joint disease    • Diastolic murmur of aorta    • Dysphagia    • Essential hypertension    • Gastroesophageal reflux disease 10/31/2018   • History of cerebellar stroke 3/28/2018   • Personal history of nicotine dependence 10/31/2018   • Pneumonia      Past Surgical History:   Procedure Laterality Date   • APPENDECTOMY     • HERNIA REPAIR     • HYSTERECTOMY     • TONSILLECTOMY     • TUBAL ABDOMINAL LIGATION            OT ASSESSMENT FLOWSHEET (last 12 hours)      Occupational Therapy Evaluation     Row Name 19 0822                   OT Evaluation Time/Intention    Subjective Information  complains  of;weakness;fatigue  -JJ        Document Type  evaluation see MAR  -JJ        Mode of Treatment  occupational therapy;concurrent therapy  -JJ        Patient Effort  good  -JJ           General Information    Patient Profile Reviewed?  yes  -JJ        Onset of Illness/Injury or Date of Surgery  11/11/19  -JJ        Referring Physician  Dr. Montenegro  -JJ        Patient Observations  alert;cooperative;agree to therapy  -JJ        Patient/Family Observations  no family present in room   -JJ        General Observations of Patient  fowlers in bed, IV site, 2L O2/NC, alert  -JJ        Prior Level of Function  min assist:;bathing;dressing;grooming;independent:;all household mobility;transfer cane for mobility  -JJ        Equipment Currently Used at Home  walker, rolling;cane, straight;shower chair;wheelchair;grab bar;commode, bedside  -JJ        Pertinent History of Current Functional Problem  Pt presented to ED with c/o 3 day hx of SOB, cough and congestion. Pt has a hx of COPD with us of 2L O2 continous at home. Chest XR: R plueral effusion. Dx: RML pneumonia, chronic respiratory failure with hypoxia, cachexia, sepsis, coffee ground emesis.   -JJ        Existing Precautions/Restrictions  fall;oxygen therapy device and L/min  -JJ        Risks Reviewed  patient:;LOB;nausea/vomiting;dizziness;increased discomfort;change in vital signs;increased drainage;lines disloged  -JJ        Benefits Reviewed  patient:;improve function;increase independence;increase strength;increase balance;decrease pain;decrease risk of DVT;improve skin integrity;increase knowledge  -JJ           Relationship/Environment    Primary Source of Support/Comfort  child(tara)  -JJ        Lives With  child(tara), adult  -JJ        Family Caregiver if Needed  child(tara), adult  -JJ           Resource/Environmental Concerns    Current Living Arrangements  home/apartment/condo  -JJ        Resource/Environmental Concerns  none  -JJ           Cognitive  Assessment/Interventions    Additional Documentation  Cognitive Assessment/Intervention (Group)  -           Cognitive Assessment/Intervention- PT/OT    Affect/Mental Status (Cognitive)  [facility]  -        Orientation Status (Cognition)  oriented to;person;place  -        Follows Commands (Cognition)  [facility]  -        Cognitive Function (Cognitive)  [facility]  -        Personal Safety Interventions  fall prevention program maintained;gait belt;muscle strengthening facilitated;nonskid shoes/slippers when out of bed;supervised activity  -           Safety Issues, Functional Mobility    Impairments Affecting Function (Mobility)  balance;endurance/activity tolerance;strength;range of motion (ROM)  -           Bed Mobility Assessment/Treatment    Bed Mobility Assessment/Treatment  supine-sit  -        Supine-Sit [place] (Bed Mobility)  minimum assist (75% patient effort)  -        Bed Mobility, Safety Issues  decreased use of arms for pushing/pulling;decreased use of legs for bridging/pushing  -        Assistive Device (Bed Mobility)  head of bed elevated;bed rails  -           Functional Mobility    Functional Mobility- Ind. Level  contact guard assist;minimum assist (75% patient effort)  -        Functional Mobility- Safety Issues  supplemental O2;step length decreased  -        Functional Mobility- Comment  around bed to chair.   -           Transfer Assessment/Treatment    Transfer Assessment/Treatment  sit-stand transfer;stand-sit transfer  -           Sit-Stand Transfer    Sit-Stand [place] (Transfers)  contact guard;minimum assist (75% patient effort)  -           Stand-Sit Transfer    Stand-Sit [place] (Transfers)  contact guard;minimum assist (75% patient effort)  -           ADL Assessment/Intervention    BADL Assessment/Intervention  lower body dressing  -           Lower Body Dressing Assessment/Training    Lower Body Dressing [place] Level  don;socks;maximum assist  (25% patient effort)  -JJ        Lower Body Dressing Position  edge of bed sitting  -JJ           BADL Safety/Performance    Impairments, BADL Safety/Performance  balance;endurance/activity tolerance;strength;range of motion  -JJ        Skilled BADL Treatment/Intervention  BADL process/adaptation training  -JJ           General ROM    GENERAL ROM COMMENTS  B shoulder flexion impaired 50%, elbows and wrist WFL  -JJ           MMT (Manual Muscle Testing)    General MMT Comments  BUE strength functionally 4/5 within avalible range  -JJ           Motor Assessment/Interventions    Additional Documentation  Balance (Group)  -JJ           Balance    Balance  static sitting balance;static standing balance  -JJ           Static Sitting Balance    Level of [place] (Unsupported Sitting, Static Balance)  contact guard assist;minimal assist, 75% patient effort  -JJ        Sitting Position (Unsupported Sitting, Static Balance)  sitting on edge of bed  -JJ        Time Able to Maintain Position (Unsupported Sitting, Static Balance)  more than 5 minutes  -JJ        Comment (Unsupported Sitting, Static Balance)  posterior lean during MMT testing requiring vcs to correct  -JJ           Static Standing Balance    Level of [place] (Supported Standing, Static Balance)  contact guard assist;minimal assist, 75% patient effort  -JJ        Time Able to Maintain Position (Supported Standing, Static Balance)  1 to 2 minutes  -JJ        Assistive Device Utilized (Supported Standing, Static Balance)  -- HHAx2  -JJ           Sensory Assessment/Intervention    Sensory General Assessment  no sensation deficits identified in BUE per pt report  -JJ           Positioning and Restraints    Pre-Treatment Position  in bed  -JJ        Post Treatment Position  chair  -JJ        In Chair  reclined;notified nsg;call light within reach;encouraged to call for assist  -JJ           Pain Assessment    Additional Documentation  Pain Scale: Numbers  Pre/Post-Treatment (Group)  -JJ           Pain Scale: Numbers Pre/Post-Treatment    Pain Scale: Numbers, Pretreatment  0/10 - no pain  -JJ        Pain Scale: Numbers, Post-Treatment  0/10 - no pain  -JJ        Pain Intervention(s)  Medication (See MAR);Repositioned;Ambulation/increased activity  -JJ           Coping    Verbalized Emotional State  acceptance  -JJ           Plan of Care Review    Plan of Care Reviewed With  patient  -JJ           Clinical Impression (OT)    Date of Referral to OT  11/12/19  -JJ        OT Diagnosis  decreased adls  -JJ        Prognosis (OT Eval)  good  -JJ        Patient/Family Goals Statement (OT Eval)  return home with daughter  -J        Criteria for Skilled Therapeutic Interventions Met (OT Eval)  yes;treatment indicated  -JJ        Rehab Potential (OT Eval)  good, to achieve stated therapy goals  -JJ        Therapy Frequency (OT Eval)  5 times/wk  -JJ        Predicted Duration of Therapy Intervention (Therapy Eval)  10 days  -JJ        Care Plan Review (OT)  evaluation/treatment results reviewed;care plan/treatment goals reviewed;risks/benefits reviewed;patient/other agree to care plan;current/potential barriers reviewed  -JJ        Anticipated Discharge Disposition (OT)  home with assist;home with home health;home with 24/7 care  -JJ           Vital Signs    Pre SpO2 (%)  100  -JJ        O2 Delivery Pre Treatment  supplemental O2  -JJ        Intra SpO2 (%)  90  -JJ        O2 Delivery Intra Treatment  supplemental O2  -JJ        Post SpO2 (%)  96  -JJ        O2 Delivery Post Treatment  supplemental O2  -JJ        Pre Patient Position  Supine  -JJ        Intra Patient Position  Standing  -JJ        Post Patient Position  Sitting  -JJ           Planned OT Interventions    Planned Therapy Interventions (OT Eval)  activity tolerance training;BADL retraining;adaptive equipment training;functional balance retraining;occupation/activity based interventions;patient/caregiver  education/training;strengthening exercise;transfer/mobility retraining  -           OT Goals    Bathing Goal Selection (OT)  bathing, OT goal 1  -JJ        Dressing Goal Selection (OT)  dressing, OT goal 1  -JJ        Toileting Goal Selection (OT)  toileting, OT goal 1  -JJ           Bathing Goal 1 (OT)    Activity/Assistive Device (Bathing Goal 1, OT)  bathing skills, all  -JJ        [place] Level/Cues Needed (Bathing Goal 1, OT)  minimum assist (75% or more patient effort) AE PRN  -JJ        Time Frame (Bathing Goal 1, OT)  long term goal (LTG);by discharge  -JJ        Progress/Outcomes (Bathing Goal 1, OT)  goal ongoing  -           Dressing Goal 1 (OT)    Activity/Assistive Device (Dressing Goal 1, OT)  lower body dressing AE PRN   -JJ        [place]/Cues Needed (Dressing Goal 1, OT)  contact guard assist  -JJ        Time Frame (Dressing Goal 1, OT)  long term goal (LTG);by discharge  -JJ        Progress/Outcome (Dressing Goal 1, OT)  goal ongoing  -           Toileting Goal 1 (OT)    Activity/Device (Toileting Goal 1, OT)  toileting skills, all;commode  -JJ        [place] Level/Cues Needed (Toileting Goal 1, OT)  standby assist  -JJ        Time Frame (Toileting Goal 1, OT)  long term goal (LTG);by discharge  -        Progress/Outcome (Toileting Goal 1, OT)  goal ongoing  -           Living Environment    Home Accessibility  stairs to enter home walk-in shower  -          User Key  (r) = Recorded By, (t) = Taken By, (c) = Cosigned By    Initials Name Effective Dates    Alicia Max OTR/L 10/12/18 -          Occupational Therapy Education     Title: PT OT SLP Therapies (In Progress)     Topic: Occupational Therapy (In Progress)     Point: ADL training (Done)     Description: Instruct learner(s) on proper safety adaptation and remediation techniques during self care or transfers.   Instruct in proper use of assistive devices.    Learning Progress Summary           Patient  Acceptance, E, VU by  at 11/13/2019  9:25 AM    Comment:  OT POC, benefits and roles of OT.                               User Key     Initials Effective Dates Name Provider Type Discipline    BOUCHRA 10/12/18 -  Alicia Klein OTR/L Occupational Therapist OT                  OT Recommendation and Plan  Outcome Summary/Treatment Plan (OT)  Anticipated Discharge Disposition (OT): home with assist, home with home health, home with 24/7 care  Planned Therapy Interventions (OT Eval): activity tolerance training, BADL retraining, adaptive equipment training, functional balance retraining, occupation/activity based interventions, patient/caregiver education/training, strengthening exercise, transfer/mobility retraining  Therapy Frequency (OT Eval): 5 times/wk  Plan of Care Review  Plan of Care Reviewed With: patient  Plan of Care Reviewed With: patient  Outcome Summary: OT eval completed. Pt alert and oriented to self and place only. Very pleasant and eager to work with therapy. Min A for supine to sit at EOB. Max A to don socks seated at EOB. Min A for sit <> stand t/f from EOB and all functional mobility with HHAx2 and supplemental O2. Pt demo's decreased strength, balance, activity tolerance, and B shoulder flexion ROM. Pt would benefit from skilled OT services to address these deficits. Recommend d/c home with 24/7 and HH vs. ALIF vs. SNF.     Outcome Measures     Row Name 11/13/19 0900             How much help from another is currently needed...    Putting on and taking off regular lower body clothing?  2  -JJ      Bathing (including washing, rinsing, and drying)  2  -JJ      Toileting (which includes using toilet bed pan or urinal)  2  -JJ      Putting on and taking off regular upper body clothing  3  -JJ      Taking care of personal grooming (such as brushing teeth)  3  -JJ      Eating meals  4  -JJ      AM-PAC 6 Clicks Score (OT)  16  -JJ         Functional Assessment    Outcome Measure Options  AM-PAC 6 Clicks  Daily Activity (OT)  -        User Key  (r) = Recorded By, (t) = Taken By, (c) = Cosigned By    Initials Name Provider Type    Alicia Max OTR/L Occupational Therapist          Time Calculation:   Time Calculation- OT     Row Name 11/13/19 0924             Time Calculation- OT    OT Start Time  0833 add 11 minutes for chart review   -      OT Stop Time  0917  -      OT Time Calculation (min)  44 min  -      OT Received On  11/13/19  -      OT Goal Re-Cert Due Date  11/23/19  -        User Key  (r) = Recorded By, (t) = Taken By, (c) = Cosigned By    Initials Name Provider Type    Alicia Max OTR/L Occupational Therapist        Therapy Charges for Today     Code Description Service Date Service Provider Modifiers Qty    [id number] HC OT EVAL LOW COMPLEXITY 4 11/13/2019 Alicia Klein OTR/L GO 1               SARAH BETH Roa/DIETER  11/13/2019

## 2019-11-13 NOTE — THERAPY EVALUATION
Acute Care - Speech Language Pathology   Swallow Initial Evaluation [facility]     Patient Name: Heather Burgos  : 10/23/1926  MRN: [id number]  Today's Date: 2019  Onset of Illness/Injury or Date of Surgery: 19     Referring Physician: Dr. Montenegro      Admit Date: 2019  Clinical bedside swallow evaluation complete. Pt SOA at times with mild difficulty communicating due to decreased respiratory support. Initially the pt's O2 sats were in the mid 80's; however, the pt was placed on 2 liters of O2 and her sats ivy to the mid to upper 90s throughout assessment and she was able to complete PO trials. She was presented a full range of PO consistencies except for mechanical soft solids. Mild diffuse oral residue noted post initial swallow of regular solids but she was able to clear a majority of residue with a thin liquid wash. Laryngeal elevation is decreased by a mild amount with all trials complete and a 2x audible swallow was noted with both nectar thick and thin liquids. 1x throat clear noted following nectar thick trials; however, no wet vocal quality or any other overt s/s of aspiration noted. Pt ok to begin a regular diet with thin liquids. Ok for meds to be administered whole with thin liquids as tolerated. RN to monitor for increased congestion and continue general aspiration precautions. SLP will follow to ensure diet tolerance.  Lambert Kelley MS CCC-SLP 2019 10:09 AM    Visit Dx:     ICD-10-CM ICD-9-CM   1. Pneumonia due to infectious organism, unspecified laterality, unspecified part of lung J18.9 136.9     484.8   2. Chronic respiratory failure with hypoxia (CMS/[facility]) J96.11 518.83     799.02   3. Chronic obstructive pulmonary disease, unspecified COPD type (CMS/HCC) J44.9 496   4. Decreased activities of daily living (ADL) Z78.9 V49.89   5. Oropharyngeal dysphagia R13.12 787.22     Patient Active Problem List   Diagnosis   • Chronic atrial fibrillation   • Aortic stenosis, severe    • Stage 2 moderate COPD by GOLD classification (CMS/HCC)   • Current use of long term anticoagulation   • Anemia   • Acute on chronic respiratory failure with hypoxia (CMS/HCC)   • Pulmonary hypertension (CMS/HCC)   • Chronic respiratory failure with hypoxia (CMS/HCC)   • Cachexia (CMS/HCC)   • RML pneumonia (CMS/HCC)   • Sepsis (CMS/HCC)   • Coffee ground emesis     Past Medical History:   Diagnosis Date   • Atrial fibrillation with rapid ventricular response (CMS/HCC)    • Chronic respiratory failure with hypoxia (CMS/HCC) 10/28/2019   • Degenerative joint disease    • Diastolic murmur of aorta    • Dysphagia    • Essential hypertension    • Gastroesophageal reflux disease 10/31/2018   • History of cerebellar stroke 3/28/2018   • Personal history of nicotine dependence 10/31/2018   • Pneumonia      Past Surgical History:   Procedure Laterality Date   • APPENDECTOMY     • HERNIA REPAIR     • HYSTERECTOMY     • TONSILLECTOMY     • TUBAL ABDOMINAL LIGATION          SWALLOW EVALUATION (last 72 hours)      SLP Adult Swallow Evaluation     Row Name 11/13/19 0900                   Rehab Evaluation    Document Type  evaluation  -CS        Subjective Information  complains of;weakness;fatigue  -CS        Patient Observations  alert;cooperative;agree to therapy  -CS        Patient/Family Observations  Family arrived at the end of eval  -CS        Patient Effort  good  -CS           General Information    Patient Profile Reviewed  yes  -CS        Pertinent History Of Current Problem  COPD, Rhinovirus, CXR 11/12/2019- Right basilar opacity effusion.  -CS        Current Method of Nutrition  NPO  -CS        Precautions/Limitations, Vision  WFL with corrective lenses;for purposes of eval  -CS        Precautions/Limitations, Hearing  WFL;for purposes of eval  -CS        Prior Level of Function-Communication  WFL  -CS        Prior Level of Function-Swallowing  no diet consistency restrictions  -CS        Plans/Goals Discussed  with  patient and family  -CS        Barriers to Rehab  none identified  -CS        Patient's Goals for Discharge  return to PO diet  -CS        Family Goals for Discharge  family did not state  -CS           Pain Assessment    Additional Documentation  Pain Scale: Numbers Pre/Post-Treatment (Group)  -CS           Pain Scale: Numbers Pre/Post-Treatment    Pain Scale: Numbers, Pretreatment  0/10 - no pain  -CS        Pain Scale: Numbers, Post-Treatment  0/10 - no pain  -CS           Oral Motor and Function    Dentition Assessment  missing teeth  -CS        Secretion Management  WNL/WFL  -CS        Mucosal Quality  dry  -CS        Volitional Swallow  weak  -CS        Volitional Cough  weak;non-productive  -CS           Oral Musculature and Cranial Nerve Assessment    Oral Motor General Assessment  WFL  -CS           General Eating/Swallowing Observations    Respiratory Support Currently in Use  nasal cannula  -CS        Positioning During Eating  upright in chair  -CS        Consistencies Trialed  regular textures;honey-thick liquids;nectar/syrup-thick liquids;thin liquids;pureed  -CS           Clinical Swallow Eval    Oral Prep Phase  WFL  -CS        Oral Transit  WFL  -CS        Oral Residue  impaired  -CS        Pharyngeal Phase  suspected pharyngeal impairment  -CS        Esophageal Phase  unremarkable  -CS        Clinical Swallow Evaluation Summary  Clinical bedside swallow evaluation complete. Pt SOA at times with mild difficulty communicating due to decreased respiratory support. Initially the pt's O2 sats were in the mid 80's; however, the pt's was placed on 2 liters of O2 and her sats ivy to the mid to upper 90s throughout assessment and she was able to complete PO trials. She was presented a full range of PO consistencies except for mechanical soft solids. Mild diffuse oral residue noted post initial swallow of regular solids but she was able to clear a majority of residue with a thin liquid wash. Laryngeal  elevation is decreased by a mild amount with all trials complete and a 2x audible swallow was noted with both nectar thick and thin liquids. 1x throat clear noted following nectar thick trials; however, no wet vocal quality or any other overt s/s of aspiration noted. Pt ok to begin a  regular diet with thin liquids. Ok for meds to be administered whole with thin liquids as tolerated. RN to monitor for increased congestion and continue general aspiration precautions. SLP will follow to ensure diet tolerance.  -CS           Clinical Impression    SLP Swallowing Diagnosis  mild;oral dysfunction;suspected pharyngeal dysfunction  -CS        Functional Impact  risk of aspiration/pneumonia  -CS        Rehab Potential/Prognosis, Swallowing  good, to achieve stated therapy goals  -CS        Swallow Criteria for Skilled Therapeutic Interventions Met  demonstrates skilled criteria  -CS           Recommendations    Therapy Frequency (Swallow)  at least;2 days per week  -CS        Predicted Duration Therapy Intervention (Days)  until discharge  -CS        SLP Diet Recommendation  regular textures;thin liquids  -CS        Recommended Precautions and Strategies  upright posture during/after eating;small bites of food and sips of liquid  -CS        SLP Rec. for Method of Medication Administration  meds whole;with thin liquids;with pudding or applesauce;as tolerated  -CS        Monitor for Signs of Aspiration  yes;cough;gurgly voice;throat clearing;notify SLP if any concerns  -CS        Anticipated Dischage Disposition  unknown  -CS           Swallow Goals (SLP)    Oral Nutrition/Hydration Goal Selection (SLP)  oral nutrition/hydration, SLP goal 1  -CS           Oral Nutrition/Hydration Goal 1 (SLP)    Oral Nutrition/Hydration Goal 1, SLP  Pt will tolerate LRD w/o any overt s/s of aspiration.  -CS        Time Frame (Oral Nutrition/Hydration Goal 1, SLP)  by discharge  -CS        Barriers (Oral Nutrition/Hydration Goal 1, SLP)  n/a   -CS        Progress/Outcomes (Oral Nutrition/Hydration Goal 1, SLP)  goal ongoing  -CS          User Key  (r) = Recorded By, (t) = Taken By, (c) = Cosigned By    Initials Name Effective Dates    Lambert Dupree, MS CCC-SLP 04/03/18 -           EDUCATION  The patient has been educated in the following areas:   Dysphagia (Swallowing Impairment).    SLP Recommendation and Plan  SLP Swallowing Diagnosis: mild, oral dysfunction, suspected pharyngeal dysfunction  SLP Diet Recommendation: regular textures, thin liquids  Recommended Precautions and Strategies: upright posture during/after eating, small bites of food and sips of liquid  SLP Rec. for Method of Medication Administration: meds whole, with thin liquids, with pudding or applesauce, as tolerated     Monitor for Signs of Aspiration: yes, cough, gurgly voice, throat clearing, notify SLP if any concerns     Swallow Criteria for Skilled Therapeutic Interventions Met: demonstrates skilled criteria  Anticipated Dischage Disposition: unknown  Rehab Potential/Prognosis, Swallowing: good, to achieve stated therapy goals  Therapy Frequency (Swallow): at least, 2 days per week  Predicted Duration Therapy Intervention (Days): until discharge       Plan of Care Reviewed With: patient  Progress: no change(Initial eval)  Outcome Summary: Clinical bedside swallow evaluation complete. Pt SOA at times with mild difficulty communicating due to decreased respiratory support. Initially the pt's O2 sats were in the mid 80's; however, the pt was placed on 2 liters of O2 and her sats ivy to the mid to upper 90s throughout assessment and she was able to complete PO trials. She was presented a full range of PO consistencies except for mechanical soft solids. Mild diffuse oral residue noted post initial swallow of regular solids but she was able to clear a majority of residue with a thin liquid wash. Laryngeal elevation is decreased by a mild amount with all trials complete and a 2x  audible swallow was noted with both nectar thick and thin liquids. 1x throat clear noted following nectar thick trials; however, no wet vocal quality or any other overt s/s of aspiration noted. Pt ok to begin a  regular diet with thin liquids. Ok for meds to be administered whole with thin liquids as tolerated. RN to monitor for increased congestion and continue general aspiration precautions. SLP will follow to ensure diet tolerance.    SLP GOALS     Row Name 11/13/19 0900             Oral Nutrition/Hydration Goal 1 (SLP)    Oral Nutrition/Hydration Goal 1, SLP  Pt will tolerate LRD w/o any overt s/s of aspiration.  -CS      Time Frame (Oral Nutrition/Hydration Goal 1, SLP)  by discharge  -CS      Barriers (Oral Nutrition/Hydration Goal 1, SLP)  n/a  -CS      Progress/Outcomes (Oral Nutrition/Hydration Goal 1, SLP)  goal ongoing  -CS        User Key  (r) = Recorded By, (t) = Taken By, (c) = Cosigned By    Initials Name Provider Type    Lambert Dupree MS CCC-SLP Speech and Language Pathologist             Time Calculation:   Time Calculation- SLP     Row Name 11/13/19 1008             Time Calculation- SLP    SLP Start Time  0900  -CS      SLP Stop Time  1008  -CS      SLP Time Calculation (min)  68 min  -CS      SLP Received On  11/13/19  -CS      SLP Goal Re-Cert Due Date  11/23/19  -        User Key  (r) = Recorded By, (t) = Taken By, (c) = Cosigned By    Initials Name Provider Type    Lambert Dupree MS CCC-SLP Speech and Language Pathologist          Therapy Charges for Today     Code Description Service Date Service Provider Modifiers Qty    [id number]  ST EVAL ORAL PHARYNG SWALLOW 5 11/13/2019 Lambert Kelley MS CCC-SLP GN 1               Lambert Kleley MS CCC-SLP  11/13/2019

## 2019-11-13 NOTE — PLAN OF CARE
Problem: Patient Care Overview  Goal: Plan of Care Review  Outcome: Ongoing (interventions implemented as appropriate)   11/13/19 0863   Coping/Psychosocial   Plan of Care Reviewed With patient   Plan of Care Review   Progress improving   OTHER   Outcome Summary patient has been resting well tonight, she has been confused but is very pleasant. Has c/o shortness of breath x1, prn breathing tx helped adequately. Coughing up thick yellow sputum. Safety maintained.

## 2019-11-13 NOTE — PROGRESS NOTES
Continued Stay Note   [facility]     Patient Name: Heather Burgos  MRN: [id number]  Today's Date: 11/13/2019    Admit Date: 11/11/2019    Discharge Plan     Row Name 11/13/19 0843       Plan    Plan  Home with HH    Patient/Family in Agreement with Plan  yes    Plan Comments  Patient is now extubated.  Per conversation with daughter on 11/12/19, plan is for patient to return home at discharge with  services.  SW will follow to ensure appropriate level of care at disposition.        Discharge Codes    No documentation.             JOCELINE Santana

## 2019-11-13 NOTE — PROGRESS NOTES
Continued Stay Note  ISABELLA Luna     Patient Name: Heather Burgos  MRN: [id number]  Today's Date: 11/13/2019    Admit Date: 11/11/2019    Discharge Plan     Row Name 11/13/19 1506       Plan    Plan  SNF placement/palliative    Patient/Family in Agreement with Plan  yes    Plan Comments  Social service consult received regarding SNF placement with palliative care/hospice.  Apparently patient's spouse had VA benefits.  VA does not provide placement benefits to spouses.  SW has contacted VA regarding placement benefits to spouses with hospice?  SW has left message for VA  in regard to VA placement benefits with hospice for spouses, awaiting response.  In the meantime, patient's family has also agreed to a referral to [facility]/palliative care under Medicare.          Discharge Codes    No documentation.             JOCELINE Santana

## 2019-11-13 NOTE — THERAPY EVALUATION
Patient Name: Heather Burgos  : 10/23/1926    MRN: [id number]                              Today's Date: 2019       Admit Date: 2019    Visit Dx:     ICD-10-CM ICD-9-CM   1. Pneumonia due to infectious organism, unspecified laterality, unspecified part of lung J18.9 136.9     484.8   2. Chronic respiratory failure with hypoxia (CMS/HCC) J96.11 518.83     799.02   3. Chronic obstructive pulmonary disease, unspecified COPD type (CMS/HCC) J44.9 496   4. Decreased activities of daily living (ADL) Z78.9 V49.89   5. Oropharyngeal dysphagia R13.12 787.22   6. Impaired mobility Z74.09 799.89     Patient Active Problem List   Diagnosis   • Chronic atrial fibrillation   • Aortic stenosis, severe   • Stage 2 moderate COPD by GOLD classification (CMS/[facility])   • Current use of long term anticoagulation   • Anemia   • Acute on chronic respiratory failure with hypoxia (CMS/[facility])   • Pulmonary hypertension (CMS/HCC)   • Chronic respiratory failure with hypoxia (CMS/HCC)   • Cachexia (CMS/HCC)   • RML pneumonia (CMS/HCC)   • Sepsis (CMS/[facility])   • Coffee ground emesis     Past Medical History:   Diagnosis Date   • Atrial fibrillation with rapid ventricular response (CMS/[facility])    • Chronic respiratory failure with hypoxia (CMS/HCC) 10/28/2019   • Degenerative joint disease    • Diastolic murmur of aorta    • Dysphagia    • Essential hypertension    • Gastroesophageal reflux disease 10/31/2018   • History of cerebellar stroke 3/28/2018   • Personal history of nicotine dependence 10/31/2018   • Pneumonia      Past Surgical History:   Procedure Laterality Date   • APPENDECTOMY     • HERNIA REPAIR     • HYSTERECTOMY     • TONSILLECTOMY     • TUBAL ABDOMINAL LIGATION       General Information     Row Name 19 0812          PT Evaluation Time/Intention    Document Type  evaluation CC: SOB, cough. Hx: AFib, chronic respiratory failure w/ hypoxia, DJD, essential HTN. Dx: RML pneumonia, cachexia, sepsis, pulm HTN, anemia, stage 2  COPD, severe aortic stenosis. H.7. XR- large hiatal hernia.  -MICAH (r) AF (t) MICAH (c)     Mode of Treatment  physical therapy  -MICAH (r) AF (t) MICAH (c)     Row Name 19 08          General Information    Patient Profile Reviewed?  yes  -MICAH (r) AF (t) MICAH (c)     Prior Level of Function  independent:;all household mobility uses quad cane, O2  -MICAH (r) AF (t) MICAH (c)     Existing Precautions/Restrictions  fall;oxygen therapy device and L/min;other (see comments)  (Significant)  droplet precaution  -MICAH (r) AF (t) MICAH (c)     Barriers to Rehab  cognitive status  -MICAH (r) AF (t) MICAH (c)     Row Name 19 08          Relationship/Environment    Lives With  child(tara), adult  -MICAH (r) AF (t) MICAH (c)     Row Name 19 08          Resource/Environmental Concerns    Current Living Arrangements  home/apartment/condo  -MICAH (r) AF (t) MICAH (c)     Row Name 19 08          Home Main Entrance    Number of Stairs, Main Entrance  none  -MICAH (r) AF (t) MICAH (c)     Row Name 19 08          Stairs Within Home, Primary    Stairs, Within Home, Primary  unsure about steps, Pt seems cofused with providing hx  -MICAH (r) AF (t) MICAH (c)     Row Name 19 08          Cognitive Assessment/Intervention- PT/OT    Orientation Status (Cognition)  oriented to;person  -MICAH     Personal Safety Interventions  safety round/check completed;fall prevention program maintained;gait belt;nonskid shoes/slippers when out of bed;supervised activity;muscle strengthening facilitated  -MICAH     Cognitive Assessment/Intervention Comment  thought at Michelle; situation was incoherent  -MICAH (r) AF (t) MICAH (c)     Row Name 19 08          Safety Issues, Functional Mobility    Impairments Affecting Function (Mobility)  balance;endurance/activity tolerance;strength;postural/trunk control  -MICAH (r) AF (t) MICAH (c)       User Key  (r) = Recorded By, (t) = Taken By, (c) = Cosigned By    Initials Name Provider Type    Joes Angel Elizondo, PT DPT Physical  Therapist    Dyana Nick, PT Student PT Student        Mobility     Row Name 11/13/19 0812          Bed Mobility Assessment/Treatment    Bed Mobility Assessment/Treatment  supine-sit  -MICAH     Supine-Sit Russell (Bed Mobility)  minimum assist (75% patient effort)  -MICAH (r) AF (t) MICAH (c)     Assistive Device (Bed Mobility)  head of bed elevated  -MICAH (r) AF (t) MICAH (c)     Comment (Bed Mobility)  Pt needed assist with upper body and positioning self to EOB  -MICAH (r) AF (t) MICAH (c)     [facility] Name 11/13/19 0812          Transfer Assessment/Treatment    Comment (Transfers)  ambulating bed to chair, Pt demos stooped posture, very small/shuffled steps and slow speed  -MICAH (r) AF (t) MICAH (c)     Row Name 11/13/19 0812          Bed-Chair Transfer    Bed-Chair Russell (Transfers)  contact guard;2 person assist  -MICAH (r) AF (t) MICAH (c)     Assistive Device (Bed-Chair Transfers)  other (see comments) B hand-held assist  -MICAH (r) AF (t) MICAH (c)     Row Name 11/13/19 0812          Sit-Stand Transfer    Sit-Stand Russell (Transfers)  contact guard;minimum assist (75% patient effort);2 person assist  -MICAH (r) AF (t) MICAH (c)     Assistive Device (Sit-Stand Transfers)  other (see comments) B hand-held assist  -MICAH (r) AF (t) MICAH (c)       User Key  (r) = Recorded By, (t) = Taken By, (c) = Cosigned By    Initials Name Provider Type    Jose Angel Elizondo, PT DPT Physical Therapist    Dyana Nick, PT Student PT Student        Obj/Interventions     Row Name 11/13/19 0812          General ROM    GENERAL ROM COMMENTS  B LE WFL  -MICAH (r) AF (t) MICAH (c)     [facility] Name 11/13/19 0812          MMT (Manual Muscle Testing)    General MMT Comments  R hip flex 4-/5, L hip flex 4+/5, R knee flex 4/5, otherwise 5/5  -MICAH (r) AF (t) MICAH (c)     Row Name 11/13/19 0812          Static Sitting Balance    Level of Russell (Unsupported Sitting, Static Balance)  contact guard assist  -MICAH (r) AF (t) MICAH (c)     Sitting Position (Unsupported Sitting,  Static Balance)  sitting on edge of bed  -MICAH (r) AF (t) MICAH (c)     Comment (Unsupported Sitting, Static Balance)  Pt had one lean posteriorly while speaking to nurse, needing CGA to help Pt correct  -MICAH (r) AF (t) MICAH (c)     Row Name 11/13/19 0812          Static Standing Balance    Level of [place] (Supported Standing, Static Balance)  contact guard assist;2 person assist  -MICAH (r) AF (t) IMCAH (c)     Assistive Device Utilized (Supported Standing, Static Balance)  other (see comments) B hand-held assist  -MICAH (r) AF (t) MICAH (c)     Row Name 11/13/19 0812          Sensory Assessment/Intervention    Sensory General Assessment  no sensation deficits identified light touch LE  -MICAH (r) AF (t) MICAH (c)       User Key  (r) = Recorded By, (t) = Taken By, (c) = Cosigned By    Initials Name Provider Type    Jose Angel Elizondo, PT DPT Physical Therapist    Dyana Nick, PT Student PT Student        Goals/Plan     Row Name 11/13/19 0812          Bed Mobility Goal 1 (PT)    Activity/Assistive Device (Bed Mobility Goal 1, PT)  sit to supine/supine to sit  -MICAH (r) AF (t) MICAH (c)     [place] Level/Cues Needed (Bed Mobility Goal 1, PT)  independent  -MICAH (r) AF (t) MICAH (c)     Time Frame (Bed Mobility Goal 1, PT)  long term goal (LTG);10 days  -MICAH (r) AF (t) MICAH (c)     Progress/Outcomes (Bed Mobility Goal 1, PT)  goal ongoing  -MICAH (r) AF (t) MICAH (c)     Row Name 11/13/19 0812          Transfer Goal 1 (PT)    Activity/Assistive Device (Transfer Goal 1, PT)  sit-to-stand/stand-to-sit;bed-to-chair/chair-to-bed;commode, bedside  -MICAH (r) AF (t) MICAH (c)     [place]odd Level/Cues Needed (Transfer Goal 1, PT)  supervision required  -MICAH (r) AF (t) MICAH (c)     Time Frame (Transfer Goal 1, PT)  long term goal (LTG);10 days  -MICAH (r) AF (t) MICAH (c)     Progress/Outcome (Transfer Goal 1, PT)  goal ongoing  -MICAH (r) AF (t) MICAH (c)     Row Name 11/13/19 0812          Gait Training Goal 1 (PT)    Activity/Assistive Device (Gait Training Goal 1,  PT)  gait (walking locomotion);assistive device use;cane, quad;walker, rolling;normalize weight shifts;increase energy conservation  -MICAH (r) AF (t) MICAH (c)     [place] Level (Gait Training Goal 1, PT)  supervision required  -MICAH (r) AF (t) MICAH (c)     Distance (Gait Goal 1, PT)  50 ft  -MICAH (r) AF (t) MICAH (c)     Time Frame (Gait Training Goal 1, PT)  long term goal (LTG);10 days  -MICAH (r) AF (t) MICAH (c)     Progress/Outcome (Gait Training Goal 1, PT)  goal ongoing  -MICAH (r) AF (t) MICAH (c)       User Key  (r) = Recorded By, (t) = Taken By, (c) = Cosigned By    Initials Name Provider Type    Jose Angel Elizondo, PT DPT Physical Therapist    Dyana Nick, PT Student PT Student        Clinical Impression     Row Name 11/13/19 0812          Pain Assessment    Additional Documentation  Pain Scale: Numbers Pre/Post-Treatment (Group)  -MICAH (r) AF (t) MICAH (c)     Row Name 11/13/19 0812          Pain Scale: Numbers Pre/Post-Treatment    Pain Scale: Numbers, Pretreatment  0/10 - no pain  -MICAH (r) AF (t) MICAH (c)     Row Name 11/13/19 0812          Physical Therapy Clinical Impression    Patient/Family Goals Statement (PT Clinical Impression)  recommend SNF for further therapy  -MICAH (r) AF (t) MICAH (c)     Criteria for Skilled Interventions Met (PT Clinical Impression)  yes;treatment indicated  -MICAH (r) AF (t) MICAH (c)     Rehab Potential (PT Clinical Summary)  good, to achieve stated therapy goals  -MICAH (r) AF (t) MICAH (c)     Predicted Duration of Therapy (PT)  until d/c  -MICAH (r) AF (t) MICAH (c)     Row Name 11/13/19 0812          Vital Signs    O2 Delivery Pre Treatment  nasal cannula  -MICAH (r) AF (t) MICAH (c)     O2 Delivery Intra Treatment  nasal cannula  -MICAH (r) AF (t) MICAH (c)     O2 Delivery Post Treatment  nasal cannula  -MICAH (r) AF (t) MICAH (c)     Pre Patient Position  Supine  -MICAH (r) AF (t) MICAH (c)     Intra Patient Position  Standing  -MICAH (r) AF (t) MICAH (c)     Post Patient Position  Sitting  -MIACH (r) AF (t) MICAH (c)     Row Name 11/13/19  0812          Positioning and Restraints    Pre-Treatment Position  in bed  -MICAH (r) AF (t) MICAH (c)     Post Treatment Position  chair  -MICAH (r) AF (t) MICAH (c)     In Chair  notified nsg;sitting;call light within reach;encouraged to call for assist  -MICAH (r) AF (t) MICAH (c)       User Key  (r) = Recorded By, (t) = Taken By, (c) = Cosigned By    Initials Name Provider Type    Jose Angel Elizondo PT DPT Physical Therapist    Dyana Nick, PT Student PT Student        Outcome Measures     Row Name 11/13/19 0812          How much help from another person do you currently need...    Turning from your back to your side while in flat bed without using bedrails?  4  -MICAH (r) AF (t) MICAH (c)     Moving from lying on back to sitting on the side of a flat bed without bedrails?  3  -MICAH (r) AF (t) MICAH (c)     Moving to and from a bed to a chair (including a wheelchair)?  3  -MICAH (r) AF (t) MICAH (c)     Standing up from a chair using your arms (e.g., wheelchair, bedside chair)?  3  -MICAH (r) AF (t) MICAH (c)     Climbing 3-5 steps with a railing?  3  -MICAH (r) AF (t) MICAH (c)     To walk in hospital room?  3  -MICAH (r) AF (t) MICAH (c)     AM-PAC 6 Clicks Score (PT)  19  -MICAH (r) AF (t)     Row Name 11/13/19 0812          Functional Assessment    Outcome Measure Options  AM-PAC 6 Clicks Basic Mobility (PT)  -MICAH (r) AF (t) MICAH (c)       User Key  (r) = Recorded By, (t) = Taken By, (c) = Cosigned By    Initials Name Provider Type    Jose Angel Elizondo, PT DPT Physical Therapist    Dyana Nick, PT Student PT Student        Physical Therapy Education     Title: PT OT SLP Therapies (In Progress)     Topic: Physical Therapy (In Progress)     Point: Mobility training (Done)     Learning Progress Summary           Patient Acceptance, E, VU,NR by JUAN at 11/13/2019  8:12 AM    Comment:  Benefits of activity and ongoing PT POC                               User Key     Initials Effective Dates Name Provider Type Discipline    JUAN 08/27/19 -  Dyana Davis, PT  "Student PT Student PT              PT Recommendation and Plan  Planned Therapy Interventions (PT Eval): balance training, bed mobility training, gait training, home exercise program, patient/family education, transfer training, postural re-education, strengthening  Outcome Summary/Treatment Plan (PT)  Anticipated Discharge Disposition (PT): skilled nursing facility, transitional care  Plan of Care Reviewed With: patient  Outcome Summary: PT eval completed. A&O x person, place only; Pt tried to explain situation but was speaking in incoherently and topic was not about reason for being here. Pt had constant plea for water stating her mouth was dry, it was reapeatedly explained to Pt that SLP needed to consult Pt. Performed supine to sit at Min A, needing assist with upper body. B LE ROM: WFL. B LE strength: L hip flex 4+/5, R hip flex 4-/5, L knee 4+/5, R knee flex 4/5, B DF 5/5. When asked if Pt felt weaker on one side, Pt responded with \"I'm not sure\". Performed sit to stand at CGA/Min A x2, using B hand-held assist. Pt ambulated from bed to chair w/ B hand-held assist at CGA x2. Pt had very stooped posture, took small, shuffled steps and abmulated at slow speed. No LOB, a little SOB present. Pt was fixated on her dry mouth and having water throughout session. Pt would benefit from skilled PT to address strength, safe gait mechanics and functional mobility. PT will continue seeing Pt. Recommended d/c to SNF for further therapy.      Time Calculation:   PT Charges     Row Name 11/13/19 0812             Time Calculation    Start Time  0839 Chart review [id number]. PT with 53 min.  -MICAH (r) AF (t) MICAH (c)      Stop Time  0915  -MICAH (r) AF (t) MICAH (c)      Time Calculation (min)  36 min  -MICAH (r) AF (t)      PT Received On  11/13/19  -MICAH (r) AF (t) MICAH (c)      PT Goal Re-Cert Due Date  11/23/19  -MICAH (r) AF (t) MICAH (c)        User Key  (r) = Recorded By, (t) = Taken By, (c) = Cosigned By    Initials Name Provider Type    MICAH " Jose Angel [place], PT DPT Physical Therapist    AF Dyana Davis, PT Student PT Student        Therapy Charges for Today     Code Description Service Date Service Provider Modifiers Qty    02379101354 HC PT EVAL MOD COMPLEXITY 4 11/13/2019 Dyana Davis, PT Student GP 1          PT G-Codes  Outcome Measure Options: AM-PAC 6 Clicks Daily Activity (OT)  AM-PAC 6 Clicks Score (PT): 19  AM-PAC 6 Clicks Score (OT): 16    Dyana Davis PT Student  11/13/2019

## 2019-11-13 NOTE — PROGRESS NOTES
[facility]  INPATIENT PROGRESS NOTE    Length of Stay: 2  Date of Admission: 11/11/2019  Primary Care Physician: Lupe Ham MD    Subjective     Chief Complaint:     Mild shortness of breath    HPI     Patient has been successfully extubated.  She is sitting in a potty chair at bedside drinking a cup of coffee and looking at the leaves outside.  1 of the patient's daughters is at bedside.  Respiratory panel is positive for rhinovirus.  The patient's hemoglobin is 8.5 this a.m. after 1 unit transfusion at admission.  BNP remains elevated at 2853.  I had a long discussion with the patient's daughter regarding resuscitation status.  She agrees that her mother would not tolerate CPR and other advanced life support measures such as dialysis.  She also agrees that she would not be willing to undergo tube feeding.  I encouraged her to talk with her other siblings and make changes in her mother's resuscitation status.  I have also broached the subject of palliative care and hospice.  She seems to be open to consideration of hospice care.  I will ask palliative care to consult and discuss treatment options further.    Review of Systems     All pertinent negatives and positives are as above. All other systems have been reviewed and are negative unless otherwise stated.     Objective    Temp:  [97.5 °F (36.4 °C)-98.9 °F (37.2 °C)] 98.9 °F (37.2 °C)  Heart Rate:  [] 96  Resp:  [14-27] 24  BP: ()/(51-92) 126/92  FiO2 (%):  [30 %] 30 %    Lab Results (last 24 hours)     Procedure Component Value Units Date/Time    Hemoglobin & Hematocrit, Blood [[id number]]  (Abnormal) Collected:  11/13/19 0803    Specimen:  Blood Updated:  11/13/19 0839     Hemoglobin 8.5 g/dL      Hematocrit 28.1 %     Hemoglobin & Hematocrit, Blood [[id number]]  (Abnormal) Collected:  11/12/19 2332    Specimen:  Blood Updated:  11/12/19 2350     Hemoglobin 8.7 g/dL      Hematocrit  29.1 %     Hemoglobin & Hematocrit, Blood [[id number]]  (Abnormal) Collected:  11/12/19 1802    Specimen:  Blood Updated:  11/12/19 1807     Hemoglobin 9.1 g/dL      Hematocrit 29.6 %     Respiratory Panel, PCR - Swab, Nasopharynx [[id number]]  (Abnormal) Collected:  11/12/19 0228    Specimen:  Swab from Nasopharynx Updated:  11/12/19 1409     ADENOVIRUS, PCR Not Detected     Coronavirus 229E Not Detected     Coronavirus HKU1 Not Detected     Coronavirus NL63 Not Detected     Coronavirus OC43 Not Detected     Human Metapneumovirus Not Detected     Human Rhinovirus/Enterovirus Detected     Influenza B PCR Not Detected     Parainfluenza Virus 1 Not Detected     Parainfluenza Virus 2 Not Detected     Parainfluenza Virus 3 Not Detected     Parainfluenza Virus 4 Not Detected     Bordetella pertussis pcr Not Detected     Influenza A H1 2009 PCR Not Detected     Chlamydophila pneumoniae PCR Not Detected     Mycoplasma pneumo by PCR Not Detected     Influenza A PCR Not Detected     Influenza A H3 Not Detected     Influenza A H1 Not Detected     RSV, PCR Not Detected     Bordetella parapertussis PCR Not Detected    Blood Culture - Blood, Arm, Left [[id number]] Collected:  11/11/19 1222    Specimen:  Blood from Arm, Left Updated:  11/12/19 1246     Blood Culture No growth at 24 hours    Blood Culture - Blood, Arm, Right [[id number]] Collected:  11/11/19 1216    Specimen:  Blood from Arm, Right Updated:  11/12/19 1246     Blood Culture No growth at 24 hours    BNP [[id number]]  (Abnormal) Collected:  11/12/19 0402    Specimen:  Blood Updated:  11/12/19 1232     proBNP 2,853.0 pg/mL     Narrative:       Among patients with dyspnea, NT-proBNP is highly sensitive for the detection of acute congestive heart failure. In addition NT-proBNP of <300 pg/ml effectively rules out acute congestive heart failure with 99% negative predictive value.          Imaging Results (Last 24 Hours)     ** No results found for the last 24 hours. **              Intake/Output Summary (Last 24 hours) at 11/13/2019 1148  Last data filed at 11/13/2019 0455  Gross per 24 hour   Intake 1902.79 ml   Output 2080 ml   Net -177.21 ml       Physical Exam   Constitutional: She is oriented to person, place, and time. She appears well-developed. She appears cachectic.   HENT:   Head: Normocephalic and atraumatic.   Nose: Nose normal.   Mouth/Throat: Oropharynx is clear and moist.   Eyes: Conjunctivae are normal. No scleral icterus.   Neck: Neck supple. No JVD present.   Cardiovascular: Normal rate and regular rhythm.   Murmur heard.   Systolic murmur is present with a grade of 5/6.  Pulmonary/Chest: Effort normal. She has rhonchi ( A few scattered).   Abdominal: Soft. Bowel sounds are normal. There is no tenderness.   Musculoskeletal: Normal range of motion. She exhibits no edema.   Neurological: She is alert and oriented to person, place, and time. Coordination normal.   Generalized weakness   Skin: Skin is warm and dry.   Psychiatric: She has a normal mood and affect. Her speech is normal and behavior is normal. Cognition and memory are impaired.           Results Review:  I have reviewed the labs, radiology results, and diagnostic studies since my last progress note and made treatment changes reflective of the results.   I have reviewed the current medications.    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

## 2019-11-13 NOTE — PROGRESS NOTES
[facility] Gastroenterology  Inpatient Progress Note  Today's date:  11/13/19    Heather Burgos  10/23/1926       Reason for Follow Up: Hematemesis    Subjective: The patient is sitting up to chair this morning.  She tells me she is feeling very well.  She states the only difficulty she is having currently is some with her breathing.  She was extubated yesterday according to nursing.  Nursing tells me that she has had no complaints of nausea.  No vomiting no hematemesis.  The patient has not had a bowel movement.  The patient denies any abdominal pain or abdominal cramping.  She tells me she had no hematemesis , melena or hematochezia at home prior to this hospitalization.      Allergies   Allergen Reactions   • Erythromycin Hives and Swelling   • Demerol [Meperidine] Nausea And Vomiting and GI Intolerance   • Morphine And Related Nausea And Vomiting       Current Facility-Administered Medications:   •  acetaminophen (TYLENOL) tablet 650 mg, 650 mg, Oral, Q4H PRN **OR** acetaminophen (TYLENOL) 160 MG/5ML solution 650 mg, 650 mg, Oral, Q4H PRN **OR** acetaminophen (TYLENOL) suppository 650 mg, 650 mg, Rectal, Q4H PRN, Alen Montenegro, DO  •  albuterol (PROVENTIL) nebulizer solution 0.083% 2.5 mg/3mL, 2.5 mg, Nebulization, Once PRN, Modesto Henderson MD  •  aluminum-magnesium hydroxide-simethicone (MAALOX MAX) 400-400-40 MG/5ML suspension 15 mL, 15 mL, Oral, Q6H PRN, Alen Montenegro, DO  •  bisacodyl (DULCOLAX) EC tablet 5 mg, 5 mg, Oral, Daily PRN, Alen Montenegro, DO  •  cefTRIAXone (ROCEPHIN) 1 g/100 mL 0.9% NS (MBP), 1 g, Intravenous, Q24H, Alen Montenegro DO, 1 g at 11/12/19 1154  •  doxycycline (VIBRAMYCIN) 100 mg/100 mL 0.9% NS MBP, 100 mg, Intravenous, Q12H, Alen Montenegro DO, 100 mg at 11/13/19 0446  •  furosemide (LASIX) injection 40 mg, 40 mg, Intravenous, Daily, Alen Montenegro DO, 40 mg at 11/12/19 1155  •  guaiFENesin (MUCINEX) 12 hr tablet 1,200 mg, 1,200 mg, Oral,  Q12H, Alen Montenegro DO  •  ipratropium-albuterol (DUO-NEB) nebulizer solution 3 mL, 3 mL, Nebulization, Q4H PRN, Alen Montenegro DO, 3 mL at 11/13/19 0446  •  ipratropium-albuterol (DUO-NEB) nebulizer solution 3 mL, 3 mL, Nebulization, 4x Daily - RT, Alen Montenegro DO, 3 mL at 11/13/19 0721  •  melatonin tablet 6 mg, 6 mg, Oral, Nightly PRN, Alen Montenegro DO  •  metoprolol tartrate (LOPRESSOR) tablet 12.5 mg, 12.5 mg, Oral, Q12H, Alen Montenegro DO  •  ondansetron (ZOFRAN) injection 4 mg, 4 mg, Intravenous, Q6H PRN, Alen Montenegro DO, 4 mg at 11/12/19 0552  •  pantoprazole (PROTONIX) injection 40 mg, 40 mg, Intravenous, Q AM, Alen Montenegro DO, 40 mg at 11/13/19 0558  •  PARoxetine (PAXIL) tablet 10 mg, 10 mg, Oral, Daily, Alen Montenegro DO  •  sodium chloride 0.9 % flush 10 mL, 10 mL, Intravenous, Q12H, Alen Montenegro DO, 10 mL at 11/12/19 2058  •  sodium chloride 0.9 % flush 10 mL, 10 mL, Intravenous, PRN, Alen Montenegro DO  •  sodium chloride 0.9 % infusion, 50 mL/hr, Intravenous, Continuous, Alen Montenegro DO, Last Rate: 50 mL/hr at 11/13/19 0455, 50 mL/hr at 11/13/19 0455    Review of Systems:   Review of Systems   Constitutional: Negative for fever and unexpected weight change.   HENT: Negative for hearing loss.    Eyes: Negative for visual disturbance.   Respiratory: Negative for cough.    Cardiovascular: Negative for chest pain.   Gastrointestinal:        See HPI   Endocrine: Negative for cold intolerance and heat intolerance.   Genitourinary: Negative for dysuria.   Musculoskeletal: Negative for arthralgias.   Skin: Negative for rash.   Neurological: Negative for seizures.   Psychiatric/Behavioral: Negative for hallucinations.        Vital Signs:  Temp:  [97.5 °F (36.4 °C)-98.9 °F (37.2 °C)] 98.9 °F (37.2 °C)  Heart Rate:  [] 91  Resp:  [14-27] 25  BP: ()/(51-97) 135/86  FiO2 (%):  [30 %] 30 %  Body mass index is 19.67 kg/m².      Intake/Output Summary (Last 24 hours) at 11/13/2019 0951  Last data filed at 11/13/2019 0455  Gross per 24 hour   Intake 1902.79 ml   Output 2080 ml   Net -177.21 ml     No intake/output data recorded.  Physical Exam:  Physical Exam   Constitutional: She appears well-developed.   Cardiovascular: Normal rate and regular rhythm.   Pulmonary/Chest: Effort normal.   Decreased and coarse bilateral   Abdominal: Soft. Bowel sounds are normal.   Neurological: She is alert.   Psychiatric: She has a normal mood and affect.        Results Review:   I have reviewed all of the patient's current test results    Results from last 7 days   Lab Units 11/13/19  0803 11/12/19  2332 11/12/19  1802 11/12/19  0402 11/11/19  1216   WBC 10*3/mm3  --   --   --  8.66 11.26*   HEMOGLOBIN g/dL 8.5* 8.7* 9.1* 8.1* 7.3*   HEMATOCRIT % 28.1* 29.1* 29.6* 26.4* 24.1*   PLATELETS 10*3/mm3  --   --   --  251 328       Results from last 7 days   Lab Units 11/12/19  0402 11/11/19  1250 11/11/19  1216   SODIUM mmol/L 139  --  141   SODIUM, ARTERIAL mmol/L  --  138  --    POTASSIUM mmol/L 4.7  --  4.3   CHLORIDE mmol/L 104  --  102   CO2 mmol/L 26.0  --  30.0*   BUN mg/dL 14  --  11   CREATININE mg/dL 0.43*  --  0.50*   CALCIUM mg/dL 8.3  --  9.0   BILIRUBIN mg/dL  --   --  0.7   ALK PHOS U/L  --   --  77   ALT (SGPT) U/L  --   --  7   AST (SGOT) U/L  --   --  13   GLUCOSE mg/dL 170*  --  111*       Results from last 7 days   Lab Units 11/11/19  1216   INR  1.22*       Lab Results   Lab Value Date/Time    LIPASE 20 (L) 03/12/2017 1455    LIPASE 79 06/20/2016 1834       Radiology Review:  Imaging Results (Last 24 Hours)     ** No results found for the last 24 hours. **          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 (CMS/[facility]) J44.9   • Current use of long term anticoagulation Z79.01   • Anemia D64.9   •  Acute on chronic respiratory failure with hypoxia (CMS/[facility]) J96.21   • Pulmonary hypertension (CMS/HCC) I27.20   • Chronic respiratory failure with hypoxia (CMS/HCC) J96.11   • Cachexia (CMS/HCC) R64   • RML pneumonia (CMS/HCC) J18.1   • Sepsis (CMS/[facility]) A41.9   • Coffee ground emesis K92.0     Hematemesis?  She is very high risk for any type of endoscopic procedure in light of her aortic stenosis, atrial fibrillation, advanced age and other comorbidities.  I do not get the sense that there is any type of volume GI blood loss.  She does have history of esophagitis it would make sense to empirically treat with PPI therapy.    Discussed care with her family as well.  All are in agreement not to proceed with any GI endoscopic procedure.  She is tolerating diet well.  GI service is signing off.         JIM Dunham  11/13/19  9:51 AM     Patient Seen, Chart, Consults, Notes, Labs, Radiology studies reviewed    I have seen and examined patient personally, performing a face-to-face diagnostic evaluation with plan of care reviewed and developed with APRN and nursing staff. I have addended and/or modified the above history of present illness, physical examination, and assessment and plan to reflect my findings and impressions. Essential elements of the care plan were discussed with APRN above.  Agree with findings and assessment/plan as documented above    Charmaine Santana MD  [facility] Gastroenterology  11/13/19  3:43 PM    Much of this encounter note is an electronic transcription/translation of spoken language to printed text. The electronic translation of spoken language may permit erroneous, or at times, nonsensical words or phrases to be inadvertently transcribed; although I have reviewed the note for such errors, some may still exist.

## 2019-11-14 NOTE — PROGRESS NOTES
Continued Stay Note   [place]     Patient Name: Heather Burgos  MRN: [id number]  Today's Date: 11/14/2019    Admit Date: 11/11/2019    Discharge Plan     Row Name 11/14/19 1156       Plan    Plan  [facility] if not on IV morphine    Patient/Family in Agreement with Plan  yes    Plan Comments  Sydnee from [facility] able to offer a bed, but would not be able to accept with Morphine GTT but would with sublingual morphine.  Will follow. [phone]    Row Name 11/14/19 9471       Plan    Plan  SNF placement/palliative care    Patient/Family in Agreement with Plan  yes    Plan Comments  Left message for Sydnee from [facility] to call this SW on whether bed will be offered to pt at discharge. Will follow.         Discharge Codes    No documentation.             OLYA Alcala

## 2019-11-14 NOTE — THERAPY DISCHARGE NOTE
Acute Care - Speech Language Pathology Discharge Summary  [facility]       Patient Name: Heather Burgos  : 10/23/1926  MRN: [id number]    Today's Date: 2019  Onset of Illness/Injury or Date of Surgery: 19       Referring Physician: Dr. Montenegro        Admit Date: 2019      SLP Recommendation and Plan  Pt D/C as she is now on comfort measures. Pt currently on a regular diet with thin liquids. MD to re-consult if SLP services become needed.  Lambert Kelley MS CCC-SLP 2019 8:07 AM    Visit Dx:    ICD-10-CM ICD-9-CM   1. Pneumonia due to infectious organism, unspecified laterality, unspecified part of lung J18.9 136.9     484.8   2. Chronic respiratory failure with hypoxia (CMS/[facility]) J96.11 518.83     799.02   3. Chronic obstructive pulmonary disease, unspecified COPD type (CMS/[facility]) J44.9 496   4. Decreased activities of daily living (ADL) Z78.9 V49.89   5. Oropharyngeal dysphagia R13.12 787.22   6. Impaired mobility Z74.09 799.89               SLP GOALS     Row Name 19 0800 19 0900          Oral Nutrition/Hydration Goal 1 (SLP)    Oral Nutrition/Hydration Goal 1, SLP  --  Pt will tolerate LRD w/o any overt s/s of aspiration.  -CS     Time Frame (Oral Nutrition/Hydration Goal 1, SLP)  --  by discharge  -CS     Barriers (Oral Nutrition/Hydration Goal 1, SLP)  --  n/a  -CS     Progress/Outcomes (Oral Nutrition/Hydration Goal 1, SLP)  goal not met;medical status inhibiting progress  -CS  goal ongoing  -CS       User Key  (r) = Recorded By, (t) = Taken By, (c) = Cosigned By    Initials Name Provider Type    CS Lambert Kelley MS CCC-SLP Speech and Language Pathologist            Therapy Charges for Today     Code Description Service Date Service Provider Modifiers Qty    [id number] HC ST EVAL ORAL PHARYNG SWALLOW 5 2019 Lambert Kelley MS CCC-SLP GN 1            SLP Discharge Summary  Anticipated Dischage Disposition: unknown  Reason for Discharge: patient/family request  discontinuation of services  Progress Toward Achieving Short/long Term Goals: other (see comments)(Comfort measures)  Discharge Destination: unknown      Lambert Kelley MS CCC-SLP  11/14/2019

## 2019-11-14 NOTE — PLAN OF CARE
Problem: Patient Care Overview  Goal: Plan of Care Review  Outcome: Ongoing (interventions implemented as appropriate)   11/14/19 0005   Coping/Psychosocial   Plan of Care Reviewed With patient;family   Plan of Care Review   Progress declining   OTHER   Outcome Summary pt with air hunger at begin of shift. morphine drip turned up over a period of time from 1mg to 4mg/hr. pt still in distress. charge nurse called provider. Samra Milan came to floor and spoke with family. they decided to place pt on comfort care. ativan admin per orders along with SL morphine. pt finally resting comfortably. i asked family twice if they wanted to move to a larger room and they declined. they are also refusing morgan cath at this time. they also did not want me to check pt for incont episode or turn. i told them to let me know if they decide differently. cont to monitor       Problem: Dying Patient, Actively (Adult)  Goal: Identify Related Risk Factors and Signs and Symptoms  Outcome: Ongoing (interventions implemented as appropriate)   11/14/19 0005   Dying Patient, Actively (Adult)   Related Risk Factors (Actively Dying Patient) age/developmental level;disease progression;dyspnea;rate of onset of illness   Signs and Symptoms (Actively Dying Patient) dyspnea;profound weakness     Goal: Comfort/Pain Control  Outcome: Ongoing (interventions implemented as appropriate)    Goal: Peace/Preservation of Dignity During the Dying Process  Outcome: Ongoing (interventions implemented as appropriate)

## 2019-11-14 NOTE — PROGRESS NOTES
PULMONARY AND CRITICAL CARE PROGRESS NOTE - [facility]    Patient: Heather Burgos  10/23/1926   MR# [id number]   Acct# [id number]  11/14/19   8:49 AM  Referring Provider: Alen Montenegro DO    Chief Complaint: Acute respiratory failure requiring mechanical ventilation  Interval history: She developed worsening dyspnea and hypoxemia over the course of yesterday, is out of ICU and a floor room.  Family has designated her comfort care only.  She remains on nasal high flow oxygen with humidity.  She is asleep.  She cannot provide any history.  Meds:    BIOTENE DRY MOUTH MOISTURIZING 1 spray Mouth/Throat 5x Daily   sodium chloride 10 mL Intravenous Q12H       Morphine 1-5 mg/hr Last Rate: 4 mg/hr (11/14/19 0240)     Review of Systems:   Review of Systems   Unable to perform ROS: Patient unresponsive     Physical Exam:  SpO2 Percentage    11/13/19 1607 11/13/19 1805 11/13/19 2019   SpO2: 95% 93% Comment: Paliative, cant pivk up     Temp:  [98.3 °F (36.8 °C)] 98.3 °F (36.8 °C)  Heart Rate:  [] 106  Resp:  [20-32] 32  BP: (123-143)/(77-92) 132/77    Intake/Output Summary (Last 24 hours) at 11/14/2019 0849  Last data filed at 11/13/2019 1813  Gross per 24 hour   Intake 120 ml   Output --   Net 120 ml     Physical Exam   Constitutional: She is sleeping.  Non-toxic appearance. She has a sickly appearance. She appears ill. Nasal cannula in place.   HENT:   Nose: Nose normal.   Pulmonary/Chest: Effort normal. No respiratory distress. She has rales.   Neurological: She is unresponsive.     Laboratory Data:  Results from last 7 days   Lab Units 11/13/19  1720 11/13/19  0803 11/12/19  2332  11/12/19  0402 11/11/19  1216   WBC 10*3/mm3  --   --   --   --  8.66 11.26*   HEMOGLOBIN g/dL 9.1* 8.5* 8.7*   < > 8.1* 7.3*   PLATELETS 10*3/mm3  --   --   --   --  251 328    < > = values in this interval not displayed.     Results from last 7 days   Lab Units 11/12/19  0402 11/11/19  1250 11/11/19  1216   SODIUM  mmol/L 139  --  141   SODIUM, ARTERIAL mmol/L  --  138  --    POTASSIUM mmol/L 4.7  --  4.3   BUN mg/dL 14  --  11   CREATININE mg/dL 0.43*  --  0.50*   INR   --   --  1.22*     Results from last 7 days   Lab Units 11/12/19  0350 11/11/19  1250   PH, ARTERIAL pH units 7.461* 7.469*   PCO2, ARTERIAL mm Hg 38.9 38.1   PO2 ART mm Hg 401.0* 70.2*   FIO2 % 100  --      Blood Culture   Date Value Ref Range Status   11/11/2019 No growth at 24 hours  Preliminary   11/11/2019 No growth at 24 hours  Preliminary     Pulmonary Assessment:  1. Recurrent acute respiratory failure with hypoxia, worsened  2. Aortic stenosis, severe known and stable  3. Moderate COPD, gold stage 2b based on 2020 guidelines as well as 2017 guidelines known and stable    Recommend:   · Patient is on comfort measures and appears comfortable  · Continue high flow nasal oxygen for comfort  · No additional pulmonary plans.  Signing off.  Available.    Electronically signed by Kyler Thompson MD, 11/14/19, 8:49 AM

## 2019-11-14 NOTE — PROGRESS NOTES
"Palliative Care Daily Progress Note     support for patient/family and comfort care    Code Status:   Code Status and Medical Interventions:   Ordered at: 11/13/19 5813     Level Of Support Discussed With:    Next of Kin (If No Surrogate)    Patient    Health Care Surrogate     Code Status:    No CPR     Medical Interventions (Level of Support Prior to Arrest):    Comfort Measures      Advanced Directives: Advance Directive Status: Patient has advance directive, copy requested   Goals of Care: Ongoing.     S: Medical record reviewed. Events noted.  Appears to be resting comfortably in bed.  Currently on 30 L FiO2 75% Vapotherm.  Patient decline overnight and was placed on morphine drip.  Family verbalized patient appears comfortable and would like to monitor her status overnight.  We discussed decreasing oxygenation as this can be a life prolonging measure.  Family agree to care plan as discussed, including addition of Ativan scheduled and as needed.     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 No   BMI 19.74 kg/m² "     Physical Exam   Constitutional: She appears well-developed. She has a sickly appearance. She appears ill. No distress.   Vapotherm 30 L FiO2 75%.  On morphine drip 4 mg/h.  Frail   Cardiovascular: Normal rate and intact distal pulses. An irregularly irregular rhythm present.   Murmur heard.  Pulmonary/Chest: Effort normal. No accessory muscle usage. No respiratory distress. She has decreased breath sounds. She has rhonchi.  She has audible coarse breath sounds.  Abdominal: Soft. Bowel sounds are normal.   Musculoskeletal: Normal range of motion.   Skin: Skin is warm and dry.   Grimaces to noxious stimulus.     Patient status: Disease state: Controlled with current treatments.  Functional status: Palliative Performance Scale Score: Performance 10%   Nutritional status: Albumin 3.50. Body mass index is 19.67 kg/m².      Family support: The patient receives support from her children..  POA/Healthcare surrogate-no advance directive on file     Impression/Problem List:     1.  Acute on chronic respiratory failure with hypoxia   2.  Right middle lobe pneumonia  3.  Sepsis  4.  Aortic stenosis, severe inoperable  5.  Pulmonary hypertension  6.  Upper GI bleed, anticoagulation has been stopped  7.  Chronic atrial fibrillation  8.  Advanced age        Recommendations/Plan:  1. plan: Goals of care include CODE STATUS no CPR\comfort measures.     2.  Palliative care encounter  -Patient declined overnight and transition to comfort measures  -Reviewed and initiated the MOST form to further delineate care goals after discharge.  Attending to complete.  -Family considering [facility] with palliative care if warranted.  Anticipating patient to further decline and likely death eminent.     3.    Comfort care  -Continue morphine drip.   -Titrate down oxygen.   -Scheduled Ativan and as needed.   - Add atropine drops for excess secretions.   - Scopolamine patch as needed for excess secretions.      Thank you for allowing us to  participate in patient's plan of care. Palliative Care Team will continue to follow patient.     Time spent:40 minutes spent reviewing medical and medication records, assessing and examining patient, discussing with family, answering questions, providing some guidance about a plan and documentation of care, and coordinating care with other healthcare members, with > 50% time spent face to face.       Aria Alegre, APRN  11/14/2019

## 2019-11-14 NOTE — PROGRESS NOTES
[facility] Medicine Services  INPATIENT PROGRESS NOTE    Length of Stay: 3  Date of Admission: 11/11/2019  Primary Care Physician: Lupe Ham MD    Subjective     Chief Complaint:     Comfort measures care    HPI     Patient is currently on a morphine drip and is receiving frequent doses of Ativan as needed for air hunger.  The patient's 2 daughters are at bedside and state that the patient appears to be very comfortable.  There are coarse respiratory sounds noted but the patient is not struggling for air.    Review of Systems     All pertinent negatives and positives are as above. All other systems have been reviewed and are negative unless otherwise stated.     Objective    Temp:  [98.3 °F (36.8 °C)] 98.3 °F (36.8 °C)  Heart Rate:  [] 106  Resp:  [20-32] 32  BP: (132-136)/(77-80) 132/77    Lab Results (last 24 hours)     Procedure Component Value Units Date/Time    Blood Culture - Blood, Arm, Left [[id number]] Collected:  11/11/19 1222    Specimen:  Blood from Arm, Left Updated:  11/14/19 1246     Blood Culture No growth at 3 days    Blood Culture - Blood, Arm, Right [[id number]] Collected:  11/11/19 1216    Specimen:  Blood from Arm, Right Updated:  11/14/19 1246     Blood Culture No growth at 3 days    Hemoglobin & Hematocrit, Blood [[id number]]  (Abnormal) Collected:  11/13/19 1720    Specimen:  Blood Updated:  11/13/19 1728     Hemoglobin 9.1 g/dL      Hematocrit 29.8 %     Respiratory Culture - Sputum, ET Suction [[id number]] Collected:  11/11/19 2023    Specimen:  Sputum from ET Suction Updated:  11/13/19 1536     Respiratory Culture Scant growth (1+) Normal Respiratory Migel     Gram Stain Greater than 25 WBCs per low power field      Few (2+) Mixed gram positive migel      Few (2+) Budding yeast      Few (2+) Epithelial cells per low power field          Imaging Results (Last 24 Hours)     ** No results found for the last 24 hours. **              Intake/Output Summary (Last 24 hours) at 11/14/2019 1410  Last data filed at 11/13/2019 1813  Gross per 24 hour   Intake 120 ml   Output --   Net 120 ml       Physical Exam   Constitutional: She appears well-developed. No distress.   HENT:   Head: Normocephalic and atraumatic.   Eyes: No scleral icterus.   Neck: No JVD present.   Cardiovascular: Tachycardia present.   Murmur heard.   Systolic murmur is present with a grade of 5/6.  Pulmonary/Chest: Effort normal. Bradypnea noted. No respiratory distress. She has rhonchi ( Bilaterally).   Abdominal: Bowel sounds are normal.   Musculoskeletal: She exhibits no deformity.   Skin: Skin is warm and dry.           Results Review:  I have reviewed the labs, radiology results, and diagnostic studies since my last progress note and made treatment changes reflective of the results.   I have reviewed the current medications.    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

## 2019-11-14 NOTE — PROGRESS NOTES
Continued Stay Note   [place]     Patient Name: Heather Burgos  MRN: [id number]  Today's Date: 11/14/2019    Admit Date: 11/11/2019    Discharge Plan     Row Name 11/14/19 0945       Plan    Plan  SNF placement/palliative care    Patient/Family in Agreement with Plan  yes    Plan Comments  Left message for Sydnee from [facility] to call this SW on whether bed will be offered to pt at discharge. Will follow.         Discharge Codes    No documentation.             OLYA Alcala

## 2019-11-14 NOTE — PROGRESS NOTES
Called by RN caring for 89.  Gasping for air, on vapotherm, morphine drip in use.  Multiple family members at bedside.  Discussed end of life issues with all in room.  DOV Bhatti and patient both state they understand the risks of increasing morphine and adding IV Ativan but wish to proceed with discontinuation of current therapies and proceed with comfort measures.  Other family members at bedside agree.  Patient voiced understanding risks of respiration depression and even death.      11/13/2019, 2110  Samra Milan, APRN

## 2019-11-15 NOTE — DISCHARGE SUMMARY
[facility]  DEATH SUMMARY       Date of Admission: 11/11/2019  Date of Death:  11/14/2019 at approximately 1827  Primary Care Physician: Lupe Ham MD    Presenting Problem/History of Present Illness:  Pneumonia [J18.9]  Pneumonia due to infectious organism, unspecified laterality, unspecified part of lung [J18.9]     Final Death Diagnoses:  Active Hospital Problems    Diagnosis   • **RML pneumonia (CMS/[facility])   • Coffee ground emesis   • Cachexia (CMS/HCC)   • Sepsis (CMS/HCC)   • Chronic respiratory failure with hypoxia (CMS/HCC)   • Pulmonary hypertension (CMS/HCC)   • Anemia   • Acute on chronic respiratory failure with hypoxia (CMS/[facility])   • Current use of long term anticoagulation   • 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     LONG DISCUSSION BY ME WITH HER 2 DAUGHTERS. I FAVOR RAPID VENT WEANING ASAP ACCORDING TO PULM MED. 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  She is very high risk for any type of endoscopic procedure in light of her aortic stenosis, atrial fibrillation, advanced age and other comorbidities.  I do not get the sense that there is any type of volume GI blood loss.  She does have history of esophagitis it would make sense to empirically treat with PPI therapy.     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 (CMS/[facility])   • Current use of long term anticoagulation   • Anemia   • Acute on chronic respiratory failure with hypoxia (CMS/[facility])   • Pulmonary hypertension (CMS/[facility])   • Chronic respiratory failure with hypoxia (CMS/[facility])   • Cachexia (CMS/[facility])   • RML pneumonia (CMS/[facility])   • Sepsis (CMS/[facility])      Pulmonary Assessment:  SEVERE ACUTE RESPIRATORY FAILURE REQUIRING MECHANICAL VENTILATION  This is a threat to life or pulmonary function, high risk, due to acute on chronic respiratory failure with hypoxemia  New problem (to me), with additional workup planned: anemia   New problem (to me), no additional workup planned: chronic a-fib   Other problems either stable, failing to improve or worsenin. Stage 2 COPD  2. Right pleural effusion  3. Severe aortic stenosis  4. Pulmonary hypertension  5. Hematemesis?     Recommend/plan:   · Day #1 endotracheal tube.  Continue mechanical ventilation.  FiO2 has already been decreased to 30%.  · Continue bronchodilators  · Daily chest x-ray/ABGs while on mechanical ventilator  · Obtain 2D echo to re-evaluate aortic stenosis  · Consider cardiology consult as last 2D echo shows severe aortic stenosis. Also with elevated BNP and right pleural  effusion.  · Decrease IV fluids to KVO  · Defer diuresis to attending     Thank you for this consult.  We will follow along.  Electronically signed by JIM Canales on 11/12/2019 at 9:18 AM  Physician substantive contribution:  Pertinent symptoms/interval history include: The patient remains on mechanical ventilatory support.  Plans are to wean the patient to extubation and not reintubate should her condition deteriorate based on her cardiac status.  Respiratory exam shows pertinent findings of decreased breath sounds at the right base.  Plan includes: Again, if the decision is to extubate to comfort measures and no further reintubation I certainly think that is reasonable.       Electronically signed by Ck Nguyen MD    Palliative care:  Impression/Problem List:     1.  Acute on chronic respiratory failure with hypoxia   2.  Right middle lobe pneumonia  3.  Sepsis  4.  Aortic stenosis, severe inoperable  5.  Pulmonary hypertension  6.  Upper GI bleed, anticoagulation has been stopped  7.  Chronic atrial fibrillation  8.  Advanced age        Recommendations/Plan:  1. plan: Goals of care include CODE STATUS no CPR\limited interventions.     2.  Palliative care encounter  -CODE STATUS changes as delineated per conversation above  -Plans to discharge to skilled nursing facility with either palliative or hospice.  Informed social work.  -Reviewed and initiated the MOST form to further delineate care goals after discharge.  Attending to complete.  -Plan to continue antibiotics at this juncture.  Patient and family do not wish any heroic or overly aggressive care measures.     3.  Dyspnea  -Add morphine concentrated solution 5 mg sublingual every 4 hours as needed for air hunger, tachypnea, and pain     4.  Anxiety related to dyspnea  -Add Ativan concentrated solution 0.25 mg sublingual every 4 hours as needed for anxiety and sleep     5.  Xerostomia  -Add biotin spray      Thank you for this  consult and allowing us to participate in patient's plan of care. Palliative Care Team will continue to follow patient.      Time spent: 90 minutes spent reviewing medical and medication records, assessing and examining patient, discussing with family, answering questions, providing some guidance about a plan and documentation of care, and coordinating care with other healthcare members, with > 50% time spent face to face.   60 minutes spent on advance care planning.     JIM Varma        Pertinent Test Results:   Lab Results (last 7 days)     Procedure Component Value Units Date/Time    Hemoglobin & Hematocrit, Blood [[id number]]  (Abnormal) Collected:  11/13/19 1720    Specimen:  Blood Updated:  11/13/19 1728     Hemoglobin 9.1 g/dL      Hematocrit 29.8 %     Respiratory Culture - Sputum, ET Suction [[id number]] Collected:  11/11/19 2023    Specimen:  Sputum from ET Suction Updated:  11/13/19 1536     Respiratory Culture Scant growth (1+) Normal Respiratory Migel     Gram Stain Greater than 25 WBCs per low power field      Few (2+) Mixed gram positive migel      Few (2+) Budding yeast      Few (2+) Epithelial cells per low power field    Hemoglobin & Hematocrit, Blood [[id number]]  (Abnormal) Collected:  11/13/19 0803    Specimen:  Blood Updated:  11/13/19 0839     Hemoglobin 8.5 g/dL      Hematocrit 28.1 %     Hemoglobin & Hematocrit, Blood [[id number]]  (Abnormal) Collected:  11/12/19 2332    Specimen:  Blood Updated:  11/12/19 2350     Hemoglobin 8.7 g/dL      Hematocrit 29.1 %     Hemoglobin & Hematocrit, Blood [[id number]]  (Abnormal) Collected:  11/12/19 1802    Specimen:  Blood Updated:  11/12/19 1807     Hemoglobin 9.1 g/dL      Hematocrit 29.6 %     Respiratory Panel, PCR - Swab, Nasopharynx [[id number]]  (Abnormal) Collected:  11/12/19 0228    Specimen:  Swab from Nasopharynx Updated:  11/12/19 1409     ADENOVIRUS, PCR Not Detected     Coronavirus 229E Not Detected     Coronavirus HKU1 Not Detected      Coronavirus NL63 Not Detected     Coronavirus OC43 Not Detected     Human Metapneumovirus Not Detected     Human Rhinovirus/Enterovirus Detected     Influenza B PCR Not Detected     Parainfluenza Virus 1 Not Detected     Parainfluenza Virus 2 Not Detected     Parainfluenza Virus 3 Not Detected     Parainfluenza Virus 4 Not Detected     Bordetella pertussis pcr Not Detected     Influenza A H1 2009 PCR Not Detected     Chlamydophila pneumoniae PCR Not Detected     Mycoplasma pneumo by PCR Not Detected     Influenza A PCR Not Detected     Influenza A H3 Not Detected     Influenza A H1 Not Detected     RSV, PCR Not Detected     Bordetella parapertussis PCR Not Detected    BNP [[id number]]  (Abnormal) Collected:  11/12/19 0402    Specimen:  Blood Updated:  11/12/19 1232     proBNP 2,853.0 pg/mL     Narrative:       Among patients with dyspnea, NT-proBNP is highly sensitive for the detection of acute congestive heart failure. In addition NT-proBNP of <300 pg/ml effectively rules out acute congestive heart failure with 99% negative predictive value.    Basic Metabolic Panel [[id number]]  (Abnormal) Collected:  11/12/19 0402    Specimen:  Blood Updated:  11/12/19 0505     Glucose 170 mg/dL      BUN 14 mg/dL      Creatinine 0.43 mg/dL      Sodium 139 mmol/L      Potassium 4.7 mmol/L      Comment: Specimen hemolyzed.  Results may be affected.        Chloride 104 mmol/L      CO2 26.0 mmol/L      Calcium 8.3 mg/dL      eGFR Non African Amer 137 mL/min/1.73      BUN/Creatinine Ratio 32.6     Anion Gap 9.0 mmol/L     Narrative:       GFR Normal >60  Chronic Kidney Disease <60  Kidney Failure <15    CBC Auto Differential [[id number]]  (Abnormal) Collected:  11/12/19 0402    Specimen:  Blood Updated:  11/12/19 0426     WBC 8.66 10*3/mm3      RBC 3.28 10*6/mm3      Hemoglobin 8.1 g/dL      Hematocrit 26.4 %      MCV 80.5 fL      MCH 24.7 pg      MCHC 30.7 g/dL      RDW 17.6 %      RDW-SD 51.2 fl      MPV 9.0 fL      Platelets 251  10*3/mm3      Neutrophil % 83.5 %      Lymphocyte % 6.6 %      Monocyte % 9.2 %      Eosinophil % 0.0 %      Basophil % 0.1 %      Immature Grans % 0.6 %      Neutrophils, Absolute 7.23 10*3/mm3      Lymphocytes, Absolute 0.57 10*3/mm3      Monocytes, Absolute 0.80 10*3/mm3      Eosinophils, Absolute 0.00 10*3/mm3      Basophils, Absolute 0.01 10*3/mm3      Immature Grans, Absolute 0.05 10*3/mm3      nRBC 0.0 /100 WBC     Blood Gas, Arterial [[id number]]  (Abnormal) Collected:  11/12/19 0350    Specimen:  Arterial Blood Updated:  11/12/19 0412     Site Right Radial     Marcelo's Test Positive     pH, Arterial 7.461 pH units      Comment: 83 Value above reference range        pCO2, Arterial 38.9 mm Hg      pO2, Arterial 401.0 mm Hg      Comment: 83 Value above reference range        HCO3, Arterial 27.7 mmol/L      Comment: 83 Value above reference range        Base Excess, Arterial 3.6 mmol/L      Comment: 83 Value above reference range        O2 Saturation, Arterial >100.1 %      Comment: 93 Value above reportable range > 100.1        Temperature 37.0 C      Barometric Pressure for Blood Gas 763 mmHg      Modality Ventilator     FIO2 100 %      Ventilator Mode AC     Set Tidal Volume 400     Set Mech Resp Rate 14.0     PEEP 5.0     Collected by [id number]     Comment: Meter: [id number]     :  [id number]       Legionella Antigen, Urine - Urine, Urine, Catheter In/Out [[id number]]  (Normal) Collected:  11/12/19 0105    Specimen:  Urine, Catheter In/Out Updated:  11/12/19 0211     LEGIONELLA ANTIGEN, URINE Negative    S. Pneumo Ag Urine or CSF - Urine, Urine, Catheter In/Out [[id number]]  (Normal) Collected:  11/12/19 0105    Specimen:  Urine, Catheter In/Out Updated:  11/12/19 0211     Strep Pneumo Ag Negative    POC Glucose Once [[id number]]  (Abnormal) Collected:  11/11/19 2115    Specimen:  Blood Updated:  11/11/19 2127     Glucose 208 mg/dL      Comment: : [id number] [place] JustinMeter ID: [id number]        [place] Draw [[id number]] Collected:  11/11/19 1216    Specimen:  Blood Updated:  11/11/19 1330    Narrative:       The following orders were created for panel order [place] Draw.  Procedure                               Abnormality         Status                     ---------                               -----------         ------                     Light Blue Top[[id number]]                                   Final result               Green Top (Gel)[[id number]]                                  Final result               Lavender Top[[id number]]                                     Final result               Red Top[[id number]]                                          In process                   Please view results for these tests on the individual orders.    Light Blue Top [[id number]] Collected:  11/11/19 1216    Specimen:  Blood Updated:  11/11/19 1330     Extra Tube hold for add-on     Comment: Auto resulted       Green Top (Gel) [[id number]] Collected:  11/11/19 1216    Specimen:  Blood Updated:  11/11/19 1330     Extra Tube Hold for add-ons.     Comment: Auto resulted.       Lavender Top [[id number]] Collected:  11/11/19 1216    Specimen:  Blood Updated:  11/11/19 1330     Extra Tube hold for add-on     Comment: Auto resulted       Blood Gas, Arterial With Co-Ox [[id number]]  (Abnormal) Collected:  11/11/19 1250    Specimen:  Arterial Blood Updated:  11/11/19 1302     Site Right Brachial     Marcelo's Test N/A     pH, Arterial 7.469 pH units      Comment: 83 Value above reference range        pCO2, Arterial 38.1 mm Hg      pO2, Arterial 70.2 mm Hg      Comment: 84 Value below reference range        HCO3, Arterial 27.6 mmol/L      Comment: 83 Value above reference range        Base Excess, Arterial 3.7 mmol/L      Comment: 83 Value above reference range        O2 Saturation, Arterial 95.2 %      Hemoglobin, Blood Gas 7.1 g/dL      Comment: 84 Value below reference range        Hematocrit, Blood Gas 21.8 %       Comment: 84 Value below reference range        Oxyhemoglobin 92.3 %      Comment: 84 Value below reference range        Methemoglobin 1.10 %      Carboxyhemoglobin 2.0 %      A-a Gradiant 35.1 mmHg      Temperature 37.0 C      Sodium, Arterial 138 mmol/L      Potassium, Arterial 3.9 mmol/L      Barometric Pressure for Blood Gas 753 mmHg      Modality Nasal Cannula     Flow Rate 2.0 lpm      Ventilator Mode NA     Note --     Collected by [id number]     Comment: Meter: [id number]     :  [id number]        pH, Temp Corrected --     pCO2, Temperature Corrected --     pO2, Temperature Corrected --    Comprehensive Metabolic Panel [[id number]]  (Abnormal) Collected:  11/11/19 1216    Specimen:  Blood Updated:  11/11/19 1252     Glucose 111 mg/dL      BUN 11 mg/dL      Creatinine 0.50 mg/dL      Sodium 141 mmol/L      Potassium 4.3 mmol/L      Chloride 102 mmol/L      CO2 30.0 mmol/L      Calcium 9.0 mg/dL      Total Protein 6.8 g/dL      Albumin 3.50 g/dL      ALT (SGPT) 7 U/L      AST (SGOT) 13 U/L      Alkaline Phosphatase 77 U/L      Total Bilirubin 0.7 mg/dL      eGFR Non African Amer 115 mL/min/1.73      Globulin 3.3 gm/dL      A/G Ratio 1.1 g/dL      BUN/Creatinine Ratio 22.0     Anion Gap 9.0 mmol/L     Narrative:       GFR Normal >60  Chronic Kidney Disease <60  Kidney Failure <15    Troponin [[id number]]  (Normal) Collected:  11/11/19 1216    Specimen:  Blood Updated:  11/11/19 1252     Troponin T <0.010 ng/mL     Narrative:       Troponin T Reference Range:  <= 0.03 ng/mL-   Negative for AMI  >0.03 ng/mL-     Abnormal for myocardial necrosis.  Clinicians would have to utilize clinical acumen, EKG, Troponin and serial changes to determine if it is an Acute Myocardial Infarction or myocardial injury due to an underlying chronic condition.     BNP [[id number]]  (Abnormal) Collected:  11/11/19 1216    Specimen:  Blood Updated:  11/11/19 1252     proBNP 1,933.0 pg/mL     Narrative:       Among patients with  dyspnea, NT-proBNP is highly sensitive for the detection of acute congestive heart failure. In addition NT-proBNP of <300 pg/ml effectively rules out acute congestive heart failure with 99% negative predictive value.    Lactic Acid, Plasma [[id number]]  (Normal) Collected:  11/11/19 1216    Specimen:  Blood Updated:  11/11/19 1249     Lactate 1.9 mmol/L     D-dimer, Quantitative [[id number]]  (Abnormal) Collected:  11/11/19 1216    Specimen:  Blood Updated:  11/11/19 1244     D-Dimer, Quantitative 2.37 mg/L (FEU)     Narrative:       Reference Range is 0-0.50 mg/L FEU. However, results <0.50 mg/L FEU tends to rule out DVT or PE. Results >0.50 mg/L FEU are not useful in predicting absence or presence of DVT or PE.    Protime-INR [[id number]]  (Abnormal) Collected:  11/11/19 1216    Specimen:  Blood Updated:  11/11/19 1244     Protime 15.8 Seconds      INR 1.22    Influenza Antigen, Rapid - Swab, Nasopharynx [[id number]]  (Normal) Collected:  11/11/19 1218    Specimen:  Swab from Nasopharynx Updated:  11/11/19 1242     Influenza A Ag, EIA Negative     Influenza B Ag, EIA Negative    Narrative:       Recommend confirmation of negative results by viral culture or molecular assay.    CBC & Differential [[id number]] Collected:  11/11/19 1216    Specimen:  Blood Updated:  11/11/19 1233    Narrative:       The following orders were created for panel order CBC & Differential.  Procedure                               Abnormality         Status                     ---------                               -----------         ------                     CBC Auto Differential[[id number]]        Abnormal            Final result                 Please view results for these tests on the individual orders.    CBC Auto Differential [[id number]]  (Abnormal) Collected:  11/11/19 1216    Specimen:  Blood Updated:  11/11/19 1233     WBC 11.26 10*3/mm3      RBC 2.98 10*6/mm3      Hemoglobin 7.3 g/dL      Hematocrit 24.1 %      MCV 80.9 fL      MCH  24.5 pg      MCHC 30.3 g/dL      RDW 18.1 %      RDW-SD 53.1 fl      MPV 9.0 fL      Platelets 328 10*3/mm3      Neutrophil % 82.6 %      Lymphocyte % 6.9 %      Monocyte % 9.5 %      Eosinophil % 0.3 %      Basophil % 0.3 %      Immature Grans % 0.4 %      Neutrophils, Absolute 9.30 10*3/mm3      Lymphocytes, Absolute 0.78 10*3/mm3      Monocytes, Absolute 1.07 10*3/mm3      Eosinophils, Absolute 0.03 10*3/mm3      Basophils, Absolute 0.03 10*3/mm3      Immature Grans, Absolute 0.05 10*3/mm3      nRBC 0.0 /100 WBC     Red Top [[id number]] Collected:  11/11/19 1216    Specimen:  Blood Updated:  11/11/19 1231        Imaging Results (Last 7 Days)     Procedure Component Value Units Date/Time    XR Chest 1 View [[id number]] Collected:  11/12/19 0829     Updated:  11/12/19 0834    Narrative:       EXAM: XR CHEST 1 VW- - 11/12/2019 8:04 AM CST     HISTORY: vent; J18.9-Pneumonia, unspecified organism; J96.11-Chronic  respiratory failure with hypoxia; J44.9-Chronic obstructive pulmonary  disease, unspecified       COMPARISON: 11/11/2019.      TECHNIQUE:  1 images.  Frontal view of the chest.     FINDINGS:    Endotracheal tube tip projects at the mid trachea. The chin overlies the  right lung apex, which limits evaluation.     Redemonstration of right basilar opacity and effusion. Left lung appears  clear. No pneumothorax demonstrated.     Borderline cardiac silhouette. Upper mediastinum within normal limits  considering rotation. Probable calcified aortic atherosclerosis.     Severe degenerative changes of the bilateral shoulders and spine, not  optimally assessed on this exam.          Impression:       1. Similar right basilar opacity and effusion.  2. Endotracheal tube tip projects in mid trachea.  This report was finalized on 11/12/2019 08:31 by Dr Ange Romero MD.    XR Chest 1 View [[id number]] Collected:  11/11/19 2025     Updated:  11/11/19 2029    Narrative:       XR CHEST 1 VW- 11/11/2019 8:10 PM CST     HISTORY:  ET tube placement.; J18.9-Pneumonia, unspecified organism;  J96.11-Chronic respiratory failure with hypoxia; J44.9-Chronic  obstructive pulmonary disease, unspecified       COMPARISON: Chest exam dated 11/11/2019.     FINDINGS:   New endotracheal tube with tip in good position. Reidentified right  basilar opacification, the result of a moderate layering right pleural  effusion. This appears stable. No new or worsening infiltrate.  Underlying cardiomegaly is stable. Pulmonary vasculature are nondilated.  No pneumothorax.       Impression:       1. Status post intubation. Endotracheal tube is in good position.  2. Reidentified layering right pleural effusion, appearing stable.  3. Underlying cardiomegaly stable.        This report was finalized on 11/11/2019 20:26 by Dr Maciej Richardson, .    XR Chest 1 View [[id number]] Collected:  11/11/19 1348     Updated:  11/11/19 1353    Narrative:       EXAM: XR CHEST 1 VW- - 11/11/2019 12:16 PM CST     HISTORY: cough/ congestion/ hypoxia       COMPARISON: 02/19/2019.      TECHNIQUE:  1 images.  Frontal view of the chest.     FINDINGS:    No pneumothorax. Right basilar opacity and effusion. Left lung clear.  Cardiac and mediastinal silhouette similar compared to prior. Probable  hiatal hernia. Calcified aortic atheromatous sclerosis. Severe  degenerative changes of the bilateral shoulders with superior migration  of the humeral heads.          Impression:       1. Right basilar opacity and effusion. Left lung clear.  2. Large hiatal hernia.  3. Severe degenerative changes of the bilateral shoulders.  This report was finalized on 11/11/2019 13:50 by Dr Ange Romero MD.            Hospital Course:  This 89-year-old white female is admitted with a chief complaint of progressive shortness of breath and cough.  She was brought to the emergency department by her daughter.  The patient resides with her daughter.  The patient states that her symptoms have been present for about 3 days  and that today her symptoms became much worse with worsening shortness of breath noted.  The patient's daughter also notes that sputum production has been scant but is clear to cream color.  The patient has a history of chronic respiratory failure and uses 2 L per nasal cannula at home.  The patient's daughter states that she has given her a breathing treatment at home and she was still saturating only 87% at home per their oximeter.  The patient has a history of COPD and is followed by pulmonology locally.     Work-up in the emergency department shows a chest x-ray with a right basilar opacity and large hiatal hernia.  ABGs show a pH of 7.469, PCO2 within normal limits and a PO2 low at 70.2 on 2 L per nasal cannula.  Influenza AB studies are negative.  CMP is unremarkable.  proBNP slightly elevated at 1933 likely secondary to pulmonary hypertension.  White blood cell count is slightly elevated at 11,300 with a hemoglobin of 7.3.    PLAN:   Admit to the medical surgical floor  Sputum culture, urinary antigens  Rocephin 1 g IV every 24 hours  Doxycycline 100 mg IV every 12 hours  Resume home medications  Supplemental oxygen as needed  Type cross and infuse 1 unit PRBCs     In the night after admission the patient developed severe respiratory distress prior to receiving a packed red blood cell infusion.  The patient's daughter wished all aggressive measures to be performed including intubation.  She was transferred to the intensive care unit and subsequently intubated.  On the following morning pulmonology was consulted for ventilator management.  There were great concerns about the patient's ability to survive given the severity of her aortic stenosis in combination with stage II COPD, pneumonia and hypertension.  Discussion was held with the patient's daughter regarding further treatment course and she decided that we would temporarily continue to aggressively pursue treatment options.  Cardiology was consulted  regarding the patient's severe valvular disease and whether or not the patient was a TAVR candidate.  Cardiology saw the patient and stated that the patient was not a candidate for any type of interventional procedure whether open or transaortic.  The patient experienced an episode of what appeared to be coffee-ground emesis and gastroenterology was consulted.  The patient was subsequently placed on IV Protonix 40 mg every 12 hours and was also provided Lasix IV x1 dose.  She continued on antibiotic therapy.  Pulmonology recommended that the patient be rapidly extubated.  It was noted that the patient had a respiratory panel positive for rhinovirus.  I had another long discussion with the patient's daughter regarding resuscitation status she agreed that her mother would not tolerate CPR or other advanced life support measures at dialysis.  She also noted that she would not be willing to undergo tube feeding.  She seemed to be open to consideration for hospice care.  Palliative care was consulted to discuss further treatment options.  Was separately transferred to the medical surgical floor after successful extubation.  Palliative care evaluated the patient and family decided they did not wish any heroic or overly aggressive care measures.  The patient was placed on morphine and Ativan for air hunger.  Later that evening the patient again experienced severe respiratory distress.  She was gasping for air despite Vapotherm.  The patient's family elected to transition the patient to comfort measures care at that time.  She was placed on a morphine drip on the following morning as she remained symptomatically short of breath.  The patient subsequently comfortably passed away later that same day at 1827 hrs.    Alen Montenegro DO  11/15/19  4:32 PM    Time: Over 30

## 2019-11-15 NOTE — THERAPY DISCHARGE NOTE
Acute Care - Physical Therapy Discharge Summary  [facility]       Patient Name: Heather Burgos  : 10/23/1926  MRN: [id number]    Today's Date: 11/15/2019  Onset of Illness/Injury or Date of Surgery: 19       Referring Physician: Dr. Montenegro      Admit Date: 2019      PT Recommendation and Plan    Visit Dx:    ICD-10-CM ICD-9-CM   1. Pneumonia due to infectious organism, unspecified laterality, unspecified part of lung J18.9 136.9     484.8   2. Chronic respiratory failure with hypoxia (CMS/[facility]) J96.11 518.83     799.02   3. Chronic obstructive pulmonary disease, unspecified COPD type (CMS/[facility]) J44.9 496   4. Decreased activities of daily living (ADL) Z78.9 V49.89   5. Oropharyngeal dysphagia R13.12 787.22   6. Impaired mobility Z74.09 799.89       Outcome Measures     Row Name 19 0900             How much help from another is currently needed...    Putting on and taking off regular lower body clothing?  2  -JJ      Bathing (including washing, rinsing, and drying)  2  -JJ      Toileting (which includes using toilet bed pan or urinal)  2  -JJ      Putting on and taking off regular upper body clothing  3  -JJ      Taking care of personal grooming (such as brushing teeth)  3  -JJ      Eating meals  4  -JJ      AM-PAC 6 Clicks Score (OT)  16  -JJ         Functional Assessment    Outcome Measure Options  AM-PAC 6 Clicks Daily Activity (OT)  -JJ        User Key  (r) = Recorded By, (t) = Taken By, (c) = Cosigned By    Initials Name Provider Type    Alicia Max OTR/L Occupational Therapist              Rehab Goal Summary     Row Name 11/15/19 0757             Bed Mobility Goal 1 (PT)    Activity/Assistive Device (Bed Mobility Goal 1, PT)  sit to supine/supine to sit  -MF      [place] Level/Cues Needed (Bed Mobility Goal 1, PT)  independent  -MF      Time Frame (Bed Mobility Goal 1, PT)  long term goal (LTG);10 days  -      Progress/Outcomes (Bed Mobility Goal 1, PT)  goal not met  -MF          Transfer Goal 1 (PT)    Activity/Assistive Device (Transfer Goal 1, PT)  sit-to-stand/stand-to-sit;bed-to-chair/chair-to-bed;commode, bedside  -MF      [place] Level/Cues Needed (Transfer Goal 1, PT)  supervision required  -MF      Time Frame (Transfer Goal 1, PT)  long term goal (LTG);10 days  -MF      Progress/Outcome (Transfer Goal 1, PT)  goal not met  -MF         Gait Training Goal 1 (PT)    Activity/Assistive Device (Gait Training Goal 1, PT)  gait (walking locomotion);assistive device use;cane, quad;walker, rolling;normalize weight shifts;increase energy conservation  -MF      [place] Level (Gait Training Goal 1, PT)  supervision required  -MF      Distance (Gait Goal 1, PT)  50 ft  -MF      Time Frame (Gait Training Goal 1, PT)  long term goal (LTG);10 days  -MF      Progress/Outcome (Gait Training Goal 1, PT)  goal not met  -MF        User Key  (r) = Recorded By, (t) = Taken By, (c) = Cosigned By    Initials Name Provider Type Discipline     Yoon Lau, PTA Physical Therapy Assistant PT              PT Discharge Summary  Anticipated Discharge Disposition (PT): other (see comments)(pt )  Reason for Discharge: Patient   Outcomes Achieved: Unable to tolerate or actively participate in therapy  Discharge Destination: other (comment)(pt )      Yoon Lau PTA   11/15/2019

## 2019-11-16 LAB
BACTERIA SPEC AEROBE CULT: NORMAL
BACTERIA SPEC AEROBE CULT: NORMAL

## 2019-11-18 ENCOUNTER — CARE COORDINATION (OUTPATIENT)
Dept: CARE COORDINATION | Age: 84
End: 2019-11-18

## 2019-12-13 RX ORDER — PAROXETINE 10 MG/1
TABLET, FILM COATED ORAL
Qty: 90 TABLET | Refills: 3 | OUTPATIENT
Start: 2019-12-13

## 2019-12-13 RX ORDER — ESOMEPRAZOLE MAGNESIUM 40 MG/1
CAPSULE, DELAYED RELEASE ORAL
Qty: 90 CAPSULE | Refills: 3 | OUTPATIENT
Start: 2019-12-13

## 2020-08-25 LAB — HOLD SPECIMEN: NORMAL

## 2022-11-06 NOTE — PLAN OF CARE
Problem: Patient Care Overview  Goal: Plan of Care Review  Outcome: Ongoing (interventions implemented as appropriate)   11/13/19 5451   Coping/Psychosocial   Plan of Care Reviewed With patient   OTHER   Outcome Summary OT eval completed. Pt alert and oriented to self and place only. Very pleasant and eager to work with therapy. Min A for supine to sit at EOB. Max A to don socks seated at EOB. Min A for sit <> stand t/f from EOB and all functional mobility with HHAx2 and supplemental O2. Pt demo's decreased strength, balance, activity tolerance, and B shoulder flexion ROM. Pt would benefit from skilled OT services to address these deficits. Recommend d/c home with 24/7 and HH vs. ALIF vs. SNF.           sensory intact